# Patient Record
Sex: MALE | Race: WHITE | NOT HISPANIC OR LATINO | Employment: FULL TIME | URBAN - METROPOLITAN AREA
[De-identification: names, ages, dates, MRNs, and addresses within clinical notes are randomized per-mention and may not be internally consistent; named-entity substitution may affect disease eponyms.]

---

## 2017-01-04 ENCOUNTER — CLINICAL SUPPORT (OUTPATIENT)
Dept: CARDIAC REHAB | Facility: CLINIC | Age: 72
End: 2017-01-04
Payer: COMMERCIAL

## 2017-01-04 DIAGNOSIS — Z98.61 S/P PTCA (PERCUTANEOUS TRANSLUMINAL CORONARY ANGIOPLASTY): ICD-10-CM

## 2017-01-04 DIAGNOSIS — I25.10 CORONARY ATHEROSCLEROSIS OF UNSPECIFIED TYPE OF VESSEL, NATIVE OR GRAFT: ICD-10-CM

## 2017-01-04 DIAGNOSIS — Z98.61 S/P PTCA (PERCUTANEOUS TRANSLUMINAL CORONARY ANGIOPLASTY): Primary | ICD-10-CM

## 2017-01-04 DIAGNOSIS — Z98.61 POSTSURGICAL PERCUTANEOUS TRANSLUMINAL CORONARY ANGIOPLASTY STATUS: ICD-10-CM

## 2017-01-04 PROCEDURE — 93798 PHYS/QHP OP CAR RHAB W/ECG: CPT | Mod: S$GLB,,, | Performed by: INTERNAL MEDICINE

## 2017-01-06 ENCOUNTER — CLINICAL SUPPORT (OUTPATIENT)
Dept: CARDIAC REHAB | Facility: CLINIC | Age: 72
End: 2017-01-06
Payer: COMMERCIAL

## 2017-01-06 DIAGNOSIS — Z98.61 POSTSURGICAL PERCUTANEOUS TRANSLUMINAL CORONARY ANGIOPLASTY STATUS: ICD-10-CM

## 2017-01-06 DIAGNOSIS — I25.10 CORONARY ATHEROSCLEROSIS OF UNSPECIFIED TYPE OF VESSEL, NATIVE OR GRAFT: ICD-10-CM

## 2017-01-06 PROCEDURE — 93798 PHYS/QHP OP CAR RHAB W/ECG: CPT | Mod: S$GLB,,, | Performed by: INTERNAL MEDICINE

## 2017-01-09 ENCOUNTER — CLINICAL SUPPORT (OUTPATIENT)
Dept: CARDIAC REHAB | Facility: CLINIC | Age: 72
End: 2017-01-09
Payer: COMMERCIAL

## 2017-01-09 DIAGNOSIS — Z98.61 PERCUTANEOUS TRANSLUMINAL CORONARY ANGIOPLASTY STATUS: ICD-10-CM

## 2017-01-09 DIAGNOSIS — I25.10 DISEASE OF CARDIOVASCULAR SYSTEM: ICD-10-CM

## 2017-01-09 PROCEDURE — 93798 PHYS/QHP OP CAR RHAB W/ECG: CPT | Mod: S$GLB,,, | Performed by: INTERNAL MEDICINE

## 2017-01-11 ENCOUNTER — CLINICAL SUPPORT (OUTPATIENT)
Dept: CARDIAC REHAB | Facility: CLINIC | Age: 72
End: 2017-01-11
Payer: COMMERCIAL

## 2017-01-11 DIAGNOSIS — I25.10 CORONARY ATHEROSCLEROSIS OF UNSPECIFIED TYPE OF VESSEL, NATIVE OR GRAFT: ICD-10-CM

## 2017-01-11 DIAGNOSIS — Z98.61 POSTSURGICAL PERCUTANEOUS TRANSLUMINAL CORONARY ANGIOPLASTY STATUS: ICD-10-CM

## 2017-01-11 PROCEDURE — 93798 PHYS/QHP OP CAR RHAB W/ECG: CPT | Mod: S$GLB,,, | Performed by: INTERNAL MEDICINE

## 2017-01-13 ENCOUNTER — CLINICAL SUPPORT (OUTPATIENT)
Dept: CARDIAC REHAB | Facility: CLINIC | Age: 72
End: 2017-01-13
Payer: COMMERCIAL

## 2017-01-13 DIAGNOSIS — I25.10 CORONARY ARTERY DISEASE INVOLVING NATIVE CORONARY ARTERY OF NATIVE HEART WITHOUT ANGINA PECTORIS: ICD-10-CM

## 2017-01-13 DIAGNOSIS — I25.10 CORONARY ATHEROSCLEROSIS OF UNSPECIFIED TYPE OF VESSEL, NATIVE OR GRAFT: ICD-10-CM

## 2017-01-13 DIAGNOSIS — Z98.61 S/P PTCA (PERCUTANEOUS TRANSLUMINAL CORONARY ANGIOPLASTY): ICD-10-CM

## 2017-01-13 PROCEDURE — 93798 PHYS/QHP OP CAR RHAB W/ECG: CPT | Mod: S$GLB,,, | Performed by: INTERNAL MEDICINE

## 2017-01-16 ENCOUNTER — CLINICAL SUPPORT (OUTPATIENT)
Dept: CARDIAC REHAB | Facility: CLINIC | Age: 72
End: 2017-01-16
Payer: COMMERCIAL

## 2017-01-16 DIAGNOSIS — Z98.61 POSTSURGICAL PERCUTANEOUS TRANSLUMINAL CORONARY ANGIOPLASTY STATUS: ICD-10-CM

## 2017-01-16 DIAGNOSIS — I25.10 CORONARY ATHEROSCLEROSIS OF UNSPECIFIED TYPE OF VESSEL, NATIVE OR GRAFT: ICD-10-CM

## 2017-01-16 PROCEDURE — 93798 PHYS/QHP OP CAR RHAB W/ECG: CPT | Mod: S$GLB,,, | Performed by: INTERNAL MEDICINE

## 2017-01-20 ENCOUNTER — CLINICAL SUPPORT (OUTPATIENT)
Dept: CARDIAC REHAB | Facility: CLINIC | Age: 72
End: 2017-01-20
Payer: COMMERCIAL

## 2017-01-20 DIAGNOSIS — I25.10 CORONARY ATHEROSCLEROSIS OF UNSPECIFIED TYPE OF VESSEL, NATIVE OR GRAFT: ICD-10-CM

## 2017-01-20 DIAGNOSIS — Z98.61 S/P PERCUTANEOUS TRANSLUMINAL CORONARY ANGIOPLASTY: ICD-10-CM

## 2017-01-20 PROCEDURE — 93798 PHYS/QHP OP CAR RHAB W/ECG: CPT | Mod: S$GLB,,, | Performed by: INTERNAL MEDICINE

## 2017-01-23 ENCOUNTER — CLINICAL SUPPORT (OUTPATIENT)
Dept: CARDIAC REHAB | Facility: CLINIC | Age: 72
End: 2017-01-23
Payer: COMMERCIAL

## 2017-01-23 DIAGNOSIS — Z98.61 POSTSURGICAL PERCUTANEOUS TRANSLUMINAL CORONARY ANGIOPLASTY STATUS: ICD-10-CM

## 2017-01-23 DIAGNOSIS — I25.10 CORONARY ATHEROSCLEROSIS OF UNSPECIFIED TYPE OF VESSEL, NATIVE OR GRAFT: ICD-10-CM

## 2017-01-23 PROCEDURE — 93798 PHYS/QHP OP CAR RHAB W/ECG: CPT | Mod: S$GLB,,, | Performed by: INTERNAL MEDICINE

## 2017-01-27 ENCOUNTER — CLINICAL SUPPORT (OUTPATIENT)
Dept: CARDIAC REHAB | Facility: CLINIC | Age: 72
End: 2017-01-27
Payer: COMMERCIAL

## 2017-01-27 DIAGNOSIS — Z98.61 POSTSURGICAL PERCUTANEOUS TRANSLUMINAL CORONARY ANGIOPLASTY STATUS: ICD-10-CM

## 2017-01-27 DIAGNOSIS — I25.10 CORONARY ATHEROSCLEROSIS OF UNSPECIFIED TYPE OF VESSEL, NATIVE OR GRAFT: ICD-10-CM

## 2017-01-27 PROCEDURE — 93798 PHYS/QHP OP CAR RHAB W/ECG: CPT | Mod: S$GLB,,, | Performed by: INTERNAL MEDICINE

## 2017-02-10 ENCOUNTER — CLINICAL SUPPORT (OUTPATIENT)
Dept: CARDIAC REHAB | Facility: CLINIC | Age: 72
End: 2017-02-10
Payer: COMMERCIAL

## 2017-02-10 DIAGNOSIS — I25.10 CORONARY ATHEROSCLEROSIS OF UNSPECIFIED TYPE OF VESSEL, NATIVE OR GRAFT: ICD-10-CM

## 2017-02-10 DIAGNOSIS — Z98.61 POSTSURGICAL PERCUTANEOUS TRANSLUMINAL CORONARY ANGIOPLASTY STATUS: ICD-10-CM

## 2017-02-10 PROCEDURE — 93798 PHYS/QHP OP CAR RHAB W/ECG: CPT | Mod: S$GLB,,, | Performed by: INTERNAL MEDICINE

## 2017-02-13 ENCOUNTER — CLINICAL SUPPORT (OUTPATIENT)
Dept: CARDIAC REHAB | Facility: CLINIC | Age: 72
End: 2017-02-13
Payer: COMMERCIAL

## 2017-02-13 DIAGNOSIS — Z98.61 POSTSURGICAL PERCUTANEOUS TRANSLUMINAL CORONARY ANGIOPLASTY STATUS: ICD-10-CM

## 2017-02-13 DIAGNOSIS — I25.10 CORONARY ATHEROSCLEROSIS OF UNSPECIFIED TYPE OF VESSEL, NATIVE OR GRAFT: ICD-10-CM

## 2017-02-13 PROCEDURE — 93798 PHYS/QHP OP CAR RHAB W/ECG: CPT | Mod: S$GLB,,, | Performed by: DIETITIAN, REGISTERED

## 2017-02-15 ENCOUNTER — CLINICAL SUPPORT (OUTPATIENT)
Dept: CARDIAC REHAB | Facility: CLINIC | Age: 72
End: 2017-02-15
Payer: COMMERCIAL

## 2017-02-15 DIAGNOSIS — Z98.61 S/P PERCUTANEOUS TRANSLUMINAL CORONARY ANGIOPLASTY: ICD-10-CM

## 2017-02-15 DIAGNOSIS — I25.10 CORONARY ATHEROSCLEROSIS OF UNSPECIFIED TYPE OF VESSEL, NATIVE OR GRAFT: ICD-10-CM

## 2017-02-15 PROCEDURE — 93798 PHYS/QHP OP CAR RHAB W/ECG: CPT | Mod: S$GLB,,, | Performed by: INTERNAL MEDICINE

## 2017-02-17 ENCOUNTER — CLINICAL SUPPORT (OUTPATIENT)
Dept: CARDIAC REHAB | Facility: CLINIC | Age: 72
End: 2017-02-17
Payer: COMMERCIAL

## 2017-02-17 DIAGNOSIS — Z98.61 POSTSURGICAL PERCUTANEOUS TRANSLUMINAL CORONARY ANGIOPLASTY STATUS: ICD-10-CM

## 2017-02-17 DIAGNOSIS — I25.10 CORONARY ATHEROSCLEROSIS OF UNSPECIFIED TYPE OF VESSEL, NATIVE OR GRAFT: ICD-10-CM

## 2017-02-17 PROCEDURE — 93798 PHYS/QHP OP CAR RHAB W/ECG: CPT | Mod: S$GLB,,, | Performed by: INTERNAL MEDICINE

## 2017-02-20 ENCOUNTER — CLINICAL SUPPORT (OUTPATIENT)
Dept: CARDIAC REHAB | Facility: CLINIC | Age: 72
End: 2017-02-20
Payer: COMMERCIAL

## 2017-02-20 DIAGNOSIS — I25.10 CORONARY ATHEROSCLEROSIS OF UNSPECIFIED TYPE OF VESSEL, NATIVE OR GRAFT: ICD-10-CM

## 2017-02-20 DIAGNOSIS — Z98.61 POSTSURGICAL PERCUTANEOUS TRANSLUMINAL CORONARY ANGIOPLASTY STATUS: ICD-10-CM

## 2017-02-20 DIAGNOSIS — Z98.61 POSTSURGICAL PERCUTANEOUS TRANSLUMINAL CORONARY ANGIOPLASTY STATUS: Primary | ICD-10-CM

## 2017-02-20 PROCEDURE — 93798 PHYS/QHP OP CAR RHAB W/ECG: CPT | Mod: S$GLB,,, | Performed by: INTERNAL MEDICINE

## 2017-02-22 ENCOUNTER — CLINICAL SUPPORT (OUTPATIENT)
Dept: CARDIAC REHAB | Facility: CLINIC | Age: 72
End: 2017-02-22
Payer: COMMERCIAL

## 2017-02-22 DIAGNOSIS — Z98.61 POSTSURGICAL PERCUTANEOUS TRANSLUMINAL CORONARY ANGIOPLASTY STATUS: ICD-10-CM

## 2017-02-22 DIAGNOSIS — I25.10 CORONARY ATHEROSCLEROSIS OF UNSPECIFIED TYPE OF VESSEL, NATIVE OR GRAFT: ICD-10-CM

## 2017-02-22 PROCEDURE — 93798 PHYS/QHP OP CAR RHAB W/ECG: CPT | Mod: S$GLB,,, | Performed by: INTERNAL MEDICINE

## 2017-02-24 ENCOUNTER — CLINICAL SUPPORT (OUTPATIENT)
Dept: CARDIAC REHAB | Facility: CLINIC | Age: 72
End: 2017-02-24
Payer: COMMERCIAL

## 2017-02-24 DIAGNOSIS — I25.10 CORONARY ATHEROSCLEROSIS OF UNSPECIFIED TYPE OF VESSEL, NATIVE OR GRAFT: ICD-10-CM

## 2017-02-24 DIAGNOSIS — Z98.61 POSTSURGICAL PERCUTANEOUS TRANSLUMINAL CORONARY ANGIOPLASTY STATUS: ICD-10-CM

## 2017-02-24 PROCEDURE — 93798 PHYS/QHP OP CAR RHAB W/ECG: CPT | Mod: S$GLB,,, | Performed by: INTERNAL MEDICINE

## 2017-02-27 ENCOUNTER — CLINICAL SUPPORT (OUTPATIENT)
Dept: CARDIAC REHAB | Facility: CLINIC | Age: 72
End: 2017-02-27
Payer: COMMERCIAL

## 2017-02-27 DIAGNOSIS — I25.10 CORONARY ATHEROSCLEROSIS OF UNSPECIFIED TYPE OF VESSEL, NATIVE OR GRAFT: ICD-10-CM

## 2017-02-27 DIAGNOSIS — Z98.61 POSTSURGICAL PERCUTANEOUS TRANSLUMINAL CORONARY ANGIOPLASTY STATUS: ICD-10-CM

## 2017-02-27 PROCEDURE — 93798 PHYS/QHP OP CAR RHAB W/ECG: CPT | Mod: S$GLB,,, | Performed by: INTERNAL MEDICINE

## 2017-03-03 ENCOUNTER — CLINICAL SUPPORT (OUTPATIENT)
Dept: CARDIAC REHAB | Facility: CLINIC | Age: 72
End: 2017-03-03
Payer: COMMERCIAL

## 2017-03-03 DIAGNOSIS — I25.10 CORONARY ATHEROSCLEROSIS OF UNSPECIFIED TYPE OF VESSEL, NATIVE OR GRAFT: ICD-10-CM

## 2017-03-03 DIAGNOSIS — Z98.61 POSTSURGICAL PERCUTANEOUS TRANSLUMINAL CORONARY ANGIOPLASTY STATUS: ICD-10-CM

## 2017-03-03 PROCEDURE — 93798 PHYS/QHP OP CAR RHAB W/ECG: CPT | Mod: S$GLB,,, | Performed by: INTERNAL MEDICINE

## 2017-03-06 ENCOUNTER — CLINICAL SUPPORT (OUTPATIENT)
Dept: CARDIAC REHAB | Facility: CLINIC | Age: 72
End: 2017-03-06
Payer: COMMERCIAL

## 2017-03-06 DIAGNOSIS — I25.10 CORONARY ATHEROSCLEROSIS OF UNSPECIFIED TYPE OF VESSEL, NATIVE OR GRAFT: ICD-10-CM

## 2017-03-06 DIAGNOSIS — Z98.61 S/P PERCUTANEOUS TRANSLUMINAL CORONARY ANGIOPLASTY: ICD-10-CM

## 2017-03-06 PROCEDURE — 93798 PHYS/QHP OP CAR RHAB W/ECG: CPT | Mod: S$GLB,,, | Performed by: INTERNAL MEDICINE

## 2017-03-08 ENCOUNTER — CLINICAL SUPPORT (OUTPATIENT)
Dept: CARDIAC REHAB | Facility: CLINIC | Age: 72
End: 2017-03-08
Payer: COMMERCIAL

## 2017-03-08 DIAGNOSIS — I25.10 CORONARY ATHEROSCLEROSIS OF UNSPECIFIED TYPE OF VESSEL, NATIVE OR GRAFT: ICD-10-CM

## 2017-03-08 DIAGNOSIS — Z98.61 POSTSURGICAL PERCUTANEOUS TRANSLUMINAL CORONARY ANGIOPLASTY STATUS: ICD-10-CM

## 2017-03-08 PROCEDURE — 93798 PHYS/QHP OP CAR RHAB W/ECG: CPT | Mod: S$GLB,,, | Performed by: INTERNAL MEDICINE

## 2017-03-10 ENCOUNTER — CLINICAL SUPPORT (OUTPATIENT)
Dept: CARDIAC REHAB | Facility: CLINIC | Age: 72
End: 2017-03-10
Payer: COMMERCIAL

## 2017-03-10 DIAGNOSIS — I25.10 CORONARY ATHEROSCLEROSIS OF UNSPECIFIED TYPE OF VESSEL, NATIVE OR GRAFT: ICD-10-CM

## 2017-03-10 DIAGNOSIS — Z98.61 POSTSURGICAL PERCUTANEOUS TRANSLUMINAL CORONARY ANGIOPLASTY STATUS: ICD-10-CM

## 2017-03-10 PROCEDURE — 93798 PHYS/QHP OP CAR RHAB W/ECG: CPT | Mod: S$GLB,,, | Performed by: INTERNAL MEDICINE

## 2017-03-13 ENCOUNTER — CLINICAL SUPPORT (OUTPATIENT)
Dept: CARDIAC REHAB | Facility: CLINIC | Age: 72
End: 2017-03-13
Payer: COMMERCIAL

## 2017-03-13 DIAGNOSIS — Z98.61 S/P PERCUTANEOUS TRANSLUMINAL CORONARY ANGIOPLASTY: ICD-10-CM

## 2017-03-13 DIAGNOSIS — I25.10 CORONARY ATHEROSCLEROSIS OF UNSPECIFIED TYPE OF VESSEL, NATIVE OR GRAFT: ICD-10-CM

## 2017-03-13 PROCEDURE — 93798 PHYS/QHP OP CAR RHAB W/ECG: CPT | Mod: S$GLB,,, | Performed by: INTERNAL MEDICINE

## 2017-03-15 ENCOUNTER — HOSPITAL ENCOUNTER (OUTPATIENT)
Dept: CARDIOLOGY | Facility: CLINIC | Age: 72
Discharge: HOME | End: 2017-03-15
Payer: COMMERCIAL

## 2017-03-15 ENCOUNTER — LAB VISIT (OUTPATIENT)
Dept: LAB | Facility: HOSPITAL | Age: 72
End: 2017-03-15
Attending: INTERNAL MEDICINE
Payer: COMMERCIAL

## 2017-03-15 DIAGNOSIS — I25.10 CORONARY ATHEROSCLEROSIS OF UNSPECIFIED TYPE OF VESSEL, NATIVE OR GRAFT: ICD-10-CM

## 2017-03-15 DIAGNOSIS — Z98.61 POSTSURGICAL PERCUTANEOUS TRANSLUMINAL CORONARY ANGIOPLASTY STATUS: ICD-10-CM

## 2017-03-15 LAB
BASOPHILS # BLD AUTO: 0.02 K/UL
BASOPHILS NFR BLD: 0.4 %
CHOLEST/HDLC SERPL: 2.8 {RATIO}
CRP SERPL-MCNC: 2.51 MG/L
DIASTOLIC DYSFUNCTION: NO
DIFFERENTIAL METHOD: ABNORMAL
EOSINOPHIL # BLD AUTO: 0.2 K/UL
EOSINOPHIL NFR BLD: 4.4 %
ERYTHROCYTE [DISTWIDTH] IN BLOOD BY AUTOMATED COUNT: 13 %
GLUCOSE SERPL-MCNC: 88 MG/DL
HCT VFR BLD AUTO: 44.6 %
HDL/CHOLESTEROL RATIO: 36.2 %
HDLC SERPL-MCNC: 34 MG/DL
HDLC SERPL-MCNC: 94 MG/DL
HGB BLD-MCNC: 15.4 G/DL
LDLC SERPL CALC-MCNC: 41 MG/DL
LYMPHOCYTES # BLD AUTO: 1.5 K/UL
LYMPHOCYTES NFR BLD: 32.9 %
MCH RBC QN AUTO: 30.9 PG
MCHC RBC AUTO-ENTMCNC: 34.5 %
MCV RBC AUTO: 90 FL
MONOCYTES # BLD AUTO: 0.5 K/UL
MONOCYTES NFR BLD: 10 %
NEUTROPHILS # BLD AUTO: 2.4 K/UL
NEUTROPHILS NFR BLD: 52.1 %
NONHDLC SERPL-MCNC: 60 MG/DL
PLATELET # BLD AUTO: 129 K/UL
PMV BLD AUTO: 12.2 FL
RBC # BLD AUTO: 4.98 M/UL
TRIGL SERPL-MCNC: 95 MG/DL
WBC # BLD AUTO: 4.59 K/UL

## 2017-03-15 PROCEDURE — 36415 COLL VENOUS BLD VENIPUNCTURE: CPT

## 2017-03-15 PROCEDURE — 85025 COMPLETE CBC W/AUTO DIFF WBC: CPT

## 2017-03-15 PROCEDURE — 94621 CARDIOPULM EXERCISE TESTING: CPT | Mod: S$GLB,,, | Performed by: INTERNAL MEDICINE

## 2017-03-15 PROCEDURE — 80061 LIPID PANEL: CPT

## 2017-03-15 PROCEDURE — 82947 ASSAY GLUCOSE BLOOD QUANT: CPT

## 2017-03-15 PROCEDURE — 86141 C-REACTIVE PROTEIN HS: CPT

## 2017-03-21 ENCOUNTER — CLINICAL SUPPORT (OUTPATIENT)
Dept: CARDIAC REHAB | Facility: CLINIC | Age: 72
End: 2017-03-21
Payer: COMMERCIAL

## 2017-03-21 DIAGNOSIS — Z98.61 POSTSURGICAL PERCUTANEOUS TRANSLUMINAL CORONARY ANGIOPLASTY STATUS: ICD-10-CM

## 2017-03-21 DIAGNOSIS — I25.10 CORONARY ATHEROSCLEROSIS OF UNSPECIFIED TYPE OF VESSEL, NATIVE OR GRAFT: ICD-10-CM

## 2017-03-21 PROCEDURE — 99999 PR PBB SHADOW E&M-EST. PATIENT-LVL I: CPT | Mod: PBBFAC,,,

## 2017-03-21 PROCEDURE — 93798 PHYS/QHP OP CAR RHAB W/ECG: CPT | Mod: S$GLB,,, | Performed by: INTERNAL MEDICINE

## 2017-03-21 NOTE — PROGRESS NOTES
Exercise:  Met with the patient for exit interview.  Entry and exit CPX test results were discussed as well as current and future exercise routine.      On entry, an estimated MET Level of 12.0 and a Peak VO2 of 30.4ml/kg/min (8.7 actual METS) were measured.  Upon exit, the patient achieved 15.0 estimated METS on the CPX test and a Peak VO2 of 34.1ml/kg/min (9.7 actual METS).      The goal of a 30% improvement to 15.6 was not reached although the patient did have a significant improvement in aerobic capacity.    Based on exit CPX test, the target heart range during aerobic exercise for this patient is 108 to 134 bpm.      Exit CPX test results also included weight (210 lb), abdominal girth (42 in), BMI (29.98), and body composition (21.9%).    Patient stated he has continued attending the gym 5 days/week.  He is using the elliptical and stairclimber interchangeably for a total of 45 minutes.  He has plans to include the treadmill in the rotation.  He is also including stretching after each exercise session and resistance training on non-consecutive days.     Aerobic exercise recommendations of 5 to 6 days/week for 30 to 60 minutes, resistance training 2 to 3 non-consecutive days/week and stretching after each session of exercise was reviewed and patient was asked to call if they have and questions in the future.  Patient stated understanding.    Patient was compliant with attendance to the rehab classes.    Minor Hoyt., CEP

## 2017-03-21 NOTE — PROGRESS NOTES
Patient here for exit interview for Phase II Cardiac rehab.    Discussed with patient pre/post labs, stress tests, QOL, risk factors, goals & home exercise prescription.      QOL:  The following changes were noted on Ward & SF36 Questionnaire:  Ward:                 PRE:       POST:              SF36         PRE:        POST:  Anxiety                    1              0                                       136             139                                  Somatic                  2              2                                        Depression             0              0                                        Hostility                   0              0                                       Patient denies having any overwhelming stress & continues to report excellent family support.  He states that he does have some stress with his job. He has been instructed to seek attention via PCP/Psychiatry in the event that circumstances change.  He verbalizes understanding.        RISK FACTORS:    Hypertension-well controlled  Sedentary lifestyle-pt is exercising 5 days per week.  Hyperlipidemia-LDL well controlled, HDL at 34.  Stress/anxiety-see above  Obesity-BMI at 29.    GOALS:  The following goals have been met:  Decrease cholesterol level  Increase exercise tolerance  Increase knowledge of CAD  Decrease blood pressure  Weight loss  Accurate pulse taking  ID & manage personal areas of stress  Stop smoking  Control diabetes by adjusting diet and exercise  Learn more about healthy eating    Home exercise prescription discussed with patient.  Patient has been instructed to follow up with Cardiologist.   Patient is currently a member at [employer].      Holly Dorado RN  Cardiac Rehab Nurse

## 2017-04-24 ENCOUNTER — OFFICE VISIT (OUTPATIENT)
Dept: PULMONOLOGY | Facility: CLINIC | Age: 72
End: 2017-04-24
Payer: COMMERCIAL

## 2017-04-24 VITALS
WEIGHT: 215.81 LBS | HEIGHT: 71 IN | BODY MASS INDEX: 30.21 KG/M2 | OXYGEN SATURATION: 98 % | HEART RATE: 66 BPM | SYSTOLIC BLOOD PRESSURE: 122 MMHG | DIASTOLIC BLOOD PRESSURE: 66 MMHG

## 2017-04-24 DIAGNOSIS — I25.10 CORONARY ARTERY DISEASE INVOLVING NATIVE CORONARY ARTERY OF NATIVE HEART WITHOUT ANGINA PECTORIS: Primary | ICD-10-CM

## 2017-04-24 DIAGNOSIS — R06.09 DOE (DYSPNEA ON EXERTION): Primary | ICD-10-CM

## 2017-04-24 PROCEDURE — 1157F ADVNC CARE PLAN IN RCRD: CPT | Mod: 8P,S$GLB,, | Performed by: INTERNAL MEDICINE

## 2017-04-24 PROCEDURE — 1126F AMNT PAIN NOTED NONE PRSNT: CPT | Mod: S$GLB,,, | Performed by: INTERNAL MEDICINE

## 2017-04-24 PROCEDURE — 99999 PR PBB SHADOW E&M-EST. PATIENT-LVL III: CPT | Mod: PBBFAC,,, | Performed by: INTERNAL MEDICINE

## 2017-04-24 PROCEDURE — 1159F MED LIST DOCD IN RCRD: CPT | Mod: S$GLB,,, | Performed by: INTERNAL MEDICINE

## 2017-04-24 PROCEDURE — 1160F RVW MEDS BY RX/DR IN RCRD: CPT | Mod: S$GLB,,, | Performed by: INTERNAL MEDICINE

## 2017-04-24 PROCEDURE — 99213 OFFICE O/P EST LOW 20 MIN: CPT | Mod: S$GLB,,, | Performed by: INTERNAL MEDICINE

## 2017-04-24 NOTE — PROGRESS NOTES
Subjective:       Patient ID: Patrick Short is a 71 y.o. male.    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 He is doing well, his brain fog resolved with Dr. Mendoza dropped his dose of lipitor from 80 to 40 mg. Sharp as ever. No hx of chest pain. Starting to train  For the [employer] in the Fall.      No flowsheet data found.]  Review of Systems   Cardiovascular:        S/P CAD with multiiple stents. S/P NSTEMI July, 2016.        Objective:      Physical Exam   Constitutional:   Normal            Assessment:       No diagnosis found.    Outpatient Encounter Prescriptions as of 4/24/2017   Medication Sig Dispense Refill    aspirin 81 MG Chew Take 1 tablet (81 mg total) by mouth once daily.  0    atorvastatin (LIPITOR) 40 MG tablet Take 1 tablet (40 mg total) by mouth once daily. 90 tablet 3    nitroGLYCERIN (NITROSTAT) 0.4 MG SL tablet Place 1 tablet (0.4 mg total) under the tongue every 5 (five) minutes as needed for Chest pain. 30 tablet 1    pantoprazole (PROTONIX) 40 MG tablet Take 1 tablet (40 mg total) by mouth once daily. 30 tablet 11    ticagrelor (BRILINTA) 90 mg tablet Take 1 tablet (90 mg total) by mouth 2 (two) times daily. 60 tablet 11     No facility-administered encounter medications on file as of 4/24/2017.      No orders of the defined types were placed in this encounter.    Plan:         S/P Stents for CAD, Seeing Dr. Mendoza next month for periodic check up

## 2017-05-15 ENCOUNTER — LAB VISIT (OUTPATIENT)
Dept: LAB | Facility: HOSPITAL | Age: 72
End: 2017-05-15
Payer: COMMERCIAL

## 2017-05-15 DIAGNOSIS — E78.5 HYPERLIPIDEMIA, UNSPECIFIED HYPERLIPIDEMIA TYPE: ICD-10-CM

## 2017-05-15 DIAGNOSIS — R41.3 MEMORY LOSS: ICD-10-CM

## 2017-05-15 LAB
CHOLEST/HDLC SERPL: 2.7 {RATIO}
HDL/CHOLESTEROL RATIO: 37.3 %
HDLC SERPL-MCNC: 118 MG/DL
HDLC SERPL-MCNC: 44 MG/DL
LDLC SERPL CALC-MCNC: 50.4 MG/DL
NONHDLC SERPL-MCNC: 74 MG/DL
TRIGL SERPL-MCNC: 118 MG/DL

## 2017-05-15 PROCEDURE — 80061 LIPID PANEL: CPT

## 2017-05-15 PROCEDURE — 36415 COLL VENOUS BLD VENIPUNCTURE: CPT

## 2017-05-23 ENCOUNTER — OFFICE VISIT (OUTPATIENT)
Dept: CARDIOLOGY | Facility: CLINIC | Age: 72
End: 2017-05-23
Payer: COMMERCIAL

## 2017-05-23 VITALS
HEART RATE: 55 BPM | SYSTOLIC BLOOD PRESSURE: 144 MMHG | HEIGHT: 70 IN | DIASTOLIC BLOOD PRESSURE: 89 MMHG | WEIGHT: 218.06 LBS | BODY MASS INDEX: 31.22 KG/M2 | OXYGEN SATURATION: 97 %

## 2017-05-23 DIAGNOSIS — E78.5 DYSLIPIDEMIA: ICD-10-CM

## 2017-05-23 DIAGNOSIS — R79.89 ELEVATED TROPONIN: ICD-10-CM

## 2017-05-23 DIAGNOSIS — I25.10 CORONARY ARTERY DISEASE INVOLVING NATIVE CORONARY ARTERY OF NATIVE HEART WITHOUT ANGINA PECTORIS: ICD-10-CM

## 2017-05-23 DIAGNOSIS — I21.4 NSTEMI (NON-ST ELEVATED MYOCARDIAL INFARCTION): Primary | ICD-10-CM

## 2017-05-23 DIAGNOSIS — R57.0 CARDIOGENIC SHOCK: ICD-10-CM

## 2017-05-23 PROCEDURE — 99499 UNLISTED E&M SERVICE: CPT | Mod: S$GLB,,, | Performed by: INTERNAL MEDICINE

## 2017-05-23 PROCEDURE — 99999 PR PBB SHADOW E&M-EST. PATIENT-LVL IV: CPT | Mod: PBBFAC,,, | Performed by: INTERNAL MEDICINE

## 2017-05-23 NOTE — PROGRESS NOTES
"Subjective:    Patient ID:  Patrick Short is a 71 y.o. male who presents for follow-up of Coronary Artery Disease      Coronary Artery Disease   Pertinent negatives include no dizziness, leg swelling, palpitations, shortness of breath or weight gain.     Patient is a 72 yo man with a recent STEMI (7/18/16) s/p GUS x 2 to RCA, s/p 90% LAD PCI (8/29/2016), anomalous Lcx origin, h/o R groin pseudoaneurysm s/p ultrasound-guided thrombin injection, who presents for routine f/u.Patient states doing great, activity increased, working out eating healthy.  Patient had memory loss on 80 of atorvastatin which has resolved, with a dose reduction to 40mg.  No bleeding issues.      Review of Systems   Constitution: Negative for weight gain and weight loss.   HENT: Negative for headaches.    Eyes: Negative for blurred vision, double vision, vision loss in left eye, vision loss in right eye and visual disturbance.   Cardiovascular: Negative for claudication, dyspnea on exertion, irregular heartbeat, leg swelling, near-syncope, orthopnea and palpitations.   Respiratory: Negative for shortness of breath and snoring.    Hematologic/Lymphatic: Does not bruise/bleed easily.   Skin: Negative for rash.   Musculoskeletal: Negative for myalgias.   Gastrointestinal: Negative for bloating, abdominal pain, constipation, diarrhea, hematemesis, hematochezia, melena, nausea and vomiting.   Neurological: Negative for excessive daytime sleepiness, dizziness, light-headedness, loss of balance, numbness and vertigo.   Psychiatric/Behavioral: Negative for altered mental status.        Objective:    BP (!) 144/89 (BP Location: Right arm, Patient Position: Sitting, BP Method: Automatic)   Pulse (!) 55   Ht 5' 10" (1.778 m)   Wt 98.9 kg (218 lb 0.6 oz)   SpO2 97%   BMI 31.28 kg/m²       Physical Exam   Constitutional: He is oriented to person, place, and time. Vital signs are normal. He appears well-developed and well-nourished.   HENT:   Head: " Normocephalic.   Eyes: Conjunctivae and EOM are normal. Pupils are equal, round, and reactive to light.   Neck: Normal range of motion. Neck supple. No hepatojugular reflux and no JVD present. Carotid bruit is not present. No tracheal deviation present. No thyromegaly present.   Cardiovascular: Normal rate, regular rhythm, normal heart sounds, intact distal pulses and normal pulses.    Pulses:       Carotid pulses are 2+ on the right side, and 2+ on the left side.       Radial pulses are 2+ on the right side, and 2+ on the left side.        Femoral pulses are 2+ on the right side, and 2+ on the left side.       Popliteal pulses are 2+ on the right side, and 2+ on the left side.        Dorsalis pedis pulses are 2+ on the right side, and 2+ on the left side.        Posterior tibial pulses are 2+ on the right side, and 2+ on the left side.   Pulmonary/Chest: Effort normal and breath sounds normal. No respiratory distress.   Abdominal: Soft. Bowel sounds are normal. There is no hepatosplenomegaly.   Musculoskeletal: Normal range of motion.   Neurological: He is alert and oriented to person, place, and time. He has normal strength and normal reflexes.   Skin: Skin is warm and dry.   Psychiatric: He has a normal mood and affect.   Nursing note and vitals reviewed.        Assessment:         Patient Active Problem List   Diagnosis    Coronary artery disease involving native coronary artery without angina pectoris - s/p RCA/LAD stents, on asp, brillinta, doing well, recent CPX 12 mets. F/u 1 year with stress echo    NSTEMI (non-ST elevated myocardial infarction)    Femoral artery pseudo-aneurysm, right - resolved    Dyslipidemia - appropriate, lipitor 40    Memory loss - resolved with decrease in statin dose     Plan:         Follow up in 1 year  Stress echo 1 year  Continue lipitor 40  Lipid panel in 1 year   Coninue DAPT indefinitely      Nikole Reid MD  Interventional Cardiology    I have personally taken the  history and examined this patient. I have discussed and agree with the resident's findings and plan as documented in the resident's note.  Sung Mendoza

## 2017-06-23 ENCOUNTER — HOSPITAL ENCOUNTER (OUTPATIENT)
Dept: CARDIOLOGY | Facility: CLINIC | Age: 72
Discharge: HOME | End: 2017-06-23
Payer: COMMERCIAL

## 2017-06-23 DIAGNOSIS — I25.10 CORONARY ARTERY DISEASE, ANGINA PRESENCE UNSPECIFIED, UNSPECIFIED VESSEL OR LESION TYPE, UNSPECIFIED WHETHER NATIVE OR TRANSPLANTED HEART: Primary | ICD-10-CM

## 2017-06-23 DIAGNOSIS — I25.10 CORONARY ARTERY DISEASE, ANGINA PRESENCE UNSPECIFIED, UNSPECIFIED VESSEL OR LESION TYPE, UNSPECIFIED WHETHER NATIVE OR TRANSPLANTED HEART: ICD-10-CM

## 2017-06-23 LAB
DIASTOLIC DYSFUNCTION: NO
RETIRED EF AND QEF - SEE NOTES: 60 (ref 55–65)

## 2017-06-23 PROCEDURE — 93351 STRESS TTE COMPLETE: CPT | Mod: S$GLB,,, | Performed by: INTERNAL MEDICINE

## 2017-06-23 PROCEDURE — 93321 DOPPLER ECHO F-UP/LMTD STD: CPT | Mod: S$GLB,,, | Performed by: INTERNAL MEDICINE

## 2017-08-17 DIAGNOSIS — I25.10 CORONARY ARTERY DISEASE, ANGINA PRESENCE UNSPECIFIED, UNSPECIFIED VESSEL OR LESION TYPE, UNSPECIFIED WHETHER NATIVE OR TRANSPLANTED HEART: Primary | ICD-10-CM

## 2017-08-18 RX ORDER — PANTOPRAZOLE SODIUM 40 MG/1
40 TABLET, DELAYED RELEASE ORAL DAILY
Qty: 30 TABLET | Refills: 11 | Status: SHIPPED | OUTPATIENT
Start: 2017-08-18 | End: 2018-08-28 | Stop reason: SDUPTHER

## 2017-08-31 RX ORDER — NITROGLYCERIN 0.4 MG/1
0.4 TABLET SUBLINGUAL EVERY 5 MIN PRN
Qty: 30 TABLET | Refills: 1 | Status: SHIPPED | OUTPATIENT
Start: 2017-08-31 | End: 2019-11-19 | Stop reason: SDUPTHER

## 2017-12-17 RX ORDER — ATORVASTATIN CALCIUM 40 MG/1
40 TABLET, FILM COATED ORAL DAILY
Qty: 90 TABLET | Refills: 3 | Status: SHIPPED | OUTPATIENT
Start: 2017-12-17 | End: 2018-12-19 | Stop reason: SDUPTHER

## 2018-05-15 ENCOUNTER — OFFICE VISIT (OUTPATIENT)
Dept: PULMONOLOGY | Facility: CLINIC | Age: 73
End: 2018-05-15
Payer: COMMERCIAL

## 2018-05-15 ENCOUNTER — OFFICE VISIT (OUTPATIENT)
Dept: CARDIOLOGY | Facility: CLINIC | Age: 73
End: 2018-05-15
Payer: COMMERCIAL

## 2018-05-15 ENCOUNTER — HOSPITAL ENCOUNTER (OUTPATIENT)
Dept: CARDIOLOGY | Facility: CLINIC | Age: 73
Discharge: HOME | End: 2018-05-15
Attending: INTERNAL MEDICINE
Payer: COMMERCIAL

## 2018-05-15 ENCOUNTER — CLINICAL SUPPORT (OUTPATIENT)
Dept: INTERNAL MEDICINE | Facility: CLINIC | Age: 73
End: 2018-05-15
Payer: COMMERCIAL

## 2018-05-15 VITALS
SYSTOLIC BLOOD PRESSURE: 154 MMHG | HEART RATE: 58 BPM | WEIGHT: 224 LBS | BODY MASS INDEX: 32.07 KG/M2 | HEIGHT: 70 IN | DIASTOLIC BLOOD PRESSURE: 84 MMHG

## 2018-05-15 VITALS
BODY MASS INDEX: 31.82 KG/M2 | OXYGEN SATURATION: 94 % | SYSTOLIC BLOOD PRESSURE: 152 MMHG | DIASTOLIC BLOOD PRESSURE: 86 MMHG | HEART RATE: 60 BPM | WEIGHT: 227.31 LBS | HEIGHT: 71 IN

## 2018-05-15 DIAGNOSIS — Z00.00 ANNUAL PHYSICAL EXAM: Primary | ICD-10-CM

## 2018-05-15 DIAGNOSIS — Z00.00 ROUTINE GENERAL MEDICAL EXAMINATION AT A HEALTH CARE FACILITY: Primary | ICD-10-CM

## 2018-05-15 DIAGNOSIS — I25.10 CORONARY ARTERY DISEASE, ANGINA PRESENCE UNSPECIFIED, UNSPECIFIED VESSEL OR LESION TYPE, UNSPECIFIED WHETHER NATIVE OR TRANSPLANTED HEART: ICD-10-CM

## 2018-05-15 DIAGNOSIS — R40.0 DAYTIME SLEEPINESS: ICD-10-CM

## 2018-05-15 DIAGNOSIS — E78.5 DYSLIPIDEMIA: ICD-10-CM

## 2018-05-15 DIAGNOSIS — I25.10 CORONARY ARTERY DISEASE INVOLVING NATIVE CORONARY ARTERY OF NATIVE HEART WITHOUT ANGINA PECTORIS: Primary | ICD-10-CM

## 2018-05-15 LAB
ALBUMIN SERPL BCP-MCNC: 3.8 G/DL
ALP SERPL-CCNC: 83 U/L
ALT SERPL W/O P-5'-P-CCNC: 38 U/L
ANION GAP SERPL CALC-SCNC: 8 MMOL/L
AORTIC VALVE REGURGITATION: NORMAL
AST SERPL-CCNC: 33 U/L
BILIRUB SERPL-MCNC: 0.9 MG/DL
BUN SERPL-MCNC: 21 MG/DL
CALCIUM SERPL-MCNC: 9.2 MG/DL
CHLORIDE SERPL-SCNC: 106 MMOL/L
CHOLEST SERPL-MCNC: 106 MG/DL
CHOLEST/HDLC SERPL: 3.1 {RATIO}
CO2 SERPL-SCNC: 27 MMOL/L
COMPLEXED PSA SERPL-MCNC: 0.83 NG/ML
CREAT SERPL-MCNC: 1 MG/DL
DIASTOLIC DYSFUNCTION: NO
ERYTHROCYTE [DISTWIDTH] IN BLOOD BY AUTOMATED COUNT: 12.8 %
EST. GFR  (AFRICAN AMERICAN): >60 ML/MIN/1.73 M^2
EST. GFR  (NON AFRICAN AMERICAN): >60 ML/MIN/1.73 M^2
ESTIMATED AVG GLUCOSE: 103 MG/DL
GLUCOSE SERPL-MCNC: 102 MG/DL
HBA1C MFR BLD HPLC: 5.2 %
HCT VFR BLD AUTO: 44.2 %
HDLC SERPL-MCNC: 34 MG/DL
HDLC SERPL: 32.1 %
HGB BLD-MCNC: 15.4 G/DL
LDLC SERPL CALC-MCNC: 51.6 MG/DL
MCH RBC QN AUTO: 31.4 PG
MCHC RBC AUTO-ENTMCNC: 34.8 G/DL
MCV RBC AUTO: 90 FL
NONHDLC SERPL-MCNC: 72 MG/DL
PLATELET # BLD AUTO: 143 K/UL
PMV BLD AUTO: 11.8 FL
POTASSIUM SERPL-SCNC: 4 MMOL/L
PROT SERPL-MCNC: 6.9 G/DL
RBC # BLD AUTO: 4.91 M/UL
RETIRED EF AND QEF - SEE NOTES: 65 (ref 55–65)
SODIUM SERPL-SCNC: 141 MMOL/L
TRIGL SERPL-MCNC: 102 MG/DL
TSH SERPL DL<=0.005 MIU/L-ACNC: 3.42 UIU/ML
WBC # BLD AUTO: 5.4 K/UL

## 2018-05-15 PROCEDURE — 84443 ASSAY THYROID STIM HORMONE: CPT

## 2018-05-15 PROCEDURE — 85027 COMPLETE CBC AUTOMATED: CPT

## 2018-05-15 PROCEDURE — 84153 ASSAY OF PSA TOTAL: CPT

## 2018-05-15 PROCEDURE — 80061 LIPID PANEL: CPT

## 2018-05-15 PROCEDURE — 99397 PER PM REEVAL EST PAT 65+ YR: CPT | Mod: S$GLB,,, | Performed by: INTERNAL MEDICINE

## 2018-05-15 PROCEDURE — 99215 OFFICE O/P EST HI 40 MIN: CPT | Mod: S$GLB,,, | Performed by: INTERNAL MEDICINE

## 2018-05-15 PROCEDURE — 99999 PR PBB SHADOW E&M-EST. PATIENT-LVL III: CPT | Mod: PBBFAC,,, | Performed by: INTERNAL MEDICINE

## 2018-05-15 PROCEDURE — 93351 STRESS TTE COMPLETE: CPT | Mod: S$GLB,,, | Performed by: INTERNAL MEDICINE

## 2018-05-15 PROCEDURE — 93321 DOPPLER ECHO F-UP/LMTD STD: CPT | Mod: S$GLB,,, | Performed by: INTERNAL MEDICINE

## 2018-05-15 PROCEDURE — 83036 HEMOGLOBIN GLYCOSYLATED A1C: CPT

## 2018-05-15 PROCEDURE — 80053 COMPREHEN METABOLIC PANEL: CPT

## 2018-05-15 RX ORDER — CETIRIZINE HYDROCHLORIDE 10 MG/1
10 TABLET ORAL DAILY
COMMUNITY
End: 2022-12-08

## 2018-05-15 RX ORDER — TRIAMCINOLONE ACETONIDE 1 MG/G
OINTMENT TOPICAL
Refills: 1 | COMMUNITY
Start: 2018-03-29

## 2018-05-15 RX ORDER — CLOBETASOL PROPIONATE 0.5 MG/G
CREAM TOPICAL
Refills: 1 | COMMUNITY
Start: 2018-03-29

## 2018-05-15 RX ORDER — DESONIDE 0.5 MG/G
CREAM TOPICAL
Refills: 1 | COMMUNITY
Start: 2018-03-29

## 2018-05-15 NOTE — ASSESSMENT & PLAN NOTE
- Hx of STEMI (7/18/2016) s/p GUS x 2 to RCA, s/p 90% LAD PCI (8/29/2016), anomalous LCx origin  - No exertional chest pain or shortness of breath  - KRYSTA with non-specific failure to augment of inferior wall, but no clinical symptoms of angina  - Continue ASA, Brilinta, Lipitor  - KRYSTA in 1 year

## 2018-05-15 NOTE — ASSESSMENT & PLAN NOTE
- Has been snoring at night with fatigue and sleepiness in the afternoon  - TSH normal this AM  - Will refer for a sleep study to assess for SHALINI

## 2018-05-15 NOTE — PROGRESS NOTES
Interventional Cardiology Clinic Note  Reason for Visit: CAD, 1 year follow-up    HPI:   Mr. Short is a 73 y/o gentleman with PMHx CAD who presented with a STEMI (7/18/2016) s/p GUS x 2 to RCA, s/p 90% LAD PCI (8/29/2016), anomalous LCx origin, h/o R groin pseudoaneurysm s/p ultrasound-guided thrombin injection, who presents for routine 1 year f/u.    Patient has been doing well since last year, is able to exercise at the gym 1 hour daily either walking on a treadmill or climbing a stairmaster with no chest pain or shortness of breath. He denies any palpitations, claudication, PND, orthopnea, or LE edema. He has gained about 10 pounds since last year, and notes that he feels more tired in the afternoons compared to before. He does say he snores at night, and has never had a previous sleep study.    ROS:    Constitution: Negative for fever, chills, weight loss or gain.   HENT: Negative for sore throat, rhinorrhea, or headache.  Eyes: Negative for blurred or double vision.   Cardiovascular: See above  Pulmonary: Negative for SOB   Gastrointestinal: Negative for abdominal pain, nausea, vomiting, or diarrhea.   : Negative for dysuria.   Neurological: Negative for focal weakness or sensory changes.  PMH:     Past Medical History:   Diagnosis Date    CAD (coronary artery disease) 7/26/2016    Heart block     MI (myocardial infarction)     Reflux      Past Surgical History:   Procedure Laterality Date    CARDIAC CATHETERIZATION      EYE SURGERY Left 02/2017     Allergies:   Review of patient's allergies indicates:  No Known Allergies  Medications:     Current Outpatient Prescriptions on File Prior to Visit   Medication Sig Dispense Refill    aspirin 81 MG Chew Take 1 tablet (81 mg total) by mouth once daily.  0    atorvastatin (LIPITOR) 40 MG tablet Take 1 tablet (40 mg total) by mouth once daily. 90 tablet 3    pantoprazole (PROTONIX) 40 MG tablet Take 1 tablet (40 mg total) by mouth once daily. 30 tablet  "11    ticagrelor (BRILINTA) 90 mg tablet Take 1 tablet (90 mg total) by mouth 2 (two) times daily. 60 tablet 11    nitroGLYCERIN (NITROSTAT) 0.4 MG SL tablet Place 1 tablet (0.4 mg total) under the tongue every 5 (five) minutes as needed for Chest pain. 30 tablet 1     No current facility-administered medications on file prior to visit.      Social History:     Social History   Substance Use Topics    Smoking status: Never Smoker    Smokeless tobacco: Never Used    Alcohol use No      Comment: twice a month/beer wine     Family History:     Family History   Problem Relation Age of Onset    Hypertension Mother     Emphysema Father     Coronary artery disease Father      Physical Exam:   BP (!) 152/86 (BP Location: Right arm, Patient Position: Sitting, BP Method: Large (Automatic))   Pulse 60   Ht 5' 10.5" (1.791 m)   Wt 103.1 kg (227 lb 4.7 oz)   SpO2 (!) 94%   BMI 32.15 kg/m²      Constitutional: NAD, conversant  HEENT: Sclera anicteric, PERRLA, EOMI  Neck: No JVD, no carotid bruits  CV: RRR, no murmur, normal S1/S2  Pulm: CTAB, no wheezes, rales, or ronchi  GI: Abdomen soft, NTND, +BS  Extremities: No LE edema, warm and well perfused  Skin: No ecchymosis, erythema, or ulcers  Psych: AOx3, appropriate affect  Neuro: No gross deficits    Labs:     Lab Results   Component Value Date     05/15/2018    K 4.0 05/15/2018     05/15/2018    CO2 27 05/15/2018    BUN 21 05/15/2018    CREATININE 1.0 05/15/2018    ANIONGAP 8 05/15/2018     Lab Results   Component Value Date    HGBA1C 5.2 05/15/2018     Lab Results   Component Value Date     (H) 07/24/2016    BNP 46 07/18/2016    BNP 64 07/18/2016    Lab Results   Component Value Date    WBC 5.40 05/15/2018    HGB 15.4 05/15/2018    HCT 44.2 05/15/2018     (L) 05/15/2018    GRAN 2.4 03/15/2017    GRAN 52.1 03/15/2017     Lab Results   Component Value Date    CHOL 106 (L) 05/15/2018    HDL 34 (L) 05/15/2018    LDLCALC 51.6 (L) 05/15/2018    " TRIG 102 05/15/2018          Imaging:   KRYSTA (5/15/2018)  CONCLUSIONS     1 - Normal left ventricular systolic function (EF 60-65%).     2 - No wall motion abnormalities.     3 - Normal left ventricular diastolic function.     4 - Normal right ventricular systolic function .     5 - Trivial aortic regurgitation.     6 - Doppler if clinically indicated.     Non-specific finding demonstrating failure to augment involving the basal inferior wall which may be associated with ischemia; clinical correlation required.     EF   Date Value Ref Range Status   05/15/2018 65 55 - 65    06/23/2017 60 55 - 65    07/19/2016 60 55 - 65      Assessment and Plan:   Daytime sleepiness  - Has been snoring at night with fatigue and sleepiness in the afternoon  - TSH normal this AM  - Will refer for a sleep study to assess for SHALINI    CAD (coronary artery disease)  - Hx of STEMI (7/18/2016) s/p GUS x 2 to RCA, s/p 90% LAD PCI (8/29/2016), anomalous LCx origin  - No exertional chest pain or shortness of breath  - KRYSTA with non-specific failure to augment of inferior wall, but no clinical symptoms of angina  - Continue ASA, Brilinta, Lipitor  - KRYSTA in 1 year    Dyslipidemia  - Lipid panel today shows Total 106, LDL 52, HDL 34,   - Continue Lipitor 40 mg Daily    Signed:  Shaheen Saunders MD  Cardiology Fellow, PGY-5  Pager: [phone]  5/15/2018 9:52 AM    I have personally taken the history and examined this patient. I have discussed and agree with the resident's findings and plan as documented in the resident's note.  Have asked Dr. Zelaya to review prior year stress test to compare base of inferior wall findings. Will communicate results to patient.   Follow up in 1 year with stress echo and sleep study now.   Sung Mendoza

## 2018-05-15 NOTE — PROGRESS NOTES
Subjective:       Patient ID: Patrick Short is a 72 y.o. male.    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 He had an emergency cardiac stent July 18, 2016. He has done well, several weeks after the initial event he had catherization  of the left main and remains asymptomatic and functions at a high work level. He participated in the [place] this past fall. His Stress 2-D ECHO today is normal, has been reviewed by Dr. Mendoza. He exercise strenously 3-4 X a week.  Will get a Sleep Study, he is a candidate for obstructive sleep apnea.   Review of Systems   Constitutional: Negative.    HENT: Negative.    Eyes: Negative.    Respiratory: Negative.    Cardiovascular: Negative.         CAD  S/P Two stents  For STEMI with complete heart block and cardiogenic shock.   Gastrointestinal: Negative.    Genitourinary: Negative.    Musculoskeletal: Negative.    Skin: Negative.    Neurological: Negative.    Psychiatric/Behavioral: Negative.    All other systems reviewed and are negative.      Objective:      Physical Exam   Constitutional: He is oriented to person, place, and time. He appears well-developed and well-nourished.   HENT:   Head: Normocephalic and atraumatic.   Right Ear: External ear normal.   Left Ear: External ear normal.   Eyes: Conjunctivae and EOM are normal. Pupils are equal, round, and reactive to light.   Neck: Normal range of motion. Neck supple.   Cardiovascular: Normal rate, regular rhythm and normal heart sounds.    Pulmonary/Chest: Effort normal and breath sounds normal.   Peak flow 650 l/min   Abdominal: Soft. Bowel sounds are normal.   Musculoskeletal: Normal range of motion.   Neurological: He is alert and oriented to person, place, and time. He has normal reflexes.   Skin: Skin is warm and dry.   Psychiatric: He has a normal mood and affect. His behavior is normal. Judgment and thought content  normal.       Assessment:       No diagnosis found.    Plan:           Labs: All parameters are normal, cholesterol:106. Stress 2-D Echo is normal with normal augmentation with exercise. Will arrange  For a sleep study.

## 2018-05-15 NOTE — LETTER
May 15, 2018    Patrick Short  [address] [address]             [facility]  [address]  [facility] [address]  Phone: [phone] Dear Tushar,    Thank you for allowing me to serve you and perform your Executive Health exam on 5/15/2018. This letter will serve as a brief summary of the physical findings and laboratory/studies performed and recommendations at this time. Today's assessment is essentially normal. You have come a long way from July of 2016. I will schedule a sleep study and would encourage you to lose 10 pounds.         If you have any questions or concerns, please don't hesitate to call.    Sincerely,        Darian Lloyd MD

## 2018-08-28 DIAGNOSIS — I25.10 CORONARY ARTERY DISEASE, ANGINA PRESENCE UNSPECIFIED, UNSPECIFIED VESSEL OR LESION TYPE, UNSPECIFIED WHETHER NATIVE OR TRANSPLANTED HEART: ICD-10-CM

## 2018-08-28 RX ORDER — PANTOPRAZOLE SODIUM 40 MG/1
TABLET, DELAYED RELEASE ORAL
Qty: 30 TABLET | Refills: 11 | Status: SHIPPED | OUTPATIENT
Start: 2018-08-28 | End: 2019-07-10 | Stop reason: SDUPTHER

## 2018-08-28 RX ORDER — TICAGRELOR 90 MG/1
TABLET ORAL
Qty: 60 TABLET | Refills: 0 | Status: SHIPPED | OUTPATIENT
Start: 2018-08-28 | End: 2018-10-16 | Stop reason: SDUPTHER

## 2018-10-16 DIAGNOSIS — I25.10 CORONARY ARTERY DISEASE, ANGINA PRESENCE UNSPECIFIED, UNSPECIFIED VESSEL OR LESION TYPE, UNSPECIFIED WHETHER NATIVE OR TRANSPLANTED HEART: ICD-10-CM

## 2018-10-16 RX ORDER — TICAGRELOR 90 MG/1
TABLET ORAL
Qty: 60 TABLET | Refills: 0 | Status: SHIPPED | OUTPATIENT
Start: 2018-10-16 | End: 2018-10-16 | Stop reason: SDUPTHER

## 2018-12-19 RX ORDER — ATORVASTATIN CALCIUM 40 MG/1
40 TABLET, FILM COATED ORAL DAILY
Qty: 90 TABLET | Refills: 3 | Status: SHIPPED | OUTPATIENT
Start: 2018-12-19 | End: 2019-12-05 | Stop reason: SDUPTHER

## 2018-12-28 ENCOUNTER — OFFICE VISIT (OUTPATIENT)
Dept: PULMONOLOGY | Facility: CLINIC | Age: 73
End: 2018-12-28
Payer: COMMERCIAL

## 2018-12-28 ENCOUNTER — CLINICAL SUPPORT (OUTPATIENT)
Dept: INTERNAL MEDICINE | Facility: CLINIC | Age: 73
End: 2018-12-28
Payer: COMMERCIAL

## 2018-12-28 VITALS
HEIGHT: 70 IN | OXYGEN SATURATION: 96 % | WEIGHT: 230.38 LBS | DIASTOLIC BLOOD PRESSURE: 74 MMHG | HEART RATE: 66 BPM | SYSTOLIC BLOOD PRESSURE: 126 MMHG | BODY MASS INDEX: 32.98 KG/M2

## 2018-12-28 DIAGNOSIS — R29.898 WEAKNESS OF BOTH LOWER EXTREMITIES: Primary | ICD-10-CM

## 2018-12-28 DIAGNOSIS — Z00.00 ROUTINE GENERAL MEDICAL EXAMINATION AT A HEALTH CARE FACILITY: Primary | ICD-10-CM

## 2018-12-28 DIAGNOSIS — S09.90XA HEAD TRAUMA, INITIAL ENCOUNTER: ICD-10-CM

## 2018-12-28 LAB
ALBUMIN SERPL BCP-MCNC: 4 G/DL
ALP SERPL-CCNC: 84 U/L
ALT SERPL W/O P-5'-P-CCNC: 36 U/L
ANION GAP SERPL CALC-SCNC: 6 MMOL/L
AST SERPL-CCNC: 32 U/L
BASOPHILS # BLD AUTO: 0.03 K/UL
BASOPHILS NFR BLD: 0.5 %
BILIRUB SERPL-MCNC: 1.1 MG/DL
BUN SERPL-MCNC: 17 MG/DL
CALCIUM SERPL-MCNC: 10 MG/DL
CHLORIDE SERPL-SCNC: 104 MMOL/L
CO2 SERPL-SCNC: 31 MMOL/L
CREAT SERPL-MCNC: 0.9 MG/DL
CRP SERPL-MCNC: 3.76 MG/L
DIFFERENTIAL METHOD: NORMAL
EOSINOPHIL # BLD AUTO: 0.1 K/UL
EOSINOPHIL NFR BLD: 1.4 %
ERYTHROCYTE [DISTWIDTH] IN BLOOD BY AUTOMATED COUNT: 12.6 %
ERYTHROCYTE [SEDIMENTATION RATE] IN BLOOD BY WESTERGREN METHOD: <2 MM/HR
EST. GFR  (AFRICAN AMERICAN): >60 ML/MIN/1.73 M^2
EST. GFR  (NON AFRICAN AMERICAN): >60 ML/MIN/1.73 M^2
FOLATE SERPL-MCNC: 14.4 NG/ML
GLUCOSE SERPL-MCNC: 102 MG/DL
HCT VFR BLD AUTO: 47.9 %
HGB BLD-MCNC: 16 G/DL
IMM GRANULOCYTES # BLD AUTO: 0.02 K/UL
IMM GRANULOCYTES NFR BLD AUTO: 0.3 %
LYMPHOCYTES # BLD AUTO: 1.6 K/UL
LYMPHOCYTES NFR BLD: 25.9 %
MCH RBC QN AUTO: 30.7 PG
MCHC RBC AUTO-ENTMCNC: 33.4 G/DL
MCV RBC AUTO: 92 FL
MONOCYTES # BLD AUTO: 0.5 K/UL
MONOCYTES NFR BLD: 8.3 %
NEUTROPHILS # BLD AUTO: 4 K/UL
NEUTROPHILS NFR BLD: 63.6 %
NRBC BLD-RTO: 0 /100 WBC
PLATELET # BLD AUTO: 177 K/UL
PMV BLD AUTO: 11.6 FL
POTASSIUM SERPL-SCNC: 4.2 MMOL/L
PROT SERPL-MCNC: 7.4 G/DL
RBC # BLD AUTO: 5.21 M/UL
SODIUM SERPL-SCNC: 141 MMOL/L
TSH SERPL DL<=0.005 MIU/L-ACNC: 2.51 UIU/ML
VIT B12 SERPL-MCNC: 449 PG/ML
WBC # BLD AUTO: 6.3 K/UL

## 2018-12-28 PROCEDURE — 82746 ASSAY OF FOLIC ACID SERUM: CPT

## 2018-12-28 PROCEDURE — 85652 RBC SED RATE AUTOMATED: CPT

## 2018-12-28 PROCEDURE — 85025 COMPLETE CBC W/AUTO DIFF WBC: CPT

## 2018-12-28 PROCEDURE — 1101F PT FALLS ASSESS-DOCD LE1/YR: CPT | Mod: CPTII,S$GLB,, | Performed by: INTERNAL MEDICINE

## 2018-12-28 PROCEDURE — 82607 VITAMIN B-12: CPT

## 2018-12-28 PROCEDURE — 86141 C-REACTIVE PROTEIN HS: CPT

## 2018-12-28 PROCEDURE — 84443 ASSAY THYROID STIM HORMONE: CPT

## 2018-12-28 PROCEDURE — 99999 PR PBB SHADOW E&M-EST. PATIENT-LVL III: CPT | Mod: PBBFAC,,, | Performed by: INTERNAL MEDICINE

## 2018-12-28 PROCEDURE — 80053 COMPREHEN METABOLIC PANEL: CPT

## 2018-12-28 PROCEDURE — 99215 OFFICE O/P EST HI 40 MIN: CPT | Mod: S$GLB,,, | Performed by: INTERNAL MEDICINE

## 2018-12-28 NOTE — PROGRESS NOTES
Subjective:       Patient ID: Patrick Short is a 73 y.o. male.    Chief Complaint: Hernia    HPI  Review of Systems    Objective:      Physical Exam    Assessment:       1. Weakness of both lower extremities        Plan:       ***

## 2018-12-28 NOTE — PROGRESS NOTES
Subjective:      Patient ID: Patrick Short is a 73 y.o. male.    Chief Complaint: Hernia    HPI  72 yo male well known to me comes for assessment of a possible hernia. He called me at home over the holidays and wanted me to check him out for an inguinal hernia. When he arrived I noted that he had a shuffle to his gait and began a neurological history, He notes that getting out of his car, he must just his hands to help get his right leg in and out. He is a bit slower in cognitive efforts. Recently took a questionnaire and he had dropped 10 points in performing the assessment that when he did it several years ago. No head aches, slight change in vision. Walks with a slight shuffle and unsteadiness,, does not fall.  Heel to toe test is normal and Rhomberg is negative. He tells me that In July when he was in [place] for the running of the [place] he fell in the shower, a very small contained space and hit the back of his head.  Several months ago he had severe occipital headaches which hadve  subsided and never returned. His gross neurological function is normal but has  muscle weakness in his lower extremities is weaker R>L.  I am concern about PMR, will get labs if normal will get a CT of the head to rule out a chronic subdural from the fall, he has been on blood thinner since his cardiac event: July, 2016. If negative, he needs to see neurology. Something is not right.      No flowsheet data found.  Review of Systems   HENT: Negative.    Eyes: Negative.    Respiratory: Negative.    Cardiovascular: Negative.         CAD with stents since July 2016, on blood thinner   Genitourinary: Negative.    Musculoskeletal: Negative.    Skin: Negative.    Gastrointestinal: Negative.    Neurological: Positive for weakness and headaches.        Shuffling gait   Psychiatric/Behavioral: Negative.      Objective:     Physical Exam   Constitutional: He is oriented to person, place, and time. He appears well-developed and well-nourished.    HENT:   Head: Normocephalic and atraumatic.   Right Ear: External ear normal.   Left Ear: External ear normal.   Eyes: Conjunctivae and EOM are normal. Pupils are equal, round, and reactive to light.   Neck: Normal range of motion. Neck supple.   Cardiovascular: Normal rate, regular rhythm and normal heart sounds.   Pulmonary/Chest: Effort normal and breath sounds normal.   Abdominal: Soft. Bowel sounds are normal.   No inguinal hernia on either side.    Musculoskeletal: Normal range of motion.   Neurological: He is alert and oriented to person, place, and time. He has normal reflexes.   Weakness with muscle testing in lower extremities    Past hx of occipital headaches.    Shuffling gait   Skin: Skin is warm and dry.   Psychiatric: He has a normal mood and affect. His behavior is normal. Judgment and thought content normal.       Assessment:     1. Weakness of both lower extremities      Outpatient Encounter Medications as of 12/28/2018   Medication Sig Dispense Refill    aspirin 81 MG Chew Take 1 tablet (81 mg total) by mouth once daily.  0    atorvastatin (LIPITOR) 40 MG tablet TAKE 1 TABLET (40 MG TOTAL) BY MOUTH ONCE DAILY. 90 tablet 3    cetirizine (ZYRTEC) 10 MG tablet Take 10 mg by mouth once daily.      clobetasol (TEMOVATE) 0.05 % cream APPLY TO AFFECTED AREA ON HAND TWICE A DAY AS NEEDED FOR RASH  1    desonide (DESOWEN) 0.05 % cream APPLY TO AFFECTED AREA ON FACE TWICE A DAY AS NEEDED FOR SCALE  1    nitroGLYCERIN (NITROSTAT) 0.4 MG SL tablet Place 1 tablet (0.4 mg total) under the tongue every 5 (five) minutes as needed for Chest pain. 30 tablet 1    pantoprazole (PROTONIX) 40 MG tablet TAKE 1 TABLET BY MOUTH EVERY DAY 30 tablet 11    ticagrelor (BRILINTA) 90 mg tablet Take 1 tablet (90 mg total) by mouth 2 (two) times daily. 180 tablet 3    triamcinolone acetonide 0.1% (KENALOG) 0.1 % ointment APPLY TO AFFECTED AREA ON AXILLAS TWICE A DAY AS NEEDED FOR ITCH/RASH  1     No  facility-administered encounter medications on file as of 12/28/2018.        Plan:   Labs: All parameters are normal. No evidence  Of vitamin deficiency or PMR Will proceed with CT of the head Encounter exceeded an hour. His physical exam for a hernia is negative.  Problem List Items Addressed This Visit     None      Visit Diagnoses     Weakness of both lower extremities    -  Primary

## 2019-01-03 ENCOUNTER — OFFICE VISIT (OUTPATIENT)
Dept: PULMONOLOGY | Facility: CLINIC | Age: 74
End: 2019-01-03
Payer: COMMERCIAL

## 2019-01-03 ENCOUNTER — HOSPITAL ENCOUNTER (OUTPATIENT)
Dept: RADIOLOGY | Facility: HOSPITAL | Age: 74
Discharge: HOME | End: 2019-01-03
Attending: INTERNAL MEDICINE
Payer: COMMERCIAL

## 2019-01-03 VITALS
OXYGEN SATURATION: 100 % | HEIGHT: 71 IN | HEART RATE: 71 BPM | BODY MASS INDEX: 31.5 KG/M2 | SYSTOLIC BLOOD PRESSURE: 138 MMHG | DIASTOLIC BLOOD PRESSURE: 76 MMHG | WEIGHT: 225 LBS

## 2019-01-03 DIAGNOSIS — R26.9 GAIT ABNORMALITY: Primary | ICD-10-CM

## 2019-01-03 DIAGNOSIS — R29.898 LEG WEAKNESS, BILATERAL: Primary | ICD-10-CM

## 2019-01-03 DIAGNOSIS — S09.90XA HEAD TRAUMA, INITIAL ENCOUNTER: ICD-10-CM

## 2019-01-03 DIAGNOSIS — R29.898 WEAKNESS OF BOTH LEGS: ICD-10-CM

## 2019-01-03 PROCEDURE — 99999 PR PBB SHADOW E&M-EST. PATIENT-LVL III: ICD-10-PCS | Mod: PBBFAC,,, | Performed by: INTERNAL MEDICINE

## 2019-01-03 PROCEDURE — 99214 OFFICE O/P EST MOD 30 MIN: CPT | Mod: S$GLB,,, | Performed by: INTERNAL MEDICINE

## 2019-01-03 PROCEDURE — 70450 CT HEAD WITHOUT CONTRAST: ICD-10-PCS | Mod: 26,,, | Performed by: RADIOLOGY

## 2019-01-03 PROCEDURE — 99999 PR PBB SHADOW E&M-EST. PATIENT-LVL III: CPT | Mod: PBBFAC,,, | Performed by: INTERNAL MEDICINE

## 2019-01-03 PROCEDURE — 99214 PR OFFICE/OUTPT VISIT, EST, LEVL IV, 30-39 MIN: ICD-10-PCS | Mod: S$GLB,,, | Performed by: INTERNAL MEDICINE

## 2019-01-03 PROCEDURE — 70450 CT HEAD/BRAIN W/O DYE: CPT | Mod: 26,,, | Performed by: RADIOLOGY

## 2019-01-03 PROCEDURE — 70450 CT HEAD/BRAIN W/O DYE: CPT | Mod: TC

## 2019-01-03 PROCEDURE — 1101F PR PT FALLS ASSESS DOC 0-1 FALLS W/OUT INJ PAST YR: ICD-10-PCS | Mod: CPTII,S$GLB,, | Performed by: INTERNAL MEDICINE

## 2019-01-03 PROCEDURE — 1101F PT FALLS ASSESS-DOCD LE1/YR: CPT | Mod: CPTII,S$GLB,, | Performed by: INTERNAL MEDICINE

## 2019-01-03 NOTE — PROGRESS NOTES
Subjective:      Patient ID: Patrick Short is a 73 y.o. male.    Chief Complaint: No chief complaint on file.    HPIHead CT is negative for any evidence of masses or subdural hematoma. Will get appt in neurology and see if they can better delineate his issue.      No flowsheet data found.  Review of Systems  Objective:     Physical Exam    Assessment:     No diagnosis found.  Outpatient Encounter Medications as of 1/3/2019   Medication Sig Dispense Refill    aspirin 81 MG Chew Take 1 tablet (81 mg total) by mouth once daily.  0    atorvastatin (LIPITOR) 40 MG tablet TAKE 1 TABLET (40 MG TOTAL) BY MOUTH ONCE DAILY. 90 tablet 3    cetirizine (ZYRTEC) 10 MG tablet Take 10 mg by mouth once daily.      clobetasol (TEMOVATE) 0.05 % cream APPLY TO AFFECTED AREA ON HAND TWICE A DAY AS NEEDED FOR RASH  1    desonide (DESOWEN) 0.05 % cream APPLY TO AFFECTED AREA ON FACE TWICE A DAY AS NEEDED FOR SCALE  1    nitroGLYCERIN (NITROSTAT) 0.4 MG SL tablet Place 1 tablet (0.4 mg total) under the tongue every 5 (five) minutes as needed for Chest pain. 30 tablet 1    pantoprazole (PROTONIX) 40 MG tablet TAKE 1 TABLET BY MOUTH EVERY DAY 30 tablet 11    ticagrelor (BRILINTA) 90 mg tablet Take 1 tablet (90 mg total) by mouth 2 (two) times daily. 180 tablet 3    triamcinolone acetonide 0.1% (KENALOG) 0.1 % ointment APPLY TO AFFECTED AREA ON AXILLAS TWICE A DAY AS NEEDED FOR ITCH/RASH  1     No facility-administered encounter medications on file as of 1/3/2019.        Plan:   No lab evidence of PMR and the CT of the head today is negative for any masses or subdural hematoma. Will get consult with neurololgy  Problem List Items Addressed This Visit     None

## 2019-01-17 ENCOUNTER — TELEPHONE (OUTPATIENT)
Dept: NEUROLOGY | Facility: CLINIC | Age: 74
End: 2019-01-17

## 2019-01-18 NOTE — TELEPHONE ENCOUNTER
----- Message from Nela Leon sent at 1/17/2019  1:55 PM CST -----  Contact: Pt request via [facility]  Message     ----- Message from [facility], System Message sent at 1/15/2019  3:50 PM CST -----    Appointment Request From: Patrick Short    With Provider: Rafael Freedman    Preferred Date Range: 1/21/2019 - 1/25/2019    Preferred Times: Any time    Reason for visit: referred by Dr Neva Lloyd; leg problem    Comments:  Available afternoon any day

## 2019-01-21 ENCOUNTER — TELEPHONE (OUTPATIENT)
Dept: NEUROLOGY | Facility: CLINIC | Age: 74
End: 2019-01-21

## 2019-01-21 NOTE — TELEPHONE ENCOUNTER
----- Message from Barb Newman sent at 1/21/2019 10:05 AM CST -----  Contact: pt  Pt would like to be called back regarding to getting a new patient appt     Pt can be reached at [phone]

## 2019-02-13 ENCOUNTER — OFFICE VISIT (OUTPATIENT)
Dept: NEUROLOGY | Facility: CLINIC | Age: 74
End: 2019-02-13
Payer: COMMERCIAL

## 2019-02-13 VITALS
HEIGHT: 71 IN | WEIGHT: 233.94 LBS | DIASTOLIC BLOOD PRESSURE: 86 MMHG | BODY MASS INDEX: 32.75 KG/M2 | HEART RATE: 80 BPM | SYSTOLIC BLOOD PRESSURE: 140 MMHG

## 2019-02-13 DIAGNOSIS — G95.9 MYELOPATHY: Primary | ICD-10-CM

## 2019-02-13 DIAGNOSIS — R53.1 WEAKNESS: ICD-10-CM

## 2019-02-13 PROCEDURE — 1101F PR PT FALLS ASSESS DOC 0-1 FALLS W/OUT INJ PAST YR: ICD-10-PCS | Mod: CPTII,S$GLB,, | Performed by: PSYCHIATRY & NEUROLOGY

## 2019-02-13 PROCEDURE — 99999 PR PBB SHADOW E&M-EST. PATIENT-LVL IV: ICD-10-PCS | Mod: PBBFAC,,, | Performed by: PSYCHIATRY & NEUROLOGY

## 2019-02-13 PROCEDURE — 1101F PT FALLS ASSESS-DOCD LE1/YR: CPT | Mod: CPTII,S$GLB,, | Performed by: PSYCHIATRY & NEUROLOGY

## 2019-02-13 PROCEDURE — 99205 OFFICE O/P NEW HI 60 MIN: CPT | Mod: S$GLB,,, | Performed by: PSYCHIATRY & NEUROLOGY

## 2019-02-13 PROCEDURE — 99999 PR PBB SHADOW E&M-EST. PATIENT-LVL IV: CPT | Mod: PBBFAC,,, | Performed by: PSYCHIATRY & NEUROLOGY

## 2019-02-13 PROCEDURE — 99205 PR OFFICE/OUTPT VISIT, NEW, LEVL V, 60-74 MIN: ICD-10-PCS | Mod: S$GLB,,, | Performed by: PSYCHIATRY & NEUROLOGY

## 2019-02-13 NOTE — LETTER
February 19, 2019      Darian Lloyd MD  [address]  [facility] [address]           [facility]  [address]  [facility] [address]  Phone: [phone]  Fax: [phone]          Patient: Patrick Short   MR Number: [id number]   YOB: 1945   Date of Visit: 2/13/2019       Dear Dr. Darian Lloyd:    Thank you for referring Patrick Short to me for evaluation. Attached you will find relevant portions of my assessment and plan of care.    If you have questions, please do not hesitate to call me. I look forward to following Patrick Short along with you.    Sincerely,    Rafael Freedman MD    Enclosure  CC:  No Recipients    If you would like to receive this communication electronically, please contact [email or web address] or [phone] to request more information on [employer] access.    For providers and/or their staff who would like to refer a patient to [facility], please contact us through our one-stop-shop provider referral line, [facility], at [phone].    If you feel you have received this communication in error or would no longer like to receive these types of communications, please e-mail [email or web address]

## 2019-02-13 NOTE — PROGRESS NOTES
Name: Patrick Short  MRN: [id number]   CSN: [id number]      Date: 02/13/2019    Referring physician:  Darian Lloyd MD  [address] [place] [address]    Chief Complaint / Interval History: No chief complaint on file.      History of Present Illness (HPI):  74 yo retired  of [employer]  Here for evaluation of gait changes. Referred by Dr. Lloyd.  Usual state of health until July 2016.  Was starting to run and train, had midline chest discomfort, had EKG/stress test, cardiac arrest in ER, treated with stent in July, then another on 8/29/16 radial access and a GUS was placed in mid LAD stent.      He participated in the [place] this past fall. His Stress 2-D ECHO today is normal, has been reviewed by Dr. Mendoza. He exercise strenously 3-4 X a week.    In July, he fell in the shower while in [employer], running with bulls!  Hit the back of his head, immediate stiff neck, and now has loss of mobility in the christina and reduced range of motion.3 months ago, he started to notice stiffness in his R leg predominantly.  Still exercising, but feels like he is walking like a penguin.  Using arms to get up.  Trouble getting off the floor.  Has not fallen to the ground, jason much more staggering.  Not feeling weak in arms.  Some tingling in his hands B after sleeping, but otherwise no description of Lhermitte's.      Vision is OK generally, but he rarely sees straight lines looking like a kink in them. Had torn R retina in the past.    7 years ago, was hang gliding in [place] and had a rough landing, pulled his legs up and hit his butt.  Got better with time and gabapentin.    Nonmotor/Premotor ROS:  Hyposmia (HENT)?Yes - terrible for years, but wife notices more now  RBD/sleep issues (Constitutional)?No  Depression/anxiety (Psychiatric)?No  Fatigue (Constitutional)?Yes - a little more than in the past, daytime naps  Constipation (GI)?No  Urinary issues ()?No  Sexual dysfunction  ()?No  Orthostasis (Cardiovascular)?No  Leg swelling (Cardiovascular)? No  Falls (Musculoskeletal)?Yes  Cognitive impairment (Neurologic)?No  Psychoses (Psychiatric)?No  Pain/Paresthesia (Neurologic)?Yes  Visual changes (Eyes)?Yes  Moles / skin changes (Skin)?No  Stridor / SOB (Pulm)?No  Bruising (Heme)?No    Past Medical History: The patient  has a past medical history of CAD (coronary artery disease) (7/26/2016), Heart block, MI (myocardial infarction), and Reflux.    Social History: The patient  reports that  has never smoked. he has never used smokeless tobacco. He reports that he does not drink alcohol or use drugs.    Family History: Their family history includes Coronary artery disease in his father; Emphysema in his father; Hypertension in his mother.    Allergies: Patient has no known allergies.     Meds:   Current Outpatient Medications on File Prior to Visit   Medication Sig Dispense Refill    aspirin 81 MG Chew Take 1 tablet (81 mg total) by mouth once daily.  0    atorvastatin (LIPITOR) 40 MG tablet TAKE 1 TABLET (40 MG TOTAL) BY MOUTH ONCE DAILY. 90 tablet 3    cetirizine (ZYRTEC) 10 MG tablet Take 10 mg by mouth once daily.      clobetasol (TEMOVATE) 0.05 % cream APPLY TO AFFECTED AREA ON HAND TWICE A DAY AS NEEDED FOR RASH  1    desonide (DESOWEN) 0.05 % cream APPLY TO AFFECTED AREA ON FACE TWICE A DAY AS NEEDED FOR SCALE  1    nitroGLYCERIN (NITROSTAT) 0.4 MG SL tablet Place 1 tablet (0.4 mg total) under the tongue every 5 (five) minutes as needed for Chest pain. 30 tablet 1    pantoprazole (PROTONIX) 40 MG tablet TAKE 1 TABLET BY MOUTH EVERY DAY 30 tablet 11    ticagrelor (BRILINTA) 90 mg tablet Take 1 tablet (90 mg total) by mouth 2 (two) times daily. 180 tablet 3    triamcinolone acetonide 0.1% (KENALOG) 0.1 % ointment APPLY TO AFFECTED AREA ON AXILLAS TWICE A DAY AS NEEDED FOR ITCH/RASH  1     No current facility-administered medications on file prior to visit.        Exam:  BP (!)  "140/86   Pulse 80   Ht 5' 11" (1.803 m)   Wt 106.1 kg (233 lb 14.5 oz)   BMI 32.62 kg/m²       Constitutional  Well-developed, well-nourished, appears stated age   Ophthalmoscopic  No papilledema with no hemorrhages or exudates bilaterally   Cardiovascular  Radial pulses 2+ and symmetric, no LE edema bilaterally   Neurological    * Mental status  MOCA deferred     - Orientation  Oriented to person, place, time, and situation     - Memory   Intact recent and remote     - Attention/concentration  Attentive, vigilant during exam     - Language  Naming & repetition intact, +2-step commands     - Fund of knowledge  Aware of current events     - Executive  Well-organized thoughts     - Other     * Cranial nerves       - CN II  PERRL, visual fields full to confrontation     - CN III, IV, VI  Extraocular movements full, normal pursuits and saccades     - CN V  Sensation V1 - V3 intact     - CN VII  Face strong and symmetric bilaterally     - CN VIII  Hearing intact bilaterally     - CN IX, X  Palate raises midline and symmetric     - CN XI  SCM and trapezius 5/5 bilaterally     - CN XII  Tongue midline   * Motor  Muscle bulk normal, strength 5/5 throughout except R hip flexion 4+/5   * Sensory   MIld dim sensation legs length dependent, no level   * Coordination  No dysmetria with finger-to-nose or heel-to-shin   * Gait  See below.   * Deep tendon reflexes  1+ and symmetric throughout except 2+ left leg   Babinski downgoing bilaterally   * Specialized movement exam  No hypophonic speech.    No facial masking.  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   No cogwheel rigidity.     No bradykinesia.   No tremor with rest, posture, kinesis, or intention.    No other dystonia, chorea, athetosis, myoclonus, or tics.   No motor impersistence.   Normal-based gait.   No shortened stride length.   No abnormal arm swing.     No postural instability.      Medical Record Review:  - Lab Results:  Clinical Support on 12/28/2018 "   Component Date Value Ref Range Status    Sodium 12/28/2018 141  136 - 145 mmol/L Final    Potassium 12/28/2018 4.2  3.5 - 5.1 mmol/L Final    Chloride 12/28/2018 104  95 - 110 mmol/L Final    CO2 12/28/2018 31* 23 - 29 mmol/L Final    Glucose 12/28/2018 102  70 - 110 mg/dL Final    BUN, Bld 12/28/2018 17  8 - 23 mg/dL Final    Creatinine 12/28/2018 0.9  0.5 - 1.4 mg/dL Final    Calcium 12/28/2018 10.0  8.7 - 10.5 mg/dL Final    Total Protein 12/28/2018 7.4  6.0 - 8.4 g/dL Final    Albumin 12/28/2018 4.0  3.5 - 5.2 g/dL Final    Total Bilirubin 12/28/2018 1.1* 0.1 - 1.0 mg/dL Final    Alkaline Phosphatase 12/28/2018 84  55 - 135 U/L Final    AST 12/28/2018 32  10 - 40 U/L Final    ALT 12/28/2018 36  10 - 44 U/L Final    Anion Gap 12/28/2018 6* 8 - 16 mmol/L Final    eGFR if African American 12/28/2018 >60.0  >60 mL/min/1.73 m^2 Final    eGFR if non African American 12/28/2018 >60.0  >60 mL/min/1.73 m^2 Final    TSH 12/28/2018 2.506  0.400 - 4.000 uIU/mL Final    WBC 12/28/2018 6.30  3.90 - 12.70 K/uL Final    RBC 12/28/2018 5.21  4.60 - 6.20 M/uL Final    Hemoglobin 12/28/2018 16.0  14.0 - 18.0 g/dL Final    Hematocrit 12/28/2018 47.9  40.0 - 54.0 % Final    MCV 12/28/2018 92  82 - 98 fL Final    MCH 12/28/2018 30.7  27.0 - 31.0 pg Final    MCHC 12/28/2018 33.4  32.0 - 36.0 g/dL Final    RDW 12/28/2018 12.6  11.5 - 14.5 % Final    Platelets 12/28/2018 177  150 - 350 K/uL Final    MPV 12/28/2018 11.6  9.2 - 12.9 fL Final    Immature Granulocytes 12/28/2018 0.3  0.0 - 0.5 % Final    Gran # (ANC) 12/28/2018 4.0  1.8 - 7.7 K/uL Final    Immature Grans (Abs) 12/28/2018 0.02  0.00 - 0.04 K/uL Final    Lymph # 12/28/2018 1.6  1.0 - 4.8 K/uL Final    Mono # 12/28/2018 0.5  0.3 - 1.0 K/uL Final    Eos # 12/28/2018 0.1  0.0 - 0.5 K/uL Final    Baso # 12/28/2018 0.03  0.00 - 0.20 K/uL Final    nRBC 12/28/2018 0  0 /100 WBC Final    Gran% 12/28/2018 63.6  38.0 - 73.0 % Final    Lymph%  12/28/2018 25.9  18.0 - 48.0 % Final    Mono% 12/28/2018 8.3  4.0 - 15.0 % Final    Eosinophil% 12/28/2018 1.4  0.0 - 8.0 % Final    Basophil% 12/28/2018 0.5  0.0 - 1.9 % Final    Differential Method 12/28/2018 Automated   Final    Sed Rate 12/28/2018 <2  0 - 23 mm/Hr Final    Vitamin B-12 12/28/2018 449  210 - 950 pg/mL Final    Folate 12/28/2018 14.4  4.0 - 24.0 ng/mL Final    CRP, High Sensitivity 12/28/2018 3.76* 0.00 - 3.19 mg/L Final       - Independent visualization and interpretation of radiological images:  * Modest hypoattenuation in supratentorial white matter, likely chronic microvascular ischemic change.  This is not unusually advanced for age.  No parenchymal mass, hemorrhage, edema or major vascular distribution infarct.            - Independent review of consultant's notes: [place]      Diagnoses:    Most consistent with a myeloneuropathy - reversible causes and structural lesions of the neuroaxis to be tested now.      Medical Decision Making:    Problem List Items Addressed This Visit     None      Visit Diagnoses     Myelopathy    -  Primary    Relevant Orders    MRI Cervical Spine Without Contrast (Completed)    MRI Thoracic Spine Without Contrast (Completed)    MRI Lumbar Spine Without Contrast (Completed)    Vitamin B1    Methylmalonic acid, serum    Homocysteine, serum    CK    Weakness        Relevant Orders    Vitamin B1    Methylmalonic acid, serum    Homocysteine, serum    CK              Rafael Freedman MD, MPH  Division of Movement and Memory Disorders  [facility]  [phone]

## 2019-02-18 ENCOUNTER — HOSPITAL ENCOUNTER (OUTPATIENT)
Dept: RADIOLOGY | Facility: HOSPITAL | Age: 74
Discharge: HOME | End: 2019-02-18
Attending: PSYCHIATRY & NEUROLOGY
Payer: COMMERCIAL

## 2019-02-18 ENCOUNTER — CLINICAL SUPPORT (OUTPATIENT)
Dept: INTERNAL MEDICINE | Facility: CLINIC | Age: 74
End: 2019-02-18
Payer: COMMERCIAL

## 2019-02-18 DIAGNOSIS — G95.9 MYELOPATHY: ICD-10-CM

## 2019-02-18 DIAGNOSIS — Z00.00 ROUTINE GENERAL MEDICAL EXAMINATION AT A HEALTH CARE FACILITY: Primary | ICD-10-CM

## 2019-02-18 LAB
CK SERPL-CCNC: 152 U/L
HCYS SERPL-SCNC: 9.9 UMOL/L

## 2019-02-18 PROCEDURE — 72141 MRI NECK SPINE W/O DYE: CPT | Mod: 26,,, | Performed by: RADIOLOGY

## 2019-02-18 PROCEDURE — 83921 ORGANIC ACID SINGLE QUANT: CPT

## 2019-02-18 PROCEDURE — 83090 ASSAY OF HOMOCYSTEINE: CPT

## 2019-02-18 PROCEDURE — 72146 MRI CHEST SPINE W/O DYE: CPT | Mod: 26,,, | Performed by: RADIOLOGY

## 2019-02-18 PROCEDURE — 72148 MRI LUMBAR SPINE W/O DYE: CPT | Mod: 26,,, | Performed by: RADIOLOGY

## 2019-02-18 PROCEDURE — 72141 MRI NECK SPINE W/O DYE: CPT | Mod: TC

## 2019-02-18 PROCEDURE — 72141 MRI CERVICAL SPINE WITHOUT CONTRAST: ICD-10-PCS | Mod: 26,,, | Performed by: RADIOLOGY

## 2019-02-18 PROCEDURE — 84425 ASSAY OF VITAMIN B-1: CPT

## 2019-02-18 PROCEDURE — 72148 MRI LUMBAR SPINE W/O DYE: CPT | Mod: TC

## 2019-02-18 PROCEDURE — 82550 ASSAY OF CK (CPK): CPT

## 2019-02-18 PROCEDURE — 36415 COLL VENOUS BLD VENIPUNCTURE: CPT

## 2019-02-18 PROCEDURE — 72146 MRI THORACIC SPINE WITHOUT CONTRAST: ICD-10-PCS | Mod: 26,,, | Performed by: RADIOLOGY

## 2019-02-18 PROCEDURE — 72146 MRI CHEST SPINE W/O DYE: CPT | Mod: TC

## 2019-02-18 PROCEDURE — 72148 MRI LUMBAR SPINE WITHOUT CONTRAST: ICD-10-PCS | Mod: 26,,, | Performed by: RADIOLOGY

## 2019-02-21 LAB — METHYLMALONATE SERPL-SCNC: 0.17 UMOL/L

## 2019-02-24 LAB — VIT B1 BLD-MCNC: 67 UG/L (ref 38–122)

## 2019-03-11 ENCOUNTER — OFFICE VISIT (OUTPATIENT)
Dept: NEUROLOGY | Facility: CLINIC | Age: 74
End: 2019-03-11
Payer: COMMERCIAL

## 2019-03-11 VITALS
SYSTOLIC BLOOD PRESSURE: 137 MMHG | HEIGHT: 71 IN | HEART RATE: 85 BPM | WEIGHT: 229.06 LBS | BODY MASS INDEX: 32.07 KG/M2 | DIASTOLIC BLOOD PRESSURE: 84 MMHG

## 2019-03-11 DIAGNOSIS — M54.10 RADICULOPATHY, UNSPECIFIED SPINAL REGION: Primary | ICD-10-CM

## 2019-03-11 PROCEDURE — 99215 PR OFFICE/OUTPT VISIT, EST, LEVL V, 40-54 MIN: ICD-10-PCS | Mod: S$GLB,,, | Performed by: PSYCHIATRY & NEUROLOGY

## 2019-03-11 PROCEDURE — 1101F PT FALLS ASSESS-DOCD LE1/YR: CPT | Mod: CPTII,S$GLB,, | Performed by: PSYCHIATRY & NEUROLOGY

## 2019-03-11 PROCEDURE — 99999 PR PBB SHADOW E&M-EST. PATIENT-LVL III: ICD-10-PCS | Mod: PBBFAC,,, | Performed by: PSYCHIATRY & NEUROLOGY

## 2019-03-11 PROCEDURE — 99215 OFFICE O/P EST HI 40 MIN: CPT | Mod: S$GLB,,, | Performed by: PSYCHIATRY & NEUROLOGY

## 2019-03-11 PROCEDURE — 99999 PR PBB SHADOW E&M-EST. PATIENT-LVL III: CPT | Mod: PBBFAC,,, | Performed by: PSYCHIATRY & NEUROLOGY

## 2019-03-11 PROCEDURE — 1101F PR PT FALLS ASSESS DOC 0-1 FALLS W/OUT INJ PAST YR: ICD-10-PCS | Mod: CPTII,S$GLB,, | Performed by: PSYCHIATRY & NEUROLOGY

## 2019-03-11 NOTE — PROGRESS NOTES
Name: Patrick Short  MRN: [id number]   CSN: [id number]      Date: 03/11/2019    Referring physician:  Rafael Freedman MD  [address] [place] [address]    Chief Complaint / Interval History: No chief complaint on file.  - busy month, was [place] in [facility]  - wife not here, made mention of a couple of things  - some forgetfulness, mostly with names and some attention issues  - rarely the left arm may fall asleep, perhaps more at night  - more nighttime pain in the legs and back - more aching - ice helps at night - wakes up and he is feeling better  - has a deposit on the retina, causing a domed appearance (presumed cause of some optical distortion)  -       History of Present Illness (HPI):  74 yo retired  of [employer]  Here for evaluation of gait changes. Referred by Dr. Lloyd.  Usual state of health until July 2016.  Was starting to run and train, had midline chest discomfort, had EKG/stress test, cardiac arrest in ER, treated with stent in July, then another on 8/29/16 radial access and a GUS was placed in mid LAD stent.      He participated in the [place] this past fall. His Stress 2-D ECHO today is normal, has been reviewed by Dr. Mendoza. He exercise strenously 3-4 X a week.    In July, he fell in the shower while in [place], running with bulls!  Hit the back of his head, immediate stiff neck, and now has loss of mobility in the christina and reduced range of motion.3 months ago, he started to notice stiffness in his R leg predominantly.  Still exercising, but feels like he is walking like a penguin.  Using arms to get up.  Trouble getting off the floor.  Has not fallen to the ground, jason much more staggering.  Not feeling weak in arms.  Some tingling in his hands B after sleeping, but otherwise no description of Lhermitte's.      Vision is OK generally, but he rarely sees straight lines looking like a kink in them. Had torn R retina in the past.    7 years ago, was  hang gliding in [place] and had a rough landing, pulled his legs up and hit his butt.  Got better with time and gabapentin.    Nonmotor/Premotor ROS:  Hyposmia (HENT)?Yes - terrible for years, but wife notices more now  RBD/sleep issues (Constitutional)?No  Depression/anxiety (Psychiatric)?No  Fatigue (Constitutional)?Yes - a little more than in the past, daytime naps  Constipation (GI)?No  Urinary issues ()?No  Sexual dysfunction ()?No  Orthostasis (Cardiovascular)?No  Leg swelling (Cardiovascular)? No  Falls (Musculoskeletal)?Yes  Cognitive impairment (Neurologic)?No  Psychoses (Psychiatric)?No  Pain/Paresthesia (Neurologic)?Yes  Visual changes (Eyes)?Yes  Moles / skin changes (Skin)?No  Stridor / SOB (Pulm)?No  Bruising (Heme)?No    Past Medical History: The patient  has a past medical history of CAD (coronary artery disease) (7/26/2016), Heart block, MI (myocardial infarction), and Reflux.    Social History: The patient  reports that  has never smoked. he has never used smokeless tobacco. He reports that he does not drink alcohol or use drugs.    Family History: Their family history includes Coronary artery disease in his father; Emphysema in his father; Hypertension in his mother.    Allergies: Patient has no known allergies.     Meds:   Current Outpatient Medications on File Prior to Visit   Medication Sig Dispense Refill    atorvastatin (LIPITOR) 40 MG tablet TAKE 1 TABLET (40 MG TOTAL) BY MOUTH ONCE DAILY. 90 tablet 3    cetirizine (ZYRTEC) 10 MG tablet Take 10 mg by mouth once daily.      pantoprazole (PROTONIX) 40 MG tablet TAKE 1 TABLET BY MOUTH EVERY DAY 30 tablet 11    ticagrelor (BRILINTA) 90 mg tablet Take 1 tablet (90 mg total) by mouth 2 (two) times daily. 180 tablet 3    aspirin 81 MG Chew Take 1 tablet (81 mg total) by mouth once daily.  0    clobetasol (TEMOVATE) 0.05 % cream APPLY TO AFFECTED AREA ON HAND TWICE A DAY AS NEEDED FOR RASH  1    desonide (DESOWEN) 0.05 % cream APPLY TO  "AFFECTED AREA ON FACE TWICE A DAY AS NEEDED FOR SCALE  1    nitroGLYCERIN (NITROSTAT) 0.4 MG SL tablet Place 1 tablet (0.4 mg total) under the tongue every 5 (five) minutes as needed for Chest pain. 30 tablet 1    triamcinolone acetonide 0.1% (KENALOG) 0.1 % ointment APPLY TO AFFECTED AREA ON AXILLAS TWICE A DAY AS NEEDED FOR ITCH/RASH  1     No current facility-administered medications on file prior to visit.        Exam:  /84   Pulse 85   Ht 5' 11" (1.803 m)   Wt 103.9 kg (229 lb 0.9 oz)   BMI 31.95 kg/m²       Constitutional  Well-developed, well-nourished, appears stated age   Ophthalmoscopic  No papilledema with no hemorrhages or exudates bilaterally   Cardiovascular  Radial pulses 2+ and symmetric, no LE edema bilaterally   Neurological    * Mental status  MOCA deferred     - Orientation  Oriented to person, place, time, and situation     - Memory   Intact recent and remote     - Attention/concentration  Attentive, vigilant during exam     - Language  Naming & repetition intact, +2-step commands     - Fund of knowledge  Aware of current events     - Executive  Well-organized thoughts     - Other     * Cranial nerves       - CN II  PERRL, visual fields full to confrontation     - CN III, IV, VI  Extraocular movements full, normal pursuits and saccades     - CN V  Sensation V1 - V3 intact     - CN VII  Face strong and symmetric bilaterally     - CN VIII  Hearing intact bilaterally     - CN IX, X  Palate raises midline and symmetric     - CN XI  SCM and trapezius 5/5 bilaterally     - CN XII  Tongue midline   * Motor  Muscle bulk normal, strength 5/5 throughout except R hip flexion 4+/5   * Sensory   MIld dim sensation legs length dependent, no level   * Coordination  No dysmetria with finger-to-nose or heel-to-shin   * Gait  See below.   * Deep tendon reflexes  1+ and symmetric throughout except 2+ left leg   Babinski downgoing bilaterally   * Specialized movement exam  No hypophonic speech.    No " facial masking.  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   No cogwheel rigidity.     No bradykinesia.   No tremor with rest, posture, kinesis, or intention.    No other dystonia, chorea, athetosis, myoclonus, or tics.   No motor impersistence.   Normal-based gait.   No shortened stride length.   No abnormal arm swing.     No postural instability.      Medical Record Review:  - Lab Results:  Clinical Support on 02/18/2019   Component Date Value Ref Range Status    Thiamine 02/18/2019 67  38 - 122 ug/L Final    Methlymalonic Acid 02/18/2019 0.17  <0.40 umol/L Final    Homocysteine 02/18/2019 9.9  4.0 - 16.5 umol/L Final    CPK 02/18/2019 152  20 - 200 U/L Final   Clinical Support on 12/28/2018   Component Date Value Ref Range Status    Sodium 12/28/2018 141  136 - 145 mmol/L Final    Potassium 12/28/2018 4.2  3.5 - 5.1 mmol/L Final    Chloride 12/28/2018 104  95 - 110 mmol/L Final    CO2 12/28/2018 31* 23 - 29 mmol/L Final    Glucose 12/28/2018 102  70 - 110 mg/dL Final    BUN, Bld 12/28/2018 17  8 - 23 mg/dL Final    Creatinine 12/28/2018 0.9  0.5 - 1.4 mg/dL Final    Calcium 12/28/2018 10.0  8.7 - 10.5 mg/dL Final    Total Protein 12/28/2018 7.4  6.0 - 8.4 g/dL Final    Albumin 12/28/2018 4.0  3.5 - 5.2 g/dL Final    Total Bilirubin 12/28/2018 1.1* 0.1 - 1.0 mg/dL Final    Alkaline Phosphatase 12/28/2018 84  55 - 135 U/L Final    AST 12/28/2018 32  10 - 40 U/L Final    ALT 12/28/2018 36  10 - 44 U/L Final    Anion Gap 12/28/2018 6* 8 - 16 mmol/L Final    eGFR if African American 12/28/2018 >60.0  >60 mL/min/1.73 m^2 Final    eGFR if non African American 12/28/2018 >60.0  >60 mL/min/1.73 m^2 Final    TSH 12/28/2018 2.506  0.400 - 4.000 uIU/mL Final    WBC 12/28/2018 6.30  3.90 - 12.70 K/uL Final    RBC 12/28/2018 5.21  4.60 - 6.20 M/uL Final    Hemoglobin 12/28/2018 16.0  14.0 - 18.0 g/dL Final    Hematocrit 12/28/2018 47.9  40.0 - 54.0 % Final    MCV 12/28/2018 92  82 - 98 fL Final     MCH 12/28/2018 30.7  27.0 - 31.0 pg Final    MCHC 12/28/2018 33.4  32.0 - 36.0 g/dL Final    RDW 12/28/2018 12.6  11.5 - 14.5 % Final    Platelets 12/28/2018 177  150 - 350 K/uL Final    MPV 12/28/2018 11.6  9.2 - 12.9 fL Final    Immature Granulocytes 12/28/2018 0.3  0.0 - 0.5 % Final    Gran # (ANC) 12/28/2018 4.0  1.8 - 7.7 K/uL Final    Immature Grans (Abs) 12/28/2018 0.02  0.00 - 0.04 K/uL Final    Lymph # 12/28/2018 1.6  1.0 - 4.8 K/uL Final    Mono # 12/28/2018 0.5  0.3 - 1.0 K/uL Final    Eos # 12/28/2018 0.1  0.0 - 0.5 K/uL Final    Baso # 12/28/2018 0.03  0.00 - 0.20 K/uL Final    nRBC 12/28/2018 0  0 /100 WBC Final    Gran% 12/28/2018 63.6  38.0 - 73.0 % Final    Lymph% 12/28/2018 25.9  18.0 - 48.0 % Final    Mono% 12/28/2018 8.3  4.0 - 15.0 % Final    Eosinophil% 12/28/2018 1.4  0.0 - 8.0 % Final    Basophil% 12/28/2018 0.5  0.0 - 1.9 % Final    Differential Method 12/28/2018 Automated   Final    Sed Rate 12/28/2018 <2  0 - 23 mm/Hr Final    Vitamin B-12 12/28/2018 449  210 - 950 pg/mL Final    Folate 12/28/2018 14.4  4.0 - 24.0 ng/mL Final    CRP, High Sensitivity 12/28/2018 3.76* 0.00 - 3.19 mg/L Final       - Independent visualization and interpretation of radiological images:  * Modest hypoattenuation in supratentorial white matter, likely chronic microvascular ischemic change.  This is not unusually advanced for age.  No parenchymal mass, hemorrhage, edema or major vascular distribution infarct.            - Independent review of consultant's notes: [place]      Diagnoses:    Most consistent with a myeloneuropathy - reversible causes and structural lesions of the neuroaxis to be tested now.      Medical Decision Making:    Problem List Items Addressed This Visit     None      Visit Diagnoses     Radiculopathy, unspecified spinal region    -  Primary    Relevant Orders    EMG W/ ULTRASOUND AND NERVE CONDUCTION TEST 2 Extremities                Rafael Freedman MD,  MPH  Division of Movement and Memory Disorders  [facility]  [phone]

## 2019-04-24 ENCOUNTER — PROCEDURE VISIT (OUTPATIENT)
Dept: NEUROLOGY | Facility: CLINIC | Age: 74
End: 2019-04-24
Payer: COMMERCIAL

## 2019-04-24 DIAGNOSIS — M54.16 LUMBAR RADICULOPATHY: ICD-10-CM

## 2019-04-24 DIAGNOSIS — M54.10 RADICULOPATHY, UNSPECIFIED SPINAL REGION: ICD-10-CM

## 2019-04-24 PROCEDURE — 95908 PR NERVE CONDUCTION STUDY; 3-4 STUDIES: ICD-10-PCS | Mod: S$GLB,,, | Performed by: NEUROMUSCULOSKELETAL MEDICINE & OMM

## 2019-04-24 PROCEDURE — 95908 NRV CNDJ TST 3-4 STUDIES: CPT | Mod: S$GLB,,, | Performed by: NEUROMUSCULOSKELETAL MEDICINE & OMM

## 2019-04-24 PROCEDURE — 95886 MUSC TEST DONE W/N TEST COMP: CPT | Mod: S$GLB,,, | Performed by: NEUROMUSCULOSKELETAL MEDICINE & OMM

## 2019-04-24 PROCEDURE — 95886 PR EMG COMPLETE, W/ NERVE CONDUCTION STUDIES, 5+ MUSCLES: ICD-10-PCS | Mod: S$GLB,,, | Performed by: NEUROMUSCULOSKELETAL MEDICINE & OMM

## 2019-04-29 ENCOUNTER — PATIENT MESSAGE (OUTPATIENT)
Dept: NEUROLOGY | Facility: CLINIC | Age: 74
End: 2019-04-29

## 2019-07-10 DIAGNOSIS — I25.10 CORONARY ARTERY DISEASE, ANGINA PRESENCE UNSPECIFIED, UNSPECIFIED VESSEL OR LESION TYPE, UNSPECIFIED WHETHER NATIVE OR TRANSPLANTED HEART: ICD-10-CM

## 2019-07-10 RX ORDER — PANTOPRAZOLE SODIUM 40 MG/1
TABLET, DELAYED RELEASE ORAL
Qty: 90 TABLET | Refills: 3 | Status: SHIPPED | OUTPATIENT
Start: 2019-07-10 | End: 2020-07-06

## 2019-10-14 DIAGNOSIS — E78.5 DYSLIPIDEMIA: Primary | ICD-10-CM

## 2019-10-14 DIAGNOSIS — I25.10 CORONARY ARTERY DISEASE INVOLVING NATIVE CORONARY ARTERY OF NATIVE HEART WITHOUT ANGINA PECTORIS: Primary | ICD-10-CM

## 2019-10-20 ENCOUNTER — OFFICE VISIT (OUTPATIENT)
Dept: URGENT CARE | Facility: CLINIC | Age: 74
End: 2019-10-20
Payer: COMMERCIAL

## 2019-10-20 VITALS
HEART RATE: 68 BPM | TEMPERATURE: 98 F | BODY MASS INDEX: 31.5 KG/M2 | RESPIRATION RATE: 20 BRPM | WEIGHT: 220 LBS | HEIGHT: 70 IN | SYSTOLIC BLOOD PRESSURE: 134 MMHG | OXYGEN SATURATION: 97 % | DIASTOLIC BLOOD PRESSURE: 91 MMHG

## 2019-10-20 DIAGNOSIS — B02.9 HERPES ZOSTER WITHOUT COMPLICATION: Primary | ICD-10-CM

## 2019-10-20 PROCEDURE — 99214 PR OFFICE/OUTPT VISIT, EST, LEVL IV, 30-39 MIN: ICD-10-PCS | Mod: S$GLB,,, | Performed by: FAMILY MEDICINE

## 2019-10-20 PROCEDURE — 1101F PR PT FALLS ASSESS DOC 0-1 FALLS W/OUT INJ PAST YR: ICD-10-PCS | Mod: CPTII,S$GLB,, | Performed by: FAMILY MEDICINE

## 2019-10-20 PROCEDURE — 1101F PT FALLS ASSESS-DOCD LE1/YR: CPT | Mod: CPTII,S$GLB,, | Performed by: FAMILY MEDICINE

## 2019-10-20 PROCEDURE — 99214 OFFICE O/P EST MOD 30 MIN: CPT | Mod: S$GLB,,, | Performed by: FAMILY MEDICINE

## 2019-10-20 RX ORDER — OLMESARTAN MEDOXOMIL 5 MG/1
5 TABLET ORAL DAILY
Refills: 3 | COMMUNITY
Start: 2019-09-24 | End: 2020-11-10 | Stop reason: SDUPTHER

## 2019-10-20 RX ORDER — ROSUVASTATIN CALCIUM 40 MG/1
40 TABLET, COATED ORAL DAILY
Refills: 2 | COMMUNITY
Start: 2019-10-14 | End: 2020-11-10

## 2019-10-20 RX ORDER — DAPAGLIFLOZIN 10 MG/1
1 TABLET, FILM COATED ORAL DAILY
Refills: 3 | COMMUNITY
Start: 2019-09-25 | End: 2020-11-10

## 2019-10-20 RX ORDER — ACETAMINOPHEN AND CODEINE PHOSPHATE 300; 30 MG/1; MG/1
TABLET ORAL
COMMUNITY
End: 2020-11-18

## 2019-10-20 RX ORDER — FLUTICASONE PROPIONATE 50 MCG
SPRAY, SUSPENSION (ML) NASAL
Refills: 3 | COMMUNITY
Start: 2019-08-18 | End: 2020-11-18

## 2019-10-20 RX ORDER — VALACYCLOVIR HYDROCHLORIDE 1 G/1
1000 TABLET, FILM COATED ORAL 3 TIMES DAILY
Qty: 21 TABLET | Refills: 0 | Status: SHIPPED | OUTPATIENT
Start: 2019-10-20 | End: 2021-11-16

## 2019-10-20 RX ORDER — AMOXICILLIN 400 MG/5ML
POWDER, FOR SUSPENSION ORAL
COMMUNITY
End: 2021-11-16

## 2019-10-20 RX ORDER — FLUTICASONE PROPIONATE 50 MCG
SPRAY, SUSPENSION (ML) NASAL
COMMUNITY
End: 2020-11-18

## 2019-10-20 NOTE — PATIENT INSTRUCTIONS

## 2019-10-20 NOTE — PROGRESS NOTES
"Subjective:       Patient ID: Patrick Short is a 74 y.o. male.    Vitals:  height is 5' 10" (1.778 m) and weight is 99.8 kg (220 lb). His oral temperature is 97.8 °F (36.6 °C). His blood pressure is 134/91 (abnormal) and his pulse is 68. His respiration is 20 and oxygen saturation is 97%.     Chief Complaint: Rash    This is a 74 y.o. male   with Past Medical History:  7/26/2016: CAD (coronary artery disease)  No date: Heart block  No date: MI (myocardial infarction)  No date: Reflux   and Past Surgical History:  No date: CARDIAC CATHETERIZATION  02/2017: EYE SURGERY; Left  who presents today with a chief complaint of a rash located on his back that he noticed three days ago. He's complaining of upper back pain and believes the rash is spreading. He used his wife's prescription of valtrex to help relieve his symptoms.    Rash   This is a new problem. The current episode started in the past 7 days. The problem has been gradually worsening since onset. The affected locations include the back. The rash is characterized by pain and redness. He was exposed to nothing. Pertinent negatives include no cough, fever or sore throat. Treatments tried: valtrex. The treatment provided no relief.       Constitution: Negative for chills and fever.   HENT: Negative for facial swelling and sore throat.    Neck: Negative for painful lymph nodes.   Eyes: Negative for eye itching and eyelid swelling.   Respiratory: Negative for cough.    Musculoskeletal: Positive for pain. Negative for joint pain and joint swelling.   Skin: Positive for rash and erythema. Negative for color change, pale, wound, abrasion, laceration, lesion, skin thickening/induration, puncture wound, abscess, avulsion and hives.   Allergic/Immunologic: Negative for environmental allergies, immunocompromised state and hives.   Hematologic/Lymphatic: Negative for swollen lymph nodes.       Objective:      Physical Exam   Constitutional: He appears well-developed and " well-nourished.   HENT:   Head: Normocephalic and atraumatic.   Cardiovascular: Normal rate, regular rhythm and normal heart sounds.   Pulmonary/Chest: Effort normal and breath sounds normal.   Skin: Skin is rash (grouped red vesicular and papular lesion right posterior axillary region). Lesions:  erythema  Nursing note and vitals reviewed.        Assessment:       1. Herpes zoster without complication        Plan:         Herpes zoster without complication  -     valACYclovir (VALTREX) 1000 MG tablet; Take 1 tablet (1,000 mg total) by mouth 3 (three) times daily. for 7 days  Dispense: 21 tablet; Refill: 0

## 2019-10-23 DIAGNOSIS — I25.10 CORONARY ARTERY DISEASE, ANGINA PRESENCE UNSPECIFIED, UNSPECIFIED VESSEL OR LESION TYPE, UNSPECIFIED WHETHER NATIVE OR TRANSPLANTED HEART: ICD-10-CM

## 2019-10-23 RX ORDER — TICAGRELOR 90 MG/1
TABLET ORAL
Qty: 180 TABLET | Refills: 3 | Status: SHIPPED | OUTPATIENT
Start: 2019-10-23 | End: 2019-11-19

## 2019-11-14 ENCOUNTER — HOSPITAL ENCOUNTER (OUTPATIENT)
Dept: CARDIOLOGY | Facility: CLINIC | Age: 74
Discharge: HOME | End: 2019-11-14
Attending: INTERNAL MEDICINE
Payer: COMMERCIAL

## 2019-11-14 ENCOUNTER — CLINICAL SUPPORT (OUTPATIENT)
Dept: INTERNAL MEDICINE | Facility: CLINIC | Age: 74
End: 2019-11-14
Payer: COMMERCIAL

## 2019-11-14 VITALS — BODY MASS INDEX: 30.1 KG/M2 | WEIGHT: 215 LBS | HEIGHT: 71 IN

## 2019-11-14 DIAGNOSIS — E78.5 DYSLIPIDEMIA: ICD-10-CM

## 2019-11-14 DIAGNOSIS — I25.10 CORONARY ARTERY DISEASE INVOLVING NATIVE CORONARY ARTERY OF NATIVE HEART WITHOUT ANGINA PECTORIS: ICD-10-CM

## 2019-11-14 LAB
ALBUMIN SERPL BCP-MCNC: 3.9 G/DL (ref 3.5–5.2)
ALBUMIN SERPL BCP-MCNC: 3.9 G/DL (ref 3.5–5.2)
ALP SERPL-CCNC: 89 U/L (ref 55–135)
ALP SERPL-CCNC: 89 U/L (ref 55–135)
ALT SERPL W/O P-5'-P-CCNC: 38 U/L (ref 10–44)
ALT SERPL W/O P-5'-P-CCNC: 38 U/L (ref 10–44)
ANION GAP SERPL CALC-SCNC: 6 MMOL/L (ref 8–16)
ASCENDING AORTA: 3.91 CM
AST SERPL-CCNC: 31 U/L (ref 10–40)
AST SERPL-CCNC: 31 U/L (ref 10–40)
BASOPHILS # BLD AUTO: 0.04 K/UL (ref 0–0.2)
BASOPHILS NFR BLD: 0.8 % (ref 0–1.9)
BILIRUB DIRECT SERPL-MCNC: 0.4 MG/DL (ref 0.1–0.3)
BILIRUB SERPL-MCNC: 0.8 MG/DL (ref 0.1–1)
BILIRUB SERPL-MCNC: 0.8 MG/DL (ref 0.1–1)
BSA FOR ECHO PROCEDURE: 2.21 M2
BUN SERPL-MCNC: 20 MG/DL (ref 8–23)
CALCIUM SERPL-MCNC: 9.6 MG/DL (ref 8.7–10.5)
CHLORIDE SERPL-SCNC: 107 MMOL/L (ref 95–110)
CHOLEST SERPL-MCNC: 114 MG/DL (ref 120–199)
CHOLEST SERPL-MCNC: 114 MG/DL (ref 120–199)
CHOLEST/HDLC SERPL: 3 {RATIO} (ref 2–5)
CHOLEST/HDLC SERPL: 3 {RATIO} (ref 2–5)
CO2 SERPL-SCNC: 29 MMOL/L (ref 23–29)
CREAT SERPL-MCNC: 1.1 MG/DL (ref 0.5–1.4)
CV ECHO LV RWT: 0.38 CM
CV STRESS BASE HR: 53 BPM
DIASTOLIC BLOOD PRESSURE: 95 MMHG
DIFFERENTIAL METHOD: NORMAL
DOP CALC LVOT AREA: 4.3 CM2
DOP CALC LVOT DIAMETER: 2.35 CM
DOP CALC LVOT PEAK VEL: 1.1 M/S
DOP CALC LVOT STROKE VOLUME: 106.69 CM3
DOP CALCLVOT PEAK VEL VTI: 24.61 CM
E WAVE DECELERATION TIME: 266.8 MSEC
E/A RATIO: 0.74
E/E' RATIO: 9 M/S
ECHO LV POSTERIOR WALL: 0.88 CM (ref 0.6–1.1)
EOSINOPHIL # BLD AUTO: 0.2 K/UL (ref 0–0.5)
EOSINOPHIL NFR BLD: 3.3 % (ref 0–8)
ERYTHROCYTE [DISTWIDTH] IN BLOOD BY AUTOMATED COUNT: 13 % (ref 11.5–14.5)
EST. GFR  (AFRICAN AMERICAN): >60 ML/MIN/1.73 M^2
EST. GFR  (NON AFRICAN AMERICAN): >60 ML/MIN/1.73 M^2
FRACTIONAL SHORTENING: 37 % (ref 28–44)
GLUCOSE SERPL-MCNC: 102 MG/DL (ref 70–110)
HCT VFR BLD AUTO: 46.9 % (ref 40–54)
HDLC SERPL-MCNC: 38 MG/DL (ref 40–75)
HDLC SERPL-MCNC: 38 MG/DL (ref 40–75)
HDLC SERPL: 33.3 % (ref 20–50)
HDLC SERPL: 33.3 % (ref 20–50)
HGB BLD-MCNC: 15.8 G/DL (ref 14–18)
IMM GRANULOCYTES # BLD AUTO: 0.01 K/UL (ref 0–0.04)
IMM GRANULOCYTES NFR BLD AUTO: 0.2 % (ref 0–0.5)
INTERVENTRICULAR SEPTUM: 1 CM (ref 0.6–1.1)
LA MAJOR: 4.17 CM
LA MINOR: 4.21 CM
LA WIDTH: 3.63 CM
LDLC SERPL CALC-MCNC: 56.6 MG/DL (ref 63–159)
LDLC SERPL CALC-MCNC: 56.6 MG/DL (ref 63–159)
LEFT ATRIUM SIZE: 4.11 CM
LEFT ATRIUM VOLUME INDEX: 24.4 ML/M2
LEFT ATRIUM VOLUME: 53.13 CM3
LEFT INTERNAL DIMENSION IN SYSTOLE: 2.95 CM (ref 2.1–4)
LEFT VENTRICLE DIASTOLIC VOLUME INDEX: 46.7 ML/M2
LEFT VENTRICLE DIASTOLIC VOLUME: 101.57 ML
LEFT VENTRICLE MASS INDEX: 69 G/M2
LEFT VENTRICLE SYSTOLIC VOLUME INDEX: 15.4 ML/M2
LEFT VENTRICLE SYSTOLIC VOLUME: 33.5 ML
LEFT VENTRICULAR INTERNAL DIMENSION IN DIASTOLE: 4.68 CM (ref 3.5–6)
LEFT VENTRICULAR MASS: 150.19 G
LV LATERAL E/E' RATIO: 9 M/S
LV SEPTAL E/E' RATIO: 9 M/S
LYMPHOCYTES # BLD AUTO: 1.6 K/UL (ref 1–4.8)
LYMPHOCYTES NFR BLD: 30.6 % (ref 18–48)
MCH RBC QN AUTO: 31 PG (ref 27–31)
MCHC RBC AUTO-ENTMCNC: 33.7 G/DL (ref 32–36)
MCV RBC AUTO: 92 FL (ref 82–98)
MONOCYTES # BLD AUTO: 0.5 K/UL (ref 0.3–1)
MONOCYTES NFR BLD: 8.7 % (ref 4–15)
MV PEAK A VEL: 0.85 M/S
MV PEAK E VEL: 0.63 M/S
NEUTROPHILS # BLD AUTO: 2.9 K/UL (ref 1.8–7.7)
NEUTROPHILS NFR BLD: 56.4 % (ref 38–73)
NONHDLC SERPL-MCNC: 76 MG/DL
NONHDLC SERPL-MCNC: 76 MG/DL
NRBC BLD-RTO: 0 /100 WBC
OHS CV CPX 1 MINUTE RECOVERY HEART RATE: 122 BPM
OHS CV CPX 85 PERCENT MAX PREDICTED HEART RATE MALE: 126
OHS CV CPX ESTIMATED METS: 11
OHS CV CPX MAX PREDICTED HEART RATE: 148
OHS CV CPX PATIENT IS FEMALE: 0
OHS CV CPX PATIENT IS MALE: 1
OHS CV CPX PEAK DIASTOLIC BLOOD PRESSURE: 78 MMHG
OHS CV CPX PEAK HEAR RATE: 150 BPM
OHS CV CPX PEAK RATE PRESSURE PRODUCT: NORMAL
OHS CV CPX PEAK SYSTOLIC BLOOD PRESSURE: 205 MMHG
OHS CV CPX PERCENT MAX PREDICTED HEART RATE ACHIEVED: 101
OHS CV CPX RATE PRESSURE PRODUCT PRESENTING: 8162
PLATELET # BLD AUTO: 165 K/UL (ref 150–350)
PMV BLD AUTO: 11.6 FL (ref 9.2–12.9)
POTASSIUM SERPL-SCNC: 4.1 MMOL/L (ref 3.5–5.1)
PROT SERPL-MCNC: 7.3 G/DL (ref 6–8.4)
PROT SERPL-MCNC: 7.3 G/DL (ref 6–8.4)
PULM VEIN S/D RATIO: 1.53
PV PEAK D VEL: 0.3 M/S
PV PEAK S VEL: 0.46 M/S
RA MAJOR: 4.3 CM
RA WIDTH: 1.94 CM
RBC # BLD AUTO: 5.1 M/UL (ref 4.6–6.2)
RIGHT VENTRICULAR END-DIASTOLIC DIMENSION: 3.19 CM
RV TISSUE DOPPLER FREE WALL SYSTOLIC VELOCITY 1 (APICAL 4 CHAMBER VIEW): 13.98 CM/S
SINUS: 3.51 CM
SODIUM SERPL-SCNC: 142 MMOL/L (ref 136–145)
STJ: 3.54 CM
STRESS ECHO POST EXERCISE DUR MIN: 6 MINUTES
STRESS ECHO POST EXERCISE DUR SEC: 51 SECONDS
SYSTOLIC BLOOD PRESSURE: 154 MMHG
TDI LATERAL: 0.07 M/S
TDI SEPTAL: 0.07 M/S
TDI: 0.07 M/S
TRICUSPID ANNULAR PLANE SYSTOLIC EXCURSION: 1.85 CM
TRIGL SERPL-MCNC: 97 MG/DL (ref 30–150)
TRIGL SERPL-MCNC: 97 MG/DL (ref 30–150)
WBC # BLD AUTO: 5.2 K/UL (ref 3.9–12.7)

## 2019-11-14 PROCEDURE — 93351 STRESS TTE COMPLETE: CPT | Mod: S$GLB,,, | Performed by: INTERNAL MEDICINE

## 2019-11-14 PROCEDURE — 80061 LIPID PANEL: CPT

## 2019-11-14 PROCEDURE — 80053 COMPREHEN METABOLIC PANEL: CPT

## 2019-11-14 PROCEDURE — 85025 COMPLETE CBC W/AUTO DIFF WBC: CPT

## 2019-11-14 PROCEDURE — 93351 STRESS ECHO (CUPID ONLY): ICD-10-PCS | Mod: S$GLB,,, | Performed by: INTERNAL MEDICINE

## 2019-11-14 PROCEDURE — 36415 COLL VENOUS BLD VENIPUNCTURE: CPT

## 2019-11-19 ENCOUNTER — OFFICE VISIT (OUTPATIENT)
Dept: CARDIOLOGY | Facility: CLINIC | Age: 74
End: 2019-11-19
Payer: COMMERCIAL

## 2019-11-19 VITALS
WEIGHT: 232.56 LBS | HEART RATE: 68 BPM | DIASTOLIC BLOOD PRESSURE: 84 MMHG | SYSTOLIC BLOOD PRESSURE: 138 MMHG | HEIGHT: 71 IN | OXYGEN SATURATION: 95 % | BODY MASS INDEX: 32.56 KG/M2

## 2019-11-19 DIAGNOSIS — I25.10 CORONARY ARTERY DISEASE INVOLVING NATIVE CORONARY ARTERY OF NATIVE HEART WITHOUT ANGINA PECTORIS: Primary | ICD-10-CM

## 2019-11-19 DIAGNOSIS — I21.11 ST ELEVATION MYOCARDIAL INFARCTION INVOLVING RIGHT CORONARY ARTERY: ICD-10-CM

## 2019-11-19 DIAGNOSIS — E78.5 DYSLIPIDEMIA: ICD-10-CM

## 2019-11-19 PROCEDURE — 99499 NO LOS: ICD-10-PCS | Mod: S$GLB,,, | Performed by: INTERNAL MEDICINE

## 2019-11-19 PROCEDURE — 99499 UNLISTED E&M SERVICE: CPT | Mod: S$GLB,,, | Performed by: INTERNAL MEDICINE

## 2019-11-19 PROCEDURE — 99999 PR PBB SHADOW E&M-EST. PATIENT-LVL III: CPT | Mod: PBBFAC,,, | Performed by: INTERNAL MEDICINE

## 2019-11-19 PROCEDURE — 99999 PR PBB SHADOW E&M-EST. PATIENT-LVL III: ICD-10-PCS | Mod: PBBFAC,,, | Performed by: INTERNAL MEDICINE

## 2019-11-19 RX ORDER — NITROGLYCERIN 0.4 MG/1
0.4 TABLET SUBLINGUAL EVERY 5 MIN PRN
Qty: 30 TABLET | Refills: 1 | Status: SHIPPED | OUTPATIENT
Start: 2019-11-19 | End: 2023-01-31

## 2019-11-19 NOTE — PROGRESS NOTES
Interventional Cardiology Clinic Note  Reason for Visit: Coronary artery disease, one year follow up    HPI:   Patrick Short is a 74 y.o. male who presents for coronary artery disease follow up.    Mr. Short is a 75 y/o gentleman with PMHx CAD who presented with a STEMI (7/18/2016) s/p GUS x 2 to RCA, s/p 90% LAD PCI (8/29/2016), anomalous LCx origin, h/o R groin pseudoaneurysm s/p ultrasound-guided thrombin injection, who presents for routine 1 year f/u.     Patient has been doing well since last year, is able to exercise at the gym 1 hour 3x/week by climbing a stairmaster with no chest pain or shortness of breath. He denies any palpitations, claudication, PND, orthopnea, or LE edema. Last time he was seen in clinic, he was referred for a sleep study, but his insurance did not cover it, so he did not do it. He did undergo uvulectomy after his last clinic visit which he thinks helped somewhat with his snoring and daytime somnolence.     He saw an endocrinologist since his last visit for weight gain. The endocrinologist wanted to switch him to Rosuvastatin to increase his HDL and start him on Olmesartan for blood pressure. He never started either of these medications. He is compliant with his ASA, Brilinta, and Lipitor.    He underwent an exercise stress echocardiogram on 11/14/19 which was negative for ischemia. The patient exercised for 6 minutes and 51 seconds on a high ramp protocol, corresponding to a functional capacity of 11 METS, achieving a peak heart rate of 150 bpm, which is 101% of the age predicted maximum heart rate.  ROS:    Constitution: Negative for diaphoresis and malaise/fatigue.   HENT: Positive for nosebleeds.  Cardiovascular: Negative for chest pain, claudication, dyspnea on exertion, leg swelling, near-syncope, orthopnea, palpitations, paroxysmal nocturnal dyspnea and syncope.   Respiratory: Negative for shortness of breath, snoring and wheezing.    Hematologic/Lymphatic: Negative for  bleeding problem. Does bruise/bleed easily.   Musculoskeletal: Negative for muscle cramps and myalgias.   Gastrointestinal: Negative for bloating, abdominal pain, nausea and vomiting.   Neurological: Negative for dizziness, headaches and light-headedness.   PMH:     Past Medical History:   Diagnosis Date    CAD (coronary artery disease) 7/26/2016    Heart block     MI (myocardial infarction)     Reflux      Past Surgical History:   Procedure Laterality Date    CARDIAC CATHETERIZATION      EYE SURGERY Left 02/2017     Allergies:   Review of patient's allergies indicates:  No Known Allergies  Medications:     Current Outpatient Medications on File Prior to Visit   Medication Sig Dispense Refill    acetaminophen-codeine 300-30mg (TYLENOL #3) 300-30 mg Tab acetaminophen 300 mg-codeine 30 mg tablet      amoxicillin (AMOXIL) 400 mg/5 mL suspension amoxicillin 400 mg/5 mL oral suspension      aspirin 81 MG Chew Take 1 tablet (81 mg total) by mouth once daily.  0    atorvastatin (LIPITOR) 40 MG tablet TAKE 1 TABLET (40 MG TOTAL) BY MOUTH ONCE DAILY. 90 tablet 3    BRILINTA 90 mg tablet TAKE 1 TABLET (90 MG TOTAL) BY MOUTH 2 (TWO) TIMES DAILY. 180 tablet 3    cetirizine (ZYRTEC) 10 MG tablet Take 10 mg by mouth once daily.      clobetasol (TEMOVATE) 0.05 % cream APPLY TO AFFECTED AREA ON HAND TWICE A DAY AS NEEDED FOR RASH  1    desonide (DESOWEN) 0.05 % cream APPLY TO AFFECTED AREA ON FACE TWICE A DAY AS NEEDED FOR SCALE  1    FARXIGA 10 mg Tab Take 1 tablet by mouth once daily.  3    fluticasone propionate (FLONASE) 50 mcg/actuation nasal spray fluticasone propionate 50 mcg/actuation nasal spray,suspension   Spray 2 sprays every day by intranasal route.      fluticasone propionate (FLONASE) 50 mcg/actuation nasal spray SPRAY 2 SPRAYS INTO EACH NOSTRIL EVERY DAY  3    nitroGLYCERIN (NITROSTAT) 0.4 MG SL tablet Place 1 tablet (0.4 mg total) under the tongue every 5 (five) minutes as needed for Chest pain.  "30 tablet 1    pantoprazole (PROTONIX) 40 MG tablet TAKE 1 TABLET BY MOUTH EVERY DAY 90 tablet 3    rosuvastatin (CRESTOR) 40 MG Tab Take 40 mg by mouth once daily.  2    triamcinolone acetonide 0.1% (KENALOG) 0.1 % ointment APPLY TO AFFECTED AREA ON AXILLAS TWICE A DAY AS NEEDED FOR ITCH/RASH  1    olmesartan (BENICAR) 5 MG Tab Take 5 mg by mouth once daily.  3    valACYclovir (VALTREX) 1000 MG tablet Take 1 tablet (1,000 mg total) by mouth 3 (three) times daily. for 7 days 21 tablet 0     No current facility-administered medications on file prior to visit.      Social History:     Social History     Tobacco Use    Smoking status: Never Smoker    Smokeless tobacco: Never Used   Substance Use Topics    Alcohol use: No     Comment: twice a month/beer wine     Family History:     Family History   Problem Relation Age of Onset    Hypertension Mother     Emphysema Father     Coronary artery disease Father      Physical Exam:   /84 (BP Location: Left arm, Patient Position: Sitting, BP Method: Large (Automatic))   Pulse 68   Ht 5' 11" (1.803 m)   Wt 105.5 kg (232 lb 9.4 oz)   SpO2 95%   BMI 32.44 kg/m²      Constitutional: NAD, conversant  HEENT: Sclera anicteric, PERRLA, EOMI  Neck: No JVD, no carotid bruits  CV: RRR, no murmur, normal S1/S2  Vascular:  L radial  2+,   R radial  2+,     L posterior tibial  2+,  R posterior tibial  2+    L dorsalis pedis  2+, R dorsalis pedis  2+     Pulm: CTAB, no wheezes, rales, or ronchi  GI: Abdomen soft, NTND, +BS  Extremities: No LE edema, warm and well perfused  Skin: No ecchymosis, erythema, or ulcers  Psych: AOx3, appropriate affect  Neuro: No gross deficits    Labs:     Lab Results   Component Value Date     11/14/2019    K 4.1 11/14/2019     11/14/2019    CO2 29 11/14/2019    BUN 20 11/14/2019    CREATININE 1.1 11/14/2019    ANIONGAP 6 (L) 11/14/2019     Lab Results   Component Value Date    HGBA1C 5.2 05/15/2018     Lab Results   Component " Value Date     (H) 2016    BNP 46 2016    BNP 64 2016    Lab Results   Component Value Date    WBC 5.20 2019    HGB 15.8 2019    HCT 46.9 2019     2019    GRAN 2.9 2019    GRAN 56.4 2019     Lab Results   Component Value Date    CHOL 114 (L) 2019    CHOL 114 (L) 2019    HDL 38 (L) 2019    HDL 38 (L) 2019    LDLCALC 56.6 (L) 2019    LDLCALC 56.6 (L) 2019    TRIG 97 2019    TRIG 97 2019          Imagin. Exercise stress echocardiogram (19)  · There were no arrhythmias during stress.  · The test was stopped because the patient experienced shortness of breath.  · The EKG portion of this study is negative for myocardial ischemia.  · Normal left ventricular systolic function. The estimated ejection fraction is 63%  · No wall motion abnormalities.  · Normal LV diastolic function.  · Normal right ventricular systolic function.  · The ascending aorta is mildly dilated.  · The stress echo portion of this study is negative for myocardial ischemia, although images very difficult post stress as per text.    Assessment:     1. Coronary artery disease involving native coronary artery of native heart without angina pectoris    2. Dyslipidemia    3. ST elevation myocardial infarction involving right coronary artery      Plan:     CAD (coronary artery disease)  -- Decrease Ticagrelor to 60 mg bid x 3 months. If no side effects of easy bruising/bleeding, will send more refills. Otherwise he can stop it if still causing aggravating bleeding.   -- Continue Aspirin 81 mg daily for life.   -- Medical therapy includes  1. SBP< 140 mmHg (< 130 mmHg for DM)  2. LDL < 70 mg/dl  3. Non-HDL cholesterol < 100 mg/dl  4. Hemoglobin A1c < 7.0%  5. Smoking abstinence  6. Targeted weight management  7. > 30 min. of moderate exercise 3 xs/week  -- Follow up in one year with exercise stress echo    Dyslipidemia  -- LDL at  goal.  -- Continue high intensity statin with Lipitor 40 mg qhs.     Signed:  Radha Peralta PA-C  Interventional Cardiology   [id number]  11/19/2019 12:11 PM  I have personally taken the history and examined this patient. I have discussed and agree with the resident's findings and plan as documented in the resident's note.    Sung Mendoza

## 2019-12-05 RX ORDER — ATORVASTATIN CALCIUM 40 MG/1
TABLET, FILM COATED ORAL
Qty: 90 TABLET | Refills: 3 | Status: SHIPPED | OUTPATIENT
Start: 2019-12-05 | End: 2020-11-10

## 2020-03-03 ENCOUNTER — OFFICE VISIT (OUTPATIENT)
Dept: URGENT CARE | Facility: CLINIC | Age: 75
End: 2020-03-03
Payer: COMMERCIAL

## 2020-03-03 VITALS
SYSTOLIC BLOOD PRESSURE: 129 MMHG | RESPIRATION RATE: 18 BRPM | TEMPERATURE: 98 F | BODY MASS INDEX: 32.2 KG/M2 | OXYGEN SATURATION: 97 % | HEART RATE: 66 BPM | WEIGHT: 230 LBS | DIASTOLIC BLOOD PRESSURE: 82 MMHG | HEIGHT: 71 IN

## 2020-03-03 DIAGNOSIS — J10.1 INFLUENZA A: Primary | ICD-10-CM

## 2020-03-03 LAB
CTP QC/QA: YES
FLUAV AG NPH QL: POSITIVE
FLUBV AG NPH QL: NEGATIVE

## 2020-03-03 PROCEDURE — 87804 INFLUENZA ASSAY W/OPTIC: CPT | Mod: QW,S$GLB,, | Performed by: FAMILY MEDICINE

## 2020-03-03 PROCEDURE — 99214 PR OFFICE/OUTPT VISIT, EST, LEVL IV, 30-39 MIN: ICD-10-PCS | Mod: 25,S$GLB,, | Performed by: FAMILY MEDICINE

## 2020-03-03 PROCEDURE — 87804 POCT INFLUENZA A/B: ICD-10-PCS | Mod: 59,QW,S$GLB, | Performed by: FAMILY MEDICINE

## 2020-03-03 PROCEDURE — 99214 OFFICE O/P EST MOD 30 MIN: CPT | Mod: 25,S$GLB,, | Performed by: FAMILY MEDICINE

## 2020-03-03 RX ORDER — BENZONATATE 100 MG/1
100 CAPSULE ORAL EVERY 6 HOURS PRN
Qty: 30 CAPSULE | Refills: 1 | Status: SHIPPED | OUTPATIENT
Start: 2020-03-03 | End: 2020-11-18

## 2020-03-03 NOTE — PROGRESS NOTES
"Subjective:       Patient ID: Patrick Short is a 74 y.o. male.    Vitals:  height is 5' 11" (1.803 m) and weight is 104.3 kg (230 lb). His oral temperature is 98.1 °F (36.7 °C). His blood pressure is 129/82 and his pulse is 66. His respiration is 18 and oxygen saturation is 97%.     Chief Complaint: URI    Meli is a 74 y.o. male who presents today with a chief complaint of cough with post nasal drip, scratchy throat for 3 days. Fever started yesterday. This morning was 100.8 and took Tylenol.     URI    This is a new problem. The current episode started in the past 7 days. The problem has been gradually worsening. The maximum temperature recorded prior to his arrival was 100.4 - 100.9 F. The fever has been present for 1 to 2 days. Associated symptoms include congestion, coughing, headaches, rhinorrhea, sinus pain, sneezing and a sore throat. Pertinent negatives include no dysuria, ear pain, nausea, plugged ear sensation, rash, swollen glands, vomiting or wheezing. He has tried acetaminophen and increased fluids for the symptoms. The treatment provided moderate relief.       Constitution: Positive for activity change, appetite change, fatigue and fever. Negative for chills, sweating and international travel in last 60 days.   HENT: Positive for congestion, sinus pain and sore throat. Negative for ear pain, tinnitus, sinus pressure and voice change.    Neck: Negative for painful lymph nodes.   Cardiovascular: Negative for chest trauma and sob on exertion.   Eyes: Negative for eye redness.   Respiratory: Positive for cough. Negative for chest tightness, sputum production, bloody sputum, COPD, shortness of breath, stridor, wheezing and asthma.    Gastrointestinal: Negative for nausea and vomiting.   Genitourinary: Negative for dysuria.   Musculoskeletal: Negative for muscle ache.   Skin: Negative for rash.   Allergic/Immunologic: Positive for seasonal allergies, sneezing and immunizations up-to-date. Negative for " asthma and flu shot.   Neurological: Positive for headaches. Negative for dizziness, light-headedness and altered mental status.   Hematologic/Lymphatic: Negative for swollen lymph nodes.   Psychiatric/Behavioral: Negative for altered mental status and confusion.       Objective:      Physical Exam   Constitutional: He appears well-developed and well-nourished.   HENT:   Head: Normocephalic and atraumatic.   Mouth/Throat: Posterior oropharyngeal erythema present.   Eyes: Pupils are equal, round, and reactive to light. EOM are normal.   Neck: Normal range of motion. Neck supple.   Cardiovascular: Normal rate, regular rhythm and normal heart sounds.   Pulmonary/Chest: Effort normal and breath sounds normal.   Lymphadenopathy:     He has cervical adenopathy (nontender).   Nursing note and vitals reviewed.        Assessment:       1. Influenza A        Plan:         Influenza A  -     POCT Influenza A/B  -     benzonatate (TESSALON PERLES) 100 MG capsule; Take 1 capsule (100 mg total) by mouth every 6 (six) hours as needed for Cough.  Dispense: 30 capsule; Refill: 1    discussed OTC remedies. RTC prn worsening symptoms

## 2020-03-03 NOTE — PATIENT INSTRUCTIONS

## 2020-11-10 ENCOUNTER — CLINICAL SUPPORT (OUTPATIENT)
Dept: INTERNAL MEDICINE | Facility: CLINIC | Age: 75
End: 2020-11-10
Payer: COMMERCIAL

## 2020-11-10 ENCOUNTER — OFFICE VISIT (OUTPATIENT)
Dept: CARDIOLOGY | Facility: CLINIC | Age: 75
End: 2020-11-10
Payer: COMMERCIAL

## 2020-11-10 ENCOUNTER — HOSPITAL ENCOUNTER (OUTPATIENT)
Dept: CARDIOLOGY | Facility: HOSPITAL | Age: 75
Discharge: HOME | End: 2020-11-10
Attending: PHYSICIAN ASSISTANT
Payer: COMMERCIAL

## 2020-11-10 VITALS
WEIGHT: 233.94 LBS | SYSTOLIC BLOOD PRESSURE: 143 MMHG | HEIGHT: 70 IN | DIASTOLIC BLOOD PRESSURE: 94 MMHG | BODY MASS INDEX: 33.49 KG/M2 | BODY MASS INDEX: 31.5 KG/M2 | OXYGEN SATURATION: 95 % | HEART RATE: 80 BPM | HEIGHT: 70 IN | WEIGHT: 220 LBS

## 2020-11-10 DIAGNOSIS — E78.00 PURE HYPERCHOLESTEROLEMIA: ICD-10-CM

## 2020-11-10 DIAGNOSIS — I25.10 CORONARY ARTERY DISEASE INVOLVING NATIVE CORONARY ARTERY OF NATIVE HEART WITHOUT ANGINA PECTORIS: ICD-10-CM

## 2020-11-10 DIAGNOSIS — Z00.00 ANNUAL PHYSICAL EXAM: Primary | ICD-10-CM

## 2020-11-10 DIAGNOSIS — I21.4 NSTEMI (NON-ST ELEVATED MYOCARDIAL INFARCTION): Primary | ICD-10-CM

## 2020-11-10 DIAGNOSIS — I21.11 ST ELEVATION MYOCARDIAL INFARCTION INVOLVING RIGHT CORONARY ARTERY: ICD-10-CM

## 2020-11-10 DIAGNOSIS — I25.10 CORONARY ARTERY DISEASE, ANGINA PRESENCE UNSPECIFIED, UNSPECIFIED VESSEL OR LESION TYPE, UNSPECIFIED WHETHER NATIVE OR TRANSPLANTED HEART: ICD-10-CM

## 2020-11-10 DIAGNOSIS — E78.5 DYSLIPIDEMIA: ICD-10-CM

## 2020-11-10 DIAGNOSIS — I72.4 FEMORAL ARTERY PSEUDO-ANEURYSM, RIGHT: ICD-10-CM

## 2020-11-10 LAB
ALBUMIN SERPL BCP-MCNC: 3.9 G/DL (ref 3.5–5.2)
ALBUMIN SERPL BCP-MCNC: 3.9 G/DL (ref 3.5–5.2)
ALP SERPL-CCNC: 83 U/L (ref 55–135)
ALP SERPL-CCNC: 85 U/L (ref 55–135)
ALT SERPL W/O P-5'-P-CCNC: 58 U/L (ref 10–44)
ALT SERPL W/O P-5'-P-CCNC: 58 U/L (ref 10–44)
ANION GAP SERPL CALC-SCNC: 7 MMOL/L (ref 8–16)
ASCENDING AORTA: 3.63 CM
AST SERPL-CCNC: 38 U/L (ref 10–40)
AST SERPL-CCNC: 39 U/L (ref 10–40)
BILIRUB DIRECT SERPL-MCNC: 0.3 MG/DL (ref 0.1–0.3)
BILIRUB SERPL-MCNC: 0.8 MG/DL (ref 0.1–1)
BILIRUB SERPL-MCNC: 0.8 MG/DL (ref 0.1–1)
BSA FOR ECHO PROCEDURE: 2.22 M2
BUN SERPL-MCNC: 13 MG/DL (ref 8–23)
CALCIUM SERPL-MCNC: 9.2 MG/DL (ref 8.7–10.5)
CHLORIDE SERPL-SCNC: 106 MMOL/L (ref 95–110)
CHOLEST SERPL-MCNC: 130 MG/DL (ref 120–199)
CHOLEST/HDLC SERPL: 3.3 {RATIO} (ref 2–5)
CO2 SERPL-SCNC: 32 MMOL/L (ref 23–29)
COMPLEXED PSA SERPL-MCNC: 0.56 NG/ML (ref 0–4)
CREAT SERPL-MCNC: 1.1 MG/DL (ref 0.5–1.4)
CV ECHO LV RWT: 0.42 CM
CV STRESS BASE HR: 70 BPM
DIASTOLIC BLOOD PRESSURE: 97 MMHG
DOP CALC LVOT AREA: 5.3 CM2
DOP CALC LVOT DIAMETER: 2.59 CM
DOP CALC LVOT PEAK VEL: 1.12 M/S
DOP CALC LVOT STROKE VOLUME: 108.95 CM3
DOP CALCLVOT PEAK VEL VTI: 20.69 CM
E WAVE DECELERATION TIME: 249.61 MSEC
E/A RATIO: 0.71
E/E' RATIO: 8.31 M/S
ECHO LV POSTERIOR WALL: 0.99 CM (ref 0.6–1.1)
ERYTHROCYTE [DISTWIDTH] IN BLOOD BY AUTOMATED COUNT: 13.1 % (ref 11.5–14.5)
EST. GFR  (AFRICAN AMERICAN): >60 ML/MIN/1.73 M^2
EST. GFR  (NON AFRICAN AMERICAN): >60 ML/MIN/1.73 M^2
ESTIMATED AVG GLUCOSE: 105 MG/DL (ref 68–131)
FRACTIONAL SHORTENING: 30 % (ref 28–44)
GLUCOSE SERPL-MCNC: 99 MG/DL (ref 70–110)
HBA1C MFR BLD HPLC: 5.3 % (ref 4–5.6)
HCT VFR BLD AUTO: 48.1 % (ref 40–54)
HDLC SERPL-MCNC: 39 MG/DL (ref 40–75)
HDLC SERPL: 30 % (ref 20–50)
HGB BLD-MCNC: 15.8 G/DL (ref 14–18)
INTERVENTRICULAR SEPTUM: 0.99 CM (ref 0.6–1.1)
IVRT: 82.78 MSEC
LA MAJOR: 4.88 CM
LA MINOR: 5.5 CM
LA WIDTH: 3.96 CM
LDLC SERPL CALC-MCNC: 61.2 MG/DL (ref 63–159)
LEFT ATRIUM SIZE: 4.13 CM
LEFT ATRIUM VOLUME INDEX: 32.2 ML/M2
LEFT ATRIUM VOLUME: 71.89 CM3
LEFT INTERNAL DIMENSION IN SYSTOLE: 3.28 CM (ref 2.1–4)
LEFT VENTRICLE DIASTOLIC VOLUME INDEX: 45.89 ML/M2
LEFT VENTRICLE DIASTOLIC VOLUME: 102.39 ML
LEFT VENTRICLE MASS INDEX: 73 G/M2
LEFT VENTRICLE SYSTOLIC VOLUME INDEX: 19.5 ML/M2
LEFT VENTRICLE SYSTOLIC VOLUME: 43.52 ML
LEFT VENTRICULAR INTERNAL DIMENSION IN DIASTOLE: 4.7 CM (ref 3.5–6)
LEFT VENTRICULAR MASS: 162.22 G
LV LATERAL E/E' RATIO: 7.71 M/S
LV SEPTAL E/E' RATIO: 9 M/S
MCH RBC QN AUTO: 30.4 PG (ref 27–31)
MCHC RBC AUTO-ENTMCNC: 32.8 G/DL (ref 32–36)
MCV RBC AUTO: 93 FL (ref 82–98)
MV PEAK A VEL: 0.76 M/S
MV PEAK E VEL: 0.54 M/S
MV STENOSIS PRESSURE HALF TIME: 72.39 MS
MV VALVE AREA P 1/2 METHOD: 3.04 CM2
NONHDLC SERPL-MCNC: 91 MG/DL
OHS CV CPX 1 MINUTE RECOVERY HEART RATE: 127 BPM
OHS CV CPX 85 PERCENT MAX PREDICTED HEART RATE MALE: 125
OHS CV CPX ESTIMATED METS: 9
OHS CV CPX MAX PREDICTED HEART RATE: 147
OHS CV CPX PATIENT IS FEMALE: 0
OHS CV CPX PATIENT IS MALE: 1
OHS CV CPX PEAK DIASTOLIC BLOOD PRESSURE: 119 MMHG
OHS CV CPX PEAK HEAR RATE: 155 BPM
OHS CV CPX PEAK RATE PRESSURE PRODUCT: NORMAL
OHS CV CPX PEAK SYSTOLIC BLOOD PRESSURE: 216 MMHG
OHS CV CPX PERCENT MAX PREDICTED HEART RATE ACHIEVED: 105
OHS CV CPX RATE PRESSURE PRODUCT PRESENTING: NORMAL
PISA TR MAX VEL: 2.18 M/S
PLATELET # BLD AUTO: 157 K/UL (ref 150–350)
PMV BLD AUTO: 11.9 FL (ref 9.2–12.9)
POTASSIUM SERPL-SCNC: 4.7 MMOL/L (ref 3.5–5.1)
PROT SERPL-MCNC: 7.2 G/DL (ref 6–8.4)
PROT SERPL-MCNC: 7.2 G/DL (ref 6–8.4)
PULM VEIN S/D RATIO: 2.5
PV PEAK D VEL: 0.3 M/S
PV PEAK S VEL: 0.75 M/S
RA MAJOR: 4.59 CM
RA PRESSURE: 3 MMHG
RA WIDTH: 2.74 CM
RBC # BLD AUTO: 5.2 M/UL (ref 4.6–6.2)
RIGHT VENTRICULAR END-DIASTOLIC DIMENSION: 3.73 CM
RV TISSUE DOPPLER FREE WALL SYSTOLIC VELOCITY 1 (APICAL 4 CHAMBER VIEW): 13.72 CM/S
SINUS: 3.77 CM
SODIUM SERPL-SCNC: 145 MMOL/L (ref 136–145)
STJ: 3.42 CM
STRESS ECHO POST EXERCISE DUR MIN: 5 MINUTES
STRESS ECHO POST EXERCISE DUR SEC: 42 SECONDS
SYSTOLIC BLOOD PRESSURE: 153 MMHG
TDI LATERAL: 0.07 M/S
TDI SEPTAL: 0.06 M/S
TDI: 0.07 M/S
TR MAX PG: 19 MMHG
TRICUSPID ANNULAR PLANE SYSTOLIC EXCURSION: 3.24 CM
TRIGL SERPL-MCNC: 149 MG/DL (ref 30–150)
TSH SERPL DL<=0.005 MIU/L-ACNC: 2.44 UIU/ML (ref 0.4–4)
TV REST PULMONARY ARTERY PRESSURE: 22 MMHG
WBC # BLD AUTO: 5.67 K/UL (ref 3.9–12.7)

## 2020-11-10 PROCEDURE — 85027 COMPLETE CBC AUTOMATED: CPT

## 2020-11-10 PROCEDURE — 99999 PR PBB SHADOW E&M-EST. PATIENT-LVL IV: CPT | Mod: PBBFAC,,, | Performed by: INTERNAL MEDICINE

## 2020-11-10 PROCEDURE — 1126F PR PAIN SEVERITY QUANTIFIED, NO PAIN PRESENT: ICD-10-PCS | Mod: S$GLB,,, | Performed by: INTERNAL MEDICINE

## 2020-11-10 PROCEDURE — 80053 COMPREHEN METABOLIC PANEL: CPT

## 2020-11-10 PROCEDURE — 1159F MED LIST DOCD IN RCRD: CPT | Mod: S$GLB,,, | Performed by: INTERNAL MEDICINE

## 2020-11-10 PROCEDURE — 83036 HEMOGLOBIN GLYCOSYLATED A1C: CPT

## 2020-11-10 PROCEDURE — 1159F PR MEDICATION LIST DOCUMENTED IN MEDICAL RECORD: ICD-10-PCS | Mod: S$GLB,,, | Performed by: INTERNAL MEDICINE

## 2020-11-10 PROCEDURE — 99215 OFFICE O/P EST HI 40 MIN: CPT | Mod: S$GLB,,, | Performed by: INTERNAL MEDICINE

## 2020-11-10 PROCEDURE — 99215 PR OFFICE/OUTPT VISIT, EST, LEVL V, 40-54 MIN: ICD-10-PCS | Mod: S$GLB,,, | Performed by: INTERNAL MEDICINE

## 2020-11-10 PROCEDURE — 84443 ASSAY THYROID STIM HORMONE: CPT

## 2020-11-10 PROCEDURE — 84153 ASSAY OF PSA TOTAL: CPT

## 2020-11-10 PROCEDURE — 80076 HEPATIC FUNCTION PANEL: CPT

## 2020-11-10 PROCEDURE — 3008F BODY MASS INDEX DOCD: CPT | Mod: CPTII,S$GLB,, | Performed by: INTERNAL MEDICINE

## 2020-11-10 PROCEDURE — 93351 STRESS ECHO (CUPID ONLY): ICD-10-PCS | Mod: 26,,, | Performed by: INTERNAL MEDICINE

## 2020-11-10 PROCEDURE — 93351 STRESS TTE COMPLETE: CPT

## 2020-11-10 PROCEDURE — 3008F PR BODY MASS INDEX (BMI) DOCUMENTED: ICD-10-PCS | Mod: CPTII,S$GLB,, | Performed by: INTERNAL MEDICINE

## 2020-11-10 PROCEDURE — 1126F AMNT PAIN NOTED NONE PRSNT: CPT | Mod: S$GLB,,, | Performed by: INTERNAL MEDICINE

## 2020-11-10 PROCEDURE — 99999 PR PBB SHADOW E&M-EST. PATIENT-LVL IV: ICD-10-PCS | Mod: PBBFAC,,, | Performed by: INTERNAL MEDICINE

## 2020-11-10 PROCEDURE — 93351 STRESS TTE COMPLETE: CPT | Mod: 26,,, | Performed by: INTERNAL MEDICINE

## 2020-11-10 PROCEDURE — 80061 LIPID PANEL: CPT

## 2020-11-10 RX ORDER — NAPROXEN SODIUM 220 MG/1
81 TABLET, FILM COATED ORAL DAILY
Refills: 0 | COMMUNITY
Start: 2020-11-10 | End: 2024-01-26

## 2020-11-10 RX ORDER — OLMESARTAN MEDOXOMIL 5 MG/1
10 TABLET ORAL DAILY
Qty: 30 TABLET | Refills: 11 | Status: SHIPPED | OUTPATIENT
Start: 2020-11-10 | End: 2020-11-18

## 2020-11-10 RX ORDER — PANTOPRAZOLE SODIUM 40 MG/1
40 TABLET, DELAYED RELEASE ORAL DAILY
Qty: 90 TABLET | Refills: 3 | Status: SHIPPED | OUTPATIENT
Start: 2020-11-10 | End: 2022-04-21 | Stop reason: SDUPTHER

## 2020-11-10 NOTE — PROGRESS NOTES
Interventional Cardiology Consult   PATIENT: Patrick Short  MRN: [id number]   Date of Service: 11/10/2020  Reason for Visit: Coronary artery disease, one year follow up     History of Present Illness:   Mr. Patrick Short is a 75 y.o. male, retired  of [employer], with PMHx CAD / STEMI (7/18/2016) s/p GUS x 2 to RCA, s/p 90% LAD PCI (8/29/2016), anomalous LCx origin, h/o R groin pseudoaneurysm s/p ultrasound-guided thrombin injection, HLD who presents for routine 1 year f/u.    Patient has been doing well since last year, due to COVID hasn't been able to work out. He gained significant weight per patient, he reports very active, walks 3x weekly ~ 1 mile. Denies any chest pain, palpitations, dizziness, syncope, DEAN or shortness of breath, claudication, PND, orthopnea, or LE edema. In the past completed sleep study, reports he received full face however did not tolerate and underwent uvulectomy after his last clinic visit which he thinks helped somewhat with his snoring and daytime somnolence.     Additional history and information were obtained from chart , and referring physician  I reviewed last clinic, H&P, progress note and discharge summary  note for additional information, including medical history, progression of symptoms, treatment plan.      Past Medical History:   Diagnosis Date    CAD (coronary artery disease) 7/26/2016    Heart block     MI (myocardial infarction)     Reflux        Review of patient's allergies indicates:  No Known Allergies    Family History   Problem Relation Age of Onset    Hypertension Mother     Emphysema Father     Coronary artery disease Father        Social History:  Social History     Tobacco Use    Smoking status: Never Smoker    Smokeless tobacco: Never Used   Substance Use Topics    Alcohol use: Yes     Comment: twice a month/beer wine       Medications have been reviewed and reconciled.  Outpatient Medications Marked as Taking for the 11/10/20  "encounter (Office Visit) with Sung Mendoza MD   Medication Sig Dispense Refill    aspirin 81 MG Chew Take 1 tablet (81 mg total) by mouth once daily.  0    cetirizine (ZYRTEC) 10 MG tablet Take 10 mg by mouth once daily.      olmesartan (BENICAR) 5 MG Tab Take 5 mg by mouth once daily.  3    pantoprazole (PROTONIX) 40 MG tablet TAKE 1 TABLET BY MOUTH EVERY DAY 90 tablet 3    [DISCONTINUED] atorvastatin (LIPITOR) 40 MG tablet TAKE 1 TABLET BY MOUTH EVERY DAY 90 tablet 3         Objective:   BP (!) 143/94 (BP Location: Right arm, Patient Position: Sitting, BP Method: Large (Automatic))   Pulse 80   Ht 5' 10" (1.778 m)   Wt 106.1 kg (233 lb 14.5 oz)   SpO2 95%   BMI 33.56 kg/m²   Physical Exam   Constitutional: He is oriented to person, place, and time. No distress.   HENT:   Head: Normocephalic and atraumatic.   Neck: Normal range of motion. Neck supple. No JVD present.   Cardiovascular: Normal rate, regular rhythm, normal heart sounds and intact distal pulses. Exam reveals no gallop and no friction rub.   No murmur heard.  Pulmonary/Chest: Effort normal and breath sounds normal. No respiratory distress. He has no wheezes. He has no rales.   Abdominal: Soft. Bowel sounds are normal. He exhibits no distension. There is no abdominal tenderness.   Musculoskeletal: Normal range of motion.   Neurological: He is alert and oriented to person, place, and time.   Skin: Skin is warm and dry. He is not diaphoretic.   Nursing note and vitals reviewed.      Data/results:  Basic Metabolic Panel, reviewed today   No results found for this or any previous visit (from the past 336 hour(s)).    Lipid panel,  reviewed    Lab Results   Component Value Date    CHOL 114 (L) 11/14/2019    CHOL 114 (L) 11/14/2019    CHOL 106 (L) 05/15/2018     Lab Results   Component Value Date    HDL 38 (L) 11/14/2019    HDL 38 (L) 11/14/2019    HDL 34 (L) 05/15/2018     Lab Results   Component Value Date    LDLCALC 56.6 (L) 11/14/2019    " LDLCALC 56.6 (L) 11/14/2019    LDLCALC 51.6 (L) 05/15/2018     Lab Results   Component Value Date    TRIG 97 11/14/2019    TRIG 97 11/14/2019    TRIG 102 05/15/2018     Lab Results   Component Value Date    CHOLHDL 33.3 11/14/2019    CHOLHDL 33.3 11/14/2019    CHOLHDL 32.1 05/15/2018       Laboratory Tests, reviewed today   Lab Results   Component Value Date    WBC 5.67 11/10/2020    HGB 15.8 11/10/2020    HCT 48.1 11/10/2020     11/10/2020    GRAN 2.9 11/14/2019    GRAN 56.4 11/14/2019     Lab Results   Component Value Date    HGBA1C 5.2 05/15/2018     Lab Results   Component Value Date     (H) 07/24/2016    BNP 46 07/18/2016    BNP 64 07/18/2016     TSH   Date Value Ref Range Status   12/28/2018 2.506 0.400 - 4.000 uIU/mL Final     Lab Results   Component Value Date    INR 1.0 07/18/2016       Medical decision making      Labs reviewed personally, reported, normal cratinine, normal platelet    Images   EKG: I  personally reviewed and interpreted the EKG, which shows, Sinus bradycardia    CXR: I independently visualized the images and personally reviewed, demonstrated, NACP    Echo, independently visualized the images and personally interpreted, showed,   1. Exercise stress echocardiogram (11/14/19)  · There were no arrhythmias during stress.  · The test was stopped because the patient experienced shortness of breath.  · The EKG portion of this study is negative for myocardial ischemia.  · Normal left ventricular systolic function. The estimated ejection fraction is 63%  · No wall motion abnormalities.  · Normal LV diastolic function.  · Normal right ventricular systolic function.  · The ascending aorta is mildly dilated.  · The stress echo portion of this study is negative for myocardial ischemia, although images very difficult post stress as per text.    · The stress echo portion of this study is negative for myocardial ischemia.  · The ECG portion of this study is negative for myocardial  ischemia.  · The test was stopped because the patient experienced shortness of breath.  · The patient's exercise capacity was normal.  · There were no arrhythmias during stress.  · The left ventricle is normal in size with normal systolic function. The estimated ejection fraction is 65%.  · Normal left ventricular diastolic function.  · Normal right ventricular size with normal right ventricular systolic function.  · Mild tricuspid regurgitation.  · The estimated PA systolic pressure is 22 mmHg.  · Normal central venous pressure (3 mmHg).  The patient exercised for 5 minutes and 42 seconds on a high ramp protocol, corresponding to a functional capacity of 9 METS, achieving a peak heart rate of 155 bpm, which is 105% of the age predicted maximum heart rate. The patient reported no symptoms during stress.   The blood pressure response to stress was normal.  The patient's exercise capacity was normal.   The test was stopped because the patient experienced shortness of breath.   The peak rate pressure product was 55111.    Coronary angiography findings/intervention (images independently visualized the images and personally interpreted):   Tortuous right EIA and right LEXIE prohibiting placement of an IABP.     Successful PCI of an occluded mid RCA with 4X15 GUS.     Successul PCI of 90% distal RCA stenosis with 4X15 GUS.     Resolution of complete heart block and return to normal sinus rhythm after RCA revasculatization.     90% mid LAD stenosis.     Old records from the chart reviewed,       Assessment & Plan:     1. NSTEMI (non-ST elevated myocardial infarction)    2. ST elevation myocardial infarction involving right coronary artery    3. Dyslipidemia    4. Coronary artery disease involving native coronary artery of native heart without angina pectoris    5. Femoral artery pseudo-aneurysm, right        Patrick Short is a 75 y.o. y.o. male , Interventional cardiology was consulted for evaluation.    Preop risk  "evaluation for left epiretinal membrane needing viterctomy with membrane peel.  - patient stable and optimized from cardiovascular standpoint, can proceed with procedure as needed no further cardiovascular evaluation needed at this time  -continue aspirin 81 mg if able to  -please keep patient normotensive, euvolemic, hemoglobin above 8  -do not initiate new blood pressure medications prior to procedure    CAD (coronary artery disease)  -- Decrease Ticagrelor to 60 mg bid x 3 months. If no side effects of easy bruising/bleeding, will send more refills. Otherwise he can stop it if still causing aggravating bleeding.   -- Continue Aspirin 81 mg daily for life.   -- Medical therapy includes  1. SBP< 140 mmHg (< 130 mmHg for DM)  2. LDL < 70 mg/dl  3. Non-HDL cholesterol < 100 mg/dl  4. Hemoglobin A1c < 7.0%  5. Smoking abstinence  6. Targeted weight management  7. > 30 min. of moderate exercise 3 xs/week  -- Follow up in one year with exercise stress echo    Dyslipidemia  Lab Results   Component Value Date    LDLCALC 56.6 (L) 11/14/2019    LDLCALC 56.6 (L) 11/14/2019   -- LDL at goal, lipitor 40mg qday  -- Continue high intensity statin with Lipitor 40 mg qhs.     Hypertension:  -BP today: BP (!) 143/94 (BP Location: Right arm, Patient Position: Sitting, BP Method: Large (Automatic))   Pulse 80   Ht 5' 10" (1.778 m)   Wt 106.1 kg (233 lb 14.5 oz)   SpO2 95%   BMI 33.56 kg/m²   -HTN is not well controlled, management plan will be altered to Olmesartan 10mg qday  -Needs Sleep clinic evaluation r/o SHALINI  -Pt advised to be compliant with their treatment regimen daily to avoid BP fluctuation and any complications.   -Pt advised to monitor their blood pressure regularly and bring their recorded results to next office visit.  -Pt educated that it is important to eat right. Following a reasonable-calorie low-salt diet (Such as the DASH diet) has been shown to control blood pressure better with less medications. " Recommended to exercise at least 30 mins a day for 5 days a week and also told to avoid smoking all together.      Associated significant medical problems   # obesity,worsening:  Significant weight gain since last clinical visit, patient reports this is secondary to covid and lack of activity.    #SHALINI: follow up with sleep clinic  Associated problems management plan as per primary team.      Prognosis, risks and benefits of management options was discussed with the patient in details.   Treatment plan and recommendations were directly comminuted and discussed with the primary team.   Thank you for allowing us to consult and assist in care of the patient.       Signed:  Anant Lynch M.D.  Page # [phone]  Cardiovascular Fellow PGY-V  [facility]     Interventional Cardiology Staff  I have personally taken the history and examined this patient. I have discussed and agree with the resident's findings and plan as documented in the resident's note.   Stress echo negative.  Will call patient with results of blood work.  Sung Mendoza

## 2020-11-18 ENCOUNTER — OFFICE VISIT (OUTPATIENT)
Dept: PULMONOLOGY | Facility: CLINIC | Age: 75
End: 2020-11-18
Payer: COMMERCIAL

## 2020-11-18 DIAGNOSIS — Z00.00 ANNUAL PHYSICAL EXAM: Primary | ICD-10-CM

## 2020-11-18 PROCEDURE — 99999 PR PBB SHADOW E&M-EST. PATIENT-LVL II: ICD-10-PCS | Mod: PBBFAC,,, | Performed by: INTERNAL MEDICINE

## 2020-11-18 PROCEDURE — 99999 PR PBB SHADOW E&M-EST. PATIENT-LVL II: CPT | Mod: PBBFAC,,, | Performed by: INTERNAL MEDICINE

## 2020-11-18 PROCEDURE — 99397 PER PM REEVAL EST PAT 65+ YR: CPT | Mod: S$GLB,,, | Performed by: INTERNAL MEDICINE

## 2020-11-18 PROCEDURE — 3288F PR FALLS RISK ASSESSMENT DOCUMENTED: ICD-10-PCS | Mod: CPTII,S$GLB,, | Performed by: INTERNAL MEDICINE

## 2020-11-18 PROCEDURE — 99397 PR PREVENTIVE VISIT,EST,65 & OVER: ICD-10-PCS | Mod: S$GLB,,, | Performed by: INTERNAL MEDICINE

## 2020-11-18 PROCEDURE — 1101F PR PT FALLS ASSESS DOC 0-1 FALLS W/OUT INJ PAST YR: ICD-10-PCS | Mod: CPTII,S$GLB,, | Performed by: INTERNAL MEDICINE

## 2020-11-18 PROCEDURE — 1159F MED LIST DOCD IN RCRD: CPT | Mod: S$GLB,,, | Performed by: INTERNAL MEDICINE

## 2020-11-18 PROCEDURE — 1126F AMNT PAIN NOTED NONE PRSNT: CPT | Mod: S$GLB,,, | Performed by: INTERNAL MEDICINE

## 2020-11-18 PROCEDURE — 3288F FALL RISK ASSESSMENT DOCD: CPT | Mod: CPTII,S$GLB,, | Performed by: INTERNAL MEDICINE

## 2020-11-18 PROCEDURE — 1101F PT FALLS ASSESS-DOCD LE1/YR: CPT | Mod: CPTII,S$GLB,, | Performed by: INTERNAL MEDICINE

## 2020-11-18 PROCEDURE — 1159F PR MEDICATION LIST DOCUMENTED IN MEDICAL RECORD: ICD-10-PCS | Mod: S$GLB,,, | Performed by: INTERNAL MEDICINE

## 2020-11-18 PROCEDURE — 1126F PR PAIN SEVERITY QUANTIFIED, NO PAIN PRESENT: ICD-10-PCS | Mod: S$GLB,,, | Performed by: INTERNAL MEDICINE

## 2020-11-18 RX ORDER — KETOROLAC TROMETHAMINE 5 MG/ML
1 SOLUTION OPHTHALMIC 2 TIMES DAILY
COMMUNITY
Start: 2020-10-23 | End: 2022-08-09

## 2020-11-18 RX ORDER — FINASTERIDE 5 MG/1
1 TABLET, FILM COATED ORAL DAILY
COMMUNITY
Start: 2020-10-01

## 2020-11-18 NOTE — LETTER
November 19, 2020    Patrick hSort  [address] [place] [address] [place] [address]             [facility] [address] [address]  [address]  [place] [address]  Phone: [phone] Dear Tushar,      Thank you for allowing me to serve you and perform your [facility] exam on 11/18/2020. This letter will serve as a brief summary of the physical findings and laboratory/studies performed and recommendations at this time. Today's assessment is essentially normal in all respects except for your weight. Your recent cardiac assessment is normal by Dr. Mendoza. Lab values are normal.   Obviously you feel well and are enjoying life. You earned it so keep it up.          If you have any questions or concerns, please don't hesitate to call.    Sincerely,        Darian Lloyd MD

## 2020-11-18 NOTE — PROGRESS NOTES
"Subjective:       Patient ID: Patrick Short is a 75 y.o. male.    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 His former company [employer] is being run by his faily. He had a STEMI and developed severe bradycardia in the ER was taken to the cath lab in 10 minutes. Had a heart cath and with perfusion of the right coronary artery his pulse returned to normal sinus and he has stents placed and had stents in LCA and have a subsequent  Cath by Dr. Mendoza several days later. He has done very well, and had a comprehensive heart assessment by Serjio Koroma. Has a normal EF and his 2-D portion of the ECHO is normal with no wall motion abnormalities.  He says that he feels great and that "life is great".  Review of Systems   Constitutional: Negative.    HENT: Negative.    Eyes: Negative.    Respiratory: Negative.    Cardiovascular: Negative.         STEMI: July 2016  Several resolution with craterization.   Gastrointestinal: Positive for anal bleeding.   Genitourinary: Negative.    Musculoskeletal: Negative.    Integumentary:  Negative.   Neurological: Negative.    Psychiatric/Behavioral: Negative.    All other systems reviewed and are negative.        Objective:      Physical Exam  Constitutional:       Appearance: He is well-developed.      Comments: Obese:   BMI:33.5  Former marathon runner.   HENT:      Head: Normocephalic and atraumatic.      Right Ear: External ear normal.      Left Ear: External ear normal.   Eyes:      Conjunctiva/sclera: Conjunctivae normal.      Pupils: Pupils are equal, round, and reactive to light.   Neck:      Musculoskeletal: Normal range of motion and neck supple.   Cardiovascular:      Rate and Rhythm: Normal rate and regular rhythm.      Heart sounds: Normal heart sounds.   Pulmonary:      Effort: Pulmonary effort is normal.      Breath sounds: Normal breath sounds.   Abdominal:      General: Bowel sounds are normal.     "  Palpations: Abdomen is soft.   Musculoskeletal: Normal range of motion.   Skin:     General: Skin is warm and dry.   Neurological:      Mental Status: He is alert and oriented to person, place, and time.      Deep Tendon Reflexes: Reflexes are normal and symmetric.   Psychiatric:         Behavior: Behavior normal.         Thought Content: Thought content normal.         Judgment: Judgment normal.         Assessment:       1. Annual physical exam        Plan:       Labs: All parameters are normal and Stress 2-D Echo negative for ischemia with a normal EF

## 2021-01-05 ENCOUNTER — IMMUNIZATION (OUTPATIENT)
Dept: PRIMARY CARE CLINIC | Facility: CLINIC | Age: 76
End: 2021-01-05
Payer: COMMERCIAL

## 2021-01-05 DIAGNOSIS — Z23 NEED FOR VACCINATION: ICD-10-CM

## 2021-01-05 PROCEDURE — 91300 COVID-19, MRNA, LNP-S, PF, 30 MCG/0.3 ML DOSE VACCINE: ICD-10-PCS | Mod: S$GLB,,, | Performed by: FAMILY MEDICINE

## 2021-01-05 PROCEDURE — 0001A COVID-19, MRNA, LNP-S, PF, 30 MCG/0.3 ML DOSE VACCINE: CPT | Mod: S$GLB,,, | Performed by: FAMILY MEDICINE

## 2021-01-05 PROCEDURE — 91300 COVID-19, MRNA, LNP-S, PF, 30 MCG/0.3 ML DOSE VACCINE: CPT | Mod: S$GLB,,, | Performed by: FAMILY MEDICINE

## 2021-01-05 PROCEDURE — 0001A COVID-19, MRNA, LNP-S, PF, 30 MCG/0.3 ML DOSE VACCINE: ICD-10-PCS | Mod: S$GLB,,, | Performed by: FAMILY MEDICINE

## 2021-01-26 ENCOUNTER — IMMUNIZATION (OUTPATIENT)
Dept: INTERNAL MEDICINE | Facility: CLINIC | Age: 76
End: 2021-01-26
Payer: COMMERCIAL

## 2021-01-26 DIAGNOSIS — Z23 NEED FOR VACCINATION: Primary | ICD-10-CM

## 2021-01-26 PROCEDURE — 0002A COVID-19, MRNA, LNP-S, PF, 30 MCG/0.3 ML DOSE VACCINE: CPT | Mod: PBBFAC | Performed by: FAMILY MEDICINE

## 2021-01-26 PROCEDURE — 91300 COVID-19, MRNA, LNP-S, PF, 30 MCG/0.3 ML DOSE VACCINE: CPT | Mod: PBBFAC | Performed by: FAMILY MEDICINE

## 2021-03-18 ENCOUNTER — PATIENT MESSAGE (OUTPATIENT)
Dept: RESEARCH | Facility: HOSPITAL | Age: 76
End: 2021-03-18

## 2021-03-26 ENCOUNTER — PATIENT MESSAGE (OUTPATIENT)
Dept: RESEARCH | Facility: HOSPITAL | Age: 76
End: 2021-03-26

## 2021-10-20 DIAGNOSIS — Z00.00 ROUTINE GENERAL MEDICAL EXAMINATION AT A HEALTH CARE FACILITY: Primary | ICD-10-CM

## 2021-10-21 ENCOUNTER — PATIENT MESSAGE (OUTPATIENT)
Dept: PULMONOLOGY | Facility: CLINIC | Age: 76
End: 2021-10-21
Payer: COMMERCIAL

## 2021-11-16 ENCOUNTER — CLINICAL SUPPORT (OUTPATIENT)
Dept: INTERNAL MEDICINE | Facility: CLINIC | Age: 76
End: 2021-11-16
Payer: COMMERCIAL

## 2021-11-16 ENCOUNTER — HOSPITAL ENCOUNTER (OUTPATIENT)
Dept: CARDIOLOGY | Facility: HOSPITAL | Age: 76
Discharge: HOME | End: 2021-11-16
Attending: STUDENT IN AN ORGANIZED HEALTH CARE EDUCATION/TRAINING PROGRAM
Payer: COMMERCIAL

## 2021-11-16 ENCOUNTER — HOSPITAL ENCOUNTER (OUTPATIENT)
Dept: RADIOLOGY | Facility: HOSPITAL | Age: 76
Discharge: HOME | End: 2021-11-16
Attending: INTERNAL MEDICINE
Payer: COMMERCIAL

## 2021-11-16 ENCOUNTER — OFFICE VISIT (OUTPATIENT)
Dept: PULMONOLOGY | Facility: CLINIC | Age: 76
End: 2021-11-16
Payer: COMMERCIAL

## 2021-11-16 ENCOUNTER — OFFICE VISIT (OUTPATIENT)
Dept: CARDIOLOGY | Facility: CLINIC | Age: 76
End: 2021-11-16
Payer: COMMERCIAL

## 2021-11-16 VITALS
OXYGEN SATURATION: 96 % | DIASTOLIC BLOOD PRESSURE: 84 MMHG | BODY MASS INDEX: 30.49 KG/M2 | SYSTOLIC BLOOD PRESSURE: 137 MMHG | WEIGHT: 212.94 LBS | HEIGHT: 70 IN | HEART RATE: 64 BPM

## 2021-11-16 VITALS
BODY MASS INDEX: 29.92 KG/M2 | WEIGHT: 209 LBS | WEIGHT: 209 LBS | SYSTOLIC BLOOD PRESSURE: 151 MMHG | DIASTOLIC BLOOD PRESSURE: 79 MMHG | HEIGHT: 70 IN | RESPIRATION RATE: 12 BRPM | HEART RATE: 62 BPM | BODY MASS INDEX: 29.92 KG/M2 | HEIGHT: 70 IN

## 2021-11-16 DIAGNOSIS — I21.4 NSTEMI (NON-ST ELEVATED MYOCARDIAL INFARCTION): ICD-10-CM

## 2021-11-16 DIAGNOSIS — Z00.00 ROUTINE GENERAL MEDICAL EXAMINATION AT A HEALTH CARE FACILITY: ICD-10-CM

## 2021-11-16 DIAGNOSIS — Z00.00 ANNUAL PHYSICAL EXAM: Primary | ICD-10-CM

## 2021-11-16 DIAGNOSIS — I25.10 CORONARY ARTERY DISEASE INVOLVING NATIVE CORONARY ARTERY OF NATIVE HEART WITHOUT ANGINA PECTORIS: ICD-10-CM

## 2021-11-16 DIAGNOSIS — E78.5 DYSLIPIDEMIA: Primary | ICD-10-CM

## 2021-11-16 LAB
ALBUMIN SERPL BCP-MCNC: 4.1 G/DL (ref 3.5–5.2)
ALP SERPL-CCNC: 77 U/L (ref 55–135)
ALT SERPL W/O P-5'-P-CCNC: 49 U/L (ref 10–44)
ANION GAP SERPL CALC-SCNC: 5 MMOL/L (ref 8–16)
ASCENDING AORTA: 3.67 CM
AST SERPL-CCNC: 26 U/L (ref 10–40)
BILIRUB SERPL-MCNC: 1 MG/DL (ref 0.1–1)
BSA FOR ECHO PROCEDURE: 2.16 M2
BUN SERPL-MCNC: 20 MG/DL (ref 8–23)
CALCIUM SERPL-MCNC: 9.7 MG/DL (ref 8.7–10.5)
CHLORIDE SERPL-SCNC: 102 MMOL/L (ref 95–110)
CHOLEST SERPL-MCNC: 132 MG/DL (ref 120–199)
CHOLEST/HDLC SERPL: 2.9 {RATIO} (ref 2–5)
CO2 SERPL-SCNC: 33 MMOL/L (ref 23–29)
COMPLEXED PSA SERPL-MCNC: 0.38 NG/ML (ref 0–4)
CREAT SERPL-MCNC: 0.9 MG/DL (ref 0.5–1.4)
CV ECHO LV RWT: 0.32 CM
CV STRESS BASE HR: 54 BPM
DIASTOLIC BLOOD PRESSURE: 83 MMHG
DOP CALC LVOT AREA: 4.1 CM2
DOP CALC LVOT DIAMETER: 2.28 CM
DOP CALC LVOT PEAK VEL: 1.02 M/S
DOP CALC LVOT STROKE VOLUME: 96.84 CM3
DOP CALCLVOT PEAK VEL VTI: 23.73 CM
E WAVE DECELERATION TIME: 238.85 MSEC
E/A RATIO: 1.06
E/E' RATIO: 9.6 M/S
ECHO LV POSTERIOR WALL: 0.77 CM (ref 0.6–1.1)
EJECTION FRACTION: 65 %
ERYTHROCYTE [DISTWIDTH] IN BLOOD BY AUTOMATED COUNT: 13.2 % (ref 11.5–14.5)
EST. GFR  (AFRICAN AMERICAN): >60 ML/MIN/1.73 M^2
EST. GFR  (NON AFRICAN AMERICAN): >60 ML/MIN/1.73 M^2
ESTIMATED AVG GLUCOSE: 105 MG/DL (ref 68–131)
FRACTIONAL SHORTENING: 28 % (ref 28–44)
GLUCOSE SERPL-MCNC: 92 MG/DL (ref 70–110)
HBA1C MFR BLD: 5.3 % (ref 4–5.6)
HCT VFR BLD AUTO: 46.1 % (ref 40–54)
HDLC SERPL-MCNC: 46 MG/DL (ref 40–75)
HDLC SERPL: 34.8 % (ref 20–50)
HGB BLD-MCNC: 15.5 G/DL (ref 14–18)
INTERVENTRICULAR SEPTUM: 0.87 CM (ref 0.6–1.1)
IVRT: 108.47 MSEC
LA MAJOR: 6.01 CM
LA MINOR: 6.03 CM
LA WIDTH: 4.27 CM
LDLC SERPL CALC-MCNC: 71 MG/DL (ref 63–159)
LEFT ATRIUM SIZE: 4.11 CM
LEFT ATRIUM VOLUME INDEX MOD: 37.6 ML/M2
LEFT ATRIUM VOLUME INDEX: 42.2 ML/M2
LEFT ATRIUM VOLUME MOD: 80 CM3
LEFT ATRIUM VOLUME: 89.8 CM3
LEFT INTERNAL DIMENSION IN SYSTOLE: 3.45 CM (ref 2.1–4)
LEFT VENTRICLE DIASTOLIC VOLUME INDEX: 50.41 ML/M2
LEFT VENTRICLE DIASTOLIC VOLUME: 107.38 ML
LEFT VENTRICLE MASS INDEX: 61 G/M2
LEFT VENTRICLE SYSTOLIC VOLUME INDEX: 23.1 ML/M2
LEFT VENTRICLE SYSTOLIC VOLUME: 49.18 ML
LEFT VENTRICULAR INTERNAL DIMENSION IN DIASTOLE: 4.8 CM (ref 3.5–6)
LEFT VENTRICULAR MASS: 130.81 G
LV LATERAL E/E' RATIO: 8 M/S
LV SEPTAL E/E' RATIO: 12 M/S
MCH RBC QN AUTO: 31 PG (ref 27–31)
MCHC RBC AUTO-ENTMCNC: 33.6 G/DL (ref 32–36)
MCV RBC AUTO: 92 FL (ref 82–98)
MV A" WAVE DURATION": 15.98 MSEC
MV PEAK A VEL: 0.68 M/S
MV PEAK E VEL: 0.72 M/S
MV STENOSIS PRESSURE HALF TIME: 69.27 MS
MV VALVE AREA P 1/2 METHOD: 3.18 CM2
NONHDLC SERPL-MCNC: 86 MG/DL
OHS CV CPX 1 MINUTE RECOVERY HEART RATE: 115 BPM
OHS CV CPX 85 PERCENT MAX PREDICTED HEART RATE MALE: 124
OHS CV CPX ESTIMATED METS: 9
OHS CV CPX MAX PREDICTED HEART RATE: 146
OHS CV CPX PATIENT IS FEMALE: 0
OHS CV CPX PATIENT IS MALE: 1
OHS CV CPX PEAK DIASTOLIC BLOOD PRESSURE: 79 MMHG
OHS CV CPX PEAK HEAR RATE: 148 BPM
OHS CV CPX PEAK RATE PRESSURE PRODUCT: NORMAL
OHS CV CPX PEAK SYSTOLIC BLOOD PRESSURE: 184 MMHG
OHS CV CPX PERCENT MAX PREDICTED HEART RATE ACHIEVED: 101
OHS CV CPX RATE PRESSURE PRODUCT PRESENTING: 7344
PISA TR MAX VEL: 2.32 M/S
PLATELET # BLD AUTO: 160 K/UL (ref 150–450)
PMV BLD AUTO: 11 FL (ref 9.2–12.9)
POTASSIUM SERPL-SCNC: 4.8 MMOL/L (ref 3.5–5.1)
PROT SERPL-MCNC: 6.9 G/DL (ref 6–8.4)
PULM VEIN S/D RATIO: 2.03
PV PEAK D VEL: 0.33 M/S
PV PEAK S VEL: 0.67 M/S
RA MAJOR: 5.93 CM
RA PRESSURE: 3 MMHG
RA WIDTH: 3.59 CM
RBC # BLD AUTO: 5 M/UL (ref 4.6–6.2)
RIGHT VENTRICULAR END-DIASTOLIC DIMENSION: 3.91 CM
RV TISSUE DOPPLER FREE WALL SYSTOLIC VELOCITY 1 (APICAL 4 CHAMBER VIEW): 15.83 CM/S
SINUS: 3.28 CM
SODIUM SERPL-SCNC: 140 MMOL/L (ref 136–145)
STJ: 3.07 CM
STRESS ECHO POST EXERCISE DUR MIN: 6 MINUTES
STRESS ECHO POST EXERCISE DUR SEC: 0 SECONDS
SYSTOLIC BLOOD PRESSURE: 136 MMHG
TDI LATERAL: 0.09 M/S
TDI SEPTAL: 0.06 M/S
TDI: 0.08 M/S
TR MAX PG: 22 MMHG
TRICUSPID ANNULAR PLANE SYSTOLIC EXCURSION: 2.76 CM
TRIGL SERPL-MCNC: 75 MG/DL (ref 30–150)
TSH SERPL DL<=0.005 MIU/L-ACNC: 2.36 UIU/ML (ref 0.4–4)
TV REST PULMONARY ARTERY PRESSURE: 25 MMHG
WBC # BLD AUTO: 6.91 K/UL (ref 3.9–12.7)

## 2021-11-16 PROCEDURE — 1160F PR REVIEW ALL MEDS BY PRESCRIBER/CLIN PHARMACIST DOCUMENTED: ICD-10-PCS | Mod: CPTII,S$GLB,, | Performed by: INTERNAL MEDICINE

## 2021-11-16 PROCEDURE — 83036 HEMOGLOBIN GLYCOSYLATED A1C: CPT | Performed by: INTERNAL MEDICINE

## 2021-11-16 PROCEDURE — 3288F FALL RISK ASSESSMENT DOCD: CPT | Mod: CPTII,S$GLB,, | Performed by: INTERNAL MEDICINE

## 2021-11-16 PROCEDURE — 3008F BODY MASS INDEX DOCD: CPT | Mod: CPTII,S$GLB,, | Performed by: INTERNAL MEDICINE

## 2021-11-16 PROCEDURE — 3075F SYST BP GE 130 - 139MM HG: CPT | Mod: CPTII,S$GLB,, | Performed by: INTERNAL MEDICINE

## 2021-11-16 PROCEDURE — 93351 STRESS TTE COMPLETE: CPT

## 2021-11-16 PROCEDURE — 1126F AMNT PAIN NOTED NONE PRSNT: CPT | Mod: CPTII,S$GLB,, | Performed by: INTERNAL MEDICINE

## 2021-11-16 PROCEDURE — 3288F PR FALLS RISK ASSESSMENT DOCUMENTED: ICD-10-PCS | Mod: CPTII,S$GLB,, | Performed by: INTERNAL MEDICINE

## 2021-11-16 PROCEDURE — 3078F PR MOST RECENT DIASTOLIC BLOOD PRESSURE < 80 MM HG: ICD-10-PCS | Mod: CPTII,S$GLB,, | Performed by: INTERNAL MEDICINE

## 2021-11-16 PROCEDURE — 3008F PR BODY MASS INDEX (BMI) DOCUMENTED: ICD-10-PCS | Mod: CPTII,S$GLB,, | Performed by: INTERNAL MEDICINE

## 2021-11-16 PROCEDURE — 99999 PR PBB SHADOW E&M-EST. PATIENT-LVL IV: ICD-10-PCS | Mod: PBBFAC,,, | Performed by: INTERNAL MEDICINE

## 2021-11-16 PROCEDURE — 99999 PR PBB SHADOW E&M-EST. PATIENT-LVL III: CPT | Mod: PBBFAC,,, | Performed by: INTERNAL MEDICINE

## 2021-11-16 PROCEDURE — 3044F PR MOST RECENT HEMOGLOBIN A1C LEVEL <7.0%: ICD-10-PCS | Mod: CPTII,S$GLB,, | Performed by: INTERNAL MEDICINE

## 2021-11-16 PROCEDURE — 1126F PR PAIN SEVERITY QUANTIFIED, NO PAIN PRESENT: ICD-10-PCS | Mod: CPTII,S$GLB,, | Performed by: INTERNAL MEDICINE

## 2021-11-16 PROCEDURE — 99499 UNLISTED E&M SERVICE: CPT | Mod: S$GLB,,, | Performed by: INTERNAL MEDICINE

## 2021-11-16 PROCEDURE — 3075F PR MOST RECENT SYSTOLIC BLOOD PRESS GE 130-139MM HG: ICD-10-PCS | Mod: CPTII,S$GLB,, | Performed by: INTERNAL MEDICINE

## 2021-11-16 PROCEDURE — 3044F HG A1C LEVEL LT 7.0%: CPT | Mod: CPTII,S$GLB,, | Performed by: INTERNAL MEDICINE

## 2021-11-16 PROCEDURE — 99999 PR PBB SHADOW E&M-EST. PATIENT-LVL III: ICD-10-PCS | Mod: PBBFAC,,, | Performed by: INTERNAL MEDICINE

## 2021-11-16 PROCEDURE — 99999 PR PBB SHADOW E&M-EST. PATIENT-LVL IV: CPT | Mod: PBBFAC,,, | Performed by: INTERNAL MEDICINE

## 2021-11-16 PROCEDURE — 71046 X-RAY EXAM CHEST 2 VIEWS: CPT | Mod: TC,FY

## 2021-11-16 PROCEDURE — 3079F PR MOST RECENT DIASTOLIC BLOOD PRESSURE 80-89 MM HG: ICD-10-PCS | Mod: CPTII,S$GLB,, | Performed by: INTERNAL MEDICINE

## 2021-11-16 PROCEDURE — 71046 X-RAY EXAM CHEST 2 VIEWS: CPT | Mod: 26,,, | Performed by: RADIOLOGY

## 2021-11-16 PROCEDURE — 80061 LIPID PANEL: CPT | Performed by: INTERNAL MEDICINE

## 2021-11-16 PROCEDURE — 71046 XR CHEST PA AND LATERAL: ICD-10-PCS | Mod: 26,,, | Performed by: RADIOLOGY

## 2021-11-16 PROCEDURE — 93351 STRESS TTE COMPLETE: CPT | Mod: 26,,, | Performed by: INTERNAL MEDICINE

## 2021-11-16 PROCEDURE — 1160F RVW MEDS BY RX/DR IN RCRD: CPT | Mod: CPTII,S$GLB,, | Performed by: INTERNAL MEDICINE

## 2021-11-16 PROCEDURE — 99499 NO LOS: ICD-10-PCS | Mod: S$GLB,,, | Performed by: INTERNAL MEDICINE

## 2021-11-16 PROCEDURE — 99397 PR PREVENTIVE VISIT,EST,65 & OVER: ICD-10-PCS | Mod: S$GLB,,, | Performed by: INTERNAL MEDICINE

## 2021-11-16 PROCEDURE — 84153 ASSAY OF PSA TOTAL: CPT | Performed by: INTERNAL MEDICINE

## 2021-11-16 PROCEDURE — 3077F SYST BP >= 140 MM HG: CPT | Mod: CPTII,S$GLB,, | Performed by: INTERNAL MEDICINE

## 2021-11-16 PROCEDURE — 93351 STRESS ECHO (CUPID ONLY): ICD-10-PCS | Mod: 26,,, | Performed by: INTERNAL MEDICINE

## 2021-11-16 PROCEDURE — 3077F PR MOST RECENT SYSTOLIC BLOOD PRESSURE >= 140 MM HG: ICD-10-PCS | Mod: CPTII,S$GLB,, | Performed by: INTERNAL MEDICINE

## 2021-11-16 PROCEDURE — 1159F MED LIST DOCD IN RCRD: CPT | Mod: CPTII,S$GLB,, | Performed by: INTERNAL MEDICINE

## 2021-11-16 PROCEDURE — 3079F DIAST BP 80-89 MM HG: CPT | Mod: CPTII,S$GLB,, | Performed by: INTERNAL MEDICINE

## 2021-11-16 PROCEDURE — 1159F PR MEDICATION LIST DOCUMENTED IN MEDICAL RECORD: ICD-10-PCS | Mod: CPTII,S$GLB,, | Performed by: INTERNAL MEDICINE

## 2021-11-16 PROCEDURE — 1101F PR PT FALLS ASSESS DOC 0-1 FALLS W/OUT INJ PAST YR: ICD-10-PCS | Mod: CPTII,S$GLB,, | Performed by: INTERNAL MEDICINE

## 2021-11-16 PROCEDURE — 3078F DIAST BP <80 MM HG: CPT | Mod: CPTII,S$GLB,, | Performed by: INTERNAL MEDICINE

## 2021-11-16 PROCEDURE — 80053 COMPREHEN METABOLIC PANEL: CPT | Performed by: INTERNAL MEDICINE

## 2021-11-16 PROCEDURE — 1101F PT FALLS ASSESS-DOCD LE1/YR: CPT | Mod: CPTII,S$GLB,, | Performed by: INTERNAL MEDICINE

## 2021-11-16 PROCEDURE — 84443 ASSAY THYROID STIM HORMONE: CPT | Performed by: INTERNAL MEDICINE

## 2021-11-16 PROCEDURE — 99397 PER PM REEVAL EST PAT 65+ YR: CPT | Mod: S$GLB,,, | Performed by: INTERNAL MEDICINE

## 2021-11-16 PROCEDURE — 85027 COMPLETE CBC AUTOMATED: CPT | Performed by: INTERNAL MEDICINE

## 2021-11-22 ENCOUNTER — TELEPHONE (OUTPATIENT)
Dept: NEUROLOGY | Facility: CLINIC | Age: 76
End: 2021-11-22
Payer: COMMERCIAL

## 2021-11-23 ENCOUNTER — IMMUNIZATION (OUTPATIENT)
Dept: INTERNAL MEDICINE | Facility: CLINIC | Age: 76
End: 2021-11-23
Payer: COMMERCIAL

## 2021-11-23 DIAGNOSIS — Z23 NEED FOR VACCINATION: Primary | ICD-10-CM

## 2021-11-23 PROCEDURE — 0004A COVID-19, MRNA, LNP-S, PF, 30 MCG/0.3 ML DOSE VACCINE: CPT | Mod: CV19,PBBFAC | Performed by: INTERNAL MEDICINE

## 2021-12-02 ENCOUNTER — OFFICE VISIT (OUTPATIENT)
Dept: NEUROLOGY | Facility: CLINIC | Age: 76
End: 2021-12-02
Payer: COMMERCIAL

## 2021-12-02 VITALS
HEART RATE: 65 BPM | HEIGHT: 70 IN | BODY MASS INDEX: 30.49 KG/M2 | SYSTOLIC BLOOD PRESSURE: 149 MMHG | DIASTOLIC BLOOD PRESSURE: 88 MMHG | WEIGHT: 212.94 LBS

## 2021-12-02 DIAGNOSIS — G20.A1 PARKINSON DISEASE: Primary | ICD-10-CM

## 2021-12-02 PROCEDURE — 99215 PR OFFICE/OUTPT VISIT, EST, LEVL V, 40-54 MIN: ICD-10-PCS | Mod: S$GLB,,, | Performed by: PSYCHIATRY & NEUROLOGY

## 2021-12-02 PROCEDURE — 3008F BODY MASS INDEX DOCD: CPT | Mod: CPTII,S$GLB,, | Performed by: PSYCHIATRY & NEUROLOGY

## 2021-12-02 PROCEDURE — 1159F MED LIST DOCD IN RCRD: CPT | Mod: CPTII,S$GLB,, | Performed by: PSYCHIATRY & NEUROLOGY

## 2021-12-02 PROCEDURE — 99215 OFFICE O/P EST HI 40 MIN: CPT | Mod: S$GLB,,, | Performed by: PSYCHIATRY & NEUROLOGY

## 2021-12-02 PROCEDURE — 3288F PR FALLS RISK ASSESSMENT DOCUMENTED: ICD-10-PCS | Mod: CPTII,S$GLB,, | Performed by: PSYCHIATRY & NEUROLOGY

## 2021-12-02 PROCEDURE — 1160F PR REVIEW ALL MEDS BY PRESCRIBER/CLIN PHARMACIST DOCUMENTED: ICD-10-PCS | Mod: CPTII,S$GLB,, | Performed by: PSYCHIATRY & NEUROLOGY

## 2021-12-02 PROCEDURE — 3079F PR MOST RECENT DIASTOLIC BLOOD PRESSURE 80-89 MM HG: ICD-10-PCS | Mod: CPTII,S$GLB,, | Performed by: PSYCHIATRY & NEUROLOGY

## 2021-12-02 PROCEDURE — 99999 PR PBB SHADOW E&M-EST. PATIENT-LVL III: ICD-10-PCS | Mod: PBBFAC,,, | Performed by: PSYCHIATRY & NEUROLOGY

## 2021-12-02 PROCEDURE — 1160F RVW MEDS BY RX/DR IN RCRD: CPT | Mod: CPTII,S$GLB,, | Performed by: PSYCHIATRY & NEUROLOGY

## 2021-12-02 PROCEDURE — 3008F PR BODY MASS INDEX (BMI) DOCUMENTED: ICD-10-PCS | Mod: CPTII,S$GLB,, | Performed by: PSYCHIATRY & NEUROLOGY

## 2021-12-02 PROCEDURE — 99999 PR PBB SHADOW E&M-EST. PATIENT-LVL III: CPT | Mod: PBBFAC,,, | Performed by: PSYCHIATRY & NEUROLOGY

## 2021-12-02 PROCEDURE — 1126F AMNT PAIN NOTED NONE PRSNT: CPT | Mod: CPTII,S$GLB,, | Performed by: PSYCHIATRY & NEUROLOGY

## 2021-12-02 PROCEDURE — 3288F FALL RISK ASSESSMENT DOCD: CPT | Mod: CPTII,S$GLB,, | Performed by: PSYCHIATRY & NEUROLOGY

## 2021-12-02 PROCEDURE — 3077F SYST BP >= 140 MM HG: CPT | Mod: CPTII,S$GLB,, | Performed by: PSYCHIATRY & NEUROLOGY

## 2021-12-02 PROCEDURE — 1100F PR PT FALLS ASSESS DOC 2+ FALLS/FALL W/INJURY/YR: ICD-10-PCS | Mod: CPTII,S$GLB,, | Performed by: PSYCHIATRY & NEUROLOGY

## 2021-12-02 PROCEDURE — 3079F DIAST BP 80-89 MM HG: CPT | Mod: CPTII,S$GLB,, | Performed by: PSYCHIATRY & NEUROLOGY

## 2021-12-02 PROCEDURE — 1126F PR PAIN SEVERITY QUANTIFIED, NO PAIN PRESENT: ICD-10-PCS | Mod: CPTII,S$GLB,, | Performed by: PSYCHIATRY & NEUROLOGY

## 2021-12-02 PROCEDURE — 1159F PR MEDICATION LIST DOCUMENTED IN MEDICAL RECORD: ICD-10-PCS | Mod: CPTII,S$GLB,, | Performed by: PSYCHIATRY & NEUROLOGY

## 2021-12-02 PROCEDURE — 3077F PR MOST RECENT SYSTOLIC BLOOD PRESSURE >= 140 MM HG: ICD-10-PCS | Mod: CPTII,S$GLB,, | Performed by: PSYCHIATRY & NEUROLOGY

## 2021-12-02 PROCEDURE — 1100F PTFALLS ASSESS-DOCD GE2>/YR: CPT | Mod: CPTII,S$GLB,, | Performed by: PSYCHIATRY & NEUROLOGY

## 2021-12-02 NOTE — PROGRESS NOTES
Name: Patrick Short  MRN: [id number]   CSN: [id number]      Date: 12/15/2021    Referring physician:  No referring provider defined for this encounter.    Chief Complaint / Interval History: PD eval  - he had episodes of listing to the side while walking, thinks it is usually to the left  - also reports some tripping and his wife says he doesn't lift his feet up, and will actually fall down, no really injuries  - not sure if he trips more on the R or L foot  - planning on getting R hip replaced on Dec 15  - thinks he might shuffle  - some history of R leg sciatica, gone noew (MRI with Grade 1 at L5/S1 on R)    From March 2019  - busy month, was [place] in [facility]  - wife not here, made mention of a couple of things  - some forgetfulness, mostly with names and some attention issues  - rarely the left arm may fall asleep, perhaps more at night  - more nighttime pain in the legs and back - more aching - ice helps at night - wakes up and he is feeling better  - has a deposit on the retina, causing a domed appearance (presumed cause of some optical distortion)  -       History of Present Illness (HPI):  74 yo retired  of [employer]  Here for evaluation of gait changes. Referred by Dr. Lloyd.  Usual state of health until July 2016.  Was starting to run and train, had midline chest discomfort, had EKG/stress test, cardiac arrest in ER, treated with stent in July, then another on 8/29/16 radial access and a GUS was placed in mid LAD stent.      He participated in the [place] [employer] this past fall. His Stress 2-D ECHO today is normal, has been reviewed by Dr. Mendoza. He exercise strenously 3-4 X a week.    In July, he fell in the shower while in [place], running with bulls!  Hit the back of his head, immediate stiff neck, and now has loss of mobility in the christina and reduced range of motion.3 months ago, he started to notice stiffness in his R leg predominantly.  Still exercising, but feels like he  is walking like a penguin.  Using arms to get up.  Trouble getting off the floor.  Has not fallen to the ground, jason much more staggering.  Not feeling weak in arms.  Some tingling in his hands B after sleeping, but otherwise no description of Lhermitte's.      Vision is OK generally, but he rarely sees straight lines looking like a kink in them. Had torn R retina in the past.    7 years ago, was hang gliding in [place] and had a rough landing, pulled his legs up and hit his butt.  Got better with time and gabapentin.    Nonmotor/Premotor ROS:  Hyposmia (HENT)?Yes - terrible for years, but wife notices more now  RBD/sleep issues (Constitutional)?No  Depression/anxiety (Psychiatric)?No  Fatigue (Constitutional)?Yes - a little more than in the past, daytime naps  Constipation (GI)?No  Urinary issues ()?No  Sexual dysfunction ()?No  Orthostasis (Cardiovascular)?No  Leg swelling (Cardiovascular)? No  Falls (Musculoskeletal)?Yes  Cognitive impairment (Neurologic)?No  Psychoses (Psychiatric)?No  Pain/Paresthesia (Neurologic)?Yes  Visual changes (Eyes)?Yes  Moles / skin changes (Skin)?No  Stridor / SOB (Pulm)?No  Bruising (Heme)?No    Past Medical History: The patient  has a past medical history of CAD (coronary artery disease) (7/26/2016), Heart block, MI (myocardial infarction), and Reflux.    Social History: The patient  reports that he has never smoked. He has never used smokeless tobacco. He reports current alcohol use. He reports that he does not use drugs.    Family History: Their family history includes Coronary artery disease in his father; Emphysema in his father; Hypertension in his mother.    Allergies: Patient has no known allergies.     Meds:   Current Outpatient Medications on File Prior to Visit   Medication Sig Dispense Refill    atorvastatin (LIPITOR) 40 MG tablet TAKE 1 TABLET BY MOUTH EVERY DAY 90 tablet 3    cetirizine (ZYRTEC) 10 MG tablet Take 10 mg by mouth once daily.      clobetasol  "(TEMOVATE) 0.05 % cream APPLY TO AFFECTED AREA ON HAND TWICE A DAY AS NEEDED FOR RASH  1    desonide (DESOWEN) 0.05 % cream APPLY TO AFFECTED AREA ON FACE TWICE A DAY AS NEEDED FOR SCALE  1    finasteride (PROSCAR) 5 mg tablet 1 tablet once daily.      ketorolac 0.5% (ACULAR) 0.5 % Drop 1 drop 2 (two) times daily. left      pantoprazole (PROTONIX) 40 MG tablet Take 1 tablet (40 mg total) by mouth once daily. 90 tablet 3    triamcinolone acetonide 0.1% (KENALOG) 0.1 % ointment APPLY TO AFFECTED AREA ON AXILLAS TWICE A DAY AS NEEDED FOR ITCH/RASH  1    aspirin 81 MG Chew Take 1 tablet (81 mg total) by mouth once daily.  0    nitroGLYCERIN (NITROSTAT) 0.4 MG SL tablet Place 1 tablet (0.4 mg total) under the tongue every 5 (five) minutes as needed for Chest pain. 30 tablet 1     No current facility-administered medications on file prior to visit.       Exam:  BP (!) 149/88   Pulse 65   Ht 5' 10" (1.778 m)   Wt 96.6 kg (212 lb 15.4 oz)   BMI 30.56 kg/m²       Constitutional  Well-developed, well-nourished, appears stated age   Ophthalmoscopic  No papilledema with no hemorrhages or exudates bilaterally   Cardiovascular  Radial pulses 2+ and symmetric, no LE edema bilaterally   Neurological    * Mental status  MOCA deferred     - Orientation  Oriented to person, place, time, and situation     - Memory   Intact recent and remote     - Attention/concentration  Attentive, vigilant during exam     - Language  Naming & repetition intact, +2-step commands     - Fund of knowledge  Aware of current events     - Executive  Well-organized thoughts     - Other     * Cranial nerves       - CN II  PERRL, visual fields full to confrontation     - CN III, IV, VI  Extraocular movements full, normal pursuits and saccades     - CN V  Sensation V1 - V3 intact     - CN VII  Face strong and symmetric bilaterally     - CN VIII  Hearing intact bilaterally     - CN IX, X  Palate raises midline and symmetric     - CN XI  SCM and " trapezius 5/5 bilaterally     - CN XII  Tongue midline   * Motor  Muscle bulk normal, strength 5/5 throughout except R hip flexion 4+/5   * Sensory   MIld dim sensation legs length dependent, no level   * Coordination  No dysmetria with finger-to-nose or heel-to-shin   * Gait  See below.   * Deep tendon reflexes  1+ and symmetric throughout except 2+ left leg   Babinski downgoing bilaterally   * Specialized movement exam  No hypophonic speech.    No facial masking.  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   Mld B cogwheel rigidity.     Mild L bradykinesia of hand. Quite animated    No tremor with rest, posture, kinesis, or intention.    No other dystonia, chorea, athetosis, myoclonus, or tics.   No motor impersistence.   Normal-based gait.   No shortened stride length.   decreased arm swing bilaterally    mild anterior stoop    normal stride length      Medical Record Review:  - Lab Results:  Hospital Outpatient Visit on 11/16/2021   Component Date Value Ref Range Status    BSA 11/16/2021 2.16  m2 Final    TDI SEPTAL 11/16/2021 0.06  m/s Final    LV LATERAL E/E' RATIO 11/16/2021 8.00  m/s Final    LV SEPTAL E/E' RATIO 11/16/2021 12.00  m/s Final    LA WIDTH 11/16/2021 4.27  cm Final    Systolic blood pressure 11/16/2021 136  mmHg Final    Diastolic blood pressure 11/16/2021 83  mmHg Final    HR at rest 11/16/2021 54  bpm Final    RPP 11/16/2021 7,344   Final    Peak HR 11/16/2021 148  bpm Final    Peak Systolic BP 11/16/2021 184  mmHg Final    Peak Diatolic BP 11/16/2021 79  mmHg Final    Peak RPP 11/16/2021 27,232   Final    Estimated METs 11/16/2021 9   Final    Max Predicted HR 11/16/2021 146   Final    85% Max Predicted HR 11/16/2021 124   Final    % Max HR Achieved 11/16/2021 101   Final    1 Minute Recovery HR 11/16/2021 115  bpm Final    OHS CV CPX PATIENT IS MALE 11/16/2021 1   Final    OHS CV CPX PATIENT IS FEMALE 11/16/2021 0   Final    TDI LATERAL 11/16/2021 0.09  m/s Final  "   Exercise duration (sec) 11/16/2021 0  seconds Final    LVIDd 11/16/2021 4.80  3.5 - 6.0 cm Final    IVS 11/16/2021 0.87  0.6 - 1.1 cm Final    Posterior Wall 11/16/2021 0.77  0.6 - 1.1 cm Final    LVIDs 11/16/2021 3.45  2.1 - 4.0 cm Final    FS 11/16/2021 28  28 - 44 % Final    LA volume 11/16/2021 89.80  cm3 Final    Sinus 11/16/2021 3.28  cm Final    STJ 11/16/2021 3.07  cm Final    Ascending aorta 11/16/2021 3.67  cm Final    LV mass 11/16/2021 130.81  g Final    LA size 11/16/2021 4.11  cm Final    Exercise duration (min) 11/16/2021 6  minutes Final    RVDD 11/16/2021 3.91  cm Final    TAPSE 11/16/2021 2.76  cm Final    RV S' 11/16/2021 15.83  cm/s Final    Left Ventricle Relative Wall Thick* 11/16/2021 0.32  cm Final    MV valve area p 1/2 method 11/16/2021 3.18  cm2 Final    E/A ratio 11/16/2021 1.06   Final    Mean e' 11/16/2021 0.08  m/s Final    E wave deceleration time 11/16/2021 238.85  msec Final    IVRT 11/16/2021 108.47  msec Final    MV "A" wave duration 11/16/2021 15.98  msec Final    Pulm vein S/D ratio 11/16/2021 2.03   Final    LVOT diameter 11/16/2021 2.28  cm Final    LVOT area 11/16/2021 4.1  cm2 Final    LVOT peak harrison 11/16/2021 1.02  m/s Final    LVOT peak VTI 11/16/2021 23.73  cm Final    LVOT stroke volume 11/16/2021 96.84  cm3 Final    E/E' ratio 11/16/2021 9.60  m/s Final    MV Peak E Harrison 11/16/2021 0.72  m/s Final    TR Max Harrison 11/16/2021 2.32  m/s Final    MV stenosis pressure 1/2 time 11/16/2021 69.27  ms Final    MV Peak A Harrison 11/16/2021 0.68  m/s Final    PV Peak S Harrison 11/16/2021 0.67  m/s Final    PV Peak D Harrison 11/16/2021 0.33  m/s Final    LV Systolic Volume 11/16/2021 49.18  mL Final    LV Systolic Volume Index 11/16/2021 23.1  mL/m2 Final    LV Diastolic Volume 11/16/2021 107.38  mL Final    LV Diastolic Volume Index 11/16/2021 50.41  mL/m2 Final    LA Volume Index 11/16/2021 42.2  mL/m2 Final    LV Mass Index 11/16/2021 61  g/m2 Final "    RA Major Axis 11/16/2021 5.93  cm Final    Left Atrium Minor Axis 11/16/2021 6.03  cm Final    Left Atrium Major Axis 11/16/2021 6.01  cm Final    Triscuspid Valve Regurgitation Pea* 11/16/2021 22  mmHg Final    LA Volume Index (Mod) 11/16/2021 37.6  mL/m2 Final    LA volume (mod) 11/16/2021 80.00  cm3 Final    RA Width 11/16/2021 3.59  cm Final    Right Atrial Pressure (from IVC) 11/16/2021 3  mmHg Final    EF 11/16/2021 65  % Final    TV rest pulmonary artery pressure 11/16/2021 25  mmHg Final   Clinical Support on 11/16/2021   Component Date Value Ref Range Status    WBC 11/16/2021 6.91  3.90 - 12.70 K/uL Final    RBC 11/16/2021 5.00  4.60 - 6.20 M/uL Final    Hemoglobin 11/16/2021 15.5  14.0 - 18.0 g/dL Final    Hematocrit 11/16/2021 46.1  40.0 - 54.0 % Final    MCV 11/16/2021 92  82 - 98 fL Final    MCH 11/16/2021 31.0  27.0 - 31.0 pg Final    MCHC 11/16/2021 33.6  32.0 - 36.0 g/dL Final    RDW 11/16/2021 13.2  11.5 - 14.5 % Final    Platelets 11/16/2021 160  150 - 450 K/uL Final    MPV 11/16/2021 11.0  9.2 - 12.9 fL Final    Cholesterol 11/16/2021 132  120 - 199 mg/dL Final    Triglycerides 11/16/2021 75  30 - 150 mg/dL Final    HDL 11/16/2021 46  40 - 75 mg/dL Final    LDL Cholesterol 11/16/2021 71.0  63.0 - 159.0 mg/dL Final    HDL/Cholesterol Ratio 11/16/2021 34.8  20.0 - 50.0 % Final    Total Cholesterol/HDL Ratio 11/16/2021 2.9  2.0 - 5.0 Final    Non-HDL Cholesterol 11/16/2021 86  mg/dL Final    TSH 11/16/2021 2.361  0.400 - 4.000 uIU/mL Final    PSA, Screen 11/16/2021 0.38  0.00 - 4.00 ng/mL Final    Hemoglobin A1C 11/16/2021 5.3  4.0 - 5.6 % Final    Estimated Avg Glucose 11/16/2021 105  68 - 131 mg/dL Final    Sodium 11/16/2021 140  136 - 145 mmol/L Final    Potassium 11/16/2021 4.8  3.5 - 5.1 mmol/L Final    Chloride 11/16/2021 102  95 - 110 mmol/L Final    CO2 11/16/2021 33* 23 - 29 mmol/L Final    Glucose 11/16/2021 92  70 - 110 mg/dL Final    BUN 11/16/2021  20  8 - 23 mg/dL Final    Creatinine 11/16/2021 0.9  0.5 - 1.4 mg/dL Final    Calcium 11/16/2021 9.7  8.7 - 10.5 mg/dL Final    Total Protein 11/16/2021 6.9  6.0 - 8.4 g/dL Final    Albumin 11/16/2021 4.1  3.5 - 5.2 g/dL Final    Total Bilirubin 11/16/2021 1.0  0.1 - 1.0 mg/dL Final    Alkaline Phosphatase 11/16/2021 77  55 - 135 U/L Final    AST 11/16/2021 26  10 - 40 U/L Final    ALT 11/16/2021 49* 10 - 44 U/L Final    Anion Gap 11/16/2021 5* 8 - 16 mmol/L Final    eGFR if African American 11/16/2021 >60.0  >60 mL/min/1.73 m^2 Final    eGFR if non African American 11/16/2021 >60.0  >60 mL/min/1.73 m^2 Final       - Independent visualization and interpretation of radiological images:  * Modest hypoattenuation in supratentorial white matter, likely chronic microvascular ischemic change.  This is not unusually advanced for age.  No parenchymal mass, hemorrhage, edema or major vascular distribution infarct.            - Independent review of consultant's notes: [place]      Diagnoses:    PDism -- mild facial masking, mild b/l cogwheel rigidity, mild bradykinesia      Medical Decision Making:  - DatScan now to confirm PDism   - hold off on medication trial for now, patient functioning well   - follow up in 3 months         Rafael Freedman MD, MPH  Division of Movement and Memory Disorders  [facility]  [phone]

## 2021-12-07 ENCOUNTER — DOCUMENTATION ONLY (OUTPATIENT)
Dept: CARDIOLOGY | Facility: CLINIC | Age: 76
End: 2021-12-07
Payer: COMMERCIAL

## 2021-12-14 ENCOUNTER — TELEPHONE (OUTPATIENT)
Dept: PULMONOLOGY | Facility: CLINIC | Age: 76
End: 2021-12-14
Payer: COMMERCIAL

## 2021-12-15 PROBLEM — G20.A1 PARKINSON DISEASE: Status: ACTIVE | Noted: 2021-12-15

## 2022-01-18 ENCOUNTER — TELEPHONE (OUTPATIENT)
Dept: NEUROLOGY | Facility: CLINIC | Age: 77
End: 2022-01-18
Payer: COMMERCIAL

## 2022-01-19 ENCOUNTER — TELEPHONE (OUTPATIENT)
Dept: NEUROLOGY | Facility: CLINIC | Age: 77
End: 2022-01-19
Payer: COMMERCIAL

## 2022-01-19 NOTE — TELEPHONE ENCOUNTER
----- Message from Fanny Winston LPN sent at 1/19/2022  9:27 AM CST -----  Regarding: FW: Orders Inquiry    ----- Message -----  From: Kori Harrell  Sent: 1/19/2022   9:23 AM CST  To: [facility] Staff  Subject: Orders Inquiry                                   Regarding: [facility] they need clinical information faxed over so they can obtain authorization for ELLEN scan doctor ordered.         Call back number:[phone] fax

## 2022-01-19 NOTE — TELEPHONE ENCOUNTER
----- Message from Kori Harrell sent at 1/19/2022  7:58 AM CST -----  Regarding: Orders Inquiry  Regarding: [facility] they need clinical information faxed over so they can obtain authorization for ELLEN scan doctor ordered.         Call back number:[phone] fax

## 2022-03-09 ENCOUNTER — DOCUMENTATION ONLY (OUTPATIENT)
Dept: NEUROLOGY | Facility: CLINIC | Age: 77
End: 2022-03-09
Payer: COMMERCIAL

## 2022-03-09 NOTE — PROGRESS NOTES
DaTscan reviewed. Reported normal. There is asymmetric uptake in the R > L caudate and putamen.       RBR

## 2022-04-21 DIAGNOSIS — K21.00 GASTROESOPHAGEAL REFLUX DISEASE WITH ESOPHAGITIS, UNSPECIFIED WHETHER HEMORRHAGE: Primary | ICD-10-CM

## 2022-04-21 DIAGNOSIS — I25.10 CORONARY ARTERY DISEASE: ICD-10-CM

## 2022-04-21 RX ORDER — PANTOPRAZOLE SODIUM 40 MG/1
40 TABLET, DELAYED RELEASE ORAL DAILY
Qty: 90 TABLET | Refills: 3 | Status: SHIPPED | OUTPATIENT
Start: 2022-04-21 | End: 2023-02-10

## 2022-05-05 ENCOUNTER — PATIENT MESSAGE (OUTPATIENT)
Dept: RESEARCH | Facility: HOSPITAL | Age: 77
End: 2022-05-05
Payer: COMMERCIAL

## 2022-06-29 DIAGNOSIS — E78.5 DYSLIPIDEMIA: Primary | ICD-10-CM

## 2022-07-26 ENCOUNTER — HOSPITAL ENCOUNTER (OUTPATIENT)
Dept: CARDIOLOGY | Facility: HOSPITAL | Age: 77
Discharge: HOME | End: 2022-07-26
Attending: INTERNAL MEDICINE
Payer: COMMERCIAL

## 2022-07-26 ENCOUNTER — OFFICE VISIT (OUTPATIENT)
Dept: CARDIOLOGY | Facility: CLINIC | Age: 77
End: 2022-07-26
Payer: COMMERCIAL

## 2022-07-26 VITALS
OXYGEN SATURATION: 96 % | HEIGHT: 70 IN | BODY MASS INDEX: 32.28 KG/M2 | HEART RATE: 61 BPM | SYSTOLIC BLOOD PRESSURE: 159 MMHG | DIASTOLIC BLOOD PRESSURE: 96 MMHG | WEIGHT: 225.5 LBS

## 2022-07-26 VITALS
SYSTOLIC BLOOD PRESSURE: 160 MMHG | HEART RATE: 63 BPM | BODY MASS INDEX: 30.35 KG/M2 | WEIGHT: 212 LBS | DIASTOLIC BLOOD PRESSURE: 90 MMHG | HEIGHT: 70 IN

## 2022-07-26 DIAGNOSIS — Z01.810 PRE-OPERATIVE CARDIOVASCULAR EXAMINATION: ICD-10-CM

## 2022-07-26 DIAGNOSIS — E78.5 DYSLIPIDEMIA: ICD-10-CM

## 2022-07-26 DIAGNOSIS — I25.10 CORONARY ARTERY DISEASE INVOLVING NATIVE CORONARY ARTERY OF NATIVE HEART WITHOUT ANGINA PECTORIS: Primary | ICD-10-CM

## 2022-07-26 LAB
ASCENDING AORTA: 3.77 CM
AV INDEX (PROSTH): 0.89
AV MEAN GRADIENT: 4 MMHG
AV PEAK GRADIENT: 8 MMHG
AV VALVE AREA: 3.52 CM2
AV VELOCITY RATIO: 0.99
BSA FOR ECHO PROCEDURE: 2.18 M2
CV ECHO LV RWT: 0.34 CM
DOP CALC AO PEAK VEL: 1.38 M/S
DOP CALC AO VTI: 28.19 CM
DOP CALC LVOT AREA: 3.9 CM2
DOP CALC LVOT DIAMETER: 2.24 CM
DOP CALC LVOT PEAK VEL: 1.37 M/S
DOP CALC LVOT STROKE VOLUME: 99.26 CM3
DOP CALCLVOT PEAK VEL VTI: 25.2 CM
E WAVE DECELERATION TIME: 217.57 MSEC
E/A RATIO: 0.77
E/E' RATIO: 12 M/S
ECHO LV POSTERIOR WALL: 0.82 CM (ref 0.6–1.1)
EJECTION FRACTION: 65 %
FRACTIONAL SHORTENING: 27 % (ref 28–44)
INTERVENTRICULAR SEPTUM: 1.2 CM (ref 0.6–1.1)
LA MAJOR: 4.38 CM
LA MINOR: 4.5 CM
LA WIDTH: 4.07 CM
LEFT ATRIUM SIZE: 3.48 CM
LEFT ATRIUM VOLUME INDEX MOD: 30.3 ML/M2
LEFT ATRIUM VOLUME INDEX: 25 ML/M2
LEFT ATRIUM VOLUME MOD: 64.83 CM3
LEFT ATRIUM VOLUME: 53.44 CM3
LEFT INTERNAL DIMENSION IN SYSTOLE: 3.46 CM (ref 2.1–4)
LEFT VENTRICLE DIASTOLIC VOLUME INDEX: 49.57 ML/M2
LEFT VENTRICLE DIASTOLIC VOLUME: 106.08 ML
LEFT VENTRICLE MASS INDEX: 80 G/M2
LEFT VENTRICLE SYSTOLIC VOLUME INDEX: 23.1 ML/M2
LEFT VENTRICLE SYSTOLIC VOLUME: 49.51 ML
LEFT VENTRICULAR INTERNAL DIMENSION IN DIASTOLE: 4.77 CM (ref 3.5–6)
LEFT VENTRICULAR MASS: 170.76 G
LV LATERAL E/E' RATIO: 11 M/S
LV SEPTAL E/E' RATIO: 13.2 M/S
MV A" WAVE DURATION": 13.99 MSEC
MV PEAK A VEL: 0.86 M/S
MV PEAK E VEL: 0.66 M/S
MV STENOSIS PRESSURE HALF TIME: 63.09 MS
MV VALVE AREA P 1/2 METHOD: 3.49 CM2
PISA TR MAX VEL: 2.19 M/S
PULM VEIN S/D RATIO: 2.11
PV PEAK D VEL: 0.37 M/S
PV PEAK S VEL: 0.78 M/S
RA MAJOR: 4.47 CM
RA PRESSURE: 3 MMHG
RA WIDTH: 3.83 CM
RIGHT VENTRICULAR END-DIASTOLIC DIMENSION: 3.87 CM
RV TISSUE DOPPLER FREE WALL SYSTOLIC VELOCITY 1 (APICAL 4 CHAMBER VIEW): 19.3 CM/S
SINUS: 3.48 CM
STJ: 3.3 CM
TDI LATERAL: 0.06 M/S
TDI SEPTAL: 0.05 M/S
TDI: 0.06 M/S
TR MAX PG: 19 MMHG
TRICUSPID ANNULAR PLANE SYSTOLIC EXCURSION: 2.56 CM
TV REST PULMONARY ARTERY PRESSURE: 22 MMHG

## 2022-07-26 PROCEDURE — 1126F AMNT PAIN NOTED NONE PRSNT: CPT | Mod: CPTII,S$GLB,, | Performed by: INTERNAL MEDICINE

## 2022-07-26 PROCEDURE — 1159F PR MEDICATION LIST DOCUMENTED IN MEDICAL RECORD: ICD-10-PCS | Mod: CPTII,S$GLB,, | Performed by: INTERNAL MEDICINE

## 2022-07-26 PROCEDURE — 1160F RVW MEDS BY RX/DR IN RCRD: CPT | Mod: CPTII,S$GLB,, | Performed by: INTERNAL MEDICINE

## 2022-07-26 PROCEDURE — 3080F DIAST BP >= 90 MM HG: CPT | Mod: CPTII,S$GLB,, | Performed by: INTERNAL MEDICINE

## 2022-07-26 PROCEDURE — 99213 OFFICE O/P EST LOW 20 MIN: CPT | Mod: S$GLB,,, | Performed by: INTERNAL MEDICINE

## 2022-07-26 PROCEDURE — 93306 TTE W/DOPPLER COMPLETE: CPT | Mod: 26,,, | Performed by: INTERNAL MEDICINE

## 2022-07-26 PROCEDURE — 3077F PR MOST RECENT SYSTOLIC BLOOD PRESSURE >= 140 MM HG: ICD-10-PCS | Mod: CPTII,S$GLB,, | Performed by: INTERNAL MEDICINE

## 2022-07-26 PROCEDURE — 1159F MED LIST DOCD IN RCRD: CPT | Mod: CPTII,S$GLB,, | Performed by: INTERNAL MEDICINE

## 2022-07-26 PROCEDURE — 3077F SYST BP >= 140 MM HG: CPT | Mod: CPTII,S$GLB,, | Performed by: INTERNAL MEDICINE

## 2022-07-26 PROCEDURE — 1126F PR PAIN SEVERITY QUANTIFIED, NO PAIN PRESENT: ICD-10-PCS | Mod: CPTII,S$GLB,, | Performed by: INTERNAL MEDICINE

## 2022-07-26 PROCEDURE — 99999 PR PBB SHADOW E&M-EST. PATIENT-LVL IV: ICD-10-PCS | Mod: PBBFAC,,, | Performed by: INTERNAL MEDICINE

## 2022-07-26 PROCEDURE — 3080F PR MOST RECENT DIASTOLIC BLOOD PRESSURE >= 90 MM HG: ICD-10-PCS | Mod: CPTII,S$GLB,, | Performed by: INTERNAL MEDICINE

## 2022-07-26 PROCEDURE — 1160F PR REVIEW ALL MEDS BY PRESCRIBER/CLIN PHARMACIST DOCUMENTED: ICD-10-PCS | Mod: CPTII,S$GLB,, | Performed by: INTERNAL MEDICINE

## 2022-07-26 PROCEDURE — 93306 TTE W/DOPPLER COMPLETE: CPT

## 2022-07-26 PROCEDURE — 99999 PR PBB SHADOW E&M-EST. PATIENT-LVL IV: CPT | Mod: PBBFAC,,, | Performed by: INTERNAL MEDICINE

## 2022-07-26 PROCEDURE — 93306 ECHO (CUPID ONLY): ICD-10-PCS | Mod: 26,,, | Performed by: INTERNAL MEDICINE

## 2022-07-26 PROCEDURE — 99213 PR OFFICE/OUTPT VISIT, EST, LEVL III, 20-29 MIN: ICD-10-PCS | Mod: S$GLB,,, | Performed by: INTERNAL MEDICINE

## 2022-07-26 NOTE — PROGRESS NOTES
Cardiology Clinic Note  Reason for Visit: Coronary Artery Disease       HPI:   77-year-old male with CAD, hyperlipidemia presents for the preop examination for the right shoulder rotator cuff repair.  .  Patient has hip replacement last December and had a stress test prior to that which was normal.  Echo today also showed normal ejection fraction with no wall motion abnormalities.  Patient is able to walk 1-2 blocks without having any shortness of breath, chest pain.  Denies having any orthopnea PND presyncope syncope.   ROS:      Review of Systems   Constitutional: Negative.    HENT: Negative.    Eyes: Negative.    Respiratory: Negative.    Cardiovascular: Negative.    Gastrointestinal: Negative.    Genitourinary: Negative.    Musculoskeletal: Negative.    Skin: Negative.    Neurological: Negative.        PMH:     Past Medical History:   Diagnosis Date    CAD (coronary artery disease) 7/26/2016    Heart block     MI (myocardial infarction)     Reflux        PSH:     Past Surgical History:   Procedure Laterality Date    CARDIAC CATHETERIZATION      EYE SURGERY Left 02/2017    HIP SURGERY Right 12/2021     Allergies:   Review of patient's allergies indicates:  No Known Allergies  Medications:     Current Outpatient Medications on File Prior to Visit   Medication Sig Dispense Refill    aspirin 81 MG Chew Take 1 tablet (81 mg total) by mouth once daily.  0    atorvastatin (LIPITOR) 40 MG tablet TAKE 1 TABLET BY MOUTH EVERY DAY 90 tablet 3    cetirizine (ZYRTEC) 10 MG tablet Take 10 mg by mouth once daily.      finasteride (PROSCAR) 5 mg tablet 1 tablet once daily.      pantoprazole (PROTONIX) 40 MG tablet Take 1 tablet (40 mg total) by mouth once daily. 90 tablet 3    clobetasol (TEMOVATE) 0.05 % cream APPLY TO AFFECTED AREA ON HAND TWICE A DAY AS NEEDED FOR RASH  1    desonide (DESOWEN) 0.05 % cream APPLY TO AFFECTED AREA ON FACE TWICE A DAY AS NEEDED FOR SCALE  1    ketorolac 0.5% (ACULAR) 0.5 % Drop  "1 drop 2 (two) times daily. left      nitroGLYCERIN (NITROSTAT) 0.4 MG SL tablet Place 1 tablet (0.4 mg total) under the tongue every 5 (five) minutes as needed for Chest pain. (Patient not taking: Reported on 7/26/2022) 30 tablet 1    triamcinolone acetonide 0.1% (KENALOG) 0.1 % ointment APPLY TO AFFECTED AREA ON AXILLAS TWICE A DAY AS NEEDED FOR ITCH/RASH  1     No current facility-administered medications on file prior to visit.     Social History:     Social History     Tobacco Use    Smoking status: Never Smoker    Smokeless tobacco: Never Used   Substance Use Topics    Alcohol use: Yes     Comment: twice a month/beer wine     Family History:     Family History   Problem Relation Age of Onset    Hypertension Mother     Emphysema Father     Coronary artery disease Father      Physical Exam:   BP (!) 159/96 (BP Location: Right arm, Patient Position: Sitting, BP Method: Large (Automatic))   Pulse 61   Ht 5' 10" (1.778 m)   Wt 102.3 kg (225 lb 8.5 oz)   SpO2 96%   BMI 32.36 kg/m²      Physical Exam  Constitutional:       Appearance: Normal appearance.   HENT:      Head: Normocephalic.      Nose: Nose normal.      Mouth/Throat:      Mouth: Mucous membranes are moist.   Eyes:      Pupils: Pupils are equal, round, and reactive to light.   Cardiovascular:      Rate and Rhythm: Normal rate and regular rhythm.   Pulmonary:      Effort: Pulmonary effort is normal.      Breath sounds: Normal breath sounds.   Abdominal:      General: Abdomen is flat. Bowel sounds are normal.      Palpations: Abdomen is soft.   Musculoskeletal:         General: Normal range of motion.      Cervical back: Normal range of motion.   Skin:     General: Skin is warm.      Capillary Refill: Capillary refill takes less than 2 seconds.   Neurological:      General: No focal deficit present.      Mental Status: He is alert and oriented to person, place, and time.         Notable Labs:     Lab Results   Component Value Date     " 11/16/2021    K 4.8 11/16/2021     11/16/2021    CO2 33 (H) 11/16/2021    BUN 20 11/16/2021    CREATININE 0.9 11/16/2021    ANIONGAP 5 (L) 11/16/2021     Lab Results   Component Value Date    HGBA1C 5.3 11/16/2021     Lab Results   Component Value Date     (H) 07/24/2016    BNP 46 07/18/2016    BNP 64 07/18/2016    Lab Results   Component Value Date    WBC 8.56 07/26/2022    HGB 14.8 07/26/2022    HCT 43.2 07/26/2022     07/26/2022    GRAN 6.2 07/26/2022    GRAN 72.3 07/26/2022     Lab Results   Component Value Date    CHOL 132 11/16/2021    HDL 46 11/16/2021    LDLCALC 71.0 11/16/2021    TRIG 75 11/16/2021          Imaging:     EF   Date Value Ref Range Status   07/26/2022 65 % Final   11/16/2021 65 % Final          Assessment:     1. Coronary artery disease involving native coronary artery of native heart without angina pectoris    2. Dyslipidemia    3. Pre-operative cardiovascular examination        Plan:     Patrick was seen today for coronary artery disease.    Diagnoses and all orders for this visit:    Pre-operative cardiovascular examination  Patient able to walk 2 blocks without having any limitations.  Denies having any chest pain, shortness of breath on exertion or at rest.    Coronary artery disease involving native coronary artery of native heart without angina pectoris  History of PCI to his RCA and LAD.  Continue aspirin, Lipitor    Dyslipidemia  Continue Lipitor          The patient Patrick Short was seen, assessed, evaluated and examined with the presence of the attending provider Dr Mendoza   Return to clinic  as needed.     Jeromy Gaona MD  Cardiology Fellow  Interventional Cardiology Staff  I have personally taken the history and examined this patient. I have discussed and agree with the resident's findings and plan as documented in the resident's note. Pt has no active cardiac condition (ACS/USA, decompenstated CHF, significant arrhythmias or severe valvular disease)  and can easily achieve > 4 METS.  Pt does not require further cardiac evaluation prior to undergoing surgical procedure. These recommendations follow the 2014 ACC/AHA Guideline on Perioperative Cardiovascular Evaluation and Management of Patients Undergoing Noncardiac Surgery. (JACC Vol. 64 No. 22, Dec 9, 2014, pp r01-b877).      Sung Mendoza

## 2022-07-27 ENCOUNTER — HOSPITAL ENCOUNTER (EMERGENCY)
Facility: HOSPITAL | Age: 77
Discharge: LEFT BEFORE DISCHARGE | End: 2022-07-27
Attending: EMERGENCY MEDICINE
Payer: COMMERCIAL

## 2022-07-27 VITALS
BODY MASS INDEX: 32.28 KG/M2 | WEIGHT: 225 LBS | DIASTOLIC BLOOD PRESSURE: 73 MMHG | TEMPERATURE: 99 F | SYSTOLIC BLOOD PRESSURE: 157 MMHG | RESPIRATION RATE: 16 BRPM | HEART RATE: 70 BPM | OXYGEN SATURATION: 95 %

## 2022-07-27 DIAGNOSIS — R07.9 CHEST PAIN: ICD-10-CM

## 2022-07-27 LAB
ALBUMIN SERPL BCP-MCNC: 3.7 G/DL (ref 3.5–5.2)
ALP SERPL-CCNC: 85 U/L (ref 55–135)
ALT SERPL W/O P-5'-P-CCNC: 36 U/L (ref 10–44)
ANION GAP SERPL CALC-SCNC: 5 MMOL/L (ref 8–16)
AST SERPL-CCNC: 24 U/L (ref 10–40)
BASOPHILS # BLD AUTO: 0.02 K/UL (ref 0–0.2)
BASOPHILS NFR BLD: 0.2 % (ref 0–1.9)
BILIRUB SERPL-MCNC: 1.4 MG/DL (ref 0.1–1)
BNP SERPL-MCNC: 61 PG/ML (ref 0–99)
BUN SERPL-MCNC: 12 MG/DL (ref 8–23)
CALCIUM SERPL-MCNC: 9.6 MG/DL (ref 8.7–10.5)
CHLORIDE SERPL-SCNC: 102 MMOL/L (ref 95–110)
CO2 SERPL-SCNC: 28 MMOL/L (ref 23–29)
CREAT SERPL-MCNC: 0.9 MG/DL (ref 0.5–1.4)
D DIMER PPP IA.FEU-MCNC: 0.8 MG/L FEU
DIFFERENTIAL METHOD: ABNORMAL
EOSINOPHIL # BLD AUTO: 0.1 K/UL (ref 0–0.5)
EOSINOPHIL NFR BLD: 1 % (ref 0–8)
ERYTHROCYTE [DISTWIDTH] IN BLOOD BY AUTOMATED COUNT: 13.4 % (ref 11.5–14.5)
EST. GFR  (AFRICAN AMERICAN): >60 ML/MIN/1.73 M^2
EST. GFR  (NON AFRICAN AMERICAN): >60 ML/MIN/1.73 M^2
GLUCOSE SERPL-MCNC: 119 MG/DL (ref 70–110)
HCT VFR BLD AUTO: 44.2 % (ref 40–54)
HGB BLD-MCNC: 15 G/DL (ref 14–18)
IMM GRANULOCYTES # BLD AUTO: 0.04 K/UL (ref 0–0.04)
IMM GRANULOCYTES NFR BLD AUTO: 0.4 % (ref 0–0.5)
LYMPHOCYTES # BLD AUTO: 1.2 K/UL (ref 1–4.8)
LYMPHOCYTES NFR BLD: 13.2 % (ref 18–48)
MCH RBC QN AUTO: 30.4 PG (ref 27–31)
MCHC RBC AUTO-ENTMCNC: 33.9 G/DL (ref 32–36)
MCV RBC AUTO: 90 FL (ref 82–98)
MONOCYTES # BLD AUTO: 0.9 K/UL (ref 0.3–1)
MONOCYTES NFR BLD: 9.9 % (ref 4–15)
NEUTROPHILS # BLD AUTO: 7 K/UL (ref 1.8–7.7)
NEUTROPHILS NFR BLD: 75.3 % (ref 38–73)
NRBC BLD-RTO: 0 /100 WBC
PLATELET # BLD AUTO: 145 K/UL (ref 150–450)
PMV BLD AUTO: 11.2 FL (ref 9.2–12.9)
POTASSIUM SERPL-SCNC: 3.9 MMOL/L (ref 3.5–5.1)
PROT SERPL-MCNC: 7.2 G/DL (ref 6–8.4)
RBC # BLD AUTO: 4.93 M/UL (ref 4.6–6.2)
SODIUM SERPL-SCNC: 135 MMOL/L (ref 136–145)
TROPONIN I SERPL DL<=0.01 NG/ML-MCNC: 0.03 NG/ML (ref 0–0.03)
WBC # BLD AUTO: 9.26 K/UL (ref 3.9–12.7)

## 2022-07-27 PROCEDURE — 84484 ASSAY OF TROPONIN QUANT: CPT | Mod: 91 | Performed by: EMERGENCY MEDICINE

## 2022-07-27 PROCEDURE — 93010 ELECTROCARDIOGRAM REPORT: CPT | Mod: ,,, | Performed by: INTERNAL MEDICINE

## 2022-07-27 PROCEDURE — 99284 PR EMERGENCY DEPT VISIT,LEVEL IV: ICD-10-PCS | Mod: ,,, | Performed by: EMERGENCY MEDICINE

## 2022-07-27 PROCEDURE — 83880 ASSAY OF NATRIURETIC PEPTIDE: CPT | Performed by: EMERGENCY MEDICINE

## 2022-07-27 PROCEDURE — 99285 EMERGENCY DEPT VISIT HI MDM: CPT | Mod: 25

## 2022-07-27 PROCEDURE — 80053 COMPREHEN METABOLIC PANEL: CPT | Performed by: EMERGENCY MEDICINE

## 2022-07-27 PROCEDURE — 93005 ELECTROCARDIOGRAM TRACING: CPT

## 2022-07-27 PROCEDURE — 85379 FIBRIN DEGRADATION QUANT: CPT | Performed by: EMERGENCY MEDICINE

## 2022-07-27 PROCEDURE — 25500020 PHARM REV CODE 255: Performed by: EMERGENCY MEDICINE

## 2022-07-27 PROCEDURE — 93010 EKG 12-LEAD: ICD-10-PCS | Mod: ,,, | Performed by: INTERNAL MEDICINE

## 2022-07-27 PROCEDURE — 63600175 PHARM REV CODE 636 W HCPCS: Performed by: EMERGENCY MEDICINE

## 2022-07-27 PROCEDURE — 84484 ASSAY OF TROPONIN QUANT: CPT | Performed by: EMERGENCY MEDICINE

## 2022-07-27 PROCEDURE — 99284 EMERGENCY DEPT VISIT MOD MDM: CPT | Mod: ,,, | Performed by: EMERGENCY MEDICINE

## 2022-07-27 PROCEDURE — 85025 COMPLETE CBC W/AUTO DIFF WBC: CPT | Performed by: EMERGENCY MEDICINE

## 2022-07-27 PROCEDURE — 96374 THER/PROPH/DIAG INJ IV PUSH: CPT

## 2022-07-27 PROCEDURE — 25000003 PHARM REV CODE 250: Performed by: EMERGENCY MEDICINE

## 2022-07-27 RX ORDER — KETOROLAC TROMETHAMINE 30 MG/ML
10 INJECTION, SOLUTION INTRAMUSCULAR; INTRAVENOUS
Status: COMPLETED | OUTPATIENT
Start: 2022-07-27 | End: 2022-07-27

## 2022-07-27 RX ORDER — HYDROCODONE BITARTRATE AND ACETAMINOPHEN 5; 325 MG/1; MG/1
1 TABLET ORAL
Status: COMPLETED | OUTPATIENT
Start: 2022-07-27 | End: 2022-07-27

## 2022-07-27 RX ADMIN — HYDROCODONE BITARTRATE AND ACETAMINOPHEN 1 TABLET: 5; 325 TABLET ORAL at 12:07

## 2022-07-27 RX ADMIN — KETOROLAC TROMETHAMINE 10 MG: 30 INJECTION, SOLUTION INTRAMUSCULAR; INTRAVENOUS at 02:07

## 2022-07-27 RX ADMIN — IOHEXOL 100 ML: 350 INJECTION, SOLUTION INTRAVENOUS at 08:07

## 2022-07-27 NOTE — ED NOTES
Pt care assumed. Pt AAOx4 resting comfortably in bed. Bed low and locked, side rails up x2, call light within reach. Wife at bedside. No needs expressed at this time, pt stating that he is ready to go.

## 2022-07-27 NOTE — ED PROVIDER NOTES
Encounter Date: 7/27/2022       History     Chief Complaint   Patient presents with    Chest Pain     Pt c/o pain on the R side of his ribs and a cough. Pt states his side hurts when he coughs and breathes in. Also states the pain is worse when he is lying. Also slightly SOB. Hx of a MI.      HPI  Review of patient's allergies indicates:  No Known Allergies  Past Medical History:   Diagnosis Date    CAD (coronary artery disease) 7/26/2016    Heart block     MI (myocardial infarction)     Reflux      Past Surgical History:   Procedure Laterality Date    CARDIAC CATHETERIZATION      EYE SURGERY Left 02/2017    HIP SURGERY Right 12/2021     Family History   Problem Relation Age of Onset    Hypertension Mother     Emphysema Father     Coronary artery disease Father      Social History     Tobacco Use    Smoking status: Never Smoker    Smokeless tobacco: Never Used   Substance Use Topics    Alcohol use: Yes     Comment: twice a month/beer wine    Drug use: Never     Review of Systems    Physical Exam     Initial Vitals [07/27/22 0350]   BP Pulse Resp Temp SpO2   (!) 197/91 70 20 98.4 °F (36.9 °C) 95 %      MAP       --         Physical Exam    ED Course   Procedures  Labs Reviewed   CBC W/ AUTO DIFFERENTIAL - Abnormal; Notable for the following components:       Result Value    Platelets 145 (*)     Gran % 75.3 (*)     Lymph % 13.2 (*)     All other components within normal limits   COMPREHENSIVE METABOLIC PANEL - Abnormal; Notable for the following components:    Sodium 135 (*)     Glucose 119 (*)     Total Bilirubin 1.4 (*)     Anion Gap 5 (*)     All other components within normal limits   TROPONIN I - Abnormal; Notable for the following components:    Troponin I 0.033 (*)     All other components within normal limits   TROPONIN I - Abnormal; Notable for the following components:    Troponin I 0.028 (*)     All other components within normal limits   D DIMER, QUANTITATIVE - Abnormal; Notable for the  following components:    D-Dimer 0.80 (*)     All other components within normal limits   TROPONIN I - Abnormal; Notable for the following components:    Troponin I 0.028 (*)     All other components within normal limits   B-TYPE NATRIURETIC PEPTIDE        ECG Results          EKG 12-lead (Final result)  Result time 07/27/22 12:56:21    Final result by Interface, Lab In [facility] (07/27/22 12:56:21)                 Narrative:    Test Reason : R07.9,    Vent. Rate : 065 BPM     Atrial Rate : 065 BPM     P-R Int : 166 ms          QRS Dur : 084 ms      QT Int : 378 ms       P-R-T Axes : 049 -30 015 degrees     QTc Int : 393 ms    Normal sinus rhythm  Left axis deviation  Possible Inferior infarct (cited on or before 27-JUL-2022)  Abnormal R wave progression in the precordial leads  Abnormal ECG  When compared with ECG of 16-NOV-2021 08:51,  Axis now left  Confirmed by Nicko Mukherjee MD ([id number]) on 7/27/2022 12:56:05 PM    Referred By: [id number]   SELF           Confirmed By:Nicko Mukherjee MD                            Imaging Results          CTA Chest Non-Coronary - PE Study (Final result)  Result time 07/27/22 09:02:27    Final result by Shlomo Packer MD (07/27/22 09:02:27)                 Impression:      1. Examination limited due to suboptimal contrast bolus timing and respiratory motion.  Examination considered diagnostic to the lobar pulmonary arterial level without convincing evidence of acute pulmonary embolism.  2. Motion compromised assessment of the lungs demonstrates dependent atelectasis and small pleural effusions.  Superimposed infectious or inflammatory airspace consolidation dependently in the lower lobes difficult to exclude.  3. Additional details as provided in the body of report.      Electronically signed by: Shlomo Packer  Date:    07/27/2022  Time:    09:02             Narrative:    EXAMINATION:  CTA CHEST NON CORONARY    CLINICAL HISTORY:  Pulmonary embolism (PE) suspected, positive  D-dimer;R side chest pain;    TECHNIQUE:  Low dose axial images, sagittal and coronal reformations were obtained from the thoracic inlet to the lung bases following the IV administration of 100 mL of Omnipaque 350.  Contrast timing was optimized to evaluate the pulmonary arteries.  MIP images were performed.    COMPARISON:  Chest radiograph 07/27/2022. CTA chest 07/24/2016.    FINDINGS:  Exam quality: Suboptimal contrast bolus timing and respiratory motion examination limit evaluation.  Considered diagnostic to the lobar pulmonary arterial level.    Pulmonary embolism: No acute or chronic pulmonary embolism.    Central pulmonary arteries: Normal caliber.    Aorta: Normal caliber.    Heart: No right heart strain. Normal size.    Coronary arteries: Calcifications present    Pericardium: Normal. No effusion, thickening, or calcification.    Support tubes and lines: None.    Base of neck/thyroid: Normal.    Lymph nodes: No supraclavicular, axillary, internal mammary, mediastinal, or hilar adenopathy.    Esophagus: Patulous esophagus, nonspecific.    Pleura: Small pleural effusions.    Upper abdomen: Unremarkable    Body wall: Unremarkable    Airways: Central airways appear patent    Lungs: Assessment lungs limited due to respiratory motion.  Dependent atelectasis identified.  Superimposed infectious or inflammatory airspace consolidation dependently in the lower lobes difficult to exclude.    Bones: No focal lesion.  Degenerative findings in the spine.                               X-Ray Chest AP Portable (Final result)  Result time 07/27/22 06:00:02    Final result by Lexis Salcido MD (07/27/22 06:00:02)                 Impression:      No acute intrathoracic abnormality identified on this single hypoventilatory radiographic view of the chest.      Electronically signed by: Lexis Salcido MD  Date:    07/27/2022  Time:    06:00             Narrative:    EXAMINATION:  XR CHEST AP PORTABLE    CLINICAL HISTORY:  Chest  Pain;    TECHNIQUE:  Single frontal view of the chest was performed.    COMPARISON:  11/16/2021    FINDINGS:  Cardiac monitoring leads overlie the chest.  Mediastinal structures are midline.  There is atherosclerosis of the thoracic aorta.  The cardiomediastinal silhouette appears to be within normal limits of size and configuration.  There are low lung volumes bilaterally with subsequent accentuation of pulmonary bronchovascular markings.  No confluent airspace consolidation, large volume of pleural through it or pneumothorax identified.  Osseous structures are intact.                                 Medications   iohexoL (OMNIPAQUE 350) injection 100 mL (100 mLs Intravenous Given 7/27/22 0835)   HYDROcodone-acetaminophen 5-325 mg per tablet 1 tablet (1 tablet Oral Given 7/27/22 1225)   ketorolac injection 9.999 mg (9.999 mg Intravenous Given 7/27/22 1438)                          Clinical Impression:   Final diagnoses:  [R07.9] Chest pain          ED Disposition Condition    HUSSAIN Denson MD  Resident  07/27/22 3732

## 2022-07-27 NOTE — ED NOTES
Assumed care of pt at this time. VSS, RR even and unlabored. Resting in bed comfortably. Pt complains of RUQ pain,described as pressure that comes from movement and coughing. Safety protocols remain, will continue to monitor.       Patient identifiers for Patrick Short 77 y.o. male checked and correct.  Chief Complaint   Patient presents with    Chest Pain     Pt c/o pain on the R side of his ribs and a cough. Pt states his side hurts when he coughs and breathes in. Also states the pain is worse when he is lying. Also slightly SOB. Hx of a MI.      Past Medical History:   Diagnosis Date    CAD (coronary artery disease) 7/26/2016    Heart block     MI (myocardial infarction)     Reflux      Allergies reported: Review of patient's allergies indicates:  No Known Allergies      LOC: Patient is awake, alert, and aware of environment with an appropriate affect. Patient is oriented x 4 and speaking appropriately.  APPEARANCE: Patient resting comfortably and in no acute distress. Patient is clean and well groomed, patient's clothing is properly fastened.  HEENT: - JVD, + midline trach, - bruising or lacerations to face or neck.  SKIN: The skin is warm and dry. Patient has normal skin turgor and moist mucus membranes.   MUSKULOSKELETAL: Patient is moving all extremities well, no obvious deformities noted. Pulses intact. PMS x 4  RESPIRATORY: Airway is open and patent. Respirations are spontaneous and non-labored with normal effort and rate.  CARDIAC: Patient has a normal rate and rhythm. 73 on cardiac monitor. No peripheral edema noted. + 2 bilateral pedal and radial pulses, < 3 s cap refill.  ABDOMEN: No distention noted. Soft and tender upon palpation in the RUQ.  NEUROLOGICAL: pupils 3 mm, PERRL. Facial expression is symmetrical. Hand grasps are equal bilaterally. Normal sensation in all extremities when touched with finger.

## 2022-07-27 NOTE — ED NOTES
Received a phone call from [facility] pertaining transfer request. Transfer placed on hold for now, per request of Jarett Chaudhry RN.

## 2022-07-27 NOTE — PROVIDER PROGRESS NOTES - EMERGENCY DEPT.
Encounter Date: 7/27/2022    ED Physician Progress Notes        Physician Note:   I received sign out on this patient from Dr. Villavicencio. Patient is pending CTA and 2nd troponin. Seeing patient with Dr. Kelly to continue care.

## 2022-07-27 NOTE — ED TRIAGE NOTES
Pt arrives to Ed with c/o right sided flank pain. Denies changes in urine, denies blood in urine or stool, denies kidney stones. Pt reports recent trip to [place]. Reports cough with onset yesterday. denies being around others that are sick. Pt lying in bed, respirations even, unlabored, NAD noted, answering questions appropriately. Pt placed on BP cycling, pulse ox, and cardiac monitoring.

## 2022-07-27 NOTE — ED PROVIDER NOTES
Encounter Date: 7/27/2022       History     Chief Complaint   Patient presents with    Chest Pain     Pt c/o pain on the R side of his ribs and a cough. Pt states his side hurts when he coughs and breathes in. Also states the pain is worse when he is lying. Also slightly SOB. Hx of a MI.      Pt is a 77 year old male with PMHx significant for CAD and MI who presents for evaluation of persistent right lateral chest wall pain, which began last night. Notes an associated cough and states chest pain worsens with coughing and deep inspiration. Pain woke the pt from sleep and this prompted him to present to the ED. He denies any hx of similar pain and states this does not feel similar to prior MI. Denies any fever, chills, shortness of breath, nausea, or vomiting. Of note, pt reports recent long flight from [place] to the [place] on [place] 19th.     The history is provided by the patient and the spouse.     Review of patient's allergies indicates:  No Known Allergies  Past Medical History:   Diagnosis Date    CAD (coronary artery disease) 7/26/2016    Heart block     MI (myocardial infarction)     Reflux      Past Surgical History:   Procedure Laterality Date    CARDIAC CATHETERIZATION      EYE SURGERY Left 02/2017    HIP SURGERY Right 12/2021     Family History   Problem Relation Age of Onset    Hypertension Mother     Emphysema Father     Coronary artery disease Father      Social History     Tobacco Use    Smoking status: Never Smoker    Smokeless tobacco: Never Used   Substance Use Topics    Alcohol use: Yes     Comment: twice a month/beer wine    Drug use: Never     Review of Systems   Constitutional: Negative for chills and fever.   HENT: Negative for rhinorrhea and sore throat.    Eyes: Negative for photophobia and pain.   Respiratory: Positive for cough. Negative for shortness of breath and wheezing.    Cardiovascular: Positive for chest pain.   Gastrointestinal: Negative for abdominal pain, diarrhea,  nausea and vomiting.   Genitourinary: Negative for dysuria and frequency.   Musculoskeletal: Negative for back pain and neck pain.   Skin: Negative for pallor and wound.   Neurological: Negative for weakness, numbness and headaches.       Physical Exam     Initial Vitals [07/27/22 0350]   BP Pulse Resp Temp SpO2   (!) 197/91 70 20 98.4 °F (36.9 °C) 95 %      MAP       --         Physical Exam    Nursing note and vitals reviewed.  Constitutional: He appears well-developed and well-nourished. He is not diaphoretic. No distress.   HENT:   Head: Normocephalic and atraumatic.   Eyes: Conjunctivae are normal. No scleral icterus.   Neck: Neck supple.   Normal range of motion.  Cardiovascular: Normal rate and regular rhythm.   Pulmonary/Chest: Breath sounds normal. No stridor. No respiratory distress.   Abdominal: Abdomen is soft. There is no abdominal tenderness.   Musculoskeletal:         General: No tenderness or edema. Normal range of motion.      Cervical back: Normal range of motion and neck supple.     Neurological: He is alert and oriented to person, place, and time. He has normal strength. No sensory deficit.   Skin: Skin is warm and dry.         ED Course   Procedures  Labs Reviewed   CBC W/ AUTO DIFFERENTIAL - Abnormal; Notable for the following components:       Result Value    Platelets 145 (*)     Gran % 75.3 (*)     Lymph % 13.2 (*)     All other components within normal limits   COMPREHENSIVE METABOLIC PANEL - Abnormal; Notable for the following components:    Sodium 135 (*)     Glucose 119 (*)     Total Bilirubin 1.4 (*)     Anion Gap 5 (*)     All other components within normal limits   TROPONIN I - Abnormal; Notable for the following components:    Troponin I 0.033 (*)     All other components within normal limits   D DIMER, QUANTITATIVE - Abnormal; Notable for the following components:    D-Dimer 0.80 (*)     All other components within normal limits   B-TYPE NATRIURETIC PEPTIDE   TROPONIN I           Imaging Results          X-Ray Chest AP Portable (Final result)  Result time 07/27/22 06:00:02    Final result by Lexis Salcido MD (07/27/22 06:00:02)                 Impression:      No acute intrathoracic abnormality identified on this single hypoventilatory radiographic view of the chest.      Electronically signed by: Lexis Salcido MD  Date:    07/27/2022  Time:    06:00             Narrative:    EXAMINATION:  XR CHEST AP PORTABLE    CLINICAL HISTORY:  Chest Pain;    TECHNIQUE:  Single frontal view of the chest was performed.    COMPARISON:  11/16/2021    FINDINGS:  Cardiac monitoring leads overlie the chest.  Mediastinal structures are midline.  There is atherosclerosis of the thoracic aorta.  The cardiomediastinal silhouette appears to be within normal limits of size and configuration.  There are low lung volumes bilaterally with subsequent accentuation of pulmonary bronchovascular markings.  No confluent airspace consolidation, large volume of pleural through it or pneumothorax identified.  Osseous structures are intact.                              X-Rays:   Independently Interpreted Readings:   Chest X-Ray: No infiltrates.  No acute abnormalities.     Medications - No data to display  Medical Decision Making:   History:   Old Medical Records: I decided to obtain old medical records.  Initial Assessment:   Pt is a 77 year old male with PMHx significant for CAD and MI who presents for evaluation of right lateral chest wall pain, which began last night. Notes an associated cough and states chest pain worsens with coughing and deep inspiration  Differential Diagnosis:   ACS, PE, PNA, CHF  Independently Interpreted Test(s):   I have ordered and independently interpreted X-rays - see prior notes.  Clinical Tests:   Lab Tests: Ordered and Reviewed  Radiological Study: Ordered and Reviewed  Medical Tests: Ordered and Reviewed  ED Management:  Labs significant for elevated troponin and d-dimer. Pt pending  second troponin and CT PE upon sign out.                       Clinical Impression:   Final diagnoses:  [R07.9] Chest pain                 Jr. Jose De Jesus Villavicencio MD  Resident  07/27/22 5411

## 2022-07-28 ENCOUNTER — HOSPITAL ENCOUNTER (OUTPATIENT)
Facility: HOSPITAL | Age: 77
Discharge: HOME | End: 2022-07-29
Attending: EMERGENCY MEDICINE | Admitting: INTERNAL MEDICINE
Payer: COMMERCIAL

## 2022-07-28 ENCOUNTER — OFFICE VISIT (OUTPATIENT)
Dept: PULMONOLOGY | Facility: CLINIC | Age: 77
End: 2022-07-28
Payer: COMMERCIAL

## 2022-07-28 ENCOUNTER — TELEPHONE (OUTPATIENT)
Dept: PULMONOLOGY | Facility: CLINIC | Age: 77
End: 2022-07-28
Payer: COMMERCIAL

## 2022-07-28 ENCOUNTER — OFFICE VISIT (OUTPATIENT)
Dept: URGENT CARE | Facility: CLINIC | Age: 77
End: 2022-07-28
Payer: COMMERCIAL

## 2022-07-28 VITALS
SYSTOLIC BLOOD PRESSURE: 158 MMHG | WEIGHT: 226 LBS | RESPIRATION RATE: 24 BRPM | TEMPERATURE: 100 F | HEIGHT: 70 IN | HEART RATE: 70 BPM | OXYGEN SATURATION: 93 % | BODY MASS INDEX: 32.35 KG/M2 | DIASTOLIC BLOOD PRESSURE: 92 MMHG

## 2022-07-28 VITALS
HEART RATE: 80 BPM | OXYGEN SATURATION: 100 % | HEIGHT: 70 IN | BODY MASS INDEX: 32.38 KG/M2 | SYSTOLIC BLOOD PRESSURE: 138 MMHG | DIASTOLIC BLOOD PRESSURE: 76 MMHG | WEIGHT: 226.19 LBS

## 2022-07-28 DIAGNOSIS — R07.81 PLEURITIC CHEST PAIN: ICD-10-CM

## 2022-07-28 DIAGNOSIS — R07.9 CHEST PAIN: ICD-10-CM

## 2022-07-28 DIAGNOSIS — I25.10 CORONARY ARTERY DISEASE INVOLVING NATIVE CORONARY ARTERY OF NATIVE HEART WITHOUT ANGINA PECTORIS: ICD-10-CM

## 2022-07-28 DIAGNOSIS — R10.9 RIGHT FLANK PAIN: ICD-10-CM

## 2022-07-28 DIAGNOSIS — R05.9 COUGH: ICD-10-CM

## 2022-07-28 DIAGNOSIS — R10.9 ABDOMINAL PAIN: ICD-10-CM

## 2022-07-28 DIAGNOSIS — R07.81 PLEURITIC CHEST PAIN: Primary | ICD-10-CM

## 2022-07-28 DIAGNOSIS — R07.9 CHEST PAIN, UNSPECIFIED TYPE: Primary | ICD-10-CM

## 2022-07-28 DIAGNOSIS — R79.89 LFT ELEVATION: ICD-10-CM

## 2022-07-28 DIAGNOSIS — R79.89 TROPONIN LEVEL ELEVATED: ICD-10-CM

## 2022-07-28 DIAGNOSIS — R05.9 COUGH: Primary | ICD-10-CM

## 2022-07-28 LAB
ALBUMIN SERPL BCP-MCNC: 3.3 G/DL (ref 3.5–5.2)
ALP SERPL-CCNC: 103 U/L (ref 55–135)
ALT SERPL W/O P-5'-P-CCNC: 58 U/L (ref 10–44)
ANION GAP SERPL CALC-SCNC: 10 MMOL/L (ref 8–16)
AST SERPL-CCNC: 49 U/L (ref 10–40)
BASOPHILS # BLD AUTO: 0.01 K/UL (ref 0–0.2)
BASOPHILS NFR BLD: 0.1 % (ref 0–1.9)
BILIRUB SERPL-MCNC: 1.4 MG/DL (ref 0.1–1)
BILIRUB UR QL STRIP: NEGATIVE
BILIRUB UR QL STRIP: NEGATIVE
BUN SERPL-MCNC: 11 MG/DL (ref 6–30)
BUN SERPL-MCNC: 11 MG/DL (ref 8–23)
CALCIUM SERPL-MCNC: 9.1 MG/DL (ref 8.7–10.5)
CHLORIDE SERPL-SCNC: 100 MMOL/L (ref 95–110)
CHLORIDE SERPL-SCNC: 102 MMOL/L (ref 95–110)
CLARITY UR REFRACT.AUTO: CLEAR
CO2 SERPL-SCNC: 25 MMOL/L (ref 23–29)
COLOR UR AUTO: YELLOW
CREAT SERPL-MCNC: 0.8 MG/DL (ref 0.5–1.4)
CREAT SERPL-MCNC: 0.8 MG/DL (ref 0.5–1.4)
CTP QC/QA: YES
DIFFERENTIAL METHOD: ABNORMAL
EOSINOPHIL # BLD AUTO: 0 K/UL (ref 0–0.5)
EOSINOPHIL NFR BLD: 0.1 % (ref 0–8)
ERYTHROCYTE [DISTWIDTH] IN BLOOD BY AUTOMATED COUNT: 13.2 % (ref 11.5–14.5)
EST. GFR  (AFRICAN AMERICAN): >60 ML/MIN/1.73 M^2
EST. GFR  (NON AFRICAN AMERICAN): >60 ML/MIN/1.73 M^2
GLUCOSE SERPL-MCNC: 190 MG/DL (ref 70–110)
GLUCOSE SERPL-MCNC: 198 MG/DL (ref 70–110)
GLUCOSE UR QL STRIP: NEGATIVE
GLUCOSE UR QL STRIP: NEGATIVE
HCT VFR BLD AUTO: 40.5 % (ref 40–54)
HCT VFR BLD CALC: 41 %PCV (ref 36–54)
HGB BLD-MCNC: 13.8 G/DL (ref 14–18)
HGB UR QL STRIP: ABNORMAL
IMM GRANULOCYTES # BLD AUTO: 0.04 K/UL (ref 0–0.04)
IMM GRANULOCYTES NFR BLD AUTO: 0.4 % (ref 0–0.5)
KETONES UR QL STRIP: NEGATIVE
KETONES UR QL STRIP: NEGATIVE
LEUKOCYTE ESTERASE UR QL STRIP: NEGATIVE
LEUKOCYTE ESTERASE UR QL STRIP: NEGATIVE
LIPASE SERPL-CCNC: 15 U/L (ref 4–60)
LYMPHOCYTES # BLD AUTO: 0.5 K/UL (ref 1–4.8)
LYMPHOCYTES NFR BLD: 4.8 % (ref 18–48)
MCH RBC QN AUTO: 30.3 PG (ref 27–31)
MCHC RBC AUTO-ENTMCNC: 34.1 G/DL (ref 32–36)
MCV RBC AUTO: 89 FL (ref 82–98)
MICROSCOPIC COMMENT: NORMAL
MONOCYTES # BLD AUTO: 0.3 K/UL (ref 0.3–1)
MONOCYTES NFR BLD: 2.8 % (ref 4–15)
NEUTROPHILS # BLD AUTO: 9 K/UL (ref 1.8–7.7)
NEUTROPHILS NFR BLD: 91.8 % (ref 38–73)
NITRITE UR QL STRIP: NEGATIVE
NRBC BLD-RTO: 0 /100 WBC
PATH REV BLD -IMP: NORMAL
PH UR STRIP: 7 [PH] (ref 5–8)
PH, POC UA: 7.5 (ref 5–8)
PLATELET # BLD AUTO: 144 K/UL (ref 150–450)
PMV BLD AUTO: 11.8 FL (ref 9.2–12.9)
POC BLOOD, URINE: NEGATIVE
POC IONIZED CALCIUM: 1.12 MMOL/L (ref 1.06–1.42)
POC NITRATES, URINE: NEGATIVE
POC TCO2 (MEASURED): 25 MMOL/L (ref 23–29)
POTASSIUM BLD-SCNC: 3.6 MMOL/L (ref 3.5–5.1)
POTASSIUM SERPL-SCNC: 3.6 MMOL/L (ref 3.5–5.1)
PROT SERPL-MCNC: 7 G/DL (ref 6–8.4)
PROT UR QL STRIP: NEGATIVE
PROT UR QL STRIP: NEGATIVE
RBC # BLD AUTO: 4.56 M/UL (ref 4.6–6.2)
RBC #/AREA URNS AUTO: 0 /HPF (ref 0–4)
SAMPLE: ABNORMAL
SARS-COV-2 RDRP RESP QL NAA+PROBE: NEGATIVE
SODIUM BLD-SCNC: 137 MMOL/L (ref 136–145)
SODIUM SERPL-SCNC: 137 MMOL/L (ref 136–145)
SP GR UR STRIP: 1 (ref 1–1.03)
SP GR UR STRIP: 1.02 (ref 1–1.03)
TROPONIN I SERPL DL<=0.01 NG/ML-MCNC: 0.03 NG/ML (ref 0–0.03)
URN SPEC COLLECT METH UR: ABNORMAL
UROBILINOGEN UR STRIP-ACNC: NORMAL (ref 0.3–2.2)
WBC # BLD AUTO: 9.79 K/UL (ref 3.9–12.7)
WBC #/AREA URNS AUTO: 1 /HPF (ref 0–5)

## 2022-07-28 PROCEDURE — 3078F DIAST BP <80 MM HG: CPT | Mod: CPTII,S$GLB,, | Performed by: INTERNAL MEDICINE

## 2022-07-28 PROCEDURE — 80047 BASIC METABLC PNL IONIZED CA: CPT

## 2022-07-28 PROCEDURE — 81003 POCT URINALYSIS, DIPSTICK, AUTOMATED, W/O SCOPE: ICD-10-PCS | Mod: QW,S$GLB,, | Performed by: FAMILY MEDICINE

## 2022-07-28 PROCEDURE — U0002 COVID-19 LAB TEST NON-CDC: HCPCS | Mod: QW,S$GLB,, | Performed by: FAMILY MEDICINE

## 2022-07-28 PROCEDURE — 99285 EMERGENCY DEPT VISIT HI MDM: CPT | Mod: CS,,, | Performed by: PHYSICIAN ASSISTANT

## 2022-07-28 PROCEDURE — 3288F PR FALLS RISK ASSESSMENT DOCUMENTED: ICD-10-PCS | Mod: CPTII,S$GLB,, | Performed by: INTERNAL MEDICINE

## 2022-07-28 PROCEDURE — 99215 PR OFFICE/OUTPT VISIT, EST, LEVL V, 40-54 MIN: ICD-10-PCS | Mod: S$GLB,,, | Performed by: INTERNAL MEDICINE

## 2022-07-28 PROCEDURE — 3080F DIAST BP >= 90 MM HG: CPT | Mod: CPTII,S$GLB,, | Performed by: FAMILY MEDICINE

## 2022-07-28 PROCEDURE — 96375 TX/PRO/DX INJ NEW DRUG ADDON: CPT

## 2022-07-28 PROCEDURE — 1101F PT FALLS ASSESS-DOCD LE1/YR: CPT | Mod: CPTII,S$GLB,, | Performed by: INTERNAL MEDICINE

## 2022-07-28 PROCEDURE — 85060 BLOOD SMEAR INTERPRETATION: CPT | Mod: ,,, | Performed by: PATHOLOGY

## 2022-07-28 PROCEDURE — 1126F PR PAIN SEVERITY QUANTIFIED, NO PAIN PRESENT: ICD-10-PCS | Mod: CPTII,S$GLB,, | Performed by: INTERNAL MEDICINE

## 2022-07-28 PROCEDURE — 1125F PR PAIN SEVERITY QUANTIFIED, PAIN PRESENT: ICD-10-PCS | Mod: CPTII,S$GLB,, | Performed by: FAMILY MEDICINE

## 2022-07-28 PROCEDURE — 99285 PR EMERGENCY DEPT VISIT,LEVEL V: ICD-10-PCS | Mod: CS,,, | Performed by: PHYSICIAN ASSISTANT

## 2022-07-28 PROCEDURE — 1159F MED LIST DOCD IN RCRD: CPT | Mod: CPTII,S$GLB,, | Performed by: FAMILY MEDICINE

## 2022-07-28 PROCEDURE — 93010 EKG 12-LEAD: ICD-10-PCS | Mod: ,,, | Performed by: INTERNAL MEDICINE

## 2022-07-28 PROCEDURE — 25500020 PHARM REV CODE 255: Performed by: STUDENT IN AN ORGANIZED HEALTH CARE EDUCATION/TRAINING PROGRAM

## 2022-07-28 PROCEDURE — 1126F AMNT PAIN NOTED NONE PRSNT: CPT | Mod: CPTII,S$GLB,, | Performed by: INTERNAL MEDICINE

## 2022-07-28 PROCEDURE — 63600175 PHARM REV CODE 636 W HCPCS: Performed by: STUDENT IN AN ORGANIZED HEALTH CARE EDUCATION/TRAINING PROGRAM

## 2022-07-28 PROCEDURE — 3077F SYST BP >= 140 MM HG: CPT | Mod: CPTII,S$GLB,, | Performed by: FAMILY MEDICINE

## 2022-07-28 PROCEDURE — 85060 PATHOLOGIST REVIEW: ICD-10-PCS | Mod: ,,, | Performed by: PATHOLOGY

## 2022-07-28 PROCEDURE — 3078F PR MOST RECENT DIASTOLIC BLOOD PRESSURE < 80 MM HG: ICD-10-PCS | Mod: CPTII,S$GLB,, | Performed by: INTERNAL MEDICINE

## 2022-07-28 PROCEDURE — 3080F PR MOST RECENT DIASTOLIC BLOOD PRESSURE >= 90 MM HG: ICD-10-PCS | Mod: CPTII,S$GLB,, | Performed by: FAMILY MEDICINE

## 2022-07-28 PROCEDURE — 1101F PR PT FALLS ASSESS DOC 0-1 FALLS W/OUT INJ PAST YR: ICD-10-PCS | Mod: CPTII,S$GLB,, | Performed by: INTERNAL MEDICINE

## 2022-07-28 PROCEDURE — 99214 PR OFFICE/OUTPT VISIT, EST, LEVL IV, 30-39 MIN: ICD-10-PCS | Mod: S$GLB,,, | Performed by: FAMILY MEDICINE

## 2022-07-28 PROCEDURE — 93005 ELECTROCARDIOGRAM TRACING: CPT

## 2022-07-28 PROCEDURE — 3077F PR MOST RECENT SYSTOLIC BLOOD PRESSURE >= 140 MM HG: ICD-10-PCS | Mod: CPTII,S$GLB,, | Performed by: FAMILY MEDICINE

## 2022-07-28 PROCEDURE — 99999 PR PBB SHADOW E&M-EST. PATIENT-LVL III: ICD-10-PCS | Mod: PBBFAC,,, | Performed by: INTERNAL MEDICINE

## 2022-07-28 PROCEDURE — 1160F PR REVIEW ALL MEDS BY PRESCRIBER/CLIN PHARMACIST DOCUMENTED: ICD-10-PCS | Mod: CPTII,S$GLB,, | Performed by: FAMILY MEDICINE

## 2022-07-28 PROCEDURE — 96374 THER/PROPH/DIAG INJ IV PUSH: CPT

## 2022-07-28 PROCEDURE — 83690 ASSAY OF LIPASE: CPT | Performed by: STUDENT IN AN ORGANIZED HEALTH CARE EDUCATION/TRAINING PROGRAM

## 2022-07-28 PROCEDURE — 3075F PR MOST RECENT SYSTOLIC BLOOD PRESS GE 130-139MM HG: ICD-10-PCS | Mod: CPTII,S$GLB,, | Performed by: INTERNAL MEDICINE

## 2022-07-28 PROCEDURE — 99214 OFFICE O/P EST MOD 30 MIN: CPT | Mod: S$GLB,,, | Performed by: FAMILY MEDICINE

## 2022-07-28 PROCEDURE — 80053 COMPREHEN METABOLIC PANEL: CPT | Performed by: STUDENT IN AN ORGANIZED HEALTH CARE EDUCATION/TRAINING PROGRAM

## 2022-07-28 PROCEDURE — 1160F RVW MEDS BY RX/DR IN RCRD: CPT | Mod: CPTII,S$GLB,, | Performed by: FAMILY MEDICINE

## 2022-07-28 PROCEDURE — 87207 SMEAR SPECIAL STAIN: CPT | Performed by: EMERGENCY MEDICINE

## 2022-07-28 PROCEDURE — U0002: ICD-10-PCS | Mod: QW,S$GLB,, | Performed by: FAMILY MEDICINE

## 2022-07-28 PROCEDURE — 81001 URINALYSIS AUTO W/SCOPE: CPT | Performed by: STUDENT IN AN ORGANIZED HEALTH CARE EDUCATION/TRAINING PROGRAM

## 2022-07-28 PROCEDURE — 3075F SYST BP GE 130 - 139MM HG: CPT | Mod: CPTII,S$GLB,, | Performed by: INTERNAL MEDICINE

## 2022-07-28 PROCEDURE — 99999 PR PBB SHADOW E&M-EST. PATIENT-LVL III: CPT | Mod: PBBFAC,,, | Performed by: INTERNAL MEDICINE

## 2022-07-28 PROCEDURE — 3288F FALL RISK ASSESSMENT DOCD: CPT | Mod: CPTII,S$GLB,, | Performed by: INTERNAL MEDICINE

## 2022-07-28 PROCEDURE — 1159F PR MEDICATION LIST DOCUMENTED IN MEDICAL RECORD: ICD-10-PCS | Mod: CPTII,S$GLB,, | Performed by: FAMILY MEDICINE

## 2022-07-28 PROCEDURE — 99285 EMERGENCY DEPT VISIT HI MDM: CPT | Mod: 25

## 2022-07-28 PROCEDURE — 99215 OFFICE O/P EST HI 40 MIN: CPT | Mod: S$GLB,,, | Performed by: INTERNAL MEDICINE

## 2022-07-28 PROCEDURE — 82330 ASSAY OF CALCIUM: CPT

## 2022-07-28 PROCEDURE — 85025 COMPLETE CBC W/AUTO DIFF WBC: CPT | Performed by: STUDENT IN AN ORGANIZED HEALTH CARE EDUCATION/TRAINING PROGRAM

## 2022-07-28 PROCEDURE — 81003 URINALYSIS AUTO W/O SCOPE: CPT | Mod: QW,S$GLB,, | Performed by: FAMILY MEDICINE

## 2022-07-28 PROCEDURE — U0002 COVID-19 LAB TEST NON-CDC: HCPCS | Performed by: EMERGENCY MEDICINE

## 2022-07-28 PROCEDURE — 84484 ASSAY OF TROPONIN QUANT: CPT | Performed by: STUDENT IN AN ORGANIZED HEALTH CARE EDUCATION/TRAINING PROGRAM

## 2022-07-28 PROCEDURE — 1125F AMNT PAIN NOTED PAIN PRSNT: CPT | Mod: CPTII,S$GLB,, | Performed by: FAMILY MEDICINE

## 2022-07-28 PROCEDURE — 93010 ELECTROCARDIOGRAM REPORT: CPT | Mod: ,,, | Performed by: INTERNAL MEDICINE

## 2022-07-28 RX ORDER — PREDNISONE 10 MG/1
TABLET ORAL
Qty: 36 TABLET | Refills: 0 | Status: SHIPPED | OUTPATIENT
Start: 2022-07-28 | End: 2022-12-08

## 2022-07-28 RX ORDER — HYDROMORPHONE HYDROCHLORIDE 1 MG/ML
0.5 INJECTION, SOLUTION INTRAMUSCULAR; INTRAVENOUS; SUBCUTANEOUS
Status: COMPLETED | OUTPATIENT
Start: 2022-07-28 | End: 2022-07-28

## 2022-07-28 RX ADMIN — IOHEXOL 100 ML: 350 INJECTION, SOLUTION INTRAVENOUS at 10:07

## 2022-07-28 RX ADMIN — HYDROMORPHONE HYDROCHLORIDE 0.5 MG: 1 INJECTION, SOLUTION INTRAMUSCULAR; INTRAVENOUS; SUBCUTANEOUS at 08:07

## 2022-07-28 NOTE — PROGRESS NOTES
Subjective:      Patient ID: Patrick Short is a 77 y.o. male.    Chief Complaint: Cough and Shortness of Breath    HPI  78 yo long term friend and patient called earlier today after  spenidng 13 hours  In the ER for cough and chest pain. He came with a cough and right sided chest pain  Because the last time he was in the ER he had a myocardial infarct in evolution, developed third degree heart block and was taken emergently to the cath lab where he had multiple stents.  He then became a cardiac patient of Dr. Michael Mendoza.    He had an extensive (& expensive) cardiac evaluation because of a slightly elevated troponin level 0.028 followed by 0.032 and 0.027  He had mulitple studies including a negative chest x-day and CTA  Negative for emboli..Other labs were:   Non fasting glucose:135  WBC Normal  ESR:41. Elevated transaminases     Portable Chest x-ray: Poor inspiratory effort otherwise normal    CTA: No emboli  ?? Of small right pleural effusion  A week ago flew back from [place]. No clinical evidence of DVT!    CT of abdomen and pelvis   Unremarkable    History of a three week [employer] trip to [place] and on about the 5 day felt poorly, had a cough and temp to 100.45  About 10 other trip members had similar symptoms!!!!!! He  Has not been tested fo Covid   Will wait another two weeks and do a Covid antibody test.ON MY EXAM I HEAR FAINT CRACKLES AT THE RIGHT LUNG BASE NO RUB    Signed out AMA      Saw Dr. Mendoza his  Cardiologist 7/26thwho has done a cardiac ECHO: Negative   Has normal EF of 65%  PAP:22 mm Hg and CVP: 3       Certainly does not seem to be a cardiac issue    He has a cough and on peak flow slightly low at 240 l/min and improved minimally with nebulizer treatement    IMP Probable Covid in [place]     Current problem seems to be pulmonary. Will give a course of prednisone and treat for asthmatic bronchitis. Need to monitor the findings at the right base.  WHY ARE THE LIVER TRANSAMINASE VALUES ELEVATED.        No flowsheet data found.  Review of Systems  Objective:     Physical Exam  Cardiovascular:      Comments: CAD with hx of stents.     Seen in ER for chest pain See HPI    Slightly elevated troponin    Negative cardiac Echo on 7/26  Pulmonary:      Comments: Cough  Non productive   No hemoptysis      Peak flow 450 l/min    Sa02:98%    Slight crackles at right lung base  Abdominal:      Comments: Elevated transaminases     Had a negative abdominal CT in the ER  No evidence  Of gallstones   Musculoskeletal:      Comments: S/P hip replacement    Scheduled for elective shoulder surgery but postponded because of son's wedding in [place] in early September.         Assessment:     1. Cough    2. Coronary artery disease involving native coronary artery of native heart without angina pectoris    3. Pleuritic chest pain      No facility-administered encounter medications on file as of 7/28/2022.     Outpatient Encounter Medications as of 7/28/2022   Medication Sig Dispense Refill    aspirin 81 MG Chew Take 1 tablet (81 mg total) by mouth once daily.  0    atorvastatin (LIPITOR) 40 MG tablet TAKE 1 TABLET BY MOUTH EVERY DAY 90 tablet 3    cetirizine (ZYRTEC) 10 MG tablet Take 10 mg by mouth once daily.      clobetasol (TEMOVATE) 0.05 % cream APPLY TO AFFECTED AREA ON HAND TWICE A DAY AS NEEDED FOR RASH  1    desonide (DESOWEN) 0.05 % cream APPLY TO AFFECTED AREA ON FACE TWICE A DAY AS NEEDED FOR SCALE  1    finasteride (PROSCAR) 5 mg tablet 1 tablet once daily.      ketorolac 0.5% (ACULAR) 0.5 % Drop 1 drop 2 (two) times daily. left      nitroGLYCERIN (NITROSTAT) 0.4 MG SL tablet Place 1 tablet (0.4 mg total) under the tongue every 5 (five) minutes as needed for Chest pain. (Patient not taking: Reported on 7/26/2022) 30 tablet 1    pantoprazole (PROTONIX) 40 MG tablet Take 1 tablet (40 mg total) by mouth once daily. 90 tablet 3    predniSONE (DELTASONE) 10 MG tablet Take 3 tablets x 7 days then 2 tablets x 7days.  36 tablet 0    triamcinolone acetonide 0.1% (KENALOG) 0.1 % ointment APPLY TO AFFECTED AREA ON AXILLAS TWICE A DAY AS NEEDED FOR ITCH/RASH  1       Plan:     Problem List Items Addressed This Visit     CAD (coronary artery disease)    Pleuritic chest pain      Other Visit Diagnoses     Cough    -  Primary        Told him that he had  Everything but an autopsy and  Only minor changes in transaminases and chest CT and my physical exam. Encounter complicated  Took >1 hour to review     Will treat with a pulse of prednisone and check back tomorrow.

## 2022-07-28 NOTE — PROVIDER PROGRESS NOTES - EMERGENCY DEPT.
Encounter Date: 7/27/2022    ED Physician Progress Notes        Physician Note:   Mr. Short was signed out to me by Dr. Lino at approximately 7 AM.  My sign-out history was that the patient had had chest discomfort the prompted his arrival to the emergency department and given a positive D-dimer was awaiting a CTA of the chest.  He also had troponins in the 0.028 range and thus our plan was going to be, given his known coronary artery disease, that we would admit the patient for further serial troponins following obtaining the CTA results.  We did want to go ahead and exclude PE prior to ascertaining his disposition.  The patient's CTA of the chest, independently reviewed and interpreted by me and interpreted by Radiology, reveal no acute embolus and also no acute subtle infiltrate or other intrathoracic findings that could be contributing to the patient's pain.  I went in to speak with the patient about these findings and discuss admission after the resident had already done so.  Initially the patient was amenable to admission with transfer due to capacity over to [facility] we already gone ahead and initiated that process.  However subsequently multiple conversations ensued in which the patient wanted to leave against medical advice.  The patient's wife initially was able to discuss this with him and convince him that further cardiac workup would be prudent.  Throughout this time he complained of pain and was frustrated that he had not had resolution of his pain with treatment here in the ED.  As I try to control his pain with Lortab.  Since I knew that he had had a cough as well I thought that this may be a good adjunct to use as it may also serve as a cough suppressant.  At that point we had discussed everything and we were awaiting transfer to [facility].  Subsequently I was asked to come back in and talk with the patient as he was wanting to leave against medical advice again.  At this point he was extremely  frustrated.  He was frustrated that his pain had not been controlled.  He at that point informed me that his abdomen was hurting and that his chest was not hurting at all.  We gave him some Toradol and I was trying to work through this with him and examine his abdomen and continued to proceed further in his workup, however the patient wanted to leave at that point.  He stated that he was going to go to [facility]  to continue his workup. He has decisional capacity. He understands the risks of leaving which we discussed including injury to the heart and even death. He has been informed that he is welcome to return to continue his workup at any time. On his paperwork when he discharged against medical advice, I cut and pasted his CTA of his chest so that he would have these results as he continued in this workup. Discharged AMA.

## 2022-07-28 NOTE — TELEPHONE ENCOUNTER
----- Message from Lexi Juarez sent at 7/28/2022 11:01 AM CDT -----  Regarding: Difficulty Breathing  Contact: @[phone]  Pt requesting a call back to see if can be seen today.  I schedule the appt for 08/02/22 per patient but he still wanted me to send a message to see about today..

## 2022-07-28 NOTE — PROGRESS NOTES
"Subjective:       Patient ID: Patrick Short is a 77 y.o. male.    Vitals:  height is 5' 10" (1.778 m) and weight is 102.5 kg (226 lb). His temperature is 99.7 °F (37.6 °C). His blood pressure is 158/92 (abnormal) and his pulse is 70. His respiration is 24 (abnormal) and oxygen saturation is 93% (abnormal).     Chief Complaint: Abdominal Pain    This is a 77 y.o. male who presents today with a chief complaint of   Abdominal pain and right sided back pain near kidney area. Symptoms for 2 weeks ago off and on. Pt also has a dry cough. Took home covid test today and was Negative     Abdominal Pain  This is a new problem. The current episode started in the past 7 days. The pain is located in the right flank. The pain is at a severity of 8/10. The pain is severe. The quality of the pain is aching (pressure). The abdominal pain radiates to the RLQ, RUQ and right flank. Associated symptoms comments: Loss of appetite. The pain is aggravated by movement, coughing, deep breathing and certain positions. The pain is relieved by nothing. Prior diagnostic workup includes CT scan.       Gastrointestinal: Positive for abdominal pain.       Objective:      Physical Exam   Cardiovascular: Normal rate, regular rhythm, normal heart sounds and normal pulses.   Pulmonary/Chest: Effort normal and breath sounds normal.   Abdominal: Normal appearance. Soft.   Neurological: He is alert.   Nursing note and vitals reviewed.    Results for orders placed or performed in visit on 07/28/22   POCT Urinalysis, Dipstick, Automated, W/O Scope   Result Value Ref Range    POC Blood, Urine Negative Negative    POC Bilirubin, Urine Negative Negative    POC Urobilinogen, Urine normal 0.3 - 2.2    POC Ketones, Urine Negative Negative    POC Protein, Urine Negative Negative    POC Nitrates, Urine Negative Negative    POC Glucose, Urine Negative Negative    pH, UA 7.5 5 - 8    POC Specific Gravity, Urine 1.005 1.003 - 1.029    POC Leukocytes, Urine Negative " Negative   POCT COVID-19 Rapid Screening   Result Value Ref Range    POC Rapid COVID Negative Negative     Acceptable Yes          Assessment:       1. Chest pain, unspecified type    2. Troponin level elevated        Chart reviewed and discussed in detail with patient the findings and testing done in the ER. Discussed with patient need for further evaluation and management of possible cardiac injury and possible pulmonary embolism. Patient verbalized understanding and agreement and contracted to return tot he ER. Discussed case with Filipe charge nurse in ER patient's concerns that led to his leaving AMA. Spent approximately 30 mins in discussion with patient and wife.   Plan:         Chest pain, unspecified type  -     POCT COVID-19 Rapid Screening  -     Refer to Emergency Dept.    Troponin level elevated  -     POCT Urinalysis, Dipstick, Automated, W/O Scope  -     Refer to Emergency Dept.

## 2022-07-28 NOTE — TELEPHONE ENCOUNTER
Mr Deja called coughing and short of breath. He said he was in [facility] ER yesterday for 12 hours and left AMA. Dr Lloyd said he can come today and he will. Bernard YUEN

## 2022-07-29 ENCOUNTER — TELEPHONE (OUTPATIENT)
Dept: NEUROLOGY | Facility: CLINIC | Age: 77
End: 2022-07-29
Payer: COMMERCIAL

## 2022-07-29 VITALS
DIASTOLIC BLOOD PRESSURE: 86 MMHG | TEMPERATURE: 97 F | OXYGEN SATURATION: 97 % | HEART RATE: 65 BPM | SYSTOLIC BLOOD PRESSURE: 158 MMHG | RESPIRATION RATE: 20 BRPM | WEIGHT: 210 LBS | HEIGHT: 71 IN | BODY MASS INDEX: 29.4 KG/M2

## 2022-07-29 DIAGNOSIS — R41.3 MEMORY LOSS: Primary | ICD-10-CM

## 2022-07-29 DIAGNOSIS — G20.A1 PARKINSON DISEASE: ICD-10-CM

## 2022-07-29 PROBLEM — R07.9 CHEST PAIN: Status: ACTIVE | Noted: 2022-07-29

## 2022-07-29 PROBLEM — R07.81 PLEURITIC CHEST PAIN: Status: ACTIVE | Noted: 2022-07-29

## 2022-07-29 LAB
ALBUMIN SERPL BCP-MCNC: 3.4 G/DL (ref 3.5–5.2)
ALP SERPL-CCNC: 105 U/L (ref 55–135)
ALT SERPL W/O P-5'-P-CCNC: 60 U/L (ref 10–44)
ANION GAP SERPL CALC-SCNC: 8 MMOL/L (ref 8–16)
AST SERPL-CCNC: 46 U/L (ref 10–40)
BASOPHILS # BLD AUTO: 0.01 K/UL (ref 0–0.2)
BASOPHILS NFR BLD: 0.2 % (ref 0–1.9)
BILIRUB DIRECT SERPL-MCNC: 0.6 MG/DL (ref 0.1–0.3)
BILIRUB SERPL-MCNC: 1.4 MG/DL (ref 0.1–1)
BUN SERPL-MCNC: 10 MG/DL (ref 8–23)
CALCIUM SERPL-MCNC: 9.1 MG/DL (ref 8.7–10.5)
CHLORIDE SERPL-SCNC: 104 MMOL/L (ref 95–110)
CO2 SERPL-SCNC: 26 MMOL/L (ref 23–29)
CREAT SERPL-MCNC: 0.7 MG/DL (ref 0.5–1.4)
CRP SERPL-MCNC: 145.1 MG/L (ref 0–8.2)
DIFFERENTIAL METHOD: ABNORMAL
EOSINOPHIL # BLD AUTO: 0 K/UL (ref 0–0.5)
EOSINOPHIL NFR BLD: 0 % (ref 0–8)
ERYTHROCYTE [DISTWIDTH] IN BLOOD BY AUTOMATED COUNT: 13.2 % (ref 11.5–14.5)
ERYTHROCYTE [SEDIMENTATION RATE] IN BLOOD BY PHOTOMETRIC METHOD: 41 MM/HR (ref 0–23)
EST. GFR  (AFRICAN AMERICAN): >60 ML/MIN/1.73 M^2
EST. GFR  (NON AFRICAN AMERICAN): >60 ML/MIN/1.73 M^2
GLUCOSE SERPL-MCNC: 135 MG/DL (ref 70–110)
HCT VFR BLD AUTO: 39.9 % (ref 40–54)
HGB BLD-MCNC: 13.4 G/DL (ref 14–18)
IMM GRANULOCYTES # BLD AUTO: 0.03 K/UL (ref 0–0.04)
IMM GRANULOCYTES NFR BLD AUTO: 0.5 % (ref 0–0.5)
LYMPHOCYTES # BLD AUTO: 0.7 K/UL (ref 1–4.8)
LYMPHOCYTES NFR BLD: 10.4 % (ref 18–48)
MAGNESIUM SERPL-MCNC: 1.5 MG/DL (ref 1.6–2.6)
MCH RBC QN AUTO: 30 PG (ref 27–31)
MCHC RBC AUTO-ENTMCNC: 33.6 G/DL (ref 32–36)
MCV RBC AUTO: 89 FL (ref 82–98)
MONOCYTES # BLD AUTO: 0.4 K/UL (ref 0.3–1)
MONOCYTES NFR BLD: 6.6 % (ref 4–15)
NEUTROPHILS # BLD AUTO: 5.5 K/UL (ref 1.8–7.7)
NEUTROPHILS NFR BLD: 82.3 % (ref 38–73)
NRBC BLD-RTO: 0 /100 WBC
PARASITE BLD: NORMAL
PATH REV BLD -IMP: NORMAL
PHOSPHATE SERPL-MCNC: 3 MG/DL (ref 2.7–4.5)
PLATELET # BLD AUTO: 142 K/UL (ref 150–450)
PMV BLD AUTO: 11.3 FL (ref 9.2–12.9)
POTASSIUM SERPL-SCNC: 3.9 MMOL/L (ref 3.5–5.1)
PROCALCITONIN SERPL IA-MCNC: 0.06 NG/ML
PROT SERPL-MCNC: 7.3 G/DL (ref 6–8.4)
RBC # BLD AUTO: 4.47 M/UL (ref 4.6–6.2)
SARS-COV-2 RDRP RESP QL NAA+PROBE: NEGATIVE
SODIUM SERPL-SCNC: 138 MMOL/L (ref 136–145)
TROPONIN I SERPL DL<=0.01 NG/ML-MCNC: 0.03 NG/ML (ref 0–0.03)
WBC # BLD AUTO: 6.64 K/UL (ref 3.9–12.7)

## 2022-07-29 PROCEDURE — 25000242 PHARM REV CODE 250 ALT 637 W/ HCPCS: Performed by: PHYSICIAN ASSISTANT

## 2022-07-29 PROCEDURE — 86140 C-REACTIVE PROTEIN: CPT | Performed by: PHYSICIAN ASSISTANT

## 2022-07-29 PROCEDURE — 96365 THER/PROPH/DIAG IV INF INIT: CPT | Performed by: EMERGENCY MEDICINE

## 2022-07-29 PROCEDURE — 83735 ASSAY OF MAGNESIUM: CPT | Performed by: PHYSICIAN ASSISTANT

## 2022-07-29 PROCEDURE — 96375 TX/PRO/DX INJ NEW DRUG ADDON: CPT | Performed by: EMERGENCY MEDICINE

## 2022-07-29 PROCEDURE — 36415 COLL VENOUS BLD VENIPUNCTURE: CPT | Performed by: PHYSICIAN ASSISTANT

## 2022-07-29 PROCEDURE — 84484 ASSAY OF TROPONIN QUANT: CPT | Performed by: PHYSICIAN ASSISTANT

## 2022-07-29 PROCEDURE — 63600175 PHARM REV CODE 636 W HCPCS: Performed by: PHYSICIAN ASSISTANT

## 2022-07-29 PROCEDURE — 96372 THER/PROPH/DIAG INJ SC/IM: CPT | Performed by: PHYSICIAN ASSISTANT

## 2022-07-29 PROCEDURE — 80048 BASIC METABOLIC PNL TOTAL CA: CPT | Performed by: PHYSICIAN ASSISTANT

## 2022-07-29 PROCEDURE — 85652 RBC SED RATE AUTOMATED: CPT | Performed by: PHYSICIAN ASSISTANT

## 2022-07-29 PROCEDURE — 99236 PR OBSERV/HOSP SAME DATE,LEVL V: ICD-10-PCS | Mod: ,,, | Performed by: PHYSICIAN ASSISTANT

## 2022-07-29 PROCEDURE — 84145 PROCALCITONIN (PCT): CPT | Performed by: PHYSICIAN ASSISTANT

## 2022-07-29 PROCEDURE — 99214 OFFICE O/P EST MOD 30 MIN: CPT | Mod: ,,, | Performed by: INTERNAL MEDICINE

## 2022-07-29 PROCEDURE — 99499 NO LOS: ICD-10-PCS | Mod: ,,, | Performed by: PHYSICIAN ASSISTANT

## 2022-07-29 PROCEDURE — G0378 HOSPITAL OBSERVATION PER HR: HCPCS

## 2022-07-29 PROCEDURE — 94761 N-INVAS EAR/PLS OXIMETRY MLT: CPT

## 2022-07-29 PROCEDURE — 25000003 PHARM REV CODE 250: Performed by: PHYSICIAN ASSISTANT

## 2022-07-29 PROCEDURE — 85025 COMPLETE CBC W/AUTO DIFF WBC: CPT | Performed by: PHYSICIAN ASSISTANT

## 2022-07-29 PROCEDURE — 99214 PR OFFICE/OUTPT VISIT, EST, LEVL IV, 30-39 MIN: ICD-10-PCS | Mod: ,,, | Performed by: INTERNAL MEDICINE

## 2022-07-29 PROCEDURE — 99214 PR OFFICE/OUTPT VISIT, EST, LEVL IV, 30-39 MIN: ICD-10-PCS | Mod: ,,, | Performed by: STUDENT IN AN ORGANIZED HEALTH CARE EDUCATION/TRAINING PROGRAM

## 2022-07-29 PROCEDURE — 94640 AIRWAY INHALATION TREATMENT: CPT

## 2022-07-29 PROCEDURE — 96361 HYDRATE IV INFUSION ADD-ON: CPT | Performed by: EMERGENCY MEDICINE

## 2022-07-29 PROCEDURE — 99214 OFFICE O/P EST MOD 30 MIN: CPT | Mod: ,,, | Performed by: STUDENT IN AN ORGANIZED HEALTH CARE EDUCATION/TRAINING PROGRAM

## 2022-07-29 PROCEDURE — 99499 UNLISTED E&M SERVICE: CPT | Mod: ,,, | Performed by: PHYSICIAN ASSISTANT

## 2022-07-29 PROCEDURE — 96366 THER/PROPH/DIAG IV INF ADDON: CPT | Performed by: EMERGENCY MEDICINE

## 2022-07-29 PROCEDURE — 84100 ASSAY OF PHOSPHORUS: CPT | Performed by: PHYSICIAN ASSISTANT

## 2022-07-29 PROCEDURE — 80076 HEPATIC FUNCTION PANEL: CPT | Performed by: PHYSICIAN ASSISTANT

## 2022-07-29 PROCEDURE — 99236 HOSP IP/OBS SAME DATE HI 85: CPT | Mod: ,,, | Performed by: PHYSICIAN ASSISTANT

## 2022-07-29 RX ORDER — NAPROXEN SODIUM 220 MG/1
81 TABLET, FILM COATED ORAL DAILY
Status: DISCONTINUED | OUTPATIENT
Start: 2022-07-29 | End: 2022-07-29 | Stop reason: HOSPADM

## 2022-07-29 RX ORDER — HEPARIN SODIUM 5000 [USP'U]/ML
5000 INJECTION, SOLUTION INTRAVENOUS; SUBCUTANEOUS EVERY 8 HOURS
Status: DISCONTINUED | OUTPATIENT
Start: 2022-07-29 | End: 2022-07-29 | Stop reason: HOSPADM

## 2022-07-29 RX ORDER — PANTOPRAZOLE SODIUM 40 MG/1
40 TABLET, DELAYED RELEASE ORAL DAILY
Status: DISCONTINUED | OUTPATIENT
Start: 2022-07-29 | End: 2022-07-29 | Stop reason: HOSPADM

## 2022-07-29 RX ORDER — GLUCAGON 1 MG
1 KIT INJECTION
Status: DISCONTINUED | OUTPATIENT
Start: 2022-07-29 | End: 2022-07-29 | Stop reason: HOSPADM

## 2022-07-29 RX ORDER — SIMETHICONE 80 MG
1 TABLET,CHEWABLE ORAL 4 TIMES DAILY PRN
Status: DISCONTINUED | OUTPATIENT
Start: 2022-07-29 | End: 2022-07-29 | Stop reason: HOSPADM

## 2022-07-29 RX ORDER — BENZONATATE 200 MG/1
200 CAPSULE ORAL 3 TIMES DAILY PRN
Qty: 30 CAPSULE | Refills: 0 | Status: SHIPPED | OUTPATIENT
Start: 2022-07-29 | End: 2022-08-08

## 2022-07-29 RX ORDER — IPRATROPIUM BROMIDE AND ALBUTEROL SULFATE 2.5; .5 MG/3ML; MG/3ML
3 SOLUTION RESPIRATORY (INHALATION) ONCE
Status: COMPLETED | OUTPATIENT
Start: 2022-07-29 | End: 2022-07-29

## 2022-07-29 RX ORDER — KETOROLAC TROMETHAMINE 15 MG/ML
15 INJECTION, SOLUTION INTRAMUSCULAR; INTRAVENOUS EVERY 6 HOURS PRN
Status: DISCONTINUED | OUTPATIENT
Start: 2022-07-29 | End: 2022-07-29 | Stop reason: HOSPADM

## 2022-07-29 RX ORDER — IBUPROFEN 200 MG
16 TABLET ORAL
Status: DISCONTINUED | OUTPATIENT
Start: 2022-07-29 | End: 2022-07-29 | Stop reason: HOSPADM

## 2022-07-29 RX ORDER — SODIUM CHLORIDE 0.9 % (FLUSH) 0.9 %
10 SYRINGE (ML) INJECTION EVERY 8 HOURS PRN
Status: DISCONTINUED | OUTPATIENT
Start: 2022-07-29 | End: 2022-07-29 | Stop reason: HOSPADM

## 2022-07-29 RX ORDER — NALOXONE HCL 0.4 MG/ML
0.02 VIAL (ML) INJECTION
Status: DISCONTINUED | OUTPATIENT
Start: 2022-07-29 | End: 2022-07-29 | Stop reason: HOSPADM

## 2022-07-29 RX ORDER — KETOROLAC TROMETHAMINE 15 MG/ML
15 INJECTION, SOLUTION INTRAMUSCULAR; INTRAVENOUS ONCE
Status: COMPLETED | OUTPATIENT
Start: 2022-07-29 | End: 2022-07-29

## 2022-07-29 RX ORDER — ONDANSETRON 4 MG/1
8 TABLET, ORALLY DISINTEGRATING ORAL EVERY 8 HOURS PRN
Status: DISCONTINUED | OUTPATIENT
Start: 2022-07-29 | End: 2022-07-29 | Stop reason: HOSPADM

## 2022-07-29 RX ORDER — IBUPROFEN 200 MG
24 TABLET ORAL
Status: DISCONTINUED | OUTPATIENT
Start: 2022-07-29 | End: 2022-07-29 | Stop reason: HOSPADM

## 2022-07-29 RX ORDER — MAGNESIUM SULFATE HEPTAHYDRATE 40 MG/ML
2 INJECTION, SOLUTION INTRAVENOUS ONCE
Status: COMPLETED | OUTPATIENT
Start: 2022-07-29 | End: 2022-07-29

## 2022-07-29 RX ORDER — POLYETHYLENE GLYCOL 3350 17 G/17G
17 POWDER, FOR SOLUTION ORAL DAILY PRN
Status: DISCONTINUED | OUTPATIENT
Start: 2022-07-29 | End: 2022-07-29 | Stop reason: HOSPADM

## 2022-07-29 RX ORDER — IPRATROPIUM BROMIDE AND ALBUTEROL SULFATE 2.5; .5 MG/3ML; MG/3ML
3 SOLUTION RESPIRATORY (INHALATION) EVERY 4 HOURS PRN
Status: DISCONTINUED | OUTPATIENT
Start: 2022-07-29 | End: 2022-07-29 | Stop reason: HOSPADM

## 2022-07-29 RX ORDER — ACETAMINOPHEN 325 MG/1
650 TABLET ORAL EVERY 4 HOURS PRN
Status: DISCONTINUED | OUTPATIENT
Start: 2022-07-29 | End: 2022-07-29 | Stop reason: HOSPADM

## 2022-07-29 RX ORDER — LIDOCAINE HYDROCHLORIDE 20 MG/ML
15 SOLUTION OROPHARYNGEAL ONCE
Status: COMPLETED | OUTPATIENT
Start: 2022-07-29 | End: 2022-07-29

## 2022-07-29 RX ORDER — IBUPROFEN 800 MG/1
800 TABLET ORAL EVERY 6 HOURS PRN
Qty: 40 TABLET | Refills: 0 | Status: SHIPPED | OUTPATIENT
Start: 2022-07-29 | End: 2022-08-08

## 2022-07-29 RX ORDER — METHYLPREDNISOLONE SOD SUCC 125 MG
125 VIAL (EA) INJECTION ONCE
Status: DISCONTINUED | OUTPATIENT
Start: 2022-07-29 | End: 2022-07-29

## 2022-07-29 RX ORDER — MAG HYDROX/ALUMINUM HYD/SIMETH 200-200-20
30 SUSPENSION, ORAL (FINAL DOSE FORM) ORAL 4 TIMES DAILY PRN
Status: DISCONTINUED | OUTPATIENT
Start: 2022-07-29 | End: 2022-07-29 | Stop reason: HOSPADM

## 2022-07-29 RX ORDER — MAG HYDROX/ALUMINUM HYD/SIMETH 200-200-20
30 SUSPENSION, ORAL (FINAL DOSE FORM) ORAL ONCE
Status: COMPLETED | OUTPATIENT
Start: 2022-07-29 | End: 2022-07-29

## 2022-07-29 RX ORDER — ATORVASTATIN CALCIUM 20 MG/1
40 TABLET, FILM COATED ORAL DAILY
Status: DISCONTINUED | OUTPATIENT
Start: 2022-07-29 | End: 2022-07-29 | Stop reason: HOSPADM

## 2022-07-29 RX ORDER — TALC
6 POWDER (GRAM) TOPICAL NIGHTLY PRN
Status: DISCONTINUED | OUTPATIENT
Start: 2022-07-29 | End: 2022-07-29 | Stop reason: HOSPADM

## 2022-07-29 RX ADMIN — IPRATROPIUM BROMIDE AND ALBUTEROL SULFATE 3 ML: .5; 3 SOLUTION RESPIRATORY (INHALATION) at 12:07

## 2022-07-29 RX ADMIN — HEPARIN SODIUM 5000 UNITS: 5000 INJECTION INTRAVENOUS; SUBCUTANEOUS at 05:07

## 2022-07-29 RX ADMIN — MAGNESIUM SULFATE HEPTAHYDRATE 2 G: 2 INJECTION, SOLUTION INTRAVENOUS at 08:07

## 2022-07-29 RX ADMIN — HEPARIN SODIUM 5000 UNITS: 5000 INJECTION INTRAVENOUS; SUBCUTANEOUS at 03:07

## 2022-07-29 RX ADMIN — METHYLPREDNISOLONE SODIUM SUCCINATE 125 MG: 125 INJECTION, POWDER, FOR SOLUTION INTRAMUSCULAR; INTRAVENOUS at 03:07

## 2022-07-29 RX ADMIN — KETOROLAC TROMETHAMINE 15 MG: 15 INJECTION, SOLUTION INTRAMUSCULAR; INTRAVENOUS at 03:07

## 2022-07-29 RX ADMIN — ALUMINUM HYDROXIDE, MAGNESIUM HYDROXIDE, AND SIMETHICONE 30 ML: 200; 200; 20 SUSPENSION ORAL at 11:07

## 2022-07-29 RX ADMIN — ASPIRIN 81 MG CHEWABLE TABLET 81 MG: 81 TABLET CHEWABLE at 08:07

## 2022-07-29 RX ADMIN — LIDOCAINE HYDROCHLORIDE 15 ML: 20 SOLUTION ORAL; TOPICAL at 11:07

## 2022-07-29 NOTE — TELEPHONE ENCOUNTER
----- Message from Darian Lloyd MD sent at 7/29/2022  1:24 PM CDT -----  Regarding: Loss of memory becoming more obvious to patient and wife      Rafael, I saw Tushar yesterday for an ER follow up. Both he  and his  wife commented that he is having progressive memory loss. States that is he is speaking and gets interrupt he can find his place or remember who he is  addressing!  Should you see him or should get plugged into the memory guru.? Nilton

## 2022-07-29 NOTE — DISCHARGE SUMMARY
[facility] - Cardiology [facility] Medicine  Discharge Summary      Patient Name: Patrick Short  MRN: [id number]  Patient Class: OP- Observation  Admission Date: 7/28/2022  Hospital Length of Stay: 0 days  Discharge Date and Time: No discharge date for patient encounter.  Attending Physician: Alethea Slaughter MD   Discharging Provider: Stephan Spencer PA-C  Primary Care Provider: Nilton Lloyd MD  Hospital Medicine Team: [facility] MED F Stephan Spencer PA-C    HPI:   Patrick Short is a 77 y.o. male with a PMHx of CAD, MI, HLD who presented to the ED complaining of chest pain and a cough. He reports that for the last 3 days he has had a persistent cough as well as a chest pain that is worse with coughing and deep inspiration. He was seen in the ED yesterday where a CTA was done due to history of long flight from  on July 19th and was negative for a PE. Patient saw his cardiologist today where COVID test was negative, but he was told to come to the ED since his pain had not improved.     ED: O2 94% on RA, otherwise hemodynamically stable and afebrile. CBC without leukocytosis. EKG without acute ischemic changes. trop is 0.031 was 0.028 yesterday. CT today with bibasilar atelectasis. CTA yesterday didn't show a PE, he left AMA yesterday but returned due to continued pain.      * No surgery found *      Hospital Course:   77 y.o. who was admitted to hospital medicine for chest pain worse with cough and deep inspiration. CTA PE and CTA C/A/P negative for PE with no acute findings. EKG was without acute ischemic changes. ESR 41 and . Troponin elevated but flat. Cardiology evaluated and did not believe presentation were consistent with a cardiac etiology. Pulmonology evaluated and suspected a post viral pleuritic chest pain as patient reports improvement with outpatient steroids and NSAIDS. Patient reported significant improvement with supportive care, discharged home in stable condition.         Goals of Care Treatment Preferences:  Code Status: Full Code      Consults:   Consults (From admission, onward)        Status Ordering Provider     Inpatient consult to Cardiology  Once        Provider:  (Not yet assigned)    GRIFFIN Juarez new Assessment & Plan notes have been filed under this hospital service since the last note was generated.  Service: Hospital Medicine    Final Active Diagnoses:    Diagnosis Date Noted POA    PRINCIPAL PROBLEM:  Pleuritic chest pain [R07.81] 07/29/2022 Yes    Parkinson disease [G20] 12/15/2021 Yes    Coronary artery disease involving native coronary artery without angina pectoris [I25.10] 08/29/2016 Yes    Dyslipidemia [E78.5] 07/19/2016 Yes      Problems Resolved During this Admission:       Discharged Condition: stable    Disposition: Home or Self Care    Follow Up:    Patient Instructions:      C-REACTIVE PROTEIN   Standing Status: Future Standing Exp. Date: 09/27/23     Ambulatory referral/consult to Pulmonology   Standing Status: Future   Referral Priority: Urgent Referral Type: Consultation   Referral Reason: Specialty Services Required   Referred to Provider: LAURA COOK Requested Specialty: Pulmonary Disease   Number of Visits Requested: 1     Diet Cardiac     Notify your health care provider if you experience any of the following:  severe uncontrolled pain     Notify your health care provider if you experience any of the following:  difficulty breathing or increased cough     Notify your health care provider if you experience any of the following:  temperature >100.4     Activity as tolerated       Significant Diagnostic Studies: Labs: All labs within the past 24 hours have been reviewed    Pending Diagnostic Studies:     None         Medications:  Reconciled Home Medications:      Medication List      START taking these medications    benzonatate 200 MG capsule  Commonly known as: TESSALON  Take 1 capsule (200 mg total) by  mouth 3 (three) times daily as needed for Cough.     ibuprofen 800 MG tablet  Commonly known as: ADVIL,MOTRIN  Take 1 tablet (800 mg total) by mouth every 6 (six) hours as needed for Pain.        CONTINUE taking these medications    aspirin 81 MG Chew  Take 1 tablet (81 mg total) by mouth once daily.     atorvastatin 40 MG tablet  Commonly known as: LIPITOR  TAKE 1 TABLET BY MOUTH EVERY DAY     cetirizine 10 MG tablet  Commonly known as: ZYRTEC  Take 10 mg by mouth once daily.     clobetasoL 0.05 % cream  Commonly known as: TEMOVATE  APPLY TO AFFECTED AREA ON HAND TWICE A DAY AS NEEDED FOR RASH     desonide 0.05 % cream  Commonly known as: DESOWEN  APPLY TO AFFECTED AREA ON FACE TWICE A DAY AS NEEDED FOR SCALE     finasteride 5 mg tablet  Commonly known as: PROSCAR  1 tablet once daily.     ketorolac 0.5% 0.5 % Drop  Commonly known as: ACULAR  1 drop 2 (two) times daily. left     nitroGLYCERIN 0.4 MG SL tablet  Commonly known as: NITROSTAT  Place 1 tablet (0.4 mg total) under the tongue every 5 (five) minutes as needed for Chest pain.     pantoprazole 40 MG tablet  Commonly known as: PROTONIX  Take 1 tablet (40 mg total) by mouth once daily.     predniSONE 10 MG tablet  Commonly known as: DELTASONE  Take 3 tablets x 7 days then 2 tablets x 7days.     triamcinolone acetonide 0.1% 0.1 % ointment  Commonly known as: KENALOG  APPLY TO AFFECTED AREA ON AXILLAS TWICE A DAY AS NEEDED FOR ITCH/RASH            Indwelling Lines/Drains at time of discharge:   Lines/Drains/Airways     None                 Time spent on the discharge of patient: 30 minutes         Stephan Spencer PA-C  Department of Hospital Medicine  [facility] - Cardiology Stepdown

## 2022-07-29 NOTE — PLAN OF CARE
Problem: Adult Inpatient Plan of Care  Goal: Plan of Care Review  Outcome: Ongoing, Progressing  Goal: Patient-Specific Goal (Individualized)  Outcome: Ongoing, Progressing  Goal: Absence of Hospital-Acquired Illness or Injury  Outcome: Ongoing, Progressing  Goal: Optimal Comfort and Wellbeing  Outcome: Ongoing, Progressing  Goal: Readiness for Transition of Care  Outcome: Ongoing, Progressing     Problem: Infection  Goal: Absence of Infection Signs and Symptoms  Outcome: Ongoing, Progressing     Problem: Impaired Wound Healing  Goal: Optimal Wound Healing  Outcome: Ongoing, Progressing

## 2022-07-29 NOTE — NURSING
Received patient from ED. Patient is alert and orientedx4, and ambulatory. No complaints of pain, discomfort, or respiratory distress. VSS. Telemetry box on patient. Cardiac consult this AM. Patient staying in street clothes because he stated he is leaving as soon as he is seen by cardiologist. Bed locked in lowest position. Call bell and personal items within reach. Will continue POC

## 2022-07-29 NOTE — TELEPHONE ENCOUNTER
I;ve ordered a full neuropscyh battery to start, and then we'll get him in for follow-up based on the findings.      Thanks for reaching out!

## 2022-07-29 NOTE — ASSESSMENT & PLAN NOTE
- patient presented to ED for the 2nd day in a row complaining of chest pain worse with coughing and inspiration. Also reports cough ongoing for several days  - EKG without acute ischemic changes  - troponin 0.031, will trend  - CXR without consolidation, bibasilar atelectasis  - CTA chest and CTA chest/abd/pelvis without evidence of PE  - differential includes costochondritis, pneumonia, ACS, pericarditis  - f/u ESR, CRP, procal  - pain relieved with dilaudid in ED, will order prn toradol   - monitor telemetry

## 2022-07-29 NOTE — HOSPITAL COURSE
77 y.o. who was admitted to hospital medicine for chest pain worse with cough and deep inspiration. CTA PE and CTA C/A/P negative for PE with no acute findings. EKG was without acute ischemic changes. ESR 41 and . Troponin elevated but flat. Cardiology evaluated and did not believe presentation were consistent with a cardiac etiology. Pulmonology evaluated and suspected a post viral pleuritic chest pain as patient reports improvement with outpatient steroids and NSAIDS. Patient reported significant improvement with supportive care, discharged home in stable condition.

## 2022-07-29 NOTE — PLAN OF CARE
Patient is ready for discharge. Patient stable alert and oriented. Tele, and IVs removed. No complaints of pain. Discussed discharge plan. Reviewed medications and side effects, appointments, and answered questions with patient and spouse.

## 2022-07-29 NOTE — ASSESSMENT & PLAN NOTE
Primarily abdominal pain not anginal in quality.   - Despite hx of recent URI, the lack of friction rub or relief with upright postioning, effusion on echo, or EKG changes result in a low index of suspicion for pericarditis.  - Possibly pleurisy with diaphragmatic irritation, improved with prednisone and albuterol. Some GERD involvement with hiatal hernia on CT.  - May benefit from NSAID but caution with GERD  - Continue ASA, Statin.

## 2022-07-29 NOTE — ED PROVIDER NOTES
Encounter Date: 7/28/2022       History     Chief Complaint   Patient presents with    Abdominal Pain     C/o right sided rib pain, worsens with inspiration. Denies chest pain. Here yesterday, left HUSSAIN. MD convinced him to come back, due to continued pain      Mr. Short is a 77 year old male with PMHx significant for CAD and MI who presents to the emergency department due to multiple concerns.  Patient states that for the past 3 days he has had a persistent cough and a pain that radiates to the right side of his back worse with coughing and deep inspiration.    He states that he was in emergency department yesterday and a CTA was done he was told that he did not have a pulmonary embolism but he states that he was later told that the CTA was not completed all the way.  Patient states that he had seen his cardiologist today and a COVID test was done which was negative, he was advised by his doctor to return because none of his symptoms had improved.    He denies fever, chills, nausea, or vomiting.     Of note, pt reports recent long flight from [place] to the [place] on [place]th.           Review of patient's allergies indicates:  No Known Allergies  Past Medical History:   Diagnosis Date    CAD (coronary artery disease) 7/26/2016    Heart block     MI (myocardial infarction)     Reflux      Past Surgical History:   Procedure Laterality Date    CARDIAC CATHETERIZATION      EYE SURGERY Left 02/2017    HIP SURGERY Right 12/2021     Family History   Problem Relation Age of Onset    Hypertension Mother     Emphysema Father     Coronary artery disease Father      Social History     Tobacco Use    Smoking status: Never Smoker    Smokeless tobacco: Never Used   Substance Use Topics    Alcohol use: Yes     Comment: twice a month/beer wine    Drug use: Never     Review of Systems   Constitutional: Negative for activity change, appetite change, chills, fatigue and fever.   HENT: Negative for congestion, drooling,  mouth sores, sinus pressure, sinus pain and trouble swallowing.    Eyes: Negative for photophobia, pain and visual disturbance.   Respiratory: Positive for cough and shortness of breath. Negative for chest tightness and wheezing.    Cardiovascular: Negative for chest pain, palpitations and leg swelling.   Gastrointestinal: Positive for abdominal pain. Negative for blood in stool, constipation, diarrhea, nausea and vomiting.   Genitourinary: Negative for difficulty urinating, dysuria, flank pain, hematuria, scrotal swelling and testicular pain.   Musculoskeletal: Negative for arthralgias, back pain, joint swelling and myalgias.   Skin: Negative for color change, rash and wound.   Neurological: Negative for dizziness, seizures, weakness, light-headedness, numbness and headaches.   Psychiatric/Behavioral: Negative for agitation and confusion.       Physical Exam     Initial Vitals [07/28/22 1938]   BP Pulse Resp Temp SpO2   (!) 157/84 84 15 99 °F (37.2 °C) 95 %      MAP       --         Physical Exam    Nursing note and vitals reviewed.  Constitutional: He appears well-developed and well-nourished. He is not diaphoretic. No distress.   HENT:   Head: Normocephalic and atraumatic.   Mouth/Throat: Oropharynx is clear and moist. No oropharyngeal exudate.   Eyes: Conjunctivae and EOM are normal. Pupils are equal, round, and reactive to light. Right eye exhibits no discharge. Left eye exhibits no discharge. No scleral icterus.   Neck: No tracheal deviation present. No JVD present.   Normal range of motion.  Cardiovascular: Normal rate, normal heart sounds and intact distal pulses. Exam reveals no gallop.    No murmur heard.  Pulmonary/Chest: Breath sounds normal. No respiratory distress. He has no wheezes. He has no rales. He exhibits no tenderness.   Abdominal: Abdomen is soft. Bowel sounds are normal. He exhibits no distension. There is no abdominal tenderness. There is no guarding.   Musculoskeletal:         General: No  tenderness or edema. Normal range of motion.      Cervical back: Normal range of motion.     Neurological: He is alert and oriented to person, place, and time. He has normal strength.   Skin: Skin is warm. Capillary refill takes less than 2 seconds. No erythema.   Psychiatric: He has a normal mood and affect.         ED Course   Procedures  Labs Reviewed   CBC W/ AUTO DIFFERENTIAL - Abnormal; Notable for the following components:       Result Value    RBC 4.56 (*)     Hemoglobin 13.8 (*)     Platelets 144 (*)     Gran # (ANC) 9.0 (*)     Lymph # 0.5 (*)     Gran % 91.8 (*)     Lymph % 4.8 (*)     Mono % 2.8 (*)     All other components within normal limits   COMPREHENSIVE METABOLIC PANEL - Abnormal; Notable for the following components:    Glucose 190 (*)     Albumin 3.3 (*)     Total Bilirubin 1.4 (*)     AST 49 (*)     ALT 58 (*)     All other components within normal limits   URINALYSIS, REFLEX TO URINE CULTURE - Abnormal; Notable for the following components:    Occult Blood UA 1+ (*)     All other components within normal limits    Narrative:     Specimen Source->Urine   TROPONIN I - Abnormal; Notable for the following components:    Troponin I 0.031 (*)     All other components within normal limits    Narrative:     ADD ON TROPONIN PER DR KENN MONTIEL/ORDER# [id number] @ 22:41    ISTAT PROCEDURE - Abnormal; Notable for the following components:    POC Glucose 198 (*)     All other components within normal limits   PATHOLOGIST INTERPRETATION DIFFERENTIAL   LIPASE   URINALYSIS MICROSCOPIC    Narrative:     Specimen Source->Urine   TROPONIN I   BLOOD PARASITE   ISTAT CHEM8     EKG Readings: (Independently Interpreted)   Initial Reading: No STEMI. Rhythm: Normal Sinus Rhythm. Heart Rate: 79. Ectopy: No Ectopy. Conduction: Normal.       Imaging Results          CTA Chest Abdomen Pelvis (Final result)  Result time 07/28/22 23:01:51    Final result by Pelon Acosta MD (07/28/22 23:01:51)                  Impression:      1. No acute abnormality.  2. Mild distal fusiform aneurysmal dilation of the abdominal aorta with maximum diameter 3 cm.  3. Bilateral common iliac artery aneurysms measuring 2.2 cm on the left and 2 cm on the right also.  4. Bibasilar atelectasis.  5. Small hiatal hernia.  6. Splenomegaly.  7. Right renal cyst.      Electronically signed by: Pelon Layne  Date:    07/28/2022  Time:    23:01             Narrative:    EXAMINATION:  CTA CHEST ABDOMEN PELVIS    CLINICAL HISTORY:  Aortic aneurysm, known or suspected;    TECHNIQUE:  Low dose axial, sagittal and coronal reformations were performed from the thoracic inlet to the pubic symphysis following the IV administration of 100 mL of Omnipaque 350.   No oral contrast was given.    COMPARISON:  None    FINDINGS:  Chest:    Heart and great vessels: Within normal limits.    Adenopathy: None demonstrated.    Lungs: Bibasilar atelectasis.    Aorta is normal in caliber with no evidence of aneurysm or dissection.    Pulmonary arteries are well enhanced with no evidence of pulmonary embolism.    Abdomen:    Small hiatal hernia.    Liver: Within normal limits.    Gallbladder and biliary: Within normal limits.    Spleen: Spleen is minimally enlarged.  No focal abnormality.    Pancreas: Within normal limits.    Adrenals: Within normal limits.    Kidneys: 5.6 cm cyst at the lower pole of the right kidney.  No stone, soft tissue mass or hydronephrosis bilaterally.    Bowel: Within normal limits.  No evidence of obstruction.    Peritoneum: No ascites or pneumoperitoneum.    Abdominal Adenopathy: None.    Vasculature: Mild fusiform aneurysmal dilation of the distal aorta measuring approximately 3 cm in maximum diameter.    Tortuosity and mild aneurysm dilation of the common iliac arteries measuring 2.2 cm on the left and 2 cm on the right.    Pelvis:    Urinary bladder: Unremarkable.    Male: Within normal limits.    Pelvis adenopathy: None.    Bones: No acute  fractures.  Degenerative endplate changes are prominent at the anterior superior endplate of L4.  Moderate disc space narrowing and vacuum disc phenomena at L5-S1.  Mild-to-moderate degenerative disc and endplate changes in lower thoracic spine.    Right hip arthroplasty with metallic artifact in the pelvis.    Miscellaneous: None.                                 Medications   HYDROmorphone injection 0.5 mg (0.5 mg Intravenous Given 7/28/22 2015)   iohexoL (OMNIPAQUE 350) injection 100 mL (100 mLs Intravenous Given 7/28/22 2205)     Medical Decision Making:   History:   Old Medical Records: I decided to obtain old medical records.  Old Records Summarized: records from previous admission(s).  Initial Assessment:   Mr. Short is a 77 year old male with PMHx significant for CAD and MI who presents to the emergency department due to multiple concerns.  Differential Diagnosis:   Malaria  Viral URI  Pulmonary embolism  Pneumonia  Clinical Tests:   Lab Tests: Ordered and Reviewed  Radiological Study: Ordered  ED Management:  Patient was examined,  Repeat labs were obtained which were significant for and a slight increase in patient's LFTs.    Given patient's recent history of travel I was concerned for malaria and so tests were ordered.   Patient denied having chest pain and so I did not feel the need to do a thorough cardiac workup.  I was also reassured by patient's negative workup the day before.   Given that patient did not have symptomatic improvement from his prior visit, a CTA of the chest abdomen and pelvis was obtained which was pending.  Patient was given some pain medication and he was signed out to the incoming team.             ED Course as of 07/28/22 2336   Thu Jul 28, 2022   2146 Mild transaminitis [DS]      ED Course User Index  [DS] Antoni Melendez MD             Clinical Impression:   Final diagnoses:  [R10.9] Abdominal pain  [R05.9] Cough (Primary)  [R79.89] LFT elevation                 Sangita MATIAS  MD Leo  Resident  07/28/22 0759

## 2022-07-29 NOTE — PLAN OF CARE
Plan of care discussed with patient. Patient is free of fall/trauma/injury. Denies SOB. GI cocktail administered for chest discomfort. Mg=1.5, replaced with 2g IVPB. All questions addressed. Will continue to monitor

## 2022-07-29 NOTE — SUBJECTIVE & OBJECTIVE
Past Medical History:   Diagnosis Date    CAD (coronary artery disease) 7/26/2016    Heart block     MI (myocardial infarction)     Reflux        Past Surgical History:   Procedure Laterality Date    CARDIAC CATHETERIZATION      EYE SURGERY Left 02/2017    HIP SURGERY Right 12/2021       Review of patient's allergies indicates:  No Known Allergies    Family History       Problem Relation (Age of Onset)    Coronary artery disease Father    Emphysema Father    Hypertension Mother          Tobacco Use    Smoking status: Never Smoker    Smokeless tobacco: Never Used   Substance and Sexual Activity    Alcohol use: Yes     Comment: twice a month/beer wine    Drug use: Never    Sexual activity: Yes     Partners: Female     Comment: Wife         Review of Systems   Constitutional:  Positive for chills and fever. Negative for activity change and appetite change.   HENT:  Negative for congestion and sore throat.    Eyes:  Negative for visual disturbance.   Respiratory:  Positive for cough and shortness of breath. Negative for wheezing.    Cardiovascular:  Positive for chest pain. Negative for palpitations and leg swelling.   Gastrointestinal:  Negative for abdominal pain, blood in stool, diarrhea, nausea and vomiting.   Endocrine: Negative for polyuria.   Genitourinary:  Negative for dysuria.   Musculoskeletal:  Negative for arthralgias and back pain.   Skin:  Negative for rash.   Neurological:  Negative for headaches.   Psychiatric/Behavioral:  Negative for confusion.    Objective:     Vital Signs (Most Recent):  Temp: 97.1 °F (36.2 °C) (07/29/22 1122)  Pulse: 64 (07/29/22 1122)  Resp: 20 (07/29/22 1122)  BP: (!) 152/87 (07/29/22 1122)  SpO2: 96 % (07/29/22 1122)   Vital Signs (24h Range):  Temp:  [96.2 °F (35.7 °C)-99.7 °F (37.6 °C)] 97.1 °F (36.2 °C)  Pulse:  [58-84] 64  Resp:  [15-24] 20  SpO2:  [91 %-100 %] 96 %  BP: (131-158)/(66-92) 152/87     Weight: 95.3 kg (210 lb)  Body mass index is 29.29 kg/m².    No intake or  output data in the 24 hours ending 07/29/22 1152    Physical Exam  Vitals and nursing note reviewed.   Constitutional:       General: He is not in acute distress.     Appearance: Normal appearance.   HENT:      Head: Normocephalic and atraumatic.      Nose: Nose normal.      Mouth/Throat:      Mouth: Mucous membranes are moist.   Eyes:      Extraocular Movements: Extraocular movements intact.      Conjunctiva/sclera: Conjunctivae normal.   Cardiovascular:      Rate and Rhythm: Normal rate.   Pulmonary:      Effort: Pulmonary effort is normal. No respiratory distress.      Breath sounds: Normal breath sounds. No stridor. No wheezing.   Abdominal:      General: Abdomen is flat.   Musculoskeletal:      Cervical back: Normal range of motion.      Right lower leg: No edema.      Left lower leg: No edema.   Skin:     General: Skin is warm and dry.   Neurological:      General: No focal deficit present.      Mental Status: He is alert.   Psychiatric:         Mood and Affect: Mood normal.       Vents:       Lines/Drains/Airways       Peripheral Intravenous Line  Duration                  Peripheral IV - Single Lumen 07/28/22 2015 20 G Right Wrist <1 day                    Significant Labs:    CBC/Anemia Profile:  Recent Labs   Lab 07/28/22 2016 07/28/22 2023 07/29/22 0249   WBC 9.79  --  6.64   HGB 13.8*  --  13.4*   HCT 40.5 41 39.9*   *  --  142*   MCV 89  --  89   RDW 13.2  --  13.2        Chemistries:  Recent Labs   Lab 07/28/22 2016 07/29/22 0249 07/29/22  0754    138  --    K 3.6 3.9  --     104  --    CO2 25 26  --    BUN 11 10  --    CREATININE 0.8 0.7  --    CALCIUM 9.1 9.1  --    ALBUMIN 3.3*  --  3.4*   PROT 7.0  --  7.3   BILITOT 1.4*  --  1.4*   ALKPHOS 103  --  105   ALT 58*  --  60*   AST 49*  --  46*   MG  --  1.5*  --    PHOS  --  3.0  --        All pertinent labs have been reviewed.    Significant Imaging:   I have reviewed all pertinent imaging results/findings.

## 2022-07-29 NOTE — PLAN OF CARE
[facility] Step[facility]  Initial Discharge Assessment       Primary Care Provider: Nilton Lloyd MD    Admission Diagnosis: Cough [R05.9]  Abdominal pain [R10.9]  Chest pain [R07.9]  LFT elevation [R79.89]    Admission Date: 7/28/2022  Expected Discharge Date:     Discharge Barriers Identified: None    Payor: BLUE CROSS BLUE SHIELD / Plan: [employer] HMO / Product Type: HMO /     Extended Emergency Contact Information  Primary Emergency Contact: Valeria Short   [facility]  Home Phone: [phone]  Work Phone: [phone]  Mobile Phone: [phone]  Relation: Spouse    Discharge Plan A: Home with family  Discharge Plan B: Home      [id number] - [place], [place] - [address] [facility]  [address]  [facility] [address]  Phone: [phone] Fax: [phone]      Initial Assessment (most recent)     Adult Discharge Assessment - 07/29/22 1046        Discharge Assessment    Assessment Type Discharge Planning Assessment     Confirmed/corrected address, phone number and insurance Yes     Confirmed Demographics Correct on Facesheet     Source of Information patient     Does patient/caregiver understand observation status Yes     Lives With alone     Do you expect to return to your current living situation? Yes     Do you have help at home or someone to help you manage your care at home? Yes     Who are your caregiver(s) and their phone number(s)? Spouse Valeria Short [phone]     Prior to hospitilization cognitive status: Alert/Oriented     Current cognitive status: Alert/Oriented     Walking or Climbing Stairs Difficulty none     Dressing/Bathing Difficulty none     Equipment Currently Used at Home none     Readmission within 30 days? No     Do you currently have service(s) that help you manage your care at home? No     Do you take prescription medications? Yes     Do you have prescription coverage? Yes     Coverage BCBS     Do you have any problems affording any  of your prescribed medications? No     Is the patient taking medications as prescribed? yes     How do you get to doctors appointments? car, drives self;family or friend will provide     Are you on dialysis? No     Do you take coumadin? No     Discharge Plan A Home with family     Discharge Plan B Home     DME Needed Upon Discharge  none     Discharge Plan discussed with: Patient;Spouse/sig other     Name(s) and Number(s) Spouse Valeria Short [phone]     Discharge Barriers Identified None                    Pt admitted 7/28/2022  7:43 PM    For Cough [R05.9]  Abdominal pain [R10.9]  Chest pain [R07.9]  LFT elevation [R79.89]       DPEA completed with pt who lives at home with spouse Valeria Short [phone]. Pt and spouse both are active in their health and care. Plan is home no needs.    Discharge packet provided to pt, all questions answered prior to leaving room and contact number provided to reach CM.      Pt is followed cardiologist Dr Sung Mendoza.     PCP is Nilton Lloyd MD  [phone] (p)  [phone] (f)    Future Appointments   Date Time Provider Department Center   8/2/2022  9:00 AM D[facility] Lloyd MD [facility]   Case Management [phone]

## 2022-07-29 NOTE — HPI
Patrick Short is a 77 y.o. male with a PMHx of CAD, MI, HLD who presented to the ED complaining of chest pain and a cough. He reports that for the last 3 days he has had a persistent cough as well as a chest pain that is worse with coughing and deep inspiration. He was seen in the ED yesterday where a CTA was done due to history of long flight from  on July 19th and was negative for a PE. Patient saw his cardiologist today where COVID test was negative, but he was told to come to the ED since his pain had not improved.     ED: O2 94% on RA, otherwise hemodynamically stable and afebrile. CBC without leukocytosis. EKG without acute ischemic changes. trop is 0.031 was 0.028 yesterday. CT today with bibasilar atelectasis. CTA yesterday didn't show a PE, he left AMA yesterday but returned due to continued pain.

## 2022-07-29 NOTE — ASSESSMENT & PLAN NOTE
Presents with a 3 day history of right sided chest pain worse with cough and deep inspiration with recent history of possible recent viral URI with chills, fever and persistent mild cough earlier this month, coughing persists. Suspect post viral pleuritic chest pain as patient reports improvement with outpatient steroids and OTC NSAIDS while at his pulmonologist (Dr. Callaway) yesterday.  EKG in the ED without acute ischemic changes.   CTA PE and CTA C/A/P negative for PE, no acute findings  Pain relieved with steroids and NSAIDS (toradol in ED and OTC NSAIDS at home)  Elevated ESR, CRP - likely post viral  Recommend course of NSAIDs   Follow up with Dr. Callaway

## 2022-07-29 NOTE — CONSULTS
[facility] Step[facility]  Cardiology  Consult Note    Patient Name: Patrick Short  MRN: [id number]  Admission Date: 7/28/2022  Hospital Length of Stay: 0 days  Code Status: Full Code   Attending Provider: Alethea Slaughter MD   Consulting Provider: Nathalia Joseph MD  Primary Care Physician: Nilton Lloyd MD  Principal Problem:Pleuritic chest pain    Patient information was obtained from patient, past medical records and ER records.     Inpatient consult to Cardiology  Consult performed by: Nathalia Joseph MD  Consult ordered by: Roseline Linraes PA-C        Subjective:     Chief Complaint: Pleuritic pain with cough     HPI:   77yoM w PMHx of MI (2016 s/p GUS 3x), HLD who returned to the ED last night with 3d hx of constant nonproductive cough and progressive pleuritic abdominal pain (R flank, RUQ, RLQ, epigastric, mid-back). Pt returned from a trip to [place] ~1.5 weeks ago, where he had an URI, no COVID test performed at the time. Presented to ED yesterday, CT negative for PE but only visualized to lobar segment of pulmonary arteries, COVID negative. Patient left AMA and saw his pulmonologist, where he received breathing treatment and started on prednisone taper with some improvement. Pain recurred last night with 8/10 intensity, patient returned to ED. Hypertensive to 160/80, troponin peaked at 0.031 and downtrending, BNP negative, lipase normal. CT Abdomen without acute pathology (AAA 3cm in diameter), BP in both arms equivocal. Echo 7/26 with 65% EF, no LVDD or WMA. Some elevation in inflammatory markers. Cardiology consulted for cardiac r/o.      Interval Hx: Pt HDS this AM. Afebrile Received dilaudid overnight with substantial pain relief. Still experiencing some pain with deep inspiration or cough.     Discussed prior MI, which presented with typical angina, with patient in relation to current symptomatology. Pain is primarily abdominal, not related to exertion. Abdomen and chest grossly nontender to  palpation. No friction rub on auscultation, not relieved with upright positioning, no effusion on echo. Pt states he had pleurisy as a child, current episode feels similar in quality but more intense in severity.     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    Past Medical History:   Diagnosis Date    CAD (coronary artery disease) 7/26/2016    Heart block     MI (myocardial infarction)     Reflux        Past Surgical History:   Procedure Laterality Date    CARDIAC CATHETERIZATION      EYE SURGERY Left 02/2017    HIP SURGERY Right 12/2021       Review of patient's allergies indicates:  No Known Allergies    No current facility-administered medications on file prior to encounter.     Current Outpatient Medications on File Prior to Encounter   Medication Sig    aspirin 81 MG Chew Take 1 tablet (81 mg total) by mouth once daily.    atorvastatin (LIPITOR) 40 MG tablet TAKE 1 TABLET BY MOUTH EVERY DAY    cetirizine (ZYRTEC) 10 MG tablet Take 10 mg by mouth once daily.    clobetasol (TEMOVATE) 0.05 % cream APPLY TO AFFECTED AREA ON HAND TWICE A DAY AS NEEDED FOR RASH    desonide (DESOWEN) 0.05 % cream APPLY TO AFFECTED AREA ON FACE TWICE A DAY AS NEEDED FOR SCALE    finasteride (PROSCAR) 5 mg tablet 1 tablet once daily.    ketorolac 0.5% (ACULAR) 0.5 % Drop 1 drop 2 (two) times daily. left    nitroGLYCERIN (NITROSTAT) 0.4 MG SL tablet Place 1 tablet (0.4 mg total) under the tongue every 5 (five) minutes as needed for Chest pain. (Patient not taking: Reported on 7/26/2022)    pantoprazole (PROTONIX) 40 MG tablet Take 1 tablet (40 mg total) by mouth once daily.    predniSONE (DELTASONE) 10 MG tablet Take 3 tablets x 7 days then 2 tablets x 7days.    triamcinolone acetonide 0.1% (KENALOG) 0.1 % ointment APPLY TO AFFECTED AREA ON AXILLAS TWICE A DAY AS NEEDED FOR ITCH/RASH     Family History       Problem Relation (Age of Onset)    Coronary artery disease Father    Emphysema Father    Hypertension Mother           Tobacco Use    Smoking status: Never Smoker    Smokeless tobacco: Never Used   Substance and Sexual Activity    Alcohol use: Yes     Comment: twice a month/beer wine    Drug use: Never    Sexual activity: Yes     Partners: Female     Comment: Wife     Review of Systems   Constitutional: Negative for chills, decreased appetite, diaphoresis and fever.   HENT:  Negative for odynophagia and sore throat.    Cardiovascular:  Negative for chest pain, dyspnea on exertion, irregular heartbeat, orthopnea, palpitations, paroxysmal nocturnal dyspnea and syncope.   Respiratory:  Positive for cough. Negative for hemoptysis, shortness of breath, sputum production and wheezing.    Endocrine: Negative for cold intolerance and heat intolerance.   Musculoskeletal:  Positive for back pain. Negative for muscle cramps and muscle weakness.   Gastrointestinal:  Positive for bloating, abdominal pain and heartburn. Negative for change in bowel habit, nausea and vomiting.   Genitourinary:  Positive for flank pain. Negative for dysuria.   Neurological:  Negative for focal weakness, light-headedness, loss of balance and weakness.   Psychiatric/Behavioral:  Negative for altered mental status.    Objective:     Vital Signs (Most Recent):  Temp: 97.1 °F (36.2 °C) (07/29/22 1122)  Pulse: 64 (07/29/22 1122)  Resp: 20 (07/29/22 1122)  BP: (!) 152/87 (07/29/22 1122)  SpO2: 96 % (07/29/22 1122)   Vital Signs (24h Range):  Temp:  [96.2 °F (35.7 °C)-99.7 °F (37.6 °C)] 97.1 °F (36.2 °C)  Pulse:  [58-84] 64  Resp:  [15-24] 20  SpO2:  [91 %-100 %] 96 %  BP: (131-158)/(66-92) 152/87     Weight: 95.3 kg (210 lb)  Body mass index is 29.29 kg/m².    SpO2: 96 %  O2 Device (Oxygen Therapy): room air    No intake or output data in the 24 hours ending 07/29/22 1201    Lines/Drains/Airways       Peripheral Intravenous Line  Duration                  Peripheral IV - Single Lumen 07/28/22 2015 20 G Right Wrist <1 day                    Physical  Exam  Constitutional:       General: He is not in acute distress.     Appearance: Normal appearance. He is not ill-appearing or diaphoretic.   HENT:      Head: Normocephalic and atraumatic.      Mouth/Throat:      Mouth: Mucous membranes are moist.   Eyes:      General: No scleral icterus.     Extraocular Movements: Extraocular movements intact.   Cardiovascular:      Rate and Rhythm: Normal rate and regular rhythm.      Pulses: Normal pulses.      Heart sounds: Normal heart sounds. No murmur heard.    No friction rub. No gallop.   Pulmonary:      Effort: Pulmonary effort is normal.      Breath sounds: Normal breath sounds.   Abdominal:      General: Bowel sounds are normal.      Tenderness: There is no right CVA tenderness.   Musculoskeletal:         General: No swelling or tenderness. Normal range of motion.      Cervical back: Normal range of motion and neck supple. No rigidity.      Right lower leg: No edema.      Left lower leg: No edema.   Skin:     General: Skin is warm.   Neurological:      General: No focal deficit present.      Mental Status: He is alert.   Psychiatric:         Mood and Affect: Mood normal.         Behavior: Behavior normal.         Thought Content: Thought content normal.       Significant Labs: CMP   Recent Labs   Lab 07/28/22 2016 07/29/22 0249 07/29/22 0754    138  --    K 3.6 3.9  --     104  --    CO2 25 26  --    * 135*  --    BUN 11 10  --    CREATININE 0.8 0.7  --    CALCIUM 9.1 9.1  --    PROT 7.0  --  7.3   ALBUMIN 3.3*  --  3.4*   BILITOT 1.4*  --  1.4*   ALKPHOS 103  --  105   AST 49*  --  46*   ALT 58*  --  60*   ANIONGAP 10 8  --    ESTGFRAFRICA >60.0 >60.0  --    EGFRNONAA >60.0 >60.0  --    , CBC   Recent Labs   Lab 07/28/22 2016 07/28/22 2023 07/29/22 0249   WBC 9.79  --  6.64   HGB 13.8*  --  13.4*   HCT 40.5   < > 39.9*   *  --  142*    < > = values in this interval not displayed.   , Lipid Panel No results for input(s): CHOL, HDL,  "LDLCALC, TRIG, CHOLHDL in the last 48 hours., and Troponin   Recent Labs   Lab 07/27/22  1321 07/28/22  2016 07/29/22  0429   TROPONINI 0.028* 0.031* 0.027*       Significant Imaging: Echocardiogram: Transthoracic echo (TTE) complete (Cupid Only):   Results for orders placed or performed during the hospital encounter of 07/26/22   Echo Saline Bubble? No   Result Value Ref Range    Ascending aorta 3.77 cm    STJ 3.30 cm    AV mean gradient 4 mmHg    Ao peak harrison 1.38 m/s    Ao VTI 28.19 cm    IVS 1.20 0.6 - 1.1 cm    LA size 3.48 cm    Left Atrium Major Axis 4.38 cm    Left Atrium Minor Axis 4.50 cm    LVIDd 4.77 3.5 - 6.0 cm    LVIDs 3.46 2.1 - 4.0 cm    LVOT diameter 2.24 cm    LVOT peak VTI 25.20 cm    Posterior Wall 0.82 0.6 - 1.1 cm    MV Peak A Harrison 0.86 m/s    E wave deceleration time 217.57 msec    MV Peak E Harrison 0.66 m/s    PV Peak D Harrison 0.37 m/s    PV Peak S Harrison 0.78 m/s    RA Major Axis 4.47 cm    RA Width 3.83 cm    RVDD 3.87 cm    Sinus 3.48 cm    TAPSE 2.56 cm    TR Max Harrison 2.19 m/s    TDI LATERAL 0.06 m/s    TDI SEPTAL 0.05 m/s    LA WIDTH 4.07 cm    MV stenosis pressure 1/2 time 63.09 ms    LV Diastolic Volume 106.08 mL    LV Systolic Volume 49.51 mL    RV S' 19.30 cm/s    LVOT peak harrison 1.37 m/s    LA volume (mod) 64.83 cm3    MV "A" wave duration 13.99 msec    LV LATERAL E/E' RATIO 11.00 m/s    LV SEPTAL E/E' RATIO 13.20 m/s    FS 27 %    LA volume 53.44 cm3    LV mass 170.76 g    Left Ventricle Relative Wall Thickness 0.34 cm    AV valve area 3.52 cm2    AV Velocity Ratio 0.99     AV index (prosthetic) 0.89     MV valve area p 1/2 method 3.49 cm2    E/A ratio 0.77     Mean e' 0.06 m/s    Pulm vein S/D ratio 2.11     LVOT area 3.9 cm2    LVOT stroke volume 99.26 cm3    AV peak gradient 8 mmHg    E/E' ratio 12.00 m/s    Triscuspid Valve Regurgitation Peak Gradient 19 mmHg    BSA 2.18 m2    LV Systolic Volume Index 23.1 mL/m2    LV Diastolic Volume Index 49.57 mL/m2    LA Volume Index 25.0 mL/m2    LV " Mass Index 80 g/m2    LA Volume Index (Mod) 30.3 mL/m2    Right Atrial Pressure (from IVC) 3 mmHg    EF 65 %    TV rest pulmonary artery pressure 22 mmHg    Narrative    · The left ventricle is normal in size with normal systolic function. The   estimated ejection fraction is 65%.  · Normal right ventricular size with normal right ventricular systolic   function.  · Normal left ventricular diastolic function.  · The estimated PA systolic pressure is 22 mmHg.  · Normal central venous pressure (3 mmHg).  · The ascending aorta is mildly dilated.        Assessment and Plan:     * Pleuritic chest pain  Primarily abdominal pain not anginal in quality.   - Despite hx of recent URI, the lack of friction rub or relief with upright postioning, effusion on echo, or EKG changes result in a low index of suspicion for pericarditis.  - Possibly pleurisy with diaphragmatic irritation, improved with prednisone and albuterol. Some GERD involvement with hiatal hernia on CT.  - May benefit from NSAID but caution with GERD  - Continue ASA, Statin.        VTE Risk Mitigation (From admission, onward)         Ordered     heparin (porcine) injection 5,000 Units  Every 8 hours         07/29/22 0144     IP VTE HIGH RISK PATIENT  Once         07/29/22 0144     Place sequential compression device  Until discontinued         07/29/22 0144                Thank you for your consult. I will sign off. Please contact us if you have any additional questions.    Nathalia Joseph MD  Cardiology   [facility] - [facility] Stepdown

## 2022-07-29 NOTE — H&P
[facility] - Cardiology [facility] Medicine  History & Physical    Patient Name: Patrick Short  MRN: [id number]  Patient Class: OP- Observation  Admission Date: 7/28/2022  Attending Physician: Alethea Slaughter MD   Primary Care Provider: Nilton Lloyd MD         Patient information was obtained from patient, past medical records and ER records.     Subjective:     Principal Problem:Chest pain    Chief Complaint:   Chief Complaint   Patient presents with    Abdominal Pain     C/o right sided rib pain, worsens with inspiration. Denies chest pain. Here yesterday, left AMA. MD convinced him to come back, due to continued pain         HPI: Patrick Short is a 77 y.o. male with a PMHx of CAD, MI, HLD who presented to the ED complaining of chest pain and a cough. He reports that for the last 3 days he has had a persistent cough as well as a chest pain that is worse with coughing and deep inspiration. He was seen in the ED yesterday where a CTA was done due to history of long flight from  on July 19th and was negative for a PE. Patient saw his cardiologist today where COVID test was negative, but he was told to come to the ED since his pain had not improved.     ED: O2 94% on RA, otherwise hemodynamically stable and afebrile. CBC without leukocytosis. EKG without acute ischemic changes. trop is 0.031 was 0.028 yesterday. CT today with bibasilar atelectasis. CTA yesterday didn't show a PE, he left AMA yesterday but returned due to continued pain.      Past Medical History:   Diagnosis Date    CAD (coronary artery disease) 7/26/2016    Heart block     MI (myocardial infarction)     Reflux        Past Surgical History:   Procedure Laterality Date    CARDIAC CATHETERIZATION      EYE SURGERY Left 02/2017    HIP SURGERY Right 12/2021       Review of patient's allergies indicates:  No Known Allergies    No current facility-administered medications on file prior to encounter.     Current Outpatient Medications  on File Prior to Encounter   Medication Sig    aspirin 81 MG Chew Take 1 tablet (81 mg total) by mouth once daily.    atorvastatin (LIPITOR) 40 MG tablet TAKE 1 TABLET BY MOUTH EVERY DAY    cetirizine (ZYRTEC) 10 MG tablet Take 10 mg by mouth once daily.    clobetasol (TEMOVATE) 0.05 % cream APPLY TO AFFECTED AREA ON HAND TWICE A DAY AS NEEDED FOR RASH    desonide (DESOWEN) 0.05 % cream APPLY TO AFFECTED AREA ON FACE TWICE A DAY AS NEEDED FOR SCALE    finasteride (PROSCAR) 5 mg tablet 1 tablet once daily.    ketorolac 0.5% (ACULAR) 0.5 % Drop 1 drop 2 (two) times daily. left    nitroGLYCERIN (NITROSTAT) 0.4 MG SL tablet Place 1 tablet (0.4 mg total) under the tongue every 5 (five) minutes as needed for Chest pain. (Patient not taking: Reported on 7/26/2022)    pantoprazole (PROTONIX) 40 MG tablet Take 1 tablet (40 mg total) by mouth once daily.    predniSONE (DELTASONE) 10 MG tablet Take 3 tablets x 7 days then 2 tablets x 7days.    triamcinolone acetonide 0.1% (KENALOG) 0.1 % ointment APPLY TO AFFECTED AREA ON AXILLAS TWICE A DAY AS NEEDED FOR ITCH/RASH     Family History       Problem Relation (Age of Onset)    Coronary artery disease Father    Emphysema Father    Hypertension Mother          Tobacco Use    Smoking status: Never Smoker    Smokeless tobacco: Never Used   Substance and Sexual Activity    Alcohol use: Yes     Comment: twice a month/beer wine    Drug use: Never    Sexual activity: Yes     Partners: Female     Comment: Wife     Review of Systems   Constitutional:  Negative for activity change, chills and fever.   HENT:  Negative for congestion and trouble swallowing.    Eyes:  Negative for photophobia and visual disturbance.   Respiratory:  Positive for cough. Negative for shortness of breath and wheezing.    Cardiovascular:  Positive for chest pain. Negative for palpitations and leg swelling.   Gastrointestinal:  Negative for abdominal pain, constipation, diarrhea, nausea and vomiting.    Genitourinary:  Negative for dysuria, frequency, hematuria and urgency.   Musculoskeletal:  Negative for arthralgias, back pain and gait problem.   Skin:  Negative for color change and rash.   Neurological:  Negative for dizziness, syncope, weakness, light-headedness, numbness and headaches.   Psychiatric/Behavioral:  Negative for agitation and confusion. The patient is not nervous/anxious.    Objective:     Vital Signs (Most Recent):  Temp: 99 °F (37.2 °C) (07/28/22 1938)  Pulse: (!) 58 (07/29/22 0005)  Resp: 18 (07/29/22 0005)  BP: (!) 141/82 (07/29/22 0005)  SpO2: (!) 94 % (07/29/22 0005)   Vital Signs (24h Range):  Temp:  [99 °F (37.2 °C)-99.7 °F (37.6 °C)] 99 °F (37.2 °C)  Pulse:  [58-84] 58  Resp:  [15-24] 18  SpO2:  [92 %-100 %] 94 %  BP: (138-158)/(76-92) 141/82     Weight: 95.3 kg (210 lb)  Body mass index is 29.29 kg/m².    Physical Exam  Vitals and nursing note reviewed.   Constitutional:       General: He is not in acute distress.     Appearance: He is well-developed.   HENT:      Head: Normocephalic and atraumatic.      Mouth/Throat:      Pharynx: No oropharyngeal exudate.   Eyes:      Conjunctiva/sclera: Conjunctivae normal.      Pupils: Pupils are equal, round, and reactive to light.   Cardiovascular:      Rate and Rhythm: Normal rate and regular rhythm.      Heart sounds: Normal heart sounds.   Pulmonary:      Effort: Pulmonary effort is normal. No respiratory distress.      Breath sounds: Normal breath sounds. No wheezing.   Chest:      Chest wall: No tenderness.   Abdominal:      General: Bowel sounds are normal. There is no distension.      Palpations: Abdomen is soft.      Tenderness: There is no abdominal tenderness.   Musculoskeletal:         General: No tenderness. Normal range of motion.      Cervical back: Normal range of motion and neck supple.   Lymphadenopathy:      Cervical: No cervical adenopathy.   Skin:     General: Skin is warm and dry.      Capillary Refill: Capillary refill takes  less than 2 seconds.      Findings: No rash.   Neurological:      Mental Status: He is alert and oriented to person, place, and time.      Cranial Nerves: No cranial nerve deficit.      Sensory: No sensory deficit.      Coordination: Coordination normal.   Psychiatric:         Behavior: Behavior normal.         Thought Content: Thought content normal.         Judgment: Judgment normal.         CRANIAL NERVES     CN III, IV, VI   Pupils are equal, round, and reactive to light.     Significant Labs: All pertinent labs within the past 24 hours have been reviewed.  CBC:   Recent Labs   Lab 07/27/22 0456 07/28/22 2016 07/28/22 2023   WBC 9.26 9.79  --    HGB 15.0 13.8*  --    HCT 44.2 40.5 41   * 144*  --      CMP:   Recent Labs   Lab 07/27/22 0456 07/28/22 2016   * 137   K 3.9 3.6    102   CO2 28 25   * 190*   BUN 12 11   CREATININE 0.9 0.8   CALCIUM 9.6 9.1   PROT 7.2 7.0   ALBUMIN 3.7 3.3*   BILITOT 1.4* 1.4*   ALKPHOS 85 103   AST 24 49*   ALT 36 58*   ANIONGAP 5* 10   EGFRNONAA >60.0 >60.0     Cardiac Markers:   Recent Labs   Lab 07/27/22 0456   BNP 61     Troponin:   Recent Labs   Lab 07/27/22  0850 07/27/22  1321 07/28/22 2016   TROPONINI 0.028* 0.028* 0.031*       Significant Imaging: I have reviewed all pertinent imaging results/findings within the past 24 hours.  CTA Chest Abdomen Pelvis  Narrative: EXAMINATION:  CTA CHEST ABDOMEN PELVIS    CLINICAL HISTORY:  Aortic aneurysm, known or suspected;    TECHNIQUE:  Low dose axial, sagittal and coronal reformations were performed from the thoracic inlet to the pubic symphysis following the IV administration of 100 mL of Omnipaque 350.   No oral contrast was given.    COMPARISON:  None    FINDINGS:  Chest:    Heart and great vessels: Within normal limits.    Adenopathy: None demonstrated.    Lungs: Bibasilar atelectasis.    Aorta is normal in caliber with no evidence of aneurysm or dissection.    Pulmonary arteries are well enhanced  with no evidence of pulmonary embolism.    Abdomen:    Small hiatal hernia.    Liver: Within normal limits.    Gallbladder and biliary: Within normal limits.    Spleen: Spleen is minimally enlarged.  No focal abnormality.    Pancreas: Within normal limits.    Adrenals: Within normal limits.    Kidneys: 5.6 cm cyst at the lower pole of the right kidney.  No stone, soft tissue mass or hydronephrosis bilaterally.    Bowel: Within normal limits.  No evidence of obstruction.    Peritoneum: No ascites or pneumoperitoneum.    Abdominal Adenopathy: None.    Vasculature: Mild fusiform aneurysmal dilation of the distal aorta measuring approximately 3 cm in maximum diameter.    Tortuosity and mild aneurysm dilation of the common iliac arteries measuring 2.2 cm on the left and 2 cm on the right.    Pelvis:    Urinary bladder: Unremarkable.    Male: Within normal limits.    Pelvis adenopathy: None.    Bones: No acute fractures.  Degenerative endplate changes are prominent at the anterior superior endplate of L4.  Moderate disc space narrowing and vacuum disc phenomena at L5-S1.  Mild-to-moderate degenerative disc and endplate changes in lower thoracic spine.    Right hip arthroplasty with metallic artifact in the pelvis.    Miscellaneous: None.  Impression: 1. No acute abnormality.  2. Mild distal fusiform aneurysmal dilation of the abdominal aorta with maximum diameter 3 cm.  3. Bilateral common iliac artery aneurysms measuring 2.2 cm on the left and 2 cm on the right also.  4. Bibasilar atelectasis.  5. Small hiatal hernia.  6. Splenomegaly.  7. Right renal cyst.    Electronically signed by: Pelon Layne  Date:    07/28/2022  Time:    23:01      Assessment/Plan:     * Chest pain  - patient presented to ED for the 2nd day in a row complaining of chest pain worse with coughing and inspiration. Also reports cough ongoing for several days  - EKG without acute ischemic changes  - troponin 0.031, will trend  - CXR without  consolidation, bibasilar atelectasis  - CTA chest and CTA chest/abd/pelvis without evidence of PE  - differential includes costochondritis, pneumonia, ACS, pericarditis  - f/u ESR, CRP, procal  - pain relieved with dilaudid in ED, will order prn toradol   - monitor telemetry    Coronary artery disease involving native coronary artery without angina pectoris  - continue ASA, statin     Dyslipidemia  - continue statin      VTE Risk Mitigation (From admission, onward)         Ordered     heparin (porcine) injection 5,000 Units  Every 8 hours         07/29/22 0144     IP VTE HIGH RISK PATIENT  Once         07/29/22 0144     Place sequential compression device  Until discontinued         07/29/22 0144                   Roseline Linares PA-C  Department of Hospital Medicine   [facility] - [facility] Stepdown

## 2022-07-29 NOTE — HPI
76 yo male w/ hx long standing GERD, CAD, MI s/p GUS RCA&LAD, HLD who presented to the ED 7/27 with right sided chest pain that radiates anteriorly, worse with movement, cough and deep inspiration; and with associated dyspnea. Patient recently returned from two week (7/3-7/19) trip in [place] and [facility], during this time he developed one time fever (100.5) and mild persistent cough - initially thought to be Covid, patient tested himself and was covid negative. He is vaccinated x3. Upon return from [facility] patient reports one subsequent episode of fever that resolved, intermittent chills, and persistent non productive cough.     Patient presented to his cardiologist Dr. Mendoza on Tuesday 7/26, given pre op clearance for right shoulder rotator cuff repair, early Wednesday morning patient developed right sided chest pain that radiated anteriorly worse with deep inspiration and movement and dyspnea worse when flat- in the ED 7/27, CTA PE was negative for PE, patient frustrated and left AMA, presented to his pulmonologist (Dr. Callaway) who performed breathing treatment, administered steroids and repeated Covid testing which was negative- patient reports he improved with this and was advised to present to the ED to have complete workup performed. On exam patient repeatedly denies cardiac related chest pain and points to right chest.     In the ED patient sating well on RA, HDS, AF, no leukocytosis, procal wnl, EKG NSR without acute ischemic changes, troponin 0.028>0.031>0.027, elevated ESR (41) and CRP (145.1) . CTA C/A/P without acute findings.

## 2022-07-29 NOTE — PROVIDER PROGRESS NOTES - EMERGENCY DEPT.
"Encounter Date: 7/28/2022    ED Physician Progress Notes        Physician Note:   ED Resident HAND-OFF NOTE:  10:39 PM 7/28/2022  Patrick Short is a 77 y.o. male with PMH of CAD and an MI who presented to the ED on 7/28/2022 and has been managed by , who reports patient C/O chest pain and flank pain when he coughs. I assumed care of patient from off-going ED physician team at 10:39 PM pending CT chest, abd, pelvis and troponin. Patient had recent travel from [place] and was seen in the ED yesterday, had a CTA but left AMA before the results returned. He saw his cardiologist today, had a negative covid test, and his doctor encouraged him to return to the ED due to continuing pain.     On my evaluation, Patrick Short appears well, hemodynamically stable and in NAD. States his pain has improved with dilaudid.  Thus far, Patrick Short has received:  Medications  HYDROmorphone injection 0.5 mg (0.5 mg Intravenous Given 7/28/22 2015)  iohexoL (OMNIPAQUE 350) injection 100 mL (100 mLs Intravenous Given 7/28/22 2205)    On my exam, I appreciate:  BP (!) 155/80 (BP Location: Right arm, Patient Position: Lying)   Pulse 64   Temp 99 °F (37.2 °C) (Oral)   Resp 18   Ht 5' 11" (1.803 m)   Wt 95.3 kg (210 lb)   SpO2 (!) 92%   BMI 29.29 kg/m²         Disposition: I anticipate patient will be admitted for ACS rule out.   I have discussed and counseled Patrick Short regarding exam, results, diagnosis, treatment, and plan.  ______________________  Brooke Najera DO   Emergency Medicine Resident  10:39 PM 7/28/2022      UPDATE: Patient's troponin trending up from yesterday. Troponin is 0.031 today, up from 0.028 yesterday. Admit for ACS rule out.     Discussed all results of CT and labs with patient and his wife at bedside. They agree with plan for admission.          "

## 2022-07-29 NOTE — SUBJECTIVE & OBJECTIVE
Interval Hx: Pt HDS this AM. Afebrile Received dilaudid overnight with substantial pain relief. Still experiencing some pain with deep inspiration or cough.     Discussed prior MI, which presented with typical angina, with patient in relation to current symptomatology. Pain is primarily abdominal, not related to exertion. Abdomen and chest grossly nontender to palpation. No friction rub on auscultation, not relieved with upright positioning, no effusion on echo. Pt states he had pleurisy as a child, current episode feels similar in quality but more intense in severity.     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    Past Medical History:   Diagnosis Date    CAD (coronary artery disease) 7/26/2016    Heart block     MI (myocardial infarction)     Reflux        Past Surgical History:   Procedure Laterality Date    CARDIAC CATHETERIZATION      EYE SURGERY Left 02/2017    HIP SURGERY Right 12/2021       Review of patient's allergies indicates:  No Known Allergies    No current facility-administered medications on file prior to encounter.     Current Outpatient Medications on File Prior to Encounter   Medication Sig    aspirin 81 MG Chew Take 1 tablet (81 mg total) by mouth once daily.    atorvastatin (LIPITOR) 40 MG tablet TAKE 1 TABLET BY MOUTH EVERY DAY    cetirizine (ZYRTEC) 10 MG tablet Take 10 mg by mouth once daily.    clobetasol (TEMOVATE) 0.05 % cream APPLY TO AFFECTED AREA ON HAND TWICE A DAY AS NEEDED FOR RASH    desonide (DESOWEN) 0.05 % cream APPLY TO AFFECTED AREA ON FACE TWICE A DAY AS NEEDED FOR SCALE    finasteride (PROSCAR) 5 mg tablet 1 tablet once daily.    ketorolac 0.5% (ACULAR) 0.5 % Drop 1 drop 2 (two) times daily. left    nitroGLYCERIN (NITROSTAT) 0.4 MG SL tablet Place 1 tablet (0.4 mg total) under the tongue every 5 (five) minutes as needed for Chest pain. (Patient not taking: Reported on 7/26/2022)    pantoprazole (PROTONIX) 40 MG tablet Take 1 tablet (40 mg total) by mouth once daily.     predniSONE (DELTASONE) 10 MG tablet Take 3 tablets x 7 days then 2 tablets x 7days.    triamcinolone acetonide 0.1% (KENALOG) 0.1 % ointment APPLY TO AFFECTED AREA ON AXILLAS TWICE A DAY AS NEEDED FOR ITCH/RASH     Family History       Problem Relation (Age of Onset)    Coronary artery disease Father    Emphysema Father    Hypertension Mother          Tobacco Use    Smoking status: Never Smoker    Smokeless tobacco: Never Used   Substance and Sexual Activity    Alcohol use: Yes     Comment: twice a month/beer wine    Drug use: Never    Sexual activity: Yes     Partners: Female     Comment: Wife     Review of Systems   Constitutional: Negative for chills, decreased appetite, diaphoresis and fever.   HENT:  Negative for odynophagia and sore throat.    Cardiovascular:  Negative for chest pain, dyspnea on exertion, irregular heartbeat, orthopnea, palpitations, paroxysmal nocturnal dyspnea and syncope.   Respiratory:  Positive for cough. Negative for hemoptysis, shortness of breath, sputum production and wheezing.    Endocrine: Negative for cold intolerance and heat intolerance.   Musculoskeletal:  Positive for back pain. Negative for muscle cramps and muscle weakness.   Gastrointestinal:  Positive for bloating, abdominal pain and heartburn. Negative for change in bowel habit, nausea and vomiting.   Genitourinary:  Positive for flank pain. Negative for dysuria.   Neurological:  Negative for focal weakness, light-headedness, loss of balance and weakness.   Psychiatric/Behavioral:  Negative for altered mental status.    Objective:     Vital Signs (Most Recent):  Temp: 97.1 °F (36.2 °C) (07/29/22 1122)  Pulse: 64 (07/29/22 1122)  Resp: 20 (07/29/22 1122)  BP: (!) 152/87 (07/29/22 1122)  SpO2: 96 % (07/29/22 1122)   Vital Signs (24h Range):  Temp:  [96.2 °F (35.7 °C)-99.7 °F (37.6 °C)] 97.1 °F (36.2 °C)  Pulse:  [58-84] 64  Resp:  [15-24] 20  SpO2:  [91 %-100 %] 96 %  BP: (131-158)/(66-92) 152/87     Weight: 95.3 kg (210  lb)  Body mass index is 29.29 kg/m².    SpO2: 96 %  O2 Device (Oxygen Therapy): room air    No intake or output data in the 24 hours ending 07/29/22 1201    Lines/Drains/Airways       Peripheral Intravenous Line  Duration                  Peripheral IV - Single Lumen 07/28/22 2015 20 G Right Wrist <1 day                    Physical Exam  Constitutional:       General: He is not in acute distress.     Appearance: Normal appearance. He is not ill-appearing or diaphoretic.   HENT:      Head: Normocephalic and atraumatic.      Mouth/Throat:      Mouth: Mucous membranes are moist.   Eyes:      General: No scleral icterus.     Extraocular Movements: Extraocular movements intact.   Cardiovascular:      Rate and Rhythm: Normal rate and regular rhythm.      Pulses: Normal pulses.      Heart sounds: Normal heart sounds. No murmur heard.    No friction rub. No gallop.   Pulmonary:      Effort: Pulmonary effort is normal.      Breath sounds: Normal breath sounds.   Abdominal:      General: Bowel sounds are normal.      Tenderness: There is no right CVA tenderness.   Musculoskeletal:         General: No swelling or tenderness. Normal range of motion.      Cervical back: Normal range of motion and neck supple. No rigidity.      Right lower leg: No edema.      Left lower leg: No edema.   Skin:     General: Skin is warm.   Neurological:      General: No focal deficit present.      Mental Status: He is alert.   Psychiatric:         Mood and Affect: Mood normal.         Behavior: Behavior normal.         Thought Content: Thought content normal.       Significant Labs: CMP   Recent Labs   Lab 07/28/22 2016 07/29/22  0249 07/29/22  0754    138  --    K 3.6 3.9  --     104  --    CO2 25 26  --    * 135*  --    BUN 11 10  --    CREATININE 0.8 0.7  --    CALCIUM 9.1 9.1  --    PROT 7.0  --  7.3   ALBUMIN 3.3*  --  3.4*   BILITOT 1.4*  --  1.4*   ALKPHOS 103  --  105   AST 49*  --  46*   ALT 58*  --  60*   ANIONGAP 10 8  " --    ESTGFRAFRICA >60.0 >60.0  --    EGFRNONAA >60.0 >60.0  --    , CBC   Recent Labs   Lab 07/28/22 2016 07/28/22 2023 07/29/22  0249   WBC 9.79  --  6.64   HGB 13.8*  --  13.4*   HCT 40.5   < > 39.9*   *  --  142*    < > = values in this interval not displayed.   , Lipid Panel No results for input(s): CHOL, HDL, LDLCALC, TRIG, CHOLHDL in the last 48 hours., and Troponin   Recent Labs   Lab 07/27/22  1321 07/28/22 2016 07/29/22  0429   TROPONINI 0.028* 0.031* 0.027*       Significant Imaging: Echocardiogram: Transthoracic echo (TTE) complete (Cupid Only):   Results for orders placed or performed during the hospital encounter of 07/26/22   Echo Saline Bubble? No   Result Value Ref Range    Ascending aorta 3.77 cm    STJ 3.30 cm    AV mean gradient 4 mmHg    Ao peak harrison 1.38 m/s    Ao VTI 28.19 cm    IVS 1.20 0.6 - 1.1 cm    LA size 3.48 cm    Left Atrium Major Axis 4.38 cm    Left Atrium Minor Axis 4.50 cm    LVIDd 4.77 3.5 - 6.0 cm    LVIDs 3.46 2.1 - 4.0 cm    LVOT diameter 2.24 cm    LVOT peak VTI 25.20 cm    Posterior Wall 0.82 0.6 - 1.1 cm    MV Peak A Harrison 0.86 m/s    E wave deceleration time 217.57 msec    MV Peak E Harrison 0.66 m/s    PV Peak D Harrison 0.37 m/s    PV Peak S Harirson 0.78 m/s    RA Major Axis 4.47 cm    RA Width 3.83 cm    RVDD 3.87 cm    Sinus 3.48 cm    TAPSE 2.56 cm    TR Max Harrison 2.19 m/s    TDI LATERAL 0.06 m/s    TDI SEPTAL 0.05 m/s    LA WIDTH 4.07 cm    MV stenosis pressure 1/2 time 63.09 ms    LV Diastolic Volume 106.08 mL    LV Systolic Volume 49.51 mL    RV S' 19.30 cm/s    LVOT peak harrison 1.37 m/s    LA volume (mod) 64.83 cm3    MV "A" wave duration 13.99 msec    LV LATERAL E/E' RATIO 11.00 m/s    LV SEPTAL E/E' RATIO 13.20 m/s    FS 27 %    LA volume 53.44 cm3    LV mass 170.76 g    Left Ventricle Relative Wall Thickness 0.34 cm    AV valve area 3.52 cm2    AV Velocity Ratio 0.99     AV index (prosthetic) 0.89     MV valve area p 1/2 method 3.49 cm2    E/A ratio 0.77     Mean e' 0.06 " m/s    Pulm vein S/D ratio 2.11     LVOT area 3.9 cm2    LVOT stroke volume 99.26 cm3    AV peak gradient 8 mmHg    E/E' ratio 12.00 m/s    Triscuspid Valve Regurgitation Peak Gradient 19 mmHg    BSA 2.18 m2    LV Systolic Volume Index 23.1 mL/m2    LV Diastolic Volume Index 49.57 mL/m2    LA Volume Index 25.0 mL/m2    LV Mass Index 80 g/m2    LA Volume Index (Mod) 30.3 mL/m2    Right Atrial Pressure (from IVC) 3 mmHg    EF 65 %    TV rest pulmonary artery pressure 22 mmHg    Narrative    · The left ventricle is normal in size with normal systolic function. The   estimated ejection fraction is 65%.  · Normal right ventricular size with normal right ventricular systolic   function.  · Normal left ventricular diastolic function.  · The estimated PA systolic pressure is 22 mmHg.  · Normal central venous pressure (3 mmHg).  · The ascending aorta is mildly dilated.

## 2022-07-29 NOTE — PLAN OF CARE
[facility] - Cardiology Stepdown  Discharge Final Note    Primary Care Provider: Nilton Lloyd MD    Expected Discharge Date: 7/29/2022    Final Discharge Note (most recent)     Final Note - 07/29/22 1651        Final Note    Assessment Type Final Discharge Note     Anticipated Discharge Disposition Home or Self Care               Lara Vázquez LMSW  [facility]  [id number]      Future Appointments   Date Time Provider Department Center   8/2/2022  9:00 AM Darian Lloyd MD [facility]

## 2022-07-29 NOTE — ED PROVIDER NOTES
Encounter Date: 7/28/2022       History     Chief Complaint   Patient presents with    Abdominal Pain     C/o right sided rib pain, worsens with inspiration. Denies chest pain. Here yesterday, left AMA. MD convinced him to come back, due to continued pain      The history is provided by the patient and medical records. No  was used.      Patrick Short is a 77 y.o. male with medical history of CAD with stent presenting to the ED with the chief complaint of chest pain and abdominal pain.               Review of patient's allergies indicates:  No Known Allergies  Past Medical History:   Diagnosis Date    CAD (coronary artery disease) 7/26/2016    Heart block     MI (myocardial infarction)     Reflux      Past Surgical History:   Procedure Laterality Date    CARDIAC CATHETERIZATION      EYE SURGERY Left 02/2017    HIP SURGERY Right 12/2021     Family History   Problem Relation Age of Onset    Hypertension Mother     Emphysema Father     Coronary artery disease Father      Social History     Tobacco Use    Smoking status: Never Smoker    Smokeless tobacco: Never Used   Substance Use Topics    Alcohol use: Yes     Comment: twice a month/beer wine    Drug use: Never     Review of Systems    Physical Exam     Initial Vitals [07/28/22 1938]   BP Pulse Resp Temp SpO2   (!) 157/84 84 15 99 °F (37.2 °C) 95 %      MAP       --         Physical Exam    ED Course   Procedures  Labs Reviewed - No data to display       Imaging Results    None          Medications - No data to display                       Clinical Impression:   Final diagnoses:  [R10.9] Abdominal pain

## 2022-07-29 NOTE — HPI
77yoM w PMHx of MI (2016 s/p GUS 3x), HLD who returned to the ED last night with 3d hx of constant nonproductive cough and progressive pleuritic abdominal pain (R flank, RUQ, RLQ, epigastric, mid-back). Pt returned from a trip to [place] ~1.5 weeks ago, where he had an URI, no COVID test performed at the time. Presented to ED yesterday, CT negative for PE but only visualized to lobar segment of pulmonary arteries, COVID negative. Patient left AMA and saw his pulmonologist, where he received breathing treatment and started on prednisone taper with some improvement. Pain recurred last night with 8/10 intensity, patient returned to ED. Hypertensive to 160/80, troponin peaked at 0.031 and downtrending, BNP negative, lipase normal. CT Abdomen without acute pathology (AAA 3cm in diameter), BP in both arms equivocal. Echo 7/26 with 65% EF, no LVDD or WMA. Some elevation in inflammatory markers. Cardiology consulted for cardiac r/o.

## 2022-07-29 NOTE — CONSULTS
[facility] - [facility] Step[facility]  Pulmonology  Consult Note    Patient Name: Patrick Short  MRN: [id number]  Admission Date: 7/28/2022  Hospital Length of Stay: 0 days  Code Status: Full Code  Attending Physician: Alethea Slaughter MD  Primary Care Provider: Nilton Lloyd MD   Principal Problem: Pleuritic chest pain    Consults  Subjective:     HPI:  78 yo male w/ hx long standing GERD, CAD, MI s/p GUS RCA&LAD, HLD who presented to the ED 7/27 with right sided chest pain that radiates anteriorly, worse with movement, cough and deep inspiration; and with associated dyspnea. Patient recently returned from two week (7/3-7/19) trip in [place] and [facility], during this time he developed one time fever (100.5) and mild persistent cough - initially thought to be Covid, patient tested himself and was covid negative. He is vaccinated x3. Upon return from [facility] patient reports one subsequent episode of fever that resolved, intermittent chills, and persistent non productive cough.     Patient presented to his cardiologist Dr. Mendoza on Tuesday 7/26, given pre op clearance for right shoulder rotator cuff repair, early Wednesday morning patient developed right sided chest pain that radiated anteriorly worse with deep inspiration and movement and dyspnea worse when flat- in the ED 7/27, CTA PE was negative for PE, patient frustrated and left AMA, presented to his pulmonologist (Dr. Callaway) who performed breathing treatment, administered steroids and repeated Covid testing which was negative- patient reports he improved with this and was advised to present to the ED to have complete workup performed. On exam patient repeatedly denies cardiac related chest pain and points to right chest.     In the ED patient sating well on RA, HDS, AF, no leukocytosis, procal wnl, EKG NSR without acute ischemic changes, troponin 0.028>0.031>0.027, elevated ESR (41) and CRP (145.1) . CTA C/A/P without acute findings.          Past Medical  History:   Diagnosis Date    CAD (coronary artery disease) 7/26/2016    Heart block     MI (myocardial infarction)     Reflux        Past Surgical History:   Procedure Laterality Date    CARDIAC CATHETERIZATION      EYE SURGERY Left 02/2017    HIP SURGERY Right 12/2021       Review of patient's allergies indicates:  No Known Allergies    Family History       Problem Relation (Age of Onset)    Coronary artery disease Father    Emphysema Father    Hypertension Mother          Tobacco Use    Smoking status: Never Smoker    Smokeless tobacco: Never Used   Substance and Sexual Activity    Alcohol use: Yes     Comment: twice a month/beer wine    Drug use: Never    Sexual activity: Yes     Partners: Female     Comment: Wife         Review of Systems   Constitutional:  Positive for chills and fever. Negative for activity change and appetite change.   HENT:  Negative for congestion and sore throat.    Eyes:  Negative for visual disturbance.   Respiratory:  Positive for cough and shortness of breath. Negative for wheezing.    Cardiovascular:  Positive for chest pain. Negative for palpitations and leg swelling.   Gastrointestinal:  Negative for abdominal pain, blood in stool, diarrhea, nausea and vomiting.   Endocrine: Negative for polyuria.   Genitourinary:  Negative for dysuria.   Musculoskeletal:  Negative for arthralgias and back pain.   Skin:  Negative for rash.   Neurological:  Negative for headaches.   Psychiatric/Behavioral:  Negative for confusion.    Objective:     Vital Signs (Most Recent):  Temp: 97.1 °F (36.2 °C) (07/29/22 1122)  Pulse: 64 (07/29/22 1122)  Resp: 20 (07/29/22 1122)  BP: (!) 152/87 (07/29/22 1122)  SpO2: 96 % (07/29/22 1122)   Vital Signs (24h Range):  Temp:  [96.2 °F (35.7 °C)-99.7 °F (37.6 °C)] 97.1 °F (36.2 °C)  Pulse:  [58-84] 64  Resp:  [15-24] 20  SpO2:  [91 %-100 %] 96 %  BP: (131-158)/(66-92) 152/87     Weight: 95.3 kg (210 lb)  Body mass index is 29.29 kg/m².    No intake or  output data in the 24 hours ending 07/29/22 1152    Physical Exam  Vitals and nursing note reviewed.   Constitutional:       General: He is not in acute distress.     Appearance: Normal appearance.   HENT:      Head: Normocephalic and atraumatic.      Nose: Nose normal.      Mouth/Throat:      Mouth: Mucous membranes are moist.   Eyes:      Extraocular Movements: Extraocular movements intact.      Conjunctiva/sclera: Conjunctivae normal.   Cardiovascular:      Rate and Rhythm: Normal rate.   Pulmonary:      Effort: Pulmonary effort is normal. No respiratory distress.      Breath sounds: Normal breath sounds. No stridor. No wheezing.   Abdominal:      General: Abdomen is flat.   Musculoskeletal:      Cervical back: Normal range of motion.      Right lower leg: No edema.      Left lower leg: No edema.   Skin:     General: Skin is warm and dry.   Neurological:      General: No focal deficit present.      Mental Status: He is alert.   Psychiatric:         Mood and Affect: Mood normal.       Vents:       Lines/Drains/Airways       Peripheral Intravenous Line  Duration                  Peripheral IV - Single Lumen 07/28/22 2015 20 G Right Wrist <1 day                    Significant Labs:    CBC/Anemia Profile:  Recent Labs   Lab 07/28/22 2016 07/28/22 2023 07/29/22 0249   WBC 9.79  --  6.64   HGB 13.8*  --  13.4*   HCT 40.5 41 39.9*   *  --  142*   MCV 89  --  89   RDW 13.2  --  13.2        Chemistries:  Recent Labs   Lab 07/28/22 2016 07/29/22 0249 07/29/22  0754    138  --    K 3.6 3.9  --     104  --    CO2 25 26  --    BUN 11 10  --    CREATININE 0.8 0.7  --    CALCIUM 9.1 9.1  --    ALBUMIN 3.3*  --  3.4*   PROT 7.0  --  7.3   BILITOT 1.4*  --  1.4*   ALKPHOS 103  --  105   ALT 58*  --  60*   AST 49*  --  46*   MG  --  1.5*  --    PHOS  --  3.0  --        All pertinent labs have been reviewed.    Significant Imaging:   I have reviewed all pertinent imaging results/findings.    Assessment/Plan:      * Pleuritic chest pain  Presents with a 3 day history of right sided chest pain worse with cough and deep inspiration with recent history of possible recent viral URI with chills, fever and persistent mild cough earlier this month, coughing persists. Suspect post viral pleuritic chest pain as patient reports improvement with outpatient steroids and OTC NSAIDS while at his pulmonologist (Dr. Callaway) yesterday.  EKG in the ED without acute ischemic changes.   CTA PE and CTA C/A/P negative for PE, no acute findings  Pain relieved with steroids and NSAIDS (toradol in ED and OTC NSAIDS at home)  Elevated ESR, CRP - likely post viral  Recommend course of NSAIDs   Follow up with Dr. Callaway               Thank you for your consult. I will sign off. Please contact us if you have any additional questions.     Ashtyn Smith MD  Pulmonology  [facility]

## 2022-07-29 NOTE — SUBJECTIVE & OBJECTIVE
Past Medical History:   Diagnosis Date    CAD (coronary artery disease) 7/26/2016    Heart block     MI (myocardial infarction)     Reflux        Past Surgical History:   Procedure Laterality Date    CARDIAC CATHETERIZATION      EYE SURGERY Left 02/2017    HIP SURGERY Right 12/2021       Review of patient's allergies indicates:  No Known Allergies    No current facility-administered medications on file prior to encounter.     Current Outpatient Medications on File Prior to Encounter   Medication Sig    aspirin 81 MG Chew Take 1 tablet (81 mg total) by mouth once daily.    atorvastatin (LIPITOR) 40 MG tablet TAKE 1 TABLET BY MOUTH EVERY DAY    cetirizine (ZYRTEC) 10 MG tablet Take 10 mg by mouth once daily.    clobetasol (TEMOVATE) 0.05 % cream APPLY TO AFFECTED AREA ON HAND TWICE A DAY AS NEEDED FOR RASH    desonide (DESOWEN) 0.05 % cream APPLY TO AFFECTED AREA ON FACE TWICE A DAY AS NEEDED FOR SCALE    finasteride (PROSCAR) 5 mg tablet 1 tablet once daily.    ketorolac 0.5% (ACULAR) 0.5 % Drop 1 drop 2 (two) times daily. left    nitroGLYCERIN (NITROSTAT) 0.4 MG SL tablet Place 1 tablet (0.4 mg total) under the tongue every 5 (five) minutes as needed for Chest pain. (Patient not taking: Reported on 7/26/2022)    pantoprazole (PROTONIX) 40 MG tablet Take 1 tablet (40 mg total) by mouth once daily.    predniSONE (DELTASONE) 10 MG tablet Take 3 tablets x 7 days then 2 tablets x 7days.    triamcinolone acetonide 0.1% (KENALOG) 0.1 % ointment APPLY TO AFFECTED AREA ON AXILLAS TWICE A DAY AS NEEDED FOR ITCH/RASH     Family History       Problem Relation (Age of Onset)    Coronary artery disease Father    Emphysema Father    Hypertension Mother          Tobacco Use    Smoking status: Never Smoker    Smokeless tobacco: Never Used   Substance and Sexual Activity    Alcohol use: Yes     Comment: twice a month/beer wine    Drug use: Never    Sexual activity: Yes     Partners: Female     Comment: Wife     Review of Systems    Constitutional:  Negative for activity change, chills and fever.   HENT:  Negative for congestion and trouble swallowing.    Eyes:  Negative for photophobia and visual disturbance.   Respiratory:  Positive for cough. Negative for shortness of breath and wheezing.    Cardiovascular:  Positive for chest pain. Negative for palpitations and leg swelling.   Gastrointestinal:  Negative for abdominal pain, constipation, diarrhea, nausea and vomiting.   Genitourinary:  Negative for dysuria, frequency, hematuria and urgency.   Musculoskeletal:  Negative for arthralgias, back pain and gait problem.   Skin:  Negative for color change and rash.   Neurological:  Negative for dizziness, syncope, weakness, light-headedness, numbness and headaches.   Psychiatric/Behavioral:  Negative for agitation and confusion. The patient is not nervous/anxious.    Objective:     Vital Signs (Most Recent):  Temp: 99 °F (37.2 °C) (07/28/22 1938)  Pulse: (!) 58 (07/29/22 0005)  Resp: 18 (07/29/22 0005)  BP: (!) 141/82 (07/29/22 0005)  SpO2: (!) 94 % (07/29/22 0005)   Vital Signs (24h Range):  Temp:  [99 °F (37.2 °C)-99.7 °F (37.6 °C)] 99 °F (37.2 °C)  Pulse:  [58-84] 58  Resp:  [15-24] 18  SpO2:  [92 %-100 %] 94 %  BP: (138-158)/(76-92) 141/82     Weight: 95.3 kg (210 lb)  Body mass index is 29.29 kg/m².    Physical Exam  Vitals and nursing note reviewed.   Constitutional:       General: He is not in acute distress.     Appearance: He is well-developed.   HENT:      Head: Normocephalic and atraumatic.      Mouth/Throat:      Pharynx: No oropharyngeal exudate.   Eyes:      Conjunctiva/sclera: Conjunctivae normal.      Pupils: Pupils are equal, round, and reactive to light.   Cardiovascular:      Rate and Rhythm: Normal rate and regular rhythm.      Heart sounds: Normal heart sounds.   Pulmonary:      Effort: Pulmonary effort is normal. No respiratory distress.      Breath sounds: Normal breath sounds. No wheezing.   Chest:      Chest wall: No  tenderness.   Abdominal:      General: Bowel sounds are normal. There is no distension.      Palpations: Abdomen is soft.      Tenderness: There is no abdominal tenderness.   Musculoskeletal:         General: No tenderness. Normal range of motion.      Cervical back: Normal range of motion and neck supple.   Lymphadenopathy:      Cervical: No cervical adenopathy.   Skin:     General: Skin is warm and dry.      Capillary Refill: Capillary refill takes less than 2 seconds.      Findings: No rash.   Neurological:      Mental Status: He is alert and oriented to person, place, and time.      Cranial Nerves: No cranial nerve deficit.      Sensory: No sensory deficit.      Coordination: Coordination normal.   Psychiatric:         Behavior: Behavior normal.         Thought Content: Thought content normal.         Judgment: Judgment normal.         CRANIAL NERVES     CN III, IV, VI   Pupils are equal, round, and reactive to light.     Significant Labs: All pertinent labs within the past 24 hours have been reviewed.  CBC:   Recent Labs   Lab 07/27/22 0456 07/28/22 2016 07/28/22 2023   WBC 9.26 9.79  --    HGB 15.0 13.8*  --    HCT 44.2 40.5 41   * 144*  --      CMP:   Recent Labs   Lab 07/27/22 0456 07/28/22 2016   * 137   K 3.9 3.6    102   CO2 28 25   * 190*   BUN 12 11   CREATININE 0.9 0.8   CALCIUM 9.6 9.1   PROT 7.2 7.0   ALBUMIN 3.7 3.3*   BILITOT 1.4* 1.4*   ALKPHOS 85 103   AST 24 49*   ALT 36 58*   ANIONGAP 5* 10   EGFRNONAA >60.0 >60.0     Cardiac Markers:   Recent Labs   Lab 07/27/22 0456   BNP 61     Troponin:   Recent Labs   Lab 07/27/22  0850 07/27/22  1321 07/28/22 2016   TROPONINI 0.028* 0.028* 0.031*       Significant Imaging: I have reviewed all pertinent imaging results/findings within the past 24 hours.  CTA Chest Abdomen Pelvis  Narrative: EXAMINATION:  CTA CHEST ABDOMEN PELVIS    CLINICAL HISTORY:  Aortic aneurysm, known or suspected;    TECHNIQUE:  Low dose axial,  sagittal and coronal reformations were performed from the thoracic inlet to the pubic symphysis following the IV administration of 100 mL of Omnipaque 350.   No oral contrast was given.    COMPARISON:  None    FINDINGS:  Chest:    Heart and great vessels: Within normal limits.    Adenopathy: None demonstrated.    Lungs: Bibasilar atelectasis.    Aorta is normal in caliber with no evidence of aneurysm or dissection.    Pulmonary arteries are well enhanced with no evidence of pulmonary embolism.    Abdomen:    Small hiatal hernia.    Liver: Within normal limits.    Gallbladder and biliary: Within normal limits.    Spleen: Spleen is minimally enlarged.  No focal abnormality.    Pancreas: Within normal limits.    Adrenals: Within normal limits.    Kidneys: 5.6 cm cyst at the lower pole of the right kidney.  No stone, soft tissue mass or hydronephrosis bilaterally.    Bowel: Within normal limits.  No evidence of obstruction.    Peritoneum: No ascites or pneumoperitoneum.    Abdominal Adenopathy: None.    Vasculature: Mild fusiform aneurysmal dilation of the distal aorta measuring approximately 3 cm in maximum diameter.    Tortuosity and mild aneurysm dilation of the common iliac arteries measuring 2.2 cm on the left and 2 cm on the right.    Pelvis:    Urinary bladder: Unremarkable.    Male: Within normal limits.    Pelvis adenopathy: None.    Bones: No acute fractures.  Degenerative endplate changes are prominent at the anterior superior endplate of L4.  Moderate disc space narrowing and vacuum disc phenomena at L5-S1.  Mild-to-moderate degenerative disc and endplate changes in lower thoracic spine.    Right hip arthroplasty with metallic artifact in the pelvis.    Miscellaneous: None.  Impression: 1. No acute abnormality.  2. Mild distal fusiform aneurysmal dilation of the abdominal aorta with maximum diameter 3 cm.  3. Bilateral common iliac artery aneurysms measuring 2.2 cm on the left and 2 cm on the right also.  4.  Bibasilar atelectasis.  5. Small hiatal hernia.  6. Splenomegaly.  7. Right renal cyst.    Electronically signed by: Pelon Layne  Date:    07/28/2022  Time:    23:01

## 2022-07-29 NOTE — NURSING
Home Oxygen Evaluation    Date Performed: 2022    1) Patient's Home O2 Sat on room air, while at rest: 97%        If O2 sats on room air at rest are 88% or below, patient qualifies. No additional testing needed. Document N/A in steps 2 and 3. If 89% or above, complete steps 2.      2) Patient's O2 Sat on room air while exercisin%

## 2022-08-02 DIAGNOSIS — R07.81 PLEURITIC CHEST PAIN: Primary | ICD-10-CM

## 2022-08-08 ENCOUNTER — OFFICE VISIT (OUTPATIENT)
Dept: NEUROLOGY | Facility: CLINIC | Age: 77
End: 2022-08-08
Payer: COMMERCIAL

## 2022-08-08 DIAGNOSIS — R41.9 COGNITIVE COMPLAINTS: Primary | ICD-10-CM

## 2022-08-08 DIAGNOSIS — F95.2 TOURETTES SYNDROME: ICD-10-CM

## 2022-08-08 PROCEDURE — 99499 NO LOS: ICD-10-PCS | Mod: S$PBB,,, | Performed by: PSYCHIATRY & NEUROLOGY

## 2022-08-08 PROCEDURE — 96116 NUBHVL XM PHYS/QHP 1ST HR: CPT | Mod: S$PBB,,, | Performed by: PSYCHIATRY & NEUROLOGY

## 2022-08-08 PROCEDURE — 96116 NUBHVL XM PHYS/QHP 1ST HR: CPT | Mod: PBBFAC | Performed by: PSYCHIATRY & NEUROLOGY

## 2022-08-08 PROCEDURE — 99499 UNLISTED E&M SERVICE: CPT | Mod: S$PBB,,, | Performed by: PSYCHIATRY & NEUROLOGY

## 2022-08-08 PROCEDURE — 96116 PR NEUROBEHAVIORAL STATUS EXAM BY PSYCH/PHYS: ICD-10-PCS | Mod: S$PBB,,, | Performed by: PSYCHIATRY & NEUROLOGY

## 2022-08-08 NOTE — PROGRESS NOTES
"NEUROPSYCHOLOGY CONSULT  Referral Information  Name: Patrick Short  MRN: [id number]  : 1945  Age: 77 y.o.  Race: White  Gender: male  REFERRAL SOURCE: Rafael Freedman MD  DATE CONDUCTED: 2022  SOURCES OF INFORMATION:  The following was gathered from a clinical interview with Mr. Patrick Short, a separate interview with his wife, and review of the available medical records. Mr. Short expressed an understanding of the purpose of the evaluation and consented to all procedures. Total licensed billing psychologists professional time including clinical interview, test administration and interpretation of tests administered by the billing psychologist, integration of test results and other clinical data, preparing the final report, and personally reporting results to the patient   [place]illin - 60 minutes    NEUROPSYCHOLOGICAL EVALUATION - CONFIDENTIAL    SUMMARY/TREATMENT PLAN   Mr. Short is a 77 year old male with history of Tourette's syndrome and more recent mild parkinsonism (2021) with cognitive complaints. Recent DaTSCAN was normal. Mr. Short described exacerbation of baseline cognitive weaknesses, noting that he is increasingly more likely to lose his train of thought, misplace an item, and procrastinate. His wife largely concurred with his description of cognitive errors, but does not believe they are a significant change from baseline. 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 He is scheduled for neuropsychological testing on 8/15 for further clarification of cognitive strengths/weaknesses. Behaviorally, his wife described possible mild irritability and verbal disinhibition, noting several instances of him "sharply" talking to restaurant and hospital staff. Full diagnostic impressions to follow testing.     Diagnoses  Problem List Items Addressed This Visit        Neuro    Cognitive " "complaints - Primary    Current Assessment & Plan     Compensatory Mechanisms: Mr. Short would likely benefit from increasing organizational systems, including placing items in a consistent location.     Driving: He may benefit from a formal, on-the-road driving evaluation given his wife's concerns, although this is mainly due to vision.     Neuropsychiatric Symptoms: Monitor possible irritability/verbal disinhibition as this may be a point of intervention.     Optimize Brain Health: Prioritize physical/social/cognitive activity/stimulation. Maintain a heart healthy diet.     Follow-up: Testing on 8/15.            Tourettes syndrome    Current Assessment & Plan     Treatment: Mr. Short may benefit from therapy specifically designed for individuals with Tourette's syndrome. Counseling may also be helpful for managing several of the life transitions that have occurred over the past several years.                 Mr. Short will be provided the results of the evaluation.     Thank you for allowing me to participate in Mr. Fink care.  If you have any questions, please contact me at [phone].    Jose Hill Psy.D., ABPP  Board Certified in Clinical Neuropsychology  Department of Neurology    HISTORY OF PRESENT ILLNESS: Mr. Patrick Short is a 77 y.o., right-handed, male with 16 years of education who was referred for a neuropsychological evaluation in the setting of cognitive concerns.     Mr. Short was initially referred to neurology in 12/2018 by his PCP. Per Dr. Lloyd's note: "He called me at home over the holidays and wanted me to check him out for an inguinal hernia. When he arrived I noted that he had a shuffle to his gait and began a neurological history, He notes that getting out of his car, he must just his hands to help get his right leg in and out. He is a bit slower in cognitive efforts. Recently took a questionnaire and he had dropped 10 points in performing the assessment that when he did it several " "years ago. No head aches, slight change in vision. Walks with a slight shuffle and unsteadiness,, does not fall.  Heel to toe test is normal and Rhomberg is negative. He tells me that In July when he was in [place] for the running of the bulls he fell in the shower, a very small contained space and hit the back of his head.  Several months ago he had severe occipital headaches which hadve  subsided and never returned. His gross neurological function is normal but has  muscle weakness in his lower extremities is weaker R>L." Head CT was unremarkable. He was seen by Dr. Freedman in 2/2019 who noted that his symptoms were most consistent with myeloneuropathy. No parkinsonism was noted on exam. 4/2019 EMG was consistent with "RIGHT L5-S1 RADICULOPATHY."    Mr. Short re-established care with Dr. Freedman in 12/2021 who noted mild parkinsonism on exam, including mild facial masking, mild b/l cogwheel rigidity, and mild bradykinesia. He completed a DaTSCAN in 2/2022 that was read as normal with "no significant asymmetry noted." He was also referred for neuropsychological evaluation due to primarily self-reported cognitive concerns.     Mr. Short reported gradual decline of what he believes to be baseline cognitive weaknesses. For instance, he is very prone to misplacing/losing items, indicating that he has lost multiple pairs of glasses and frequently misplaces his keys. He also purchased an Apple Watch that syncs with his phone and sounds an alarm when he can't find his phone. His wife agreed that this issue is longstanding and has been persistent throughout their marriage, stating that he has always been the type of person to set something down anywhere rather than in a consistent location. She indicated that he may misplace items more consistently than in the past, but only mildly so. Mr. Short also finds that he can lose his train of thought in conversation, especially when interrupted/distracted. He has occasional " "retrieval issues, such as not being able to recall someone's name or job title that he had worked with for years. He otherwise denied word finding issues. He feels that his sustained attention/focus isn't as sharp as in the past. He has been bothered by a few instances of telling someone that he can't find his phone and not immediately realize that he was actually talking on his phone. His wife has not appreciated any clear cognitive decline beyond what would be expected for age.     Mr. Short indicated that he does not feel that he is the best version of himself at the present time, specifically related to his drive. He acknowledged that this may be circumstantial, noting that he does not have the same pressures, especially financially, that were great motivating/driving factors when he was younger. 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 His wife indicated that the transition was made even more challenging/upsetting as his son effectively stated that he no longer wanted his father involved with the business following the transition. Mr. Short subsequently successfully started a new, smaller company that has been gratifying. He feels that he is doing well with the company all things considered, but suspects that he could be doing better. He feels that he has always been a procrastinator, but now even more so than in the past. His wife still feels that he is "at the top of his game."    Neuropsychiatric Symptoms:  Hallucinations: Denied  Delusional/Paranoid Thinking: Denied  Apathy: Denied  Irritability/Agitation: Endorsed, mildly so. His wife indicated that he has never been a "yeller," but there have been several recent instances of him talking very "sharply" towards people, including waitresses and ER staff at [facility].   Disinhibition: Denied, but noted that he has "always been a wordy prachi." For instance, he previously wrote a book about his life philosophies " "that was upwards of 800 pages. He is aware that he typically doesn't answer questions succinctly, especially yes/no questions.   Depression/Labile Mood: Denied. Feels is life is exceptional, he is very grateful. He denied active SI, plan, or intent. His wife described him as the "most optimistic person I know." He has always been quick to bounce back from adversity, but she does wonder if recent medical events have him facing his mortality more than in the past.   Anxiety: Denied    DAILY FUNCTIONING:  BASIC ADLS:  Feeding: independent  Dressing: independent  Bathing: independent    IADLS:  Support System: Resides with wife.   Appointment Management: independent, no issues managing his personal schedule.   Medication Compliance: Wife fills a pillbox. He never used to take medications, so he initially required a lot of prompting/reminders to take his medications. He now has a routine and does well, but his wife still provides oversight.   Financial Management: Wife manages the household finances. He has a  that manages the company finances.   Cooking: He doesn't cook.   Driving: His wife worries about his driving due to vision issues to the extent that she won't get into the car with him. He essentially can't see out of his left eye and drifts to the left. He doesn't drive at night.     BRAIN HEALTH RISK FACTORS:  Hearing Loss: Endorsed, bilateral hearing aides.   Sleep: No problems with sleep initiation/maitneance. He very infrequently talks in his sleep, but otherwise no dream enactment behavior per wife.     MEDICAL HISTORY: Mr. Short  has a past medical history of CAD (coronary artery disease) (2016), Heart block, MI (myocardial infarction, 71 years), and Reflux.    NEUROIMAGIN2019 Head CT: "No evidence of recent hemorrhage or other acute intracranial pathology."    3/2022: "DaTscan reviewed. Reported normal. There is asymmetric uptake in the R > L caudate and putamen. "    SUBSTANCE USE: Mr." Deja  reports that he has never smoked. He has never used smokeless tobacco. Intermittent alcohol use. He reports that he does not use drugs.    CURRENT MEDICATIONS:    Current Outpatient Medications:     aspirin 81 MG Chew, Take 1 tablet (81 mg total) by mouth once daily., Disp: , Rfl: 0    atorvastatin (LIPITOR) 40 MG tablet, TAKE 1 TABLET BY MOUTH EVERY DAY, Disp: 90 tablet, Rfl: 3    cetirizine (ZYRTEC) 10 MG tablet, Take 10 mg by mouth once daily., Disp: , Rfl:     clobetasol (TEMOVATE) 0.05 % cream, APPLY TO AFFECTED AREA ON HAND TWICE A DAY AS NEEDED FOR RASH, Disp: , Rfl: 1    desonide (DESOWEN) 0.05 % cream, APPLY TO AFFECTED AREA ON FACE TWICE A DAY AS NEEDED FOR SCALE, Disp: , Rfl: 1    finasteride (PROSCAR) 5 mg tablet, 1 tablet once daily., Disp: , Rfl:     nitroGLYCERIN (NITROSTAT) 0.4 MG SL tablet, Place 1 tablet (0.4 mg total) under the tongue every 5 (five) minutes as needed for Chest pain. (Patient not taking: Reported on 7/26/2022), Disp: 30 tablet, Rfl: 1    pantoprazole (PROTONIX) 40 MG tablet, Take 1 tablet (40 mg total) by mouth once daily., Disp: 90 tablet, Rfl: 3    predniSONE (DELTASONE) 10 MG tablet, Take 3 tablets x 7 days then 2 tablets x 7days., Disp: 36 tablet, Rfl: 0    triamcinolone acetonide 0.1% (KENALOG) 0.1 % ointment, APPLY TO AFFECTED AREA ON AXILLAS TWICE A DAY AS NEEDED FOR ITCH/RASH, Disp: , Rfl: 1     PSYCHIATRIC HISTORY: Tourette's syndrome since adolescence, almost exclusively a head tic. He continues to have daily tics and compulsions/urges. His wife also notices persistent sniffling, coughing, and belching when he seems especially stressed. He does not believe these symptoms have any functional impact at this time, but were certainly frustrating/embarrassing in adolescence. He recalls being a prescribed a medication by a psychiatriast at one point, but he does not recall the medication. He has never been in therapy/counseling. Mr. Short reported a  longstanding fear of medical professionals, noting that he essentially avoided all medical care until around 62 years when his insurance company forced him into a physical. A heart attack in his late 60's has also necessitated closer medical follow-up. Blood draws have been especially challenging as he will become lightheaded/presyncopal. He has a tendency to worry about his daughter with a history of bipolar disorder.     FAMILY HISTORY: family history includes Coronary artery disease in his father; Emphysema in his father; Hypertension in his mother. Son with Tourette's syndrome.     PSYCHOSOCIAL HISTORY:   Education:   Level Attained: Civil engineering from [employer]   Learning Difficulties: Denied, but doesn't believe he was the strongest student.      Vocation:  [employer], retired in his early 70's. He started a new, smaller company over the past several years, [employer], that is currently compromised of himself and 3 employees.     Relationship Status:   : yes, almost 40 years.    Children: 8    MENTAL STATUS AND OBSERVATIONS:  APPEARANCE: Appropriately dressed/groomed.  ALERTNESS/ORIENTATION: Attentive and alert. Demonstrated intact episodic memory for recent events.    GAIT/MOTOR: Ambulated independently.   SENSORY: Unremarkable.   SPEECH/LANGUAGE: Normal in rate, rhythm, tone, and volume. Expressive and receptive language were grossly intact.  STATED MOOD/AFFECT: Mood was euthymic. He cursed several times during the intake. He also rob a line graph to explain one of his philosophies/values and rob part of it on the examiner's desk, with no acknowledgment of it.   INTERPERSONAL BEHAVIOR: Rapport was quickly and easily established   THOUGHT PROCESSES: Thoughts seemed logical and goal-directed.

## 2022-08-09 ENCOUNTER — OFFICE VISIT (OUTPATIENT)
Dept: PULMONOLOGY | Facility: CLINIC | Age: 77
End: 2022-08-09
Payer: COMMERCIAL

## 2022-08-09 ENCOUNTER — HOSPITAL ENCOUNTER (OUTPATIENT)
Dept: RADIOLOGY | Facility: HOSPITAL | Age: 77
Discharge: HOME | End: 2022-08-09
Attending: INTERNAL MEDICINE
Payer: COMMERCIAL

## 2022-08-09 DIAGNOSIS — R07.81 PLEURITIC CHEST PAIN: Primary | ICD-10-CM

## 2022-08-09 DIAGNOSIS — R07.81 PLEURITIC CHEST PAIN: ICD-10-CM

## 2022-08-09 PROCEDURE — 71046 XR CHEST PA AND LATERAL: ICD-10-PCS | Mod: 26,,, | Performed by: RADIOLOGY

## 2022-08-09 PROCEDURE — 71046 X-RAY EXAM CHEST 2 VIEWS: CPT | Mod: TC,FY

## 2022-08-09 PROCEDURE — 99999 PR PBB SHADOW E&M-EST. PATIENT-LVL I: CPT | Mod: PBBFAC,,, | Performed by: INTERNAL MEDICINE

## 2022-08-09 PROCEDURE — 71046 X-RAY EXAM CHEST 2 VIEWS: CPT | Mod: 26,,, | Performed by: RADIOLOGY

## 2022-08-09 PROCEDURE — 99999 PR PBB SHADOW E&M-EST. PATIENT-LVL I: ICD-10-PCS | Mod: PBBFAC,,, | Performed by: INTERNAL MEDICINE

## 2022-08-09 PROCEDURE — 99213 PR OFFICE/OUTPT VISIT, EST, LEVL III, 20-29 MIN: ICD-10-PCS | Mod: S$GLB,,, | Performed by: INTERNAL MEDICINE

## 2022-08-09 PROCEDURE — 99213 OFFICE O/P EST LOW 20 MIN: CPT | Mod: S$GLB,,, | Performed by: INTERNAL MEDICINE

## 2022-08-09 NOTE — PROGRESS NOTES
Subjective:      Patient ID: Patrick Short is a 77 y.o. male.    Chief Complaint: No chief complaint on file.    HPI  Patient comes for follow up on a bout of pleurisy in the right lower lobe that prompted a visit with me  On 7/28. I heard a few crackles at the right base and there was a slight reaction I appreciated in the right costo phrenic angle.  No evidence of pulmonary emboli. He returns today feeling great and Sa02: 55%      No flowsheet data found.  Review of Systems   Respiratory:        Seen 7/28 for right sided pleuritic chest pain posteriorly  And a cough.     Objective:     Physical Exam  Constitutional:       Appearance: He is well-developed.   HENT:      Head: Normocephalic and atraumatic.      Right Ear: External ear normal.      Left Ear: External ear normal.   Eyes:      Conjunctiva/sclera: Conjunctivae normal.      Pupils: Pupils are equal, round, and reactive to light.   Cardiovascular:      Rate and Rhythm: Normal rate and regular rhythm.      Heart sounds: Normal heart sounds.   Pulmonary:      Effort: Pulmonary effort is normal.      Breath sounds: Normal breath sounds.      Comments: Clear to A& P  No crackles or wheezes    Sa02: 95%    Peak flow 550 l/min    Chest x-ray is clear with a slight blunting of the right costo phrenic  Angle.  Abdominal:      General: Bowel sounds are normal.      Palpations: Abdomen is soft.   Musculoskeletal:         General: Normal range of motion.      Cervical back: Normal range of motion and neck supple.   Skin:     General: Skin is warm and dry.   Neurological:      Mental Status: He is alert and oriented to person, place, and time.      Deep Tendon Reflexes: Reflexes are normal and symmetric.   Psychiatric:         Behavior: Behavior normal.         Thought Content: Thought content normal.         Judgment: Judgment normal.         Assessment:     1. Pleuritic chest pain      Outpatient Encounter Medications as of 8/9/2022   Medication Sig Dispense Refill     aspirin 81 MG Chew Take 1 tablet (81 mg total) by mouth once daily.  0    atorvastatin (LIPITOR) 40 MG tablet TAKE 1 TABLET BY MOUTH EVERY DAY 90 tablet 3    [] benzonatate (TESSALON) 200 MG capsule Take 1 capsule (200 mg total) by mouth 3 (three) times daily as needed for Cough. 30 capsule 0    cetirizine (ZYRTEC) 10 MG tablet Take 10 mg by mouth once daily.      clobetasol (TEMOVATE) 0.05 % cream APPLY TO AFFECTED AREA ON HAND TWICE A DAY AS NEEDED FOR RASH  1    desonide (DESOWEN) 0.05 % cream APPLY TO AFFECTED AREA ON FACE TWICE A DAY AS NEEDED FOR SCALE  1    finasteride (PROSCAR) 5 mg tablet 1 tablet once daily.      [] ibuprofen (ADVIL,MOTRIN) 800 MG tablet Take 1 tablet (800 mg total) by mouth every 6 (six) hours as needed for Pain. 40 tablet 0    nitroGLYCERIN (NITROSTAT) 0.4 MG SL tablet Place 1 tablet (0.4 mg total) under the tongue every 5 (five) minutes as needed for Chest pain. (Patient not taking: Reported on 2022) 30 tablet 1    pantoprazole (PROTONIX) 40 MG tablet Take 1 tablet (40 mg total) by mouth once daily. 90 tablet 3    predniSONE (DELTASONE) 10 MG tablet Take 3 tablets x 7 days then 2 tablets x 7days. 36 tablet 0    triamcinolone acetonide 0.1% (KENALOG) 0.1 % ointment APPLY TO AFFECTED AREA ON AXILLAS TWICE A DAY AS NEEDED FOR ITCH/RASH  1    [DISCONTINUED] ketorolac 0.5% (ACULAR) 0.5 % Drop 1 drop 2 (two) times daily. left       No facility-administered encounter medications on file as of 2022.       Plan:     Problem List Items Addressed This Visit     Pleuritic chest pain - Primary        Complete one last day of 20 mg of prednisone

## 2022-08-11 PROBLEM — R41.9 COGNITIVE COMPLAINTS: Status: ACTIVE | Noted: 2022-08-11

## 2022-08-11 PROBLEM — F95.2 TOURETTES SYNDROME: Status: ACTIVE | Noted: 2022-08-11

## 2022-08-11 NOTE — ASSESSMENT & PLAN NOTE
Compensatory Mechanisms: Mr. Short would likely benefit from increasing organizational systems, including placing items in a consistent location.     Driving: He may benefit from a formal, on-the-road driving evaluation given his wife's concerns, although this is mainly due to vision.     Neuropsychiatric Symptoms: Monitor possible irritability/verbal disinhibition as this may be a point of intervention.     Optimize Brain Health: Prioritize physical/social/cognitive activity/stimulation. Maintain a heart healthy diet.     Follow-up: Testing on 8/15.

## 2022-08-11 NOTE — ASSESSMENT & PLAN NOTE
Treatment: Mr. Short may benefit from therapy specifically designed for individuals with Tourette's syndrome. Counseling may also be helpful for managing several of the life transitions that have occurred over the past several years.

## 2022-08-15 ENCOUNTER — OFFICE VISIT (OUTPATIENT)
Dept: NEUROLOGY | Facility: CLINIC | Age: 77
End: 2022-08-15
Payer: COMMERCIAL

## 2022-08-15 DIAGNOSIS — G31.84 MILD NEUROCOGNITIVE DISORDER: Primary | ICD-10-CM

## 2022-08-15 DIAGNOSIS — R41.3 MEMORY LOSS: ICD-10-CM

## 2022-08-15 DIAGNOSIS — F95.2 TOURETTES SYNDROME: ICD-10-CM

## 2022-08-15 PROCEDURE — 96132 NRPSYC TST EVAL PHYS/QHP 1ST: CPT | Mod: S$GLB,,, | Performed by: PSYCHIATRY & NEUROLOGY

## 2022-08-15 PROCEDURE — 96139 PR PSYCH/NEUROPSYCH TEST ADMIN/SCORING, BY TECH, 2+ TESTS, EA ADDTL 30 MIN: ICD-10-PCS | Mod: S$GLB,,, | Performed by: PSYCHIATRY & NEUROLOGY

## 2022-08-15 PROCEDURE — 96138 PSYCL/NRPSYC TECH 1ST: CPT | Mod: S$GLB,,, | Performed by: PSYCHIATRY & NEUROLOGY

## 2022-08-15 PROCEDURE — 96138 PR PSYCH/NEUROPSYCH TEST ADMIN/SCORING, BY TECH, 2+ TESTS, 1ST 30 MIN: ICD-10-PCS | Mod: S$GLB,,, | Performed by: PSYCHIATRY & NEUROLOGY

## 2022-08-15 PROCEDURE — 96133 PR NEUROPSYCHOLOGIC TEST EVAL SVCS, EA ADDTL HR: ICD-10-PCS | Mod: S$GLB,,, | Performed by: PSYCHIATRY & NEUROLOGY

## 2022-08-15 PROCEDURE — 99999 PR PBB SHADOW E&M-EST. PATIENT-LVL I: CPT | Mod: PBBFAC,,,

## 2022-08-15 PROCEDURE — 99999 PR PBB SHADOW E&M-EST. PATIENT-LVL I: ICD-10-PCS | Mod: PBBFAC,,,

## 2022-08-15 PROCEDURE — 96132 PR NEUROPSYCHOLOGIC TEST EVAL SVCS, 1ST HR: ICD-10-PCS | Mod: S$GLB,,, | Performed by: PSYCHIATRY & NEUROLOGY

## 2022-08-15 PROCEDURE — 99499 UNLISTED E&M SERVICE: CPT | Mod: S$GLB,,, | Performed by: PSYCHIATRY & NEUROLOGY

## 2022-08-15 PROCEDURE — 99499 NO LOS: ICD-10-PCS | Mod: S$GLB,,, | Performed by: PSYCHIATRY & NEUROLOGY

## 2022-08-15 PROCEDURE — 96139 PSYCL/NRPSYC TST TECH EA: CPT | Mod: S$GLB,,, | Performed by: PSYCHIATRY & NEUROLOGY

## 2022-08-15 PROCEDURE — 96133 NRPSYC TST EVAL PHYS/QHP EA: CPT | Mod: S$GLB,,, | Performed by: PSYCHIATRY & NEUROLOGY

## 2022-08-15 NOTE — PROGRESS NOTES
"NEUROPSYCHOLOGY CONSULT  Referral Information  Name: Patrick Short  MRN: [id number]  : 1945  Age: 77 y.o.  Race: White  Gender: male  REFERRAL SOURCE: Rafael Freedman MD  DATE CONDUCTED: 8/15/2022  SOURCES OF INFORMATION:  The following was gathered from a clinical interview with Mr. Patrick Short, a separate interview with his wife, and review of the available medical records. Mr. Short expressed an understanding of the purpose of the evaluation and consented to all procedures. Total licensed billing psychologists professional time including clinical interview, test administration and interpretation of tests administered by the billing psychologist, integration of test results and other clinical data, preparing the final report, and personally reporting results to the patient   [place] - 60 minutes (2022), [id number] - 180 minutes (8/15/2022), [id number] - 244 minutes (8/15/2022).     NEUROPSYCHOLOGICAL EVALUATION - CONFIDENTIAL    SUMMARY/TREATMENT PLAN   Mr. Short is a 77 year old male with history of Tourette's syndrome and more recent mild parkinsonism (2021) with cognitive complaints. 2022 DaTSCAN was interpreted as normal with KAITLIN Kuo noting "asymmetric uptake in the R > L caudate and putamen." Mr. Short described exacerbation of baseline cognitive weaknesses, noting that he is increasingly more likely to lose his train of thought, misplace an item, and procrastinate. His wife largely concurred with his description of cognitive errors, but does not believe they are a significant change from baseline. 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 Behaviorally, she described possible mild irritability and verbal disinhibition, noting several instances of him "sharply" talking to restaurant and hospital staff.     Neuropsychological testing revealed a consistent pattern of " at least mild executive dysfunction, highlighted by reduced encoding, cognitive organization/source memory, processing speed, and set shifting. Etiology is unclear and may be multifaceted. He has a history of Tourette's syndrome that is undocumented in his medical record, which is noteworthy for several reasons, including the high frequency of co-occurring conditions such as ADHD. In fact, he presented with several inattentive and impulsive symptoms during the intake and testing that could be consistent with baseline ADHD. He also reported limited frustration tolerance during testing, which could have impacted his performance to some degree. While baseline ADHD in the setting of advancing and vascular risk factors may explain his cognitive weaknesses, his history is also remarkable for mild parkinsonism of unclear etiology. Follow-up with movement neurology is recommended. He may also benefit from updated neuroimaging as he has a head CT on file from 2019, but no brain MRI. He does not present with the type of hayde forgetting seen in Alzheimer's disease. Further evaluation is warranted.     Diagnoses  Problem List Items Addressed This Visit        Neuro    Mild neurocognitive disorder - Primary    Current Assessment & Plan     Diagnostics: He may benefit from a brain MRI, updated neurological exam, and/or PET scan.     Compensatory Mechanisms: Mr. Short would likely benefit from increasing organizational systems, including placing items in a consistent location.      Driving: He may benefit from a vision evaluation.      Neuropsychiatric Symptoms: Monitor possible irritability as this may be a point of intervention in the future.       Optimize Brain Health: Prioritize physical/social/cognitive activity/stimulation. Maintain a heart healthy diet.      Follow-up: Interval testing in 1-2 years.            Tourettes syndrome    Current Assessment & Plan     Treatment: Mr. Short may benefit from therapy specifically  "designed for individuals with Tourette's syndrome. Counseling may also be helpful for managing several of the life transitions that have occurred over the past several years.              Other Visit Diagnoses     Memory loss             Mr. Short will be provided the results of the evaluation.     Thank you for allowing me to participate in Mr. Fink care.  If you have any questions, please contact me at [phone].    Jose Hill Psy.D., SARAH BETHP  Board Certified in Clinical Neuropsychology  Department of Neurology    HISTORY OF PRESENT ILLNESS: Mr. Patrick Short is a 77 y.o., right-handed, male with 16 years of education who was referred for a neuropsychological evaluation in the setting of cognitive concerns.     Mr. Short was initially referred to neurology in 12/2018 by his PCP. Per Dr. Lloyd's note: "He called me at home over the holidays and wanted me to check him out for an inguinal hernia. When he arrived I noted that he had a shuffle to his gait and began a neurological history, He notes that getting out of his car, he must just his hands to help get his right leg in and out. He is a bit slower in cognitive efforts. Recently took a questionnaire and he had dropped 10 points in performing the assessment that when he did it several years ago. No head aches, slight change in vision. Walks with a slight shuffle and unsteadiness,, does not fall.  Heel to toe test is normal and Rhomberg is negative. He tells me that In July when he was in [place] for the running of the bulls he fell in the shower, a very small contained space and hit the back of his head.  Several months ago he had severe occipital headaches which hadve  subsided and never returned. His gross neurological function is normal but has  muscle weakness in his lower extremities is weaker R>L." Head CT was unremarkable. He was seen by Dr. Freedman in 2/2019 who noted that his symptoms were most consistent with myeloneuropathy. No parkinsonism was " "noted on exam. 4/2019 EMG was consistent with "RIGHT L5-S1 RADICULOPATHY."    Mr. Short re-established care with Dr. Freedman in 12/2021 who noted mild parkinsonism on exam, including mild facial masking, mild b/l cogwheel rigidity, and mild bradykinesia. He completed a DaTSCAN in 2/2022. Per KAITLIN Kuo: "Reported normal. There is asymmetric uptake in the R > L caudate and putamen." He was referred for neuropsychological evaluation due to primarily self-reported cognitive concerns.     Mr. Short reported gradual decline of what he believes to be baseline cognitive weaknesses. For instance, he is very prone to misplacing/losing items, indicating that he has lost multiple pairs of glasses and frequently misplaces his keys. He also purchased an Apple Watch that syncs with his phone and sounds an alarm when he can't find his phone. His wife agreed that this issue is longstanding and has been persistent throughout their marriage, stating that he has always been the type of person to set something down anywhere rather than in a consistent location. She indicated that he may misplace items more consistently than in the past, but only mildly so. Mr. Short also finds that he can lose his train of thought in conversation, especially when interrupted/distracted. He has occasional retrieval issues, such as not being able to recall someone's name or job title that he had worked with for years. He otherwise denied word finding issues. He feels that his sustained attention/focus isn't as sharp as in the past. He has been bothered by a few instances of telling someone that he can't find his phone and not immediately realize that he was actually talking on his phone. His wife has not appreciated any clear cognitive decline beyond what would be expected for age.     Mr. Short indicated that he does not feel that he is the best version of himself at the present time, specifically related to his drive. He acknowledged that this may " "be circumstantial, noting that he does not have the same pressures, especially financially, that were great motivating/driving factors when he was younger. 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 His wife indicated that the transition was made even more challenging/upsetting as his son effectively stated that he no longer wanted his father involved with the business following the transition. Mr. Short subsequently successfully started a new, smaller company that has been gratifying. He feels that he is doing well with the company all things considered, but suspects that he could be doing better. He feels that he has always been a procrastinator, but now even more so than in the past. His wife still feels that he is "at the top of his game."    Neuropsychiatric Symptoms:  Hallucinations: Denied  Delusional/Paranoid Thinking: Denied  Apathy: Denied  Irritability/Agitation: Endorsed, mildly so. His wife indicated that he has never been a "yeller," but there have been several recent instances of him talking very "sharply" towards people, including waitresses and ER staff at [facility].   Disinhibition: Denied, but noted that he has "always been a wordy prachi." For instance, he previously wrote a book about his life philosophies that was hundreds of pages. He is aware that he typically doesn't answer questions succinctly, especially yes/no questions.   Depression/Labile Mood: Denied. Feels his life is exceptional, he is very grateful. He denied active SI, plan, or intent. His wife described him as the "most optimistic person I know." He has always been quick to bounce back from adversity, but she does wonder if recent medical events have him facing his mortality more than in the past.   Anxiety: Denied    DAILY FUNCTIONING:  BASIC ADLS:  Feeding: independent  Dressing: independent  Bathing: independent    IADLS:  Support System: Resides with wife.   Appointment Management: " "independent, no issues managing his personal schedule.   Medication Compliance: Wife fills a pillbox. He never used to take medications, so he initially required a lot of prompting/reminders to take his medications. He now has a routine and does well, but his wife still provides oversight.   Financial Management: Wife manages the household finances. He has a  that manages the company finances.   Cooking: He doesn't cook.   Driving: His wife worries about his driving due to vision issues to the extent that she won't get into the car with him. He essentially can't see out of his left eye and drifts to the left. He doesn't drive at night.     BRAIN HEALTH RISK FACTORS:  Hearing Loss: Endorsed, bilateral hearing aides.   Sleep: No problems with sleep initiation/maitneance. He very infrequently talks in his sleep, but otherwise no dream enactment behavior per wife.     MEDICAL HISTORY: Mr. Short  has a past medical history of CAD (coronary artery disease) (2016), Heart block, MI (myocardial infarction, 71 years), and Reflux.    NEUROIMAGIN2019 Head CT: "No evidence of recent hemorrhage or other acute intracranial pathology."    3/2022: "DaTscan reviewed. Reported normal. There is asymmetric uptake in the R > L caudate and putamen. "    SUBSTANCE USE: Mr. Short  reports that he has never smoked. He has never used smokeless tobacco. Intermittent alcohol use. He reports that he does not use drugs.    CURRENT MEDICATIONS:    Current Outpatient Medications:     aspirin 81 MG Chew, Take 1 tablet (81 mg total) by mouth once daily., Disp: , Rfl: 0    atorvastatin (LIPITOR) 40 MG tablet, TAKE 1 TABLET BY MOUTH EVERY DAY, Disp: 90 tablet, Rfl: 3    cetirizine (ZYRTEC) 10 MG tablet, Take 10 mg by mouth once daily., Disp: , Rfl:     clobetasol (TEMOVATE) 0.05 % cream, APPLY TO AFFECTED AREA ON HAND TWICE A DAY AS NEEDED FOR RASH, Disp: , Rfl: 1    desonide (DESOWEN) 0.05 % cream, APPLY TO AFFECTED AREA ON " FACE TWICE A DAY AS NEEDED FOR SCALE, Disp: , Rfl: 1    finasteride (PROSCAR) 5 mg tablet, 1 tablet once daily., Disp: , Rfl:     nitroGLYCERIN (NITROSTAT) 0.4 MG SL tablet, Place 1 tablet (0.4 mg total) under the tongue every 5 (five) minutes as needed for Chest pain. (Patient not taking: Reported on 7/26/2022), Disp: 30 tablet, Rfl: 1    pantoprazole (PROTONIX) 40 MG tablet, Take 1 tablet (40 mg total) by mouth once daily., Disp: 90 tablet, Rfl: 3    predniSONE (DELTASONE) 10 MG tablet, Take 3 tablets x 7 days then 2 tablets x 7days., Disp: 36 tablet, Rfl: 0    triamcinolone acetonide 0.1% (KENALOG) 0.1 % ointment, APPLY TO AFFECTED AREA ON AXILLAS TWICE A DAY AS NEEDED FOR ITCH/RASH, Disp: , Rfl: 1     PSYCHIATRIC HISTORY: Tourette's syndrome since adolescence, almost exclusively a head tic. He continues to have daily tics and compulsions/urges. His wife also notices persistent sniffling, coughing, and belching when he seems especially stressed. He does not believe these symptoms have any functional impact at this time, but were certainly frustrating/embarrassing in adolescence. He recalls being a prescribed a medication by a psychiatriast at one point, but he does not recall the medication. He has never been in therapy/counseling. Mr. Short reported a longstanding fear of medical professionals, noting that he essentially avoided all medical care until around 62 years when his insurance company forced him into a physical. A heart attack in his late 60's has also necessitated closer medical follow-up. Blood draws have been especially challenging as he will become lightheaded/presyncopal. He has a tendency to worry about his daughter with a history of bipolar disorder.     FAMILY HISTORY: family history includes Coronary artery disease in his father; Emphysema in his father; Hypertension in his mother. Son with Tourette's syndrome.     PSYCHOSOCIAL HISTORY:   Education:   Level Attained: Civil engineering from  [employer]   Learning Difficulties: Denied, but doesn't believe he was the strongest student.      Vocation:  [employer], retired in his early 70's. He started a new, smaller company over the past several years, [employer], that is currently compromised of himself and 3 employees.     Relationship Status:   : yes, almost 40 years.    Children: 8    MENTAL STATUS AND OBSERVATIONS:  APPEARANCE: Appropriately dressed/groomed.  ALERTNESS/ORIENTATION: Attentive and alert. Demonstrated intact episodic memory for recent events.    GAIT/MOTOR: Ambulated independently. Mild right hand tremor was noted during testing. He often broke the tips of pencils due to pushing down hard.   SENSORY: Corrective lenses.    SPEECH/LANGUAGE: Normal in rate, rhythm, tone, and volume. Expressive and receptive language were grossly intact.  STATED MOOD/AFFECT: Mood was euthymic. He cursed several times during the intake. He also rob a line graph to explain one of his philosophies/values and rob part of it on the examiner's desk, with no acknowledgment of it.   INTERPERSONAL BEHAVIOR: Rapport was quickly and easily established   THOUGHT PROCESSES: Thoughts seemed logical and goal-directed.   BEHAVIORAL OBSERVATIONS: Scores on a standalone PVT were WNL. 1/2 scores on embedded PVTs was at established cut scores. He appeared frustrated with performance on tests of learning/memory. He had no difficulty comprehending/retaining test instructions. Test results are believed to be a valid/reliable measure of his current cognitive abilities.      APPENDIX/TEST RESULTS:  TESTS ADMINISTERED:  Clinical Interview and Review of Records, MSVT, Test of Premorbid Functioning (TOPF), Adal Cognitive Assessment (MoCA), selected subtests from the Wechsler Adult Intelligence Scale - 4th edition (WAIS-IV, ACS Demographically Adjusted Norms), [place] Verbal Learning Test - second edition (CVLT-2), Logical Memory  subtest from the WMS-IV ([facility] Demographically Adjusted Norms), Naming subtest from the NAB, Controlled Oral Word Association Test ([facility] Norms), Animal Fluency ([facility] Norms), Trailmaking Test ([facility] Norms), Marco A Complex Figure (Copy Trial), Grooved Pegboard Test ([facility] Norms), [place] Card Sorting Test - 64 card version (WCST-64), Generalized Anxiety Disorder - 7 (MARTIN-7), and the Contreras Depression Scale - 2nd edition (BDI-2).     Score Label T-Score Standard Score Z-Score Scaled Score %ile Rank   Exceptionally High > 70 > 130 > 2.0  > 16 > 98   Above Average 64-69 120-129 1.4-1.9 15 91-97   High Average 57-63 110-119 0.7-1.3 12-14 75-90   Average 44-56  0.6 to -0.6 8-11 25-74   Low Average 37-43 80-89 -1.3 to -0.7 6-7 9-24   Below Average 30-36 70-79 -2.0 to -1.4 4-5 2-8   Exceptionally Low < 30 < 70  < -2.0 < 4 < 2      Mental Status: He was oriented to the month, year, and day of the week. He was 2 days off the exact date. He was fully oriented to location.   8/2022 MoCA = 22/30     Pre-morbid/Baseline: Single word reading was average while educational/occupational attainment was suggestive of high average range abilities.      Language: Low average semantic verbal fluency. Below average performance on a test of letter verbal fluency. He demonstrated poor persistence on all verbal fluency trials with very few words in the last 30 seconds. He offered 0 words in the last 30 seconds for 1 trials and only 1 word for another. Average performance on a test of confrontation naming with 2 semantic paraphasic errors (lemon and kiwi for lime, celery for asparagus).      Visuospatial: He rob the numbers outside of the contour for his drawing of a clock face, but all were included and in the correct position. He accurately set the clock hands, but they were the incorrect length. Disorganized and rushed approach to his copy of a complex figure. While he made several errors, they appeared secondary to his disorganized  approach rather than visuospatial dysfunction.      Learning/Memory: Overall encoding of a supraspan word list was low average as he recalled 3, 7, 7, 7, and 6 of 16 words across the learning trials. He grouped the words by serial position rather than semantic category. He then encoded 5/16 words from a second, distracter list (average). He freely recalled 4/16 words following exposure to the distracter list (low average) and 3/16 words with categorical cueing (exceptionally low). He freely recalled 2/16 words following a long delay (below average, 2 intrusion errors) and 4/16 with categorical cueing (below average, 4 intrusion errors). Recognition was WNL, but remarkable for a positive response bias (16/16 hits, 14/16 false positives, 8 list B). He encoded 4/5 words after 2 trials on the MoCA, freely recalling 2/5 following a brief delay. He recalled 1 word with categorical cueing. He did not correctly identify the remaining 2 words with multiple choice cueing. Overall encoding of two short stories was average. He required a cue for the first story following a long delay, then recalled 7/13 previously encoded details. He also required a cue for the second story and then recalled 11/11 previously encoded details. His overall recall was average. Responses to yes/no questions pertaining to the stories was high average.      Executive Functioning: One trial learning/encoding depended on the task as he performed in the average range on a story learning test and below average on word learning tests. He provided 5/5 correct responses on a serial 7 subtraction task. High average performance on a test of conceptual/abstract reasoning. Low average to average performance on tests of processing speed. Average performance on a test requiring him to maintain a complex set. Significant difference between 2 tests of working memory was he performed in the below average range on a digit span task, but in the average range on a test  of mental arithmetic. He quickly solved the first category on a card sorting test, but then perseverated on that category for the remaining 54 trials on the test.     Motor: Fine motor abilities was average, bilaterally. He attempted to use his off hand on 2 occasions for the left hand trial.      Mood: Responses on a self-reported inventory were not indicative of clinical depression. He also did not endorse clinical anxiety.       Raw Score Type of Standardized Score Standardized Score Percentile/CP   MSVT  - - -   MSVT  - - -   MSVT Cons 100 - - -   MSVT PA 70 - - -   MSVT FR 50 - - -   ACS RDS 7 - - -   CVLT-II FC 16 - - -   PREMORBID FUNCTIONING Raw Score Type of Standardized Score Standardized Score Percentile/CP   TOPF simple dem. eFSIQ -  82   TOPF pred. eFSIQ -  53   TOPF simple + pred. eFSIQ -  73   INTELLECTUAL FUNCTIONING Raw Score Type of Standardized Score Standardized Score Percentile/CP   WAIS-IV       WMI - T 44    PSI - T 40    Similarities 30 T 57    Digit Span 19 T 35          DS Forward 9 ss 9 37         DS Backward 5 ss 6 9         DS Sequence 5 ss 8 25         Longest Digit Forward 6 - - -         Longest Digit Backward 3 - - -         Longest Digit Sequence 4 - - -   Arithmetic 17 T 54    Symbol Search 16 T 39    Coding 41 T 44    COGNITIVE SCREENING Raw Score Type of Standardized Score Standardized Score Percentile/CP   MoCA 22 - - -   Orientation - Place 2/2 - - -   Orientation - Date 3/4 - - -   LANGUAGE FUNCTIONING Raw Score Type of Standardized Score Standardized Score Percentile/CP   WAIS-IV Similarities 30 ss 14 91   TOPF Word Reading 38 SS 99 47   NAB Naming 29 Tscore 46 34   NAB Naming Percent Correct After Semantic Cuing 50 - - 71   NAB Naming Percent Correct After Phonemic Cuing 100 - - 100   FAS 21 Tscore 31 3   Animal Naming 14 Tscore 40 16   VISUOSPATIAL FUNCTIONING Raw Score Type of Standardized Score Standardized Score Percentile/CP   RCFT Copy  22.5 - - <1   RCFT Time to Copy 119 - - >16   LEARNING & MEMORY Raw Score Type of Standardized Score Standardized Score Percentile/CP   CVLT-II       Trials 1-5 (T-Score) 30 Tscore 42 21   List A Trial 1 3 zscore -1.5 6.681   List A Trial 5 6 zscore -1.5 7   List B 5 zscore 0 50   SDFR 4 zscore -1 16   SDCR 3 zscore -2.5 1   LDFR 2 zscore -2 2   LDCR 4 zscore -2 2   Semantic Clustering 0 zscore -0.5 31   Learning [place] 0.6 zscore -1 16   Repetitions 1 zscore -0.5 31   Intrusions 14 zscore 1.5 93   Recognition Hits 16 zscore 1 84   False Positives 14 zscore 2.5 99   Discriminability 2 zscore -0.5 31   WMS-IV       Auditory Immediate (additional score) - SS 97 42   Auditory Delayed (additional score) - SS 86 18   Auditory Memory - T 41    WMS-IV Subtests       LM I 34 T 49    LM II 18 T 52    LM Recognition 20 - - >75   (CVLT-II Trials 1-5) 42 T 41    (CVLT-II Long Delay) -2 T 28    ATTENTION/WORKING MEMORY Raw Score Type of Standardized Score Standardized Score Percentile/CP   WAIS-IV WMI -  63   WAIS-IV Digit Span 19 ss 8 25         DS Forward 9 ss 9 37         DS Backward 5 ss 6 9         DS Sequence 5 ss 8 25         Longest Digit Forward 6 - - -         Longest Digit Backward 3 - - -         Longest Digit Sequence 4 - - -   WAIS-IV Arithmetic 17 ss 14 91   MENTAL PROCESSING SPEED Raw Score Type of Standardized Score Standardized Score Percentile/CP   WAIS-IV PSI - SS 92 30   WAIS-IV Symbol Search 16 ss 8 25   WAIS-IV Coding 41 ss 9 37   TMT A  32 Tscore 53 62   TMT A errors 0 - - -   EXECUTIVE FUNCTIONING Raw Score Type of Standardized Score Standardized Score Percentile/CP   TMT B 122 Tscore 44 27   TMT B errors 2 - - -   WCST-64       Total Correct 24 - - -   Total Errors 40 SS 72 3   Perseverative Resp. 54 SS <55 <1   Perseverative Err. 40 SS <55 <1   Nonperseverative Err. 0 SS >145 >99   Concept. Level Response 13 SS 71 3   Categories Completed 1 - - 11-16   FMS 0 - -    Learning to Learn N/A - -     WAIS-IV Similarities 30 ss 14 91   FRONTOMOTOR  Raw Score Type of Standardized Score Standardized Score Percentile/CP   GPT DH 75 Tscore 53 62   GPD ND 84 Tscore 52 58   MOOD & PERSONALITY Raw Score Type of Standardized Score Standardized Score Percentile/CP   BDI-2 5 - - -   MARTIN-7 2 - - -

## 2022-08-19 PROBLEM — G31.84 MILD NEUROCOGNITIVE DISORDER: Status: ACTIVE | Noted: 2022-08-11

## 2022-08-19 NOTE — ASSESSMENT & PLAN NOTE
Diagnostics: He may benefit from a brain MRI, updated neurological exam, and/or PET scan.     Compensatory Mechanisms: Mr. Short would likely benefit from increasing organizational systems, including placing items in a consistent location.      Driving: He may benefit from a vision evaluation.      Neuropsychiatric Symptoms: Monitor possible irritability as this may be a point of intervention in the future.       Optimize Brain Health: Prioritize physical/social/cognitive activity/stimulation. Maintain a heart healthy diet.      Follow-up: Interval testing in 1-2 years.

## 2022-08-25 ENCOUNTER — OFFICE VISIT (OUTPATIENT)
Dept: NEUROLOGY | Facility: CLINIC | Age: 77
End: 2022-08-25
Payer: COMMERCIAL

## 2022-08-25 DIAGNOSIS — G31.84 MILD NEUROCOGNITIVE DISORDER: Primary | ICD-10-CM

## 2022-08-25 DIAGNOSIS — F95.2 TOURETTES SYNDROME: ICD-10-CM

## 2022-08-25 PROCEDURE — 99499 UNLISTED E&M SERVICE: CPT | Mod: S$GLB,,, | Performed by: PSYCHIATRY & NEUROLOGY

## 2022-08-25 PROCEDURE — 99499 NO LOS: ICD-10-PCS | Mod: S$GLB,,, | Performed by: PSYCHIATRY & NEUROLOGY

## 2022-08-25 NOTE — PROGRESS NOTES
NEUROPSYCHOLOGICAL EVALUATION - CONFIDENTIAL  FEEDBACK NOTE    On 8/25/2022, I provided Mr. Patrick Short and his wife the neuropsychological evaluation results. Please see the full report for a comprehensive overview of the findings. Mr. Short was provided a copy of the report and invited to call with additional questions.      Jose Hill Psy.D., ABPP  Board Certified in Clinical Neuropsychology  [facility] - Department of Neurology

## 2022-08-26 ENCOUNTER — PATIENT MESSAGE (OUTPATIENT)
Dept: NEUROLOGY | Facility: CLINIC | Age: 77
End: 2022-08-26
Payer: COMMERCIAL

## 2022-09-28 DIAGNOSIS — R47.89 OTHER SPEECH DISTURBANCE: ICD-10-CM

## 2022-09-28 DIAGNOSIS — I25.10 CORONARY ARTERY DISEASE INVOLVING NATIVE CORONARY ARTERY OF NATIVE HEART WITHOUT ANGINA PECTORIS: Primary | ICD-10-CM

## 2022-10-06 ENCOUNTER — OFFICE VISIT (OUTPATIENT)
Dept: NEUROLOGY | Facility: CLINIC | Age: 77
End: 2022-10-06
Payer: COMMERCIAL

## 2022-10-06 ENCOUNTER — OFFICE VISIT (OUTPATIENT)
Dept: PULMONOLOGY | Facility: CLINIC | Age: 77
End: 2022-10-06
Payer: COMMERCIAL

## 2022-10-06 VITALS
HEART RATE: 81 BPM | DIASTOLIC BLOOD PRESSURE: 90 MMHG | BODY MASS INDEX: 32.35 KG/M2 | WEIGHT: 231.06 LBS | WEIGHT: 230.19 LBS | SYSTOLIC BLOOD PRESSURE: 153 MMHG | HEIGHT: 71 IN | BODY MASS INDEX: 32.23 KG/M2 | SYSTOLIC BLOOD PRESSURE: 138 MMHG | DIASTOLIC BLOOD PRESSURE: 78 MMHG | OXYGEN SATURATION: 97 % | HEIGHT: 71 IN | HEART RATE: 67 BPM

## 2022-10-06 DIAGNOSIS — G45.9 TIA DUE TO EMBOLISM: Primary | ICD-10-CM

## 2022-10-06 DIAGNOSIS — G62.9 POLYNEUROPATHY: ICD-10-CM

## 2022-10-06 DIAGNOSIS — G45.1 HEMISPHERIC CAROTID ARTERY SYNDROME: Primary | ICD-10-CM

## 2022-10-06 DIAGNOSIS — G45.1 TIA INVOLVING CAROTID ARTERY: Primary | ICD-10-CM

## 2022-10-06 DIAGNOSIS — G45.1 TIA INVOLVING LEFT INTERNAL CAROTID ARTERY: ICD-10-CM

## 2022-10-06 DIAGNOSIS — I74.9 TIA DUE TO EMBOLISM: Primary | ICD-10-CM

## 2022-10-06 PROCEDURE — 99214 PR OFFICE/OUTPT VISIT, EST, LEVL IV, 30-39 MIN: ICD-10-PCS | Mod: S$GLB,,, | Performed by: INTERNAL MEDICINE

## 2022-10-06 PROCEDURE — 3080F DIAST BP >= 90 MM HG: CPT | Mod: CPTII,S$GLB,, | Performed by: PSYCHIATRY & NEUROLOGY

## 2022-10-06 PROCEDURE — 99999 PR PBB SHADOW E&M-EST. PATIENT-LVL III: ICD-10-PCS | Mod: PBBFAC,,, | Performed by: INTERNAL MEDICINE

## 2022-10-06 PROCEDURE — 1101F PT FALLS ASSESS-DOCD LE1/YR: CPT | Mod: CPTII,S$GLB,, | Performed by: INTERNAL MEDICINE

## 2022-10-06 PROCEDURE — 1126F AMNT PAIN NOTED NONE PRSNT: CPT | Mod: CPTII,S$GLB,, | Performed by: PSYCHIATRY & NEUROLOGY

## 2022-10-06 PROCEDURE — 99999 PR PBB SHADOW E&M-EST. PATIENT-LVL III: ICD-10-PCS | Mod: PBBFAC,,, | Performed by: PSYCHIATRY & NEUROLOGY

## 2022-10-06 PROCEDURE — 1100F PTFALLS ASSESS-DOCD GE2>/YR: CPT | Mod: CPTII,S$GLB,, | Performed by: PSYCHIATRY & NEUROLOGY

## 2022-10-06 PROCEDURE — 1100F PR PT FALLS ASSESS DOC 2+ FALLS/FALL W/INJURY/YR: ICD-10-PCS | Mod: CPTII,S$GLB,, | Performed by: PSYCHIATRY & NEUROLOGY

## 2022-10-06 PROCEDURE — 3288F PR FALLS RISK ASSESSMENT DOCUMENTED: ICD-10-PCS | Mod: CPTII,S$GLB,, | Performed by: INTERNAL MEDICINE

## 2022-10-06 PROCEDURE — 3078F PR MOST RECENT DIASTOLIC BLOOD PRESSURE < 80 MM HG: ICD-10-PCS | Mod: CPTII,S$GLB,, | Performed by: INTERNAL MEDICINE

## 2022-10-06 PROCEDURE — 1159F MED LIST DOCD IN RCRD: CPT | Mod: CPTII,S$GLB,, | Performed by: PSYCHIATRY & NEUROLOGY

## 2022-10-06 PROCEDURE — 1101F PR PT FALLS ASSESS DOC 0-1 FALLS W/OUT INJ PAST YR: ICD-10-PCS | Mod: CPTII,S$GLB,, | Performed by: INTERNAL MEDICINE

## 2022-10-06 PROCEDURE — 3078F DIAST BP <80 MM HG: CPT | Mod: CPTII,S$GLB,, | Performed by: INTERNAL MEDICINE

## 2022-10-06 PROCEDURE — 3288F PR FALLS RISK ASSESSMENT DOCUMENTED: ICD-10-PCS | Mod: CPTII,S$GLB,, | Performed by: PSYCHIATRY & NEUROLOGY

## 2022-10-06 PROCEDURE — 3288F FALL RISK ASSESSMENT DOCD: CPT | Mod: CPTII,S$GLB,, | Performed by: INTERNAL MEDICINE

## 2022-10-06 PROCEDURE — 3077F PR MOST RECENT SYSTOLIC BLOOD PRESSURE >= 140 MM HG: ICD-10-PCS | Mod: CPTII,S$GLB,, | Performed by: PSYCHIATRY & NEUROLOGY

## 2022-10-06 PROCEDURE — 99999 PR PBB SHADOW E&M-EST. PATIENT-LVL III: CPT | Mod: PBBFAC,,, | Performed by: PSYCHIATRY & NEUROLOGY

## 2022-10-06 PROCEDURE — 99215 PR OFFICE/OUTPT VISIT, EST, LEVL V, 40-54 MIN: ICD-10-PCS | Mod: S$GLB,,, | Performed by: PSYCHIATRY & NEUROLOGY

## 2022-10-06 PROCEDURE — 3288F FALL RISK ASSESSMENT DOCD: CPT | Mod: CPTII,S$GLB,, | Performed by: PSYCHIATRY & NEUROLOGY

## 2022-10-06 PROCEDURE — 1159F PR MEDICATION LIST DOCUMENTED IN MEDICAL RECORD: ICD-10-PCS | Mod: CPTII,S$GLB,, | Performed by: PSYCHIATRY & NEUROLOGY

## 2022-10-06 PROCEDURE — 3080F PR MOST RECENT DIASTOLIC BLOOD PRESSURE >= 90 MM HG: ICD-10-PCS | Mod: CPTII,S$GLB,, | Performed by: PSYCHIATRY & NEUROLOGY

## 2022-10-06 PROCEDURE — 1159F MED LIST DOCD IN RCRD: CPT | Mod: CPTII,S$GLB,, | Performed by: INTERNAL MEDICINE

## 2022-10-06 PROCEDURE — 1126F PR PAIN SEVERITY QUANTIFIED, NO PAIN PRESENT: ICD-10-PCS | Mod: CPTII,S$GLB,, | Performed by: PSYCHIATRY & NEUROLOGY

## 2022-10-06 PROCEDURE — 99214 OFFICE O/P EST MOD 30 MIN: CPT | Mod: S$GLB,,, | Performed by: INTERNAL MEDICINE

## 2022-10-06 PROCEDURE — 3075F PR MOST RECENT SYSTOLIC BLOOD PRESS GE 130-139MM HG: ICD-10-PCS | Mod: CPTII,S$GLB,, | Performed by: INTERNAL MEDICINE

## 2022-10-06 PROCEDURE — 3075F SYST BP GE 130 - 139MM HG: CPT | Mod: CPTII,S$GLB,, | Performed by: INTERNAL MEDICINE

## 2022-10-06 PROCEDURE — 1159F PR MEDICATION LIST DOCUMENTED IN MEDICAL RECORD: ICD-10-PCS | Mod: CPTII,S$GLB,, | Performed by: INTERNAL MEDICINE

## 2022-10-06 PROCEDURE — 3077F SYST BP >= 140 MM HG: CPT | Mod: CPTII,S$GLB,, | Performed by: PSYCHIATRY & NEUROLOGY

## 2022-10-06 PROCEDURE — 99999 PR PBB SHADOW E&M-EST. PATIENT-LVL III: CPT | Mod: PBBFAC,,, | Performed by: INTERNAL MEDICINE

## 2022-10-06 PROCEDURE — 99215 OFFICE O/P EST HI 40 MIN: CPT | Mod: S$GLB,,, | Performed by: PSYCHIATRY & NEUROLOGY

## 2022-10-06 RX ORDER — OFLOXACIN 3 MG/ML
SOLUTION/ DROPS OPHTHALMIC
COMMUNITY
Start: 2022-09-20 | End: 2022-12-08

## 2022-10-06 NOTE — LETTER
October 6, 2022        Darian Lloyd MD  [address]  [facility] [address]             [facility]  [address]  [facility] [address]  Phone: [phone]  Fax: [phone]   Patient: Patrick Short   MR Number: [id number]   YOB: 1945   Date of Visit: 10/6/2022       Dear Dr. Lloyd:    Thank you for referring Patrick Short to me for evaluation. Below are the relevant portions of my assessment and plan of care.            If you have questions, please do not hesitate to call me. I look forward to following Patrick along with you.    Sincerely,      Frantz Bee MD           CC    No Recipients

## 2022-10-06 NOTE — PROGRESS NOTES
Subjective:      Patient ID: Patrick Short is a 77 y.o. male.    Chief Complaint: No chief complaint on file.  78 yo long term friend and patient comes for assessment of what clinically appears to be a TIA event last evening. He has hx of CAD with several stents and is followed by Dr. Mendoza.    Last evening he felt wobbly and  while trying   to  converse with his wife he had dysarthria. His speech was garbled.  This cleared in several minutes.  He has noted in recent months problems with recent memory.  He had a cataract operation recently without incident.     He is scheduled on going to go on a cruise with his wife to [facility] in several days.     Have  spokento Dr. Bynum and he has agreed to see him this AM.      No flowsheet data found.  Review of Systems   Constitutional: Negative.    HENT: Negative.     Eyes: Negative.         Recent successful cataract operation.   Respiratory: Negative.     Cardiovascular: Negative.         CAD with several stents   Genitourinary: Negative.    Musculoskeletal: Negative.    Skin: Negative.    Gastrointestinal: Negative.    Neurological: Negative.         Recent TIA event    Psychiatric/Behavioral: Negative.     Objective:     Physical Exam  Constitutional:       Appearance: He is well-developed.   HENT:      Head: Normocephalic and atraumatic.      Right Ear: External ear normal.      Left Ear: External ear normal.   Eyes:      Conjunctiva/sclera: Conjunctivae normal.      Pupils: Pupils are equal, round, and reactive to light.   Cardiovascular:      Rate and Rhythm: Normal rate and regular rhythm.      Heart sounds: Normal heart sounds.      Comments: /90    Sa02: 98%  Pulmonary:      Effort: Pulmonary effort is normal.      Breath sounds: Normal breath sounds.   Abdominal:      General: Bowel sounds are normal.      Palpations: Abdomen is soft.   Musculoskeletal:         General: Normal range of motion.      Cervical back: Normal range of motion and neck supple.    Skin:     General: Skin is warm and dry.   Neurological:      Mental Status: He is alert and oriented to person, place, and time.      Deep Tendon Reflexes: Reflexes are normal and symmetric.   Psychiatric:         Behavior: Behavior normal.         Thought Content: Thought content normal.         Judgment: Judgment normal.       Assessment:     1. TIA due to embolism      Outpatient Encounter Medications as of 10/6/2022   Medication Sig Dispense Refill    aspirin 81 MG Chew Take 1 tablet (81 mg total) by mouth once daily.  0    atorvastatin (LIPITOR) 40 MG tablet TAKE 1 TABLET BY MOUTH EVERY DAY 90 tablet 3    cetirizine (ZYRTEC) 10 MG tablet Take 10 mg by mouth once daily.      clobetasol (TEMOVATE) 0.05 % cream APPLY TO AFFECTED AREA ON HAND TWICE A DAY AS NEEDED FOR RASH  1    desonide (DESOWEN) 0.05 % cream APPLY TO AFFECTED AREA ON FACE TWICE A DAY AS NEEDED FOR SCALE  1    finasteride (PROSCAR) 5 mg tablet 1 tablet once daily.      nitroGLYCERIN (NITROSTAT) 0.4 MG SL tablet Place 1 tablet (0.4 mg total) under the tongue every 5 (five) minutes as needed for Chest pain. (Patient not taking: Reported on 7/26/2022) 30 tablet 1    pantoprazole (PROTONIX) 40 MG tablet Take 1 tablet (40 mg total) by mouth once daily. 90 tablet 3    predniSONE (DELTASONE) 10 MG tablet Take 3 tablets x 7 days then 2 tablets x 7days. 36 tablet 0    triamcinolone acetonide 0.1% (KENALOG) 0.1 % ointment APPLY TO AFFECTED AREA ON AXILLAS TWICE A DAY AS NEEDED FOR ITCH/RASH  1     No facility-administered encounter medications on file as of 10/6/2022.       Plan:     Problem List Items Addressed This Visit    None  Visit Diagnoses       TIA due to embolism    -  Primary            Recent TIA event last evening

## 2022-10-06 NOTE — PROGRESS NOTES
Outpatient Neurology Consultation    Requesting physician: Dr. Lloyd    Impression:  Probable L cortical TIA 10/5/22:  focal seizure in DDx (given fall, will exclude SDH with MRI); acephalgic migraine unlikely  Exam suggestive of sensory polyneuropathy  MCI  Mild parkinsonism    Plan:  Continue ASA  CTA head/neck  MRI brain  Clopidogrel 75mg/d x 21 days if MRI c/w TIA  HgA1c, B12, TSH  Continue atorvastatin 40mg/d; target LDL <70mg/dL  30 day event monitor if above unrevealing for etiology  I will arrange f/u after MRI and CTA    Problem List Items Addressed This Visit    None  Visit Diagnoses       Hemispheric carotid artery syndrome    -  Primary    Relevant Orders    MRI Brain Without Contrast    CTA Head and Neck (xpd)    TIA involving left internal carotid artery        Relevant Orders    MRI Brain Without Contrast    CTA Head and Neck (xpd)    Polyneuropathy        Relevant Orders    Hemoglobin A1C    Vitamin B12    TSH            CC: TIA    HPI:  76 y/o WM referred by Dr. Lloyd for episode of language dysfunction.  He presents with his wife who assists with the history.  He had a mechanical fall yesterday afternoon.  He reports hitting his head without LOC.  Around 5p, sudden word salad x 1-2 mins.  No focal symptoms.  He was tired after and took a nap.  No headache. No history of similar symptoms.    Echo in Jun '22 unremarkable; ECG 7/28/22 - NSR    Past Medical History:   Diagnosis Date    CAD (coronary artery disease) 7/26/2016    Heart block     MI (myocardial infarction)     Reflux       Past Surgical History:   Procedure Laterality Date    CARDIAC CATHETERIZATION      EYE SURGERY Left 02/2017    HIP SURGERY Right 12/2021      Outpatient Medications Marked as Taking for the 10/6/22 encounter (Office Visit) with Frantz Bee MD   Medication Sig Dispense Refill    aspirin 81 MG Chew Take 1 tablet (81 mg total) by mouth once daily.  0    atorvastatin (LIPITOR) 40 MG tablet TAKE 1 TABLET BY MOUTH  "EVERY DAY 90 tablet 3    cetirizine (ZYRTEC) 10 MG tablet Take 10 mg by mouth once daily.      clobetasol (TEMOVATE) 0.05 % cream APPLY TO AFFECTED AREA ON HAND TWICE A DAY AS NEEDED FOR RASH  1    desonide (DESOWEN) 0.05 % cream APPLY TO AFFECTED AREA ON FACE TWICE A DAY AS NEEDED FOR SCALE  1    finasteride (PROSCAR) 5 mg tablet 1 tablet once daily.      ofloxacin (OCUFLOX) 0.3 % ophthalmic solution       pantoprazole (PROTONIX) 40 MG tablet Take 1 tablet (40 mg total) by mouth once daily. 90 tablet 3    triamcinolone acetonide 0.1% (KENALOG) 0.1 % ointment APPLY TO AFFECTED AREA ON AXILLAS TWICE A DAY AS NEEDED FOR ITCH/RASH  1      Review of patient's allergies indicates:  No Known Allergies   Family History   Problem Relation Age of Onset    Hypertension Mother     Emphysema Father     Coronary artery disease Father       Social History     Socioeconomic History    Marital status:    Tobacco Use    Smoking status: Never    Smokeless tobacco: Never   Substance and Sexual Activity    Alcohol use: Yes     Comment: twice a month/beer wine    Drug use: Never    Sexual activity: Yes     Partners: Female     Comment: Wife     Review Of Systems:  General: Negative for fever   HENT: Negative for tinnitus, nose bleeds, neck stiffness   Cardiac Negative for palpitations   Vascular: Negative for easy bruising   Pulmonary: Negative for cough   Gastrointestinal: Negative for constipation   Urinary: Negative for incomplete bladder emptying   Musculoskeletal: Negative for muscle aches   Neurological: As above. +tics   Psychiatric:  Negative for depression     BP (!) 153/90   Pulse 67   Ht 5' 11" (1.803 m)   Wt 104.4 kg (230 lb 2.6 oz)   BMI 32.10 kg/m²    Well developed, well nourished male  Extremities: no edema    Mental status:   Awake, alert and appropriately oriented   Normal recent and remote memory   Normal attention and concentration   Normal speech and language   Normal fund of knowledge   No " extinction  Cranial nerves:   Normal funduscopic - discs sharp   PERRLA   EOMF without nystagmus   VFF   Normal facial sensation   Normal facial movements   Intact hearing bilaterally   Palate elevates symmetrically   Normal SCM and trapezius strength   Tongue midline  Motor:   No pronator drift   Normal FF movements bilaterally   Normal muscle tone, bulk and power (no cogwheeling)   No abnormal movements  Sensory   Intact to LT  Glove-stocking decrease PP, temperature  DTRs   1-2+ and symmetric except absent AJs   Plantar responses are flexor bilaterally  Coordination   Intact to FNF, RAH, and HTS  Gait   Normal base and gait   No Romberg    Data Reviewed:  Lab Results   Component Value Date    LDLCALC 58.8 (L) 07/26/2022     Lab Results   Component Value Date    HGBA1C 5.3 11/16/2021     Lab Results   Component Value Date    TSH 2.361 11/16/2021     60 mins chart review, face to face, documentation    Frantz Bee MD

## 2022-10-07 ENCOUNTER — HOSPITAL ENCOUNTER (OUTPATIENT)
Dept: CARDIOLOGY | Facility: HOSPITAL | Age: 77
Discharge: HOME | End: 2022-10-07
Attending: INTERNAL MEDICINE
Payer: COMMERCIAL

## 2022-10-07 DIAGNOSIS — G45.1 TIA INVOLVING CAROTID ARTERY: ICD-10-CM

## 2022-10-07 LAB
LEFT ARM DIASTOLIC BLOOD PRESSURE: 97 MMHG
LEFT ARM SYSTOLIC BLOOD PRESSURE: 160 MMHG
LEFT CBA DIAS: 9 CM/S
LEFT CBA SYS: 38 CM/S
LEFT CCA DIST DIAS: 12 CM/S
LEFT CCA DIST SYS: 59 CM/S
LEFT CCA MID DIAS: 13 CM/S
LEFT CCA MID SYS: 59 CM/S
LEFT CCA PROX DIAS: 15 CM/S
LEFT CCA PROX SYS: 64 CM/S
LEFT ECA DIAS: 9 CM/S
LEFT ECA SYS: 64 CM/S
LEFT ICA DIST DIAS: 20 CM/S
LEFT ICA DIST SYS: 56 CM/S
LEFT ICA MID DIAS: 15 CM/S
LEFT ICA MID SYS: 56 CM/S
LEFT ICA PROX DIAS: 12 CM/S
LEFT ICA PROX SYS: 41 CM/S
LEFT VERTEBRAL DIAS: 12 CM/S
LEFT VERTEBRAL SYS: 35 CM/S
OHS CV CAROTID RIGHT ICA EDV HIGHEST: 14
OHS CV CAROTID ULTRASOUND LEFT ICA/CCA RATIO: 0.95
OHS CV CAROTID ULTRASOUND RIGHT ICA/CCA RATIO: 1.18
OHS CV PV CAROTID LEFT HIGHEST CCA: 64
OHS CV PV CAROTID LEFT HIGHEST ICA: 56
OHS CV PV CAROTID RIGHT HIGHEST CCA: 68
OHS CV PV CAROTID RIGHT HIGHEST ICA: 59
OHS CV US CAROTID LEFT HIGHEST EDV: 20
RIGHT ARM DIASTOLIC BLOOD PRESSURE: 97 MMHG
RIGHT ARM SYSTOLIC BLOOD PRESSURE: 165 MMHG
RIGHT CBA DIAS: 10 CM/S
RIGHT CBA SYS: 40 CM/S
RIGHT CCA DIST DIAS: 10 CM/S
RIGHT CCA DIST SYS: 50 CM/S
RIGHT CCA MID DIAS: 16 CM/S
RIGHT CCA MID SYS: 68 CM/S
RIGHT CCA PROX DIAS: 13 CM/S
RIGHT CCA PROX SYS: 56 CM/S
RIGHT ECA DIAS: 11 CM/S
RIGHT ECA SYS: 83 CM/S
RIGHT ICA DIST DIAS: 13 CM/S
RIGHT ICA DIST SYS: 57 CM/S
RIGHT ICA MID DIAS: 14 CM/S
RIGHT ICA MID SYS: 58 CM/S
RIGHT ICA PROX DIAS: 10 CM/S
RIGHT ICA PROX SYS: 59 CM/S
RIGHT VERTEBRAL DIAS: 8 CM/S
RIGHT VERTEBRAL SYS: 22 CM/S

## 2022-10-07 PROCEDURE — 93880 CV US DOPPLER CAROTID (CUPID ONLY): ICD-10-PCS | Mod: 26,,, | Performed by: INTERNAL MEDICINE

## 2022-10-07 PROCEDURE — 93880 EXTRACRANIAL BILAT STUDY: CPT

## 2022-10-07 PROCEDURE — 93880 EXTRACRANIAL BILAT STUDY: CPT | Mod: 26,,, | Performed by: INTERNAL MEDICINE

## 2022-10-10 ENCOUNTER — HOSPITAL ENCOUNTER (EMERGENCY)
Facility: HOSPITAL | Age: 77
Discharge: HOME | End: 2022-10-10
Attending: EMERGENCY MEDICINE
Payer: COMMERCIAL

## 2022-10-10 VITALS
HEART RATE: 55 BPM | OXYGEN SATURATION: 97 % | SYSTOLIC BLOOD PRESSURE: 140 MMHG | RESPIRATION RATE: 18 BRPM | DIASTOLIC BLOOD PRESSURE: 70 MMHG | TEMPERATURE: 98 F

## 2022-10-10 DIAGNOSIS — R55 SYNCOPE: ICD-10-CM

## 2022-10-10 DIAGNOSIS — I63.9 FOCAL INFARCTION OF BRAIN: ICD-10-CM

## 2022-10-10 DIAGNOSIS — R79.89 TROPONIN I ABOVE REFERENCE RANGE: Primary | ICD-10-CM

## 2022-10-10 DIAGNOSIS — R00.1 BRADYCARDIA: ICD-10-CM

## 2022-10-10 DIAGNOSIS — E83.39 HYPOPHOSPHATEMIA: ICD-10-CM

## 2022-10-10 LAB
ALBUMIN SERPL BCP-MCNC: 3.7 G/DL (ref 3.5–5.2)
ALP SERPL-CCNC: 64 U/L (ref 55–135)
ALT SERPL W/O P-5'-P-CCNC: 25 U/L (ref 10–44)
ANION GAP SERPL CALC-SCNC: 7 MMOL/L (ref 8–16)
AST SERPL-CCNC: 22 U/L (ref 10–40)
BASOPHILS # BLD AUTO: 0.03 K/UL (ref 0–0.2)
BASOPHILS NFR BLD: 0.6 % (ref 0–1.9)
BILIRUB SERPL-MCNC: 0.8 MG/DL (ref 0.1–1)
BILIRUB UR QL STRIP: NEGATIVE
BNP SERPL-MCNC: 83 PG/ML (ref 0–99)
BUN SERPL-MCNC: 15 MG/DL (ref 8–23)
CALCIUM SERPL-MCNC: 9 MG/DL (ref 8.7–10.5)
CHLORIDE SERPL-SCNC: 108 MMOL/L (ref 95–110)
CLARITY UR REFRACT.AUTO: CLEAR
CO2 SERPL-SCNC: 25 MMOL/L (ref 23–29)
COLOR UR AUTO: YELLOW
CREAT SERPL-MCNC: 0.8 MG/DL (ref 0.5–1.4)
DIFFERENTIAL METHOD: ABNORMAL
EOSINOPHIL # BLD AUTO: 0.2 K/UL (ref 0–0.5)
EOSINOPHIL NFR BLD: 4.1 % (ref 0–8)
ERYTHROCYTE [DISTWIDTH] IN BLOOD BY AUTOMATED COUNT: 13 % (ref 11.5–14.5)
EST. GFR  (NO RACE VARIABLE): >60 ML/MIN/1.73 M^2
GLUCOSE SERPL-MCNC: 104 MG/DL (ref 70–110)
GLUCOSE UR QL STRIP: NEGATIVE
HCT VFR BLD AUTO: 43.4 % (ref 40–54)
HGB BLD-MCNC: 14.9 G/DL (ref 14–18)
HGB UR QL STRIP: NEGATIVE
IMM GRANULOCYTES # BLD AUTO: 0.01 K/UL (ref 0–0.04)
IMM GRANULOCYTES NFR BLD AUTO: 0.2 % (ref 0–0.5)
KETONES UR QL STRIP: NEGATIVE
LEUKOCYTE ESTERASE UR QL STRIP: NEGATIVE
LYMPHOCYTES # BLD AUTO: 1.5 K/UL (ref 1–4.8)
LYMPHOCYTES NFR BLD: 30.2 % (ref 18–48)
MAGNESIUM SERPL-MCNC: 1.6 MG/DL (ref 1.6–2.6)
MCH RBC QN AUTO: 31.6 PG (ref 27–31)
MCHC RBC AUTO-ENTMCNC: 34.3 G/DL (ref 32–36)
MCV RBC AUTO: 92 FL (ref 82–98)
MONOCYTES # BLD AUTO: 0.4 K/UL (ref 0.3–1)
MONOCYTES NFR BLD: 7.1 % (ref 4–15)
NEUTROPHILS # BLD AUTO: 2.9 K/UL (ref 1.8–7.7)
NEUTROPHILS NFR BLD: 57.8 % (ref 38–73)
NITRITE UR QL STRIP: NEGATIVE
NRBC BLD-RTO: 0 /100 WBC
PH UR STRIP: 8 [PH] (ref 5–8)
PHOSPHATE SERPL-MCNC: 2.6 MG/DL (ref 2.7–4.5)
PLATELET # BLD AUTO: 150 K/UL (ref 150–450)
PMV BLD AUTO: 11.8 FL (ref 9.2–12.9)
POCT GLUCOSE: 102 MG/DL (ref 70–110)
POTASSIUM SERPL-SCNC: 3.9 MMOL/L (ref 3.5–5.1)
PROT SERPL-MCNC: 6.3 G/DL (ref 6–8.4)
PROT UR QL STRIP: NEGATIVE
RBC # BLD AUTO: 4.72 M/UL (ref 4.6–6.2)
SODIUM SERPL-SCNC: 140 MMOL/L (ref 136–145)
SP GR UR STRIP: >1.03 (ref 1–1.03)
TROPONIN I SERPL DL<=0.01 NG/ML-MCNC: 0.06 NG/ML (ref 0–0.03)
TROPONIN I SERPL DL<=0.01 NG/ML-MCNC: 0.06 NG/ML (ref 0–0.03)
TSH SERPL DL<=0.005 MIU/L-ACNC: 3.1 UIU/ML (ref 0.4–4)
URN SPEC COLLECT METH UR: ABNORMAL
WBC # BLD AUTO: 5.07 K/UL (ref 3.9–12.7)

## 2022-10-10 PROCEDURE — 84484 ASSAY OF TROPONIN QUANT: CPT | Mod: 91

## 2022-10-10 PROCEDURE — 81003 URINALYSIS AUTO W/O SCOPE: CPT

## 2022-10-10 PROCEDURE — 93005 ELECTROCARDIOGRAM TRACING: CPT

## 2022-10-10 PROCEDURE — 93010 ELECTROCARDIOGRAM REPORT: CPT | Mod: ,,, | Performed by: INTERNAL MEDICINE

## 2022-10-10 PROCEDURE — 85025 COMPLETE CBC W/AUTO DIFF WBC: CPT

## 2022-10-10 PROCEDURE — 99285 EMERGENCY DEPT VISIT HI MDM: CPT | Mod: ,,, | Performed by: PSYCHIATRY & NEUROLOGY

## 2022-10-10 PROCEDURE — 99285 EMERGENCY DEPT VISIT HI MDM: CPT | Mod: 25

## 2022-10-10 PROCEDURE — 99285 PR EMERGENCY DEPT VISIT,LEVEL V: ICD-10-PCS | Mod: ,,, | Performed by: PSYCHIATRY & NEUROLOGY

## 2022-10-10 PROCEDURE — 84100 ASSAY OF PHOSPHORUS: CPT

## 2022-10-10 PROCEDURE — 83735 ASSAY OF MAGNESIUM: CPT

## 2022-10-10 PROCEDURE — 93010 EKG 12-LEAD: ICD-10-PCS | Mod: ,,, | Performed by: INTERNAL MEDICINE

## 2022-10-10 PROCEDURE — 80053 COMPREHEN METABOLIC PANEL: CPT

## 2022-10-10 PROCEDURE — 83880 ASSAY OF NATRIURETIC PEPTIDE: CPT

## 2022-10-10 PROCEDURE — 25000003 PHARM REV CODE 250

## 2022-10-10 PROCEDURE — 99285 EMERGENCY DEPT VISIT HI MDM: CPT | Mod: CS,,, | Performed by: EMERGENCY MEDICINE

## 2022-10-10 PROCEDURE — 84443 ASSAY THYROID STIM HORMONE: CPT

## 2022-10-10 PROCEDURE — 99285 PR EMERGENCY DEPT VISIT,LEVEL V: ICD-10-PCS | Mod: CS,,, | Performed by: EMERGENCY MEDICINE

## 2022-10-10 PROCEDURE — 82962 GLUCOSE BLOOD TEST: CPT

## 2022-10-10 PROCEDURE — 25500020 PHARM REV CODE 255: Performed by: EMERGENCY MEDICINE

## 2022-10-10 RX ORDER — ACETAMINOPHEN 500 MG
1000 TABLET ORAL
Status: COMPLETED | OUTPATIENT
Start: 2022-10-10 | End: 2022-10-10

## 2022-10-10 RX ORDER — SODIUM,POTASSIUM PHOSPHATES 280-250MG
2 POWDER IN PACKET (EA) ORAL ONCE
Status: COMPLETED | OUTPATIENT
Start: 2022-10-10 | End: 2022-10-10

## 2022-10-10 RX ORDER — LEVOCETIRIZINE DIHYDROCHLORIDE 5 MG/1
5 TABLET, FILM COATED ORAL NIGHTLY
COMMUNITY

## 2022-10-10 RX ADMIN — IOHEXOL 75 ML: 350 INJECTION, SOLUTION INTRAVENOUS at 09:10

## 2022-10-10 RX ADMIN — POTASSIUM & SODIUM PHOSPHATES POWDER PACK 280-160-250 MG 2 PACKET: 280-160-250 PACK at 10:10

## 2022-10-10 RX ADMIN — ACETAMINOPHEN 1000 MG: 500 TABLET ORAL at 12:10

## 2022-10-10 NOTE — ED PROVIDER NOTES
Encounter Date: 10/10/2022       History     Chief Complaint   Patient presents with    Loss of Consciousness      Passed out while getting an IV across the street at the imaging center. Also had a TIA last week. Pt currently AAOx4     77-year-old male with a past medical history of CAD, previous MI and TIA presents to the ED with wife at bedside via EMS from clinic where he was going to receive a workup for the TIA he suffered last week.  Patient was having an IV placed when he spontaneously syncopized lasting approximately 5 minutes. This occurred about 15-20 minutes PTA. This was witnessed and no trauma occurred.  Wife states that when he regained consciousness he had excessive yawning, however, he returned to baseline without any confusion.  Patient states that this has happened in the past and is usually related to needles.  Wife states that approximately 6 years ago he had an episode of syncope but at that time he also had an episode of asystole secondary to an MI. He also reports of an associated mild headache, however, he denies chest pain, shortness of breath, fever, chills, nausea, vomiting, diarrhea, dysuria and hematuria.  No other complaints at this time.    The history is provided by the patient and the spouse.   Review of patient's allergies indicates:  No Known Allergies  Past Medical History:   Diagnosis Date    CAD (coronary artery disease) 7/26/2016    Heart block     MI (myocardial infarction)     Reflux      Past Surgical History:   Procedure Laterality Date    CARDIAC CATHETERIZATION      EYE SURGERY Left 02/2017    HIP SURGERY Right 12/2021     Family History   Problem Relation Age of Onset    Hypertension Mother     Emphysema Father     Coronary artery disease Father      Social History     Tobacco Use    Smoking status: Never    Smokeless tobacco: Never   Substance Use Topics    Alcohol use: Yes     Comment: twice a month/beer wine    Drug use: Never     Review of Systems   Constitutional:   Negative for activity change, chills and fever.   HENT:  Negative for congestion, ear pain and sore throat.    Respiratory:  Negative for shortness of breath and stridor.    Cardiovascular:  Negative for chest pain and palpitations.   Gastrointestinal:  Negative for abdominal pain, nausea and vomiting.   Genitourinary:  Negative for dysuria and urgency.   Musculoskeletal:  Negative for back pain.   Skin:  Negative for rash.   Allergic/Immunologic: Negative for environmental allergies, food allergies and immunocompromised state.   Neurological:  Positive for syncope and headaches. Negative for dizziness and weakness.   Hematological:  Does not bruise/bleed easily.     Physical Exam     Initial Vitals [10/10/22 0724]   BP Pulse Resp Temp SpO2   (!) 147/63 (!) 55 16 97.6 °F (36.4 °C) 98 %      MAP       --         Physical Exam    Nursing note and vitals reviewed.  Constitutional: Vital signs are normal. He appears well-developed and well-nourished. He is not diaphoretic. No distress.   HENT:   Head: Normocephalic and atraumatic.   Right Ear: External ear normal.   Left Ear: External ear normal.   Eyes: EOM are normal. Right eye exhibits no discharge. Left eye exhibits no discharge.   Neck: Trachea normal. Neck supple. No thyroid mass present.   Cardiovascular:  Normal rate, regular rhythm, normal heart sounds and intact distal pulses.     Exam reveals no gallop and no friction rub.       No murmur heard.  Pulmonary/Chest: Breath sounds normal. No respiratory distress. He has no wheezes. He has no rhonchi. He has no rales.   Abdominal: Abdomen is soft. Bowel sounds are normal. He exhibits no distension. There is no abdominal tenderness. There is no rebound and no guarding.   Musculoskeletal:      Cervical back: Neck supple.     Neurological: He is alert and oriented to person, place, and time. He has normal strength. No cranial nerve deficit or sensory deficit. GCS score is 15. GCS eye subscore is 4. GCS verbal  subscore is 5. GCS motor subscore is 6.   Normal finger-to-nose exam.  5/5 strength in all 4 extremities.  No facial droop, no slurred speech.   Skin: Skin is warm and dry. Capillary refill takes less than 2 seconds. No rash noted.   Psychiatric: He has a normal mood and affect.       ED Course   Procedures  Labs Reviewed   CBC W/ AUTO DIFFERENTIAL - Abnormal; Notable for the following components:       Result Value    MCH 31.6 (*)     All other components within normal limits   COMPREHENSIVE METABOLIC PANEL - Abnormal; Notable for the following components:    Anion Gap 7 (*)     All other components within normal limits   TROPONIN I - Abnormal; Notable for the following components:    Troponin I 0.061 (*)     All other components within normal limits   URINALYSIS, REFLEX TO URINE CULTURE - Abnormal; Notable for the following components:    Specific Gravity, UA >1.030 (*)     All other components within normal limits    Narrative:     Specimen Source->Urine   PHOSPHORUS - Abnormal; Notable for the following components:    Phosphorus 2.6 (*)     All other components within normal limits   TROPONIN I - Abnormal; Notable for the following components:    Troponin I 0.056 (*)     All other components within normal limits   B-TYPE NATRIURETIC PEPTIDE   MAGNESIUM   TSH   POCT GLUCOSE   POCT GLUCOSE MONITORING CONTINUOUS        ECG Results              EKG 12-lead (Final result)  Result time 10/10/22 09:19:05      Final result by Interface, Lab In [facility] (10/10/22 09:19:05)                   Narrative:    Test Reason : R55,    Vent. Rate : 053 BPM     Atrial Rate : 053 BPM     P-R Int : 166 ms          QRS Dur : 094 ms      QT Int : 418 ms       P-R-T Axes : 053 033 007 degrees     QTc Int : 392 ms    Sinus bradycardia  Low voltage QRS  Tiny inferior Qs  Low anterior forces and R wave progression- Cannot rule out Anterior  infarct ,age undetermined but doubt  Abnormal ECG  When compared with ECG of 28-JUL-2022  19:39,  Vent. rate has decreased BY  26 BPM  QT has shortened  Confirmed by Tahng WIGGINS MD ([id number]) on 10/10/2022 9:18:54 AM    Referred By: [email or web address]   SELF           Confirmed By:Thang WIGGINS MD                                  Imaging Results               MRI Brain Without Contrast (Final result)  Result time 10/10/22 12:33:45      Final result by Jas Sigala MD (10/10/22 12:33:45)                   Impression:      Subcentimeter acute left parietal subcortical infarct.    Mild chronic small vessel ischemic change.    Multiple parenchymal microhemorrhages with multifocal areas of superficial siderosis affecting both cerebral hemispheres.  The appearance is not entirely specific but suggestive of cerebral amyloid angiopathy in a patient of this age.  For clinical correlation.    This report was flagged in Epic as abnormal.      Electronically signed by: Jas Sigala MD  Date:    10/10/2022  Time:    12:33               Narrative:    EXAMINATION:  MRI BRAIN WITHOUT CONTRAST    CLINICAL HISTORY:  Transient ischemic attack (TIA);    TECHNIQUE:  Multiplanar multisequence MR imaging of the brain was performed without intravenous contrast.    COMPARISON:  CT 10/10/2022    FINDINGS:  Intracranial Compartment:    Ventricles are normal in size for age without evidence of hydrocephalus.    Subcentimeter focus of diffusion restriction in the subcortical white matter of the left parietal lobe.  Findings in keeping with an acute infarct.  No corresponding mass effect or hemorrhage.  No additional areas of acute infarction elsewhere.  Mild patchy T2/FLAIR hyperintensity in the supratentorial white matter, nonspecific but most likely reflecting chronic small vessel ischemic changes. No remote major vascular distribution infarct.    Multifocal regions of curvilinear susceptibility artifact lining the cerebral sulci particularly in the frontal and parietal lobes bilaterally.  There is also multiple small scattered punctate  foci prior parenchymal hemorrhage.  No acute hematoma.    No intracranial mass effect or midline shift.    No extra-axial blood or fluid collections.    Normal vascular flow voids are preserved.    Skull/Extracranial Contents (limited evaluation):    Bone marrow signal intensity is normal.                                       CTA Head and Neck (xpd) (Final result)  Result time 10/10/22 09:22:05      Final result by Jas Sigala MD (10/10/22 09:22:05)                   Impression:      No evidence of acute hemorrhage or major vascular distribution infarct.    Mild chronic small-vessel ischemic change.    Modest atherosclerosis without evidence of high-grade stenosis or large vessel occlusion.      Electronically signed by: Jas Sgiala MD  Date:    10/10/2022  Time:    09:22               Narrative:    EXAMINATION:  CTA HEAD AND NECK (XPD)    CLINICAL HISTORY:  Transient ischemic attack (TIA);Syncope, recurrent;    TECHNIQUE:  Axial CT images obtained throughout the region of the head before and after the administration of intravenous contrast.  CT angiogram was performed through the cervical and intracranial vasculature during the IV bolus administration of 75mL of Omnipaque 350.  Multiplanar MPR and MIP reformats were performed.    CT source data was analyzed using artificial intelligence software for detection of a large vessel occlusions (LVO) in order to enable computer assisted triage notification and aid clinical stroke decision making.    COMPARISON:  None    FINDINGS:  The ventricles are normal in size without evidence of hydrocephalus.    Mild patchy hypoattenuation in the supratentorial white matter, nonspecific but most likely reflecting chronic small vessel ischemic changes. No recent or remote major vascular distribution infarct. No acute hemorrhage.  No mass effect or midline shift.  No enhancing lesions.    No extra-axial blood or fluid collections.    The cranium appears intact.    Mastoid  air cells and paranasal sinuses are essentially clear.    Left-sided pseudophakia.    Moderate cervical spondylosis.      CTA:    The aortic arch maintains a normal branching pattern.    The common carotid arteries are normal in caliber.  No significant plaque or stenosis at either carotid bifurcation.  Both internal carotid arteries demonstrating mild calcified plaque in the distal cervical segments without significant luminal narrowing.  Additional punctate calcification in cavernous left ICA.    The vertebral origins are patent. The cervical vertebral arteries are normal in course and caliber. Vertebrobasilar system is within normal limits without focal abnormality.    The anterior, middle, and posterior cerebral arteries are within normal limits, without evidence of significant stenosis, focal occlusion, or intracranial aneurysm formation.                                       X-Ray Chest PA And Lateral (Final result)  Result time 10/10/22 09:05:51      Final result by Isac Guerrero MD (10/10/22 09:05:51)                   Impression:      No acute cardiopulmonary disease and no detrimental interval change.      Electronically signed by: Isac Guerrero MD  Date:    10/10/2022  Time:    09:05               Narrative:    EXAMINATION:  XR CHEST PA AND LATERAL    CLINICAL HISTORY:  Syncope and collapse    TECHNIQUE:  PA and lateral views of the chest were performed.    COMPARISON:  08/09/2022    FINDINGS:  Cardiac silhouette is at the upper limits of normal size and unchanged.  Tortuous thoracic aorta.  Pulmonary vascularity does not appear congested.  Lungs are satisfactorily expanded and appear free of active disease.  No definite pleural fluid on today's study.  No pneumothorax.  Skeletal structures appear intact.                                       Medications   acetaminophen tablet 1,000 mg (1,000 mg Oral Given 10/10/22 1243)   iohexoL (OMNIPAQUE 350) injection 75 mL (75 mLs Intravenous Given 10/10/22 0906)    potassium, sodium phosphates 280-160-250 mg packet 2 packet (2 packets Oral Given 10/10/22 1000)     Medical Decision Making:   Initial Assessment:   77-year-old male who appears to be in no acute distress presents to the ED with wife at bedside following a syncope episode.  ABC's intact.  Physical exam is grossly unremarkable.  Differential Diagnosis:   Vasovagal syncope  Dehydration  Electrolyte abnormality  Cardiac arrhythmia  Anemia             ED Course as of 10/10/22 1439   Mon Oct 10, 2022   0917 Troponin I(!): 0.061  Troponin is elevated.  EKG did not show any obvious ischemic findings.  I will trend the troponin. [BG]   0921 Phosphorus(!): 2.6  Phosphorus is slightly below normal limits.  I will replete with 2 packets. [BG]   1151 Troponin I(!): 0.056  Troponin is down trending. [BG]   1251 MRI shows a subcentimeter acute left parietal subcortical infarct.  Consulted vascular neurology and they will come down to see the patient. [BG]   1252 Repeat EKG shows 55 beats per minute.  Sinus rhythm.  Normal axis.  No obvious ST segment or T-wave changes. [BG]   1436 Signed out patient to oncoming provider pending vascular neuro recommendations. [BG]      ED Course User Index  [BG] Vita Hernandez MD                 Clinical Impression:   Final diagnoses:  [R55] Syncope  [R00.1] Bradycardia  [R77.8] Troponin I above reference range (Primary)  [E83.39] Hypophosphatemia  [I63.9] Focal infarction of brain               Vita Hernandez MD  Resident  10/10/22 0366

## 2022-10-10 NOTE — DISCHARGE INSTRUCTIONS
Thank you for visiting us Today!    Please follow up with your primary care physician and Vascular neurology concerning your symptoms.

## 2022-10-10 NOTE — ED TRIAGE NOTES
Patrick Short, a 77 y.o. male presents to the ED w/ complaint of syncope    Triage note:  Chief Complaint   Patient presents with    Loss of Consciousness      Passed out while getting an IV across the street at the imaging center. Also had a TIA last week. Pt currently AAOx4     Review of patient's allergies indicates:  No Known Allergies  Past Medical History:   Diagnosis Date    CAD (coronary artery disease) 7/26/2016    Heart block     MI (myocardial infarction)     Reflux

## 2022-10-11 ENCOUNTER — PATIENT MESSAGE (OUTPATIENT)
Dept: NEUROLOGY | Facility: CLINIC | Age: 77
End: 2022-10-11
Payer: COMMERCIAL

## 2022-10-11 ENCOUNTER — TELEPHONE (OUTPATIENT)
Dept: NEUROLOGY | Facility: CLINIC | Age: 77
End: 2022-10-11
Payer: COMMERCIAL

## 2022-10-11 DIAGNOSIS — G45.1 HEMISPHERIC CAROTID ARTERY SYNDROME: Primary | ICD-10-CM

## 2022-10-11 RX ORDER — CLOPIDOGREL BISULFATE 75 MG/1
75 TABLET ORAL DAILY
Qty: 21 TABLET | Refills: 0 | Status: SHIPPED | OUTPATIENT
Start: 2022-10-11 | End: 2022-11-21

## 2022-10-11 NOTE — CONSULTS
[facility] - Emergency Dept  Vascular Neurology  [facility]  Consult Note    Consults-consult placed to vascular neurology    Assessment/Plan:     Patient is a 77 y.o. year old male with:    * Cerebral ischemic stroke due to global hypoperfusion with watershed infarct  77-year-old male with past medical history of previous MI, TIA, and CAD who presents to the ED the after suffering a bout of syncope while having an IV placed for CTA today which was recommended with MRI after being seen by Dr. Bee on 10/06 following an event on 10/05 during which he experienced difficulty speaking, with word salad for 1-2 minutes, which resolved spontaneously with only some fatigue noted afterwards.  MRI was completed in the ED and demonstrated a small subcentimeter acute left parietal infarction. CTA with no LVO.  NIH in the ED is 0. Etiology of infarction at this time is believed to most likely be due to watershed event related to hypoperfusion during syncopal event. Due to suspected etiology of infarction, will not start Plavix at this time, as there is no evidence of previous insult as relates to the event which initially brought the patient to see Dr. Bee.     Antithrombotics for secondary stroke prevention: Antiplatelets: Aspirin: 81 mg daily    Statins for secondary stroke prevention and hyperlipidemia, if present:   Statins: Atorvastatin- 40 mg daily    Aggressive risk factor modification: Diet, Obesity, CAD     Rehab efforts: The patient has been evaluated by a stroke team provider and the therapy needs have been fully considered based off the presenting complaints and exam findings. The following therapy evaluations are needed: None    Diagnostics ordered/pending: HgbA1C to assess blood glucose levels, TSH to assess thyroid function    VTE prophylaxis: None: Reason for No Pharmacological VTE Prophylaxis: Patient with NIH of 0 discharging from ED.    BP parameters: Infarct: No intervention, SBP  <220        CAD (coronary artery disease)  -chronic  -stroke risk factor  -previous MI        STROKE DOCUMENTATION          NIH Scale:  Interval: baseline  1a. Level of Consciousness: 0-->Alert, keenly responsive  1b. LOC Questions: 0-->Answers both questions correctly  1c. LOC Commands: 0-->Performs both tasks correctly  2. Best Gaze: 0-->Normal  3. Visual: 0-->No visual loss  4. Facial Palsy: 0-->Normal symmetrical movements  5a. Motor Arm, Left: 0-->No drift, limb holds 90 (or 45) degrees for full 10 secs  5b. Motor Arm, Right: 0-->No drift, limb holds 90 (or 45) degrees for full 10 secs  6a. Motor Leg, Left: 0-->No drift, leg holds 30 degree position for full 5 secs  6b. Motor Leg, Right: 0-->No drift, leg holds 30 degree position for full 5 secs  7. Limb Ataxia: 0-->Absent  8. Sensory: 0-->Normal, no sensory loss  9. Best Language: 0-->No aphasia, normal  10. Dysarthria: 0-->Normal  11. Extinction and Inattention (formerly Neglect): 0-->No abnormality  Total (NIH Stroke Scale): 0    Modified Henrietta Score: 0  Ruthie Coma Scale:15   ABCD2 Score:    [id number] Score:   HAS -BLED Score:   ICH Score:   Hunt & Macedo Classification:       Thrombolysis Candidate? No, Patient back to neurological baseline     Delays to Thrombolysis?  No    Interventional Revascularization Candidate?   Is the patient eligible for mechanical endovascular reperfusion (CRISTI)?  No; No large vessel occlusion identified on imaging  and No; no significant neurologic deficit (NIHSS <6)     Delays to Thrombectomy? No    Hemorrhagic change of an Ischemic Stroke: Does this patient have an ischemic stroke with hemorrhagic changes? No     Subjective:     History of Present Illness:  Patient is 77-year-old male with past medical history of previous MI, TIA, and CAD who presents to the ED the after suffering a bout of syncope while having an IV placed for CTA today.  The patient is back to baseline, and he and his wife state that this is been a  recurrent issue throughout his life, due to fear of needles.  His syncopal episode lasted about 5 minutes, and he returned to baseline without any prolonged confusion following the event, and without any additional focal neurologic symptoms.  He was scheduled to have MRI and CTA performed today, after being seen by Dr. Bee on 10/06 following an event on 10/05 during which he experienced difficulty speaking, with word salad for 1-2 minutes, which resolved spontaneously with only some fatigue noted afterwards.  MRI was completed in the ED and demonstrated a small subcentimeter acute left parietal infarction. CTA with no LVO.  NIH in the ED is 0.  The patient denies any chest pain, shortness of breath, headache, or new onset numbness or weakness.  He has a family history of CAD in his father.  His social history is negative for smoking or illicit drugs, and he states he has an occasional drink once or twice a month.          Past Medical History:   Diagnosis Date    CAD (coronary artery disease) 7/26/2016    Heart block     MI (myocardial infarction)     Reflux      Past Surgical History:   Procedure Laterality Date    CARDIAC CATHETERIZATION      EYE SURGERY Left 02/2017    HIP SURGERY Right 12/2021     Family History   Problem Relation Age of Onset    Hypertension Mother     Emphysema Father     Coronary artery disease Father      Social History     Tobacco Use    Smoking status: Never    Smokeless tobacco: Never   Substance Use Topics    Alcohol use: Yes     Comment: twice a month/beer wine    Drug use: Never     Review of patient's allergies indicates:  No Known Allergies    Medications: I have reviewed the current medication administration record.    (Not in a hospital admission)      Review of Systems   Constitutional:  Negative for chills and fever.   HENT:  Negative for nosebleeds and sore throat.    Eyes:  Positive for visual disturbance. Negative for photophobia.   Respiratory:  Negative  for cough and shortness of breath.    Cardiovascular:  Negative for chest pain and palpitations.   Gastrointestinal:  Negative for abdominal pain and vomiting.   Musculoskeletal:  Negative for gait problem and neck stiffness.   Skin:  Negative for pallor and wound.   Allergic/Immunologic: Negative for food allergies and immunocompromised state.   Neurological:  Negative for facial asymmetry, speech difficulty and weakness.   Psychiatric/Behavioral:  Negative for agitation, confusion and decreased concentration.    Objective:     Vital Signs (Most Recent):  Temp: 97.6 °F (36.4 °C) (10/10/22 0724)  Pulse: (!) 55 (10/10/22 1532)  Resp: 18 (10/10/22 1245)  BP: (!) 140/70 (10/10/22 1532)  SpO2: 97 % (10/10/22 1543)    Vital Signs Range (Last 24H):  Temp:  [97.6 °F (36.4 °C)]   Pulse:  [53-62]   Resp:  [16-20]   BP: (123-173)/(63-90)   SpO2:  [95 %-99 %]     Physical Exam  Constitutional:       General: He is not in acute distress.  HENT:      Head: Normocephalic and atraumatic.   Eyes:      Extraocular Movements: Extraocular movements intact.      Pupils: Pupils are equal, round, and reactive to light.   Cardiovascular:      Rate and Rhythm: Normal rate.      Pulses: Normal pulses.   Pulmonary:      Effort: Pulmonary effort is normal. No respiratory distress.   Abdominal:      Palpations: Abdomen is soft.      Tenderness: There is no abdominal tenderness.   Musculoskeletal:      Cervical back: Normal range of motion and neck supple.   Skin:     General: Skin is warm and dry.   Neurological:      General: No focal deficit present.      Mental Status: He is alert and oriented to person, place, and time.   Psychiatric:         Mood and Affect: Mood normal.         Behavior: Behavior normal.       Neurological Exam:   LOC: alert  Attention Span: Good   Language: No aphasia  Articulation: No dysarthria  Orientation: Person, Place, Time   EOM (CN III, IV, VI): Full/intact  Facial Sensation (CN V): Normal  Facial Movement (CN  VII): Symmetric facial expression    Motor: Arm left  Normal 5/5  Leg left  Normal 5/5  Arm right  Normal 5/5  Leg right Normal 5/5  Cerebellum: No evidence of appendicular or axial ataxia  Sensation: Intact to light touch, temperature and vibration      Laboratory:  CMP:   Recent Labs   Lab 10/10/22  0804   CALCIUM 9.0   ALBUMIN 3.7   PROT 6.3      K 3.9   CO2 25      BUN 15   CREATININE 0.8   ALKPHOS 64   ALT 25   AST 22   BILITOT 0.8     CBC:   Recent Labs   Lab 10/10/22  0804   WBC 5.07   RBC 4.72   HGB 14.9   HCT 43.4      MCV 92   MCH 31.6*   MCHC 34.3     Lipid Panel: No results for input(s): CHOL, LDLCALC, HDL, TRIG in the last 168 hours.  Coagulation: No results for input(s): PT, INR, APTT in the last 168 hours.  Hgb A1C: No results for input(s): HGBA1C in the last 168 hours.  TSH:   Recent Labs   Lab 10/10/22  0804   TSH 3.098       Diagnostic Results:      Brain imaging:  MRI Brain 10/10/22  Impression:   Subcentimeter acute left parietal subcortical infarct.   Mild chronic small vessel ischemic change.  Multiple parenchymal microhemorrhages with multifocal areas of superficial siderosis affecting both cerebral hemispheres.  The appearance is not entirely specific but suggestive of cerebral amyloid angiopathy in a patient of this age.  For clinical correlation.    Vessel Imaging:  CTA H&N 10/10/22  Impression:   No evidence of acute hemorrhage or major vascular distribution infarct.   Mild chronic small-vessel ischemic change.   Modest atherosclerosis without evidence of high-grade stenosis or large vessel occlusion.    Cardiac Evaluation:   STEPAN Salomon MD  [facility]  Department of Vascular Neurology   [facility] - Emergency Dept

## 2022-10-11 NOTE — TELEPHONE ENCOUNTER
Called pt and spoke w his assistant. I informed her that Dr. Bee does not feel that an in-person hospital f/u is necessary but has ordered a cardiac event monitor which will be scheduled

## 2022-10-11 NOTE — SUBJECTIVE & OBJECTIVE
Past Medical History:   Diagnosis Date    CAD (coronary artery disease) 7/26/2016    Heart block     MI (myocardial infarction)     Reflux      Past Surgical History:   Procedure Laterality Date    CARDIAC CATHETERIZATION      EYE SURGERY Left 02/2017    HIP SURGERY Right 12/2021     Family History   Problem Relation Age of Onset    Hypertension Mother     Emphysema Father     Coronary artery disease Father      Social History     Tobacco Use    Smoking status: Never    Smokeless tobacco: Never   Substance Use Topics    Alcohol use: Yes     Comment: twice a month/beer wine    Drug use: Never     Review of patient's allergies indicates:  No Known Allergies    Medications: I have reviewed the current medication administration record.    (Not in a hospital admission)      Review of Systems   Constitutional:  Negative for chills and fever.   HENT:  Negative for nosebleeds and sore throat.    Eyes:  Positive for visual disturbance. Negative for photophobia.   Respiratory:  Negative for cough and shortness of breath.    Cardiovascular:  Negative for chest pain and palpitations.   Gastrointestinal:  Negative for abdominal pain and vomiting.   Musculoskeletal:  Negative for gait problem and neck stiffness.   Skin:  Negative for pallor and wound.   Allergic/Immunologic: Negative for food allergies and immunocompromised state.   Neurological:  Negative for facial asymmetry, speech difficulty and weakness.   Psychiatric/Behavioral:  Negative for agitation, confusion and decreased concentration.    Objective:     Vital Signs (Most Recent):  Temp: 97.6 °F (36.4 °C) (10/10/22 0724)  Pulse: (!) 55 (10/10/22 1532)  Resp: 18 (10/10/22 1245)  BP: (!) 140/70 (10/10/22 1532)  SpO2: 97 % (10/10/22 1543)    Vital Signs Range (Last 24H):  Temp:  [97.6 °F (36.4 °C)]   Pulse:  [53-62]   Resp:  [16-20]   BP: (123-173)/(63-90)   SpO2:  [95 %-99 %]     Physical Exam  Constitutional:       General: He is not in acute distress.  HENT:       Head: Normocephalic and atraumatic.   Eyes:      Extraocular Movements: Extraocular movements intact.      Pupils: Pupils are equal, round, and reactive to light.   Cardiovascular:      Rate and Rhythm: Normal rate.      Pulses: Normal pulses.   Pulmonary:      Effort: Pulmonary effort is normal. No respiratory distress.   Abdominal:      Palpations: Abdomen is soft.      Tenderness: There is no abdominal tenderness.   Musculoskeletal:      Cervical back: Normal range of motion and neck supple.   Skin:     General: Skin is warm and dry.   Neurological:      General: No focal deficit present.      Mental Status: He is alert and oriented to person, place, and time.   Psychiatric:         Mood and Affect: Mood normal.         Behavior: Behavior normal.       Neurological Exam:   LOC: alert  Attention Span: Good   Language: No aphasia  Articulation: No dysarthria  Orientation: Person, Place, Time   EOM (CN III, IV, VI): Full/intact  Facial Sensation (CN V): Normal  Facial Movement (CN VII): Symmetric facial expression    Motor: Arm left  Normal 5/5  Leg left  Normal 5/5  Arm right  Normal 5/5  Leg right Normal 5/5  Cerebellum: No evidence of appendicular or axial ataxia  Sensation: Intact to light touch, temperature and vibration      Laboratory:  CMP:   Recent Labs   Lab 10/10/22  0804   CALCIUM 9.0   ALBUMIN 3.7   PROT 6.3      K 3.9   CO2 25      BUN 15   CREATININE 0.8   ALKPHOS 64   ALT 25   AST 22   BILITOT 0.8     CBC:   Recent Labs   Lab 10/10/22  0804   WBC 5.07   RBC 4.72   HGB 14.9   HCT 43.4      MCV 92   MCH 31.6*   MCHC 34.3     Lipid Panel: No results for input(s): CHOL, LDLCALC, HDL, TRIG in the last 168 hours.  Coagulation: No results for input(s): PT, INR, APTT in the last 168 hours.  Hgb A1C: No results for input(s): HGBA1C in the last 168 hours.  TSH:   Recent Labs   Lab 10/10/22  0804   TSH 3.098       Diagnostic Results:      Brain imaging:  MRI Brain 10/10/22  Impression:    Subcentimeter acute left parietal subcortical infarct.   Mild chronic small vessel ischemic change.  Multiple parenchymal microhemorrhages with multifocal areas of superficial siderosis affecting both cerebral hemispheres.  The appearance is not entirely specific but suggestive of cerebral amyloid angiopathy in a patient of this age.  For clinical correlation.    Vessel Imaging:  CTA H&N 10/10/22  Impression:   No evidence of acute hemorrhage or major vascular distribution infarct.   Mild chronic small-vessel ischemic change.   Modest atherosclerosis without evidence of high-grade stenosis or large vessel occlusion.    Cardiac Evaluation:   NA

## 2022-10-11 NOTE — TELEPHONE ENCOUNTER
Spoke w pt's assistant and explained that Dr. Bee will review pt's hospital encounter and advise a follow up time. Once this occurs we will call back to schedule an appointment.

## 2022-10-11 NOTE — TELEPHONE ENCOUNTER
Abby Grant,      A Cardiac event monitor has been ordered for this pt and will need to be scheduled, please. I believe I already routed this to you but I wanted to route it with a note.     Thank you!  Randy

## 2022-10-11 NOTE — ASSESSMENT & PLAN NOTE
77-year-old male with past medical history of previous MI, TIA, and CAD who presents to the ED the after suffering a bout of syncope while having an IV placed for CTA today which was recommended with MRI after being seen by Dr. Bee on 10/06 following an event on 10/05 during which he experienced difficulty speaking, with word salad for 1-2 minutes, which resolved spontaneously with only some fatigue noted afterwards.  MRI was completed in the ED and demonstrated a small subcentimeter acute left parietal infarction. CTA with no LVO.  NIH in the ED is 0. Etiology of infarction at this time is believed to most likely be due to watershed event related to hypoperfusion during syncopal event. Due to suspected etiology of infarction, will not start Plavix at this time, as there is no evidence of previous insult as relates to the event which initially brought the patient to see Dr. Bee.     Antithrombotics for secondary stroke prevention: Antiplatelets: Aspirin: 81 mg daily    Statins for secondary stroke prevention and hyperlipidemia, if present:   Statins: Atorvastatin- 40 mg daily    Aggressive risk factor modification: Diet, Obesity, CAD     Rehab efforts: The patient has been evaluated by a stroke team provider and the therapy needs have been fully considered based off the presenting complaints and exam findings. The following therapy evaluations are needed: None    Diagnostics ordered/pending: HgbA1C to assess blood glucose levels, TSH to assess thyroid function    VTE prophylaxis: None: Reason for No Pharmacological VTE Prophylaxis: Patient with NIH of 0 discharging from ED.    BP parameters: Infarct: No intervention, SBP <220

## 2022-10-11 NOTE — HPI
Patient is 77-year-old male with past medical history of previous MI, TIA, and CAD who presents to the ED the after suffering a bout of syncope while having an IV placed for CTA today.  The patient is back to baseline, and he and his wife state that this is been a recurrent issue throughout his life, due to fear of needles.  His syncopal episode lasted about 5 minutes, and he returned to baseline without any prolonged confusion following the event, and without any additional focal neurologic symptoms.  He was scheduled to have MRI and CTA performed today, after being seen by Dr. Bee on 10/06 following an event on 10/05 during which he experienced difficulty speaking, with word salad for 1-2 minutes, which resolved spontaneously with only some fatigue noted afterwards.  MRI was completed in the ED and demonstrated a small subcentimeter acute left parietal infarction. CTA with no LVO.  NIH in the ED is 0.  The patient denies any chest pain, shortness of breath, headache, or new onset numbness or weakness.  He has a family history of CAD in his father.  His social history is negative for smoking or illicit drugs, and he states he has an occasional drink once or twice a month.

## 2022-10-14 ENCOUNTER — CLINICAL SUPPORT (OUTPATIENT)
Dept: CARDIOLOGY | Facility: HOSPITAL | Age: 77
End: 2022-10-14
Attending: PSYCHIATRY & NEUROLOGY
Payer: COMMERCIAL

## 2022-10-14 DIAGNOSIS — G45.1 HEMISPHERIC CAROTID ARTERY SYNDROME: ICD-10-CM

## 2022-10-14 PROCEDURE — 93272 ECG/REVIEW INTERPRET ONLY: CPT | Mod: ,,, | Performed by: INTERNAL MEDICINE

## 2022-10-14 PROCEDURE — 93270 REMOTE 30 DAY ECG REV/REPORT: CPT

## 2022-10-14 PROCEDURE — 93272 CARDIAC EVENT MONITOR (CUPID ONLY): ICD-10-PCS | Mod: ,,, | Performed by: INTERNAL MEDICINE

## 2022-10-28 ENCOUNTER — TELEPHONE (OUTPATIENT)
Dept: NEUROLOGY | Facility: CLINIC | Age: 77
End: 2022-10-28

## 2022-10-28 NOTE — TELEPHONE ENCOUNTER
Dr. Bee below is a copy/paste of pt's last clinic note, just to confirm he no longer needs Plavix afetr he completes the 21 days?    Plan:  Continue ASA  CTA head/neck  MRI brain  Clopidogrel 75mg/d x 21 days if MRI c/w TIA  HgA1c, B12, TSH  Continue atorvastatin 40mg/d; target LDL <70mg/dL  30 day event monitor if above unrevealing for etiology  I will arrange f/u after MRI and CTA

## 2022-10-28 NOTE — TELEPHONE ENCOUNTER
----- Message from Carli Shaw sent at 10/28/2022  8:14 AM CDT -----  Contact: pt @ [phone]  Pt is calling regarding his medication clopidogreL (PLAVIX) 75 mg tablet. Wondering if he will still have to take it or not  ends on 11/1 asking for call back      [id number] - [place], [place] - [address] [place] [address]  [address]  [facility] [address]  Phone: [phone] Fax: [phone]

## 2022-10-31 ENCOUNTER — PATIENT MESSAGE (OUTPATIENT)
Dept: NEUROLOGY | Facility: CLINIC | Age: 77
End: 2022-10-31
Payer: COMMERCIAL

## 2022-10-31 DIAGNOSIS — G62.9 POLYNEUROPATHY: Primary | ICD-10-CM

## 2022-10-31 DIAGNOSIS — G44.89 OTHER HEADACHE SYNDROME: ICD-10-CM

## 2022-11-01 ENCOUNTER — PATIENT MESSAGE (OUTPATIENT)
Dept: NEUROLOGY | Facility: CLINIC | Age: 77
End: 2022-11-01
Payer: COMMERCIAL

## 2022-11-01 NOTE — TELEPHONE ENCOUNTER
Left message on Mr. Short voicemail requesting a call back. My chart message sent as well.         MD Naima Perez MA  Caller: Unspecified (4 days ago, 10:51 AM)  Hi,   No clopidogrel after he runs out but he should continue ASA 81mg/d.   Please confirm he has the event monitor on.     Also, please arrange f/u with me in Dec.   Thanks,   MANDI

## 2022-11-02 ENCOUNTER — PATIENT MESSAGE (OUTPATIENT)
Dept: NEUROLOGY | Facility: CLINIC | Age: 77
End: 2022-11-02
Payer: COMMERCIAL

## 2022-11-07 ENCOUNTER — TELEPHONE (OUTPATIENT)
Dept: NEUROLOGY | Facility: CLINIC | Age: 77
End: 2022-11-07
Payer: COMMERCIAL

## 2022-11-07 NOTE — TELEPHONE ENCOUNTER
Randy,  I sent the patient the message below but it has not been read.  Can you call him and pass this along?  Thanks,  MANDI            Patient Message  Open   11/2/2022  [facility] - Neurology 8th Fl  Frantz Bee MD  Neurology     Conversation: Labs  (Newest Message First)  November 2, 2022  Me  to Tushar Deja      11:03 AM  Hi,  Randy passed along your message about headaches that I am assuming are a new problem.  I had ordered some labs but don't see you had them drawn yet so I wanted to be sure you got the message.     After I see the labs, I will connect about next steps.  RZ    This Patient Portal message has not been read.

## 2022-11-07 NOTE — TELEPHONE ENCOUNTER
Called pt and informed him that Dr. Bee called in some lab work orders and instructed him to go to an [facility] lab to get that done. Pt confirmed that he would this week.

## 2022-11-11 ENCOUNTER — CLINICAL SUPPORT (OUTPATIENT)
Dept: INTERNAL MEDICINE | Facility: CLINIC | Age: 77
End: 2022-11-11
Payer: COMMERCIAL

## 2022-11-11 ENCOUNTER — TELEPHONE (OUTPATIENT)
Dept: NEUROLOGY | Facility: HOSPITAL | Age: 77
End: 2022-11-11
Payer: COMMERCIAL

## 2022-11-11 DIAGNOSIS — G62.9 POLYNEUROPATHY: ICD-10-CM

## 2022-11-11 DIAGNOSIS — G44.89 OTHER HEADACHE SYNDROME: ICD-10-CM

## 2022-11-11 LAB
CRP SERPL-MCNC: 1.6 MG/L (ref 0–8.2)
ERYTHROCYTE [SEDIMENTATION RATE] IN BLOOD BY PHOTOMETRIC METHOD: 7 MM/HR (ref 0–23)
ESTIMATED AVG GLUCOSE: 100 MG/DL (ref 68–131)
HBA1C MFR BLD: 5.1 % (ref 4–5.6)
TSH SERPL DL<=0.005 MIU/L-ACNC: 1.63 UIU/ML (ref 0.4–4)
VIT B12 SERPL-MCNC: 401 PG/ML (ref 210–950)

## 2022-11-11 PROCEDURE — 84443 ASSAY THYROID STIM HORMONE: CPT | Performed by: PSYCHIATRY & NEUROLOGY

## 2022-11-11 PROCEDURE — 85652 RBC SED RATE AUTOMATED: CPT | Performed by: PSYCHIATRY & NEUROLOGY

## 2022-11-11 PROCEDURE — 82607 VITAMIN B-12: CPT | Performed by: PSYCHIATRY & NEUROLOGY

## 2022-11-11 PROCEDURE — 83036 HEMOGLOBIN GLYCOSYLATED A1C: CPT | Performed by: PSYCHIATRY & NEUROLOGY

## 2022-11-11 PROCEDURE — 86140 C-REACTIVE PROTEIN: CPT | Performed by: PSYCHIATRY & NEUROLOGY

## 2022-11-11 NOTE — TELEPHONE ENCOUNTER
Randy,  Aide call Mr. Short and tell him the labs look good.  Please ask if he still has a headache.  If so, I will call him Mon when I am back in town.  Thanks,  MANDI

## 2022-11-15 ENCOUNTER — TELEPHONE (OUTPATIENT)
Dept: NEUROLOGY | Facility: CLINIC | Age: 77
End: 2022-11-15
Payer: COMMERCIAL

## 2022-11-15 DIAGNOSIS — G20.A1 PARKINSON DISEASE: ICD-10-CM

## 2022-11-15 DIAGNOSIS — G44.89 OTHER HEADACHE SYNDROME: ICD-10-CM

## 2022-11-15 DIAGNOSIS — I63.89 OTHER CEREBRAL INFARCTION: Primary | ICD-10-CM

## 2022-11-15 NOTE — TELEPHONE ENCOUNTER
Spoke to pt who explained that he has been having more constant headaches and has trouble sometimes completing his sentences during casual conversations. Wife was on the phone and stated that pt has begun shuffling his feet. I stated that I would forward this information to Dr. Bee for a recommendation and get back to them, when he responds. Pt verbalized understanding.

## 2022-11-21 ENCOUNTER — OFFICE VISIT (OUTPATIENT)
Dept: NEUROLOGY | Facility: CLINIC | Age: 77
End: 2022-11-21
Payer: COMMERCIAL

## 2022-11-21 ENCOUNTER — TELEPHONE (OUTPATIENT)
Dept: NEUROLOGY | Facility: CLINIC | Age: 77
End: 2022-11-21

## 2022-11-21 ENCOUNTER — HOSPITAL ENCOUNTER (OUTPATIENT)
Dept: RADIOLOGY | Facility: HOSPITAL | Age: 77
Discharge: HOME | End: 2022-11-21
Attending: PSYCHIATRY & NEUROLOGY
Payer: COMMERCIAL

## 2022-11-21 ENCOUNTER — PATIENT MESSAGE (OUTPATIENT)
Dept: NEUROLOGY | Facility: CLINIC | Age: 77
End: 2022-11-21
Payer: COMMERCIAL

## 2022-11-21 VITALS
SYSTOLIC BLOOD PRESSURE: 158 MMHG | HEART RATE: 61 BPM | HEIGHT: 70 IN | WEIGHT: 233.94 LBS | BODY MASS INDEX: 33.49 KG/M2 | DIASTOLIC BLOOD PRESSURE: 92 MMHG

## 2022-11-21 DIAGNOSIS — G44.89 OTHER HEADACHE SYNDROME: ICD-10-CM

## 2022-11-21 DIAGNOSIS — I61.1 NONTRAUMATIC CORTICAL HEMORRHAGE OF RIGHT CEREBRAL HEMISPHERE: Primary | ICD-10-CM

## 2022-11-21 DIAGNOSIS — I10 PRIMARY HYPERTENSION: ICD-10-CM

## 2022-11-21 DIAGNOSIS — G20.A1 PARKINSON DISEASE: ICD-10-CM

## 2022-11-21 DIAGNOSIS — Z86.73 HISTORY OF ISCHEMIC LEFT MCA STROKE: ICD-10-CM

## 2022-11-21 DIAGNOSIS — I63.89 OTHER CEREBRAL INFARCTION: ICD-10-CM

## 2022-11-21 PROCEDURE — 1159F MED LIST DOCD IN RCRD: CPT | Mod: CPTII,S$GLB,, | Performed by: PSYCHIATRY & NEUROLOGY

## 2022-11-21 PROCEDURE — 99215 PR OFFICE/OUTPT VISIT, EST, LEVL V, 40-54 MIN: ICD-10-PCS | Mod: S$GLB,,, | Performed by: PSYCHIATRY & NEUROLOGY

## 2022-11-21 PROCEDURE — 3080F PR MOST RECENT DIASTOLIC BLOOD PRESSURE >= 90 MM HG: ICD-10-PCS | Mod: CPTII,S$GLB,, | Performed by: PSYCHIATRY & NEUROLOGY

## 2022-11-21 PROCEDURE — 3044F PR MOST RECENT HEMOGLOBIN A1C LEVEL <7.0%: ICD-10-PCS | Mod: CPTII,S$GLB,, | Performed by: PSYCHIATRY & NEUROLOGY

## 2022-11-21 PROCEDURE — 70450 CT HEAD/BRAIN W/O DYE: CPT | Mod: TC

## 2022-11-21 PROCEDURE — 1159F PR MEDICATION LIST DOCUMENTED IN MEDICAL RECORD: ICD-10-PCS | Mod: CPTII,S$GLB,, | Performed by: PSYCHIATRY & NEUROLOGY

## 2022-11-21 PROCEDURE — 1100F PTFALLS ASSESS-DOCD GE2>/YR: CPT | Mod: CPTII,S$GLB,, | Performed by: PSYCHIATRY & NEUROLOGY

## 2022-11-21 PROCEDURE — 3044F HG A1C LEVEL LT 7.0%: CPT | Mod: CPTII,S$GLB,, | Performed by: PSYCHIATRY & NEUROLOGY

## 2022-11-21 PROCEDURE — 1125F AMNT PAIN NOTED PAIN PRSNT: CPT | Mod: CPTII,S$GLB,, | Performed by: PSYCHIATRY & NEUROLOGY

## 2022-11-21 PROCEDURE — 4010F ACE/ARB THERAPY RXD/TAKEN: CPT | Mod: CPTII,S$GLB,, | Performed by: PSYCHIATRY & NEUROLOGY

## 2022-11-21 PROCEDURE — 99999 PR PBB SHADOW E&M-EST. PATIENT-LVL IV: ICD-10-PCS | Mod: PBBFAC,,, | Performed by: PSYCHIATRY & NEUROLOGY

## 2022-11-21 PROCEDURE — 1125F PR PAIN SEVERITY QUANTIFIED, PAIN PRESENT: ICD-10-PCS | Mod: CPTII,S$GLB,, | Performed by: PSYCHIATRY & NEUROLOGY

## 2022-11-21 PROCEDURE — 4010F PR ACE/ARB THEARPY RXD/TAKEN: ICD-10-PCS | Mod: CPTII,S$GLB,, | Performed by: PSYCHIATRY & NEUROLOGY

## 2022-11-21 PROCEDURE — 3077F PR MOST RECENT SYSTOLIC BLOOD PRESSURE >= 140 MM HG: ICD-10-PCS | Mod: CPTII,S$GLB,, | Performed by: PSYCHIATRY & NEUROLOGY

## 2022-11-21 PROCEDURE — 99999 PR PBB SHADOW E&M-EST. PATIENT-LVL IV: CPT | Mod: PBBFAC,,, | Performed by: PSYCHIATRY & NEUROLOGY

## 2022-11-21 PROCEDURE — 70450 CT HEAD/BRAIN W/O DYE: CPT | Mod: 26,,, | Performed by: RADIOLOGY

## 2022-11-21 PROCEDURE — 99215 OFFICE O/P EST HI 40 MIN: CPT | Mod: S$GLB,,, | Performed by: PSYCHIATRY & NEUROLOGY

## 2022-11-21 PROCEDURE — 3288F PR FALLS RISK ASSESSMENT DOCUMENTED: ICD-10-PCS | Mod: CPTII,S$GLB,, | Performed by: PSYCHIATRY & NEUROLOGY

## 2022-11-21 PROCEDURE — 3077F SYST BP >= 140 MM HG: CPT | Mod: CPTII,S$GLB,, | Performed by: PSYCHIATRY & NEUROLOGY

## 2022-11-21 PROCEDURE — 3080F DIAST BP >= 90 MM HG: CPT | Mod: CPTII,S$GLB,, | Performed by: PSYCHIATRY & NEUROLOGY

## 2022-11-21 PROCEDURE — 1100F PR PT FALLS ASSESS DOC 2+ FALLS/FALL W/INJURY/YR: ICD-10-PCS | Mod: CPTII,S$GLB,, | Performed by: PSYCHIATRY & NEUROLOGY

## 2022-11-21 PROCEDURE — 70450 CT HEAD WITHOUT CONTRAST: ICD-10-PCS | Mod: 26,,, | Performed by: RADIOLOGY

## 2022-11-21 PROCEDURE — 3288F FALL RISK ASSESSMENT DOCD: CPT | Mod: CPTII,S$GLB,, | Performed by: PSYCHIATRY & NEUROLOGY

## 2022-11-21 RX ORDER — LISINOPRIL 10 MG/1
10 TABLET ORAL DAILY
Qty: 90 TABLET | Refills: 1 | Status: SHIPPED | OUTPATIENT
Start: 2022-11-21 | End: 2023-05-04 | Stop reason: SDUPTHER

## 2022-11-21 RX ORDER — CARBIDOPA AND LEVODOPA 25; 100 MG/1; MG/1
1 TABLET ORAL 3 TIMES DAILY
Qty: 180 TABLET | Refills: 0 | Status: SHIPPED | OUTPATIENT
Start: 2022-11-21 | End: 2022-12-13

## 2022-11-21 NOTE — PROGRESS NOTES
"Neurology Clinic Follow-Up Note    mpression:  R frontal, cortical ICH, onset 2 weeks ago:  etiology = likely amyloid angiopathy  L parietal ischemic stroke 10/5/22: ESUS  Likely small, silent L occipital pole infarct: ESUS  MCI  Mild parkinsonism  HTN: newly diagnosed    Plan:  OK to continue ASA 81mg/d  Repeat CT head in 1 week, or sooner, prn  F/U event monitor final report  He will discuss ILR with Dr. Mendoza tomorrow.  If event monitor negative, I think it is reasonable given multiple prior strokes and episodes of paroxysmal "anxiety"  PT/OT  Continue atorvastatin 40mg/d; target LDL <70mg/dL  Lisinopril 10mg/d and f/u with Dr. Lloyd  Trial of Sinemet 25/100 bid with plan for escalation to tid and f/u with Dr. 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  He and his wife report 2 weeks of headache.  Today, he endorses there is mild associated LUE numbness  CT this AM shows small R frontal ICH.  No recurrent aphasia.  Over the last few months, there has been increased gait shuffling and a few falls.     Past Medical History:   Diagnosis Date    CAD (coronary artery disease) 7/26/2016    Cerebral ischemic stroke due to global hypoperfusion with watershed infarct 10/10/2022    Heart block     MI (myocardial infarction)     Primary hypertension 11/21/2022    Reflux          Outpatient Medications Marked as Taking for the 11/21/22 " "encounter (Office Visit) with Frantz Bee MD   Medication Sig Dispense Refill    atorvastatin (LIPITOR) 40 MG tablet TAKE 1 TABLET BY MOUTH EVERY DAY 90 tablet 3    cetirizine (ZYRTEC) 10 MG tablet Take 10 mg by mouth once daily.      clobetasol (TEMOVATE) 0.05 % cream APPLY TO AFFECTED AREA ON HAND TWICE A DAY AS NEEDED FOR RASH  1    desonide (DESOWEN) 0.05 % cream APPLY TO AFFECTED AREA ON FACE TWICE A DAY AS NEEDED FOR SCALE  1    finasteride (PROSCAR) 5 mg tablet 1 tablet once daily.      levocetirizine (XYZAL) 5 MG tablet Take 5 mg by mouth every evening.      ofloxacin (OCUFLOX) 0.3 % ophthalmic solution       pantoprazole (PROTONIX) 40 MG tablet Take 1 tablet (40 mg total) by mouth once daily. 90 tablet 3    triamcinolone acetonide 0.1% (KENALOG) 0.1 % ointment APPLY TO AFFECTED AREA ON AXILLAS TWICE A DAY AS NEEDED FOR ITCH/RASH  1       BP (!) 158/92   Pulse 61   Ht 5' 10" (1.778 m)   Wt 106.1 kg (233 lb 14.5 oz)   BMI 33.56 kg/m²   Well-developed, well nourished.  Awake, alert and oriented.  Language is normal.  EOMF without nystagmus, VFF, Mild L lower facial weakness.  LUE drift. Mild L hemiparesis in UMN pattern (UE > LE). Sensation intact. No extinction to double simultaneous tactile stimulation.  Coordination intact.  Mild leno and cogwheeling. Mild decrease in facial expression.     Lab Results   Component Value Date    [id number] 11/11/2022     Lab Results   Component Value Date    HGBA1C 5.1 11/11/2022     Lab Results   Component Value Date    TSH 1.632 11/11/2022     82 mins chart review, face to face, documentation    Frantz Bee MD     "

## 2022-11-21 NOTE — TELEPHONE ENCOUNTER
CT brain shows small R cortical ICH.  Looking back at MRI, he likely has CAA.  I left  for pt to call me (office and cell).  Can likely manage as outpatient as COOLEY has been present for a couple weeks but will confirm when we speak.  Otherwise, will direct to ED.

## 2022-11-22 ENCOUNTER — OFFICE VISIT (OUTPATIENT)
Dept: CARDIOLOGY | Facility: CLINIC | Age: 77
End: 2022-11-22
Payer: COMMERCIAL

## 2022-11-22 ENCOUNTER — HOSPITAL ENCOUNTER (OUTPATIENT)
Dept: CARDIOLOGY | Facility: HOSPITAL | Age: 77
Discharge: HOME | End: 2022-11-22
Attending: INTERNAL MEDICINE
Payer: COMMERCIAL

## 2022-11-22 VITALS
OXYGEN SATURATION: 95 % | SYSTOLIC BLOOD PRESSURE: 161 MMHG | BODY MASS INDEX: 33.77 KG/M2 | HEART RATE: 62 BPM | WEIGHT: 235.88 LBS | DIASTOLIC BLOOD PRESSURE: 91 MMHG | HEIGHT: 70 IN

## 2022-11-22 VITALS — BODY MASS INDEX: 29.54 KG/M2 | WEIGHT: 211 LBS | HEIGHT: 71 IN

## 2022-11-22 DIAGNOSIS — I25.10 CORONARY ARTERY DISEASE INVOLVING NATIVE CORONARY ARTERY OF NATIVE HEART WITHOUT ANGINA PECTORIS: ICD-10-CM

## 2022-11-22 DIAGNOSIS — I62.9 INTRACRANIAL HEMORRHAGE: ICD-10-CM

## 2022-11-22 LAB
CV STRESS BASE HR: 61 BPM
DIASTOLIC BLOOD PRESSURE: 85 MMHG
OHS CV CPX 1 MINUTE RECOVERY HEART RATE: 126 BPM
OHS CV CPX 85 PERCENT MAX PREDICTED HEART RATE MALE: 123
OHS CV CPX ESTIMATED METS: 8
OHS CV CPX MAX PREDICTED HEART RATE: 145
OHS CV CPX PATIENT IS FEMALE: 0
OHS CV CPX PATIENT IS MALE: 1
OHS CV CPX PEAK DIASTOLIC BLOOD PRESSURE: 94 MMHG
OHS CV CPX PEAK HEAR RATE: 157 BPM
OHS CV CPX PEAK RATE PRESSURE PRODUCT: NORMAL
OHS CV CPX PEAK SYSTOLIC BLOOD PRESSURE: 184 MMHG
OHS CV CPX PERCENT MAX PREDICTED HEART RATE ACHIEVED: 108
OHS CV CPX RATE PRESSURE PRODUCT PRESENTING: 8967
STRESS ECHO POST EXERCISE DUR MIN: 5 MINUTES
STRESS ECHO POST EXERCISE DUR SEC: 59 SECONDS
SYSTOLIC BLOOD PRESSURE: 147 MMHG

## 2022-11-22 PROCEDURE — 93018 CV STRESS TEST I&R ONLY: CPT | Mod: ,,, | Performed by: INTERNAL MEDICINE

## 2022-11-22 PROCEDURE — 99214 PR OFFICE/OUTPT VISIT, EST, LEVL IV, 30-39 MIN: ICD-10-PCS | Mod: S$GLB,,, | Performed by: INTERNAL MEDICINE

## 2022-11-22 PROCEDURE — 3044F HG A1C LEVEL LT 7.0%: CPT | Mod: CPTII,S$GLB,, | Performed by: INTERNAL MEDICINE

## 2022-11-22 PROCEDURE — 3044F PR MOST RECENT HEMOGLOBIN A1C LEVEL <7.0%: ICD-10-PCS | Mod: CPTII,S$GLB,, | Performed by: INTERNAL MEDICINE

## 2022-11-22 PROCEDURE — 3077F SYST BP >= 140 MM HG: CPT | Mod: CPTII,S$GLB,, | Performed by: INTERNAL MEDICINE

## 2022-11-22 PROCEDURE — 1160F PR REVIEW ALL MEDS BY PRESCRIBER/CLIN PHARMACIST DOCUMENTED: ICD-10-PCS | Mod: CPTII,S$GLB,, | Performed by: INTERNAL MEDICINE

## 2022-11-22 PROCEDURE — 93016 CV STRESS TEST SUPVJ ONLY: CPT | Mod: ,,, | Performed by: INTERNAL MEDICINE

## 2022-11-22 PROCEDURE — 93018 EXERCISE STRESS - EKG (CUPID ONLY): ICD-10-PCS | Mod: ,,, | Performed by: INTERNAL MEDICINE

## 2022-11-22 PROCEDURE — 1126F PR PAIN SEVERITY QUANTIFIED, NO PAIN PRESENT: ICD-10-PCS | Mod: CPTII,S$GLB,, | Performed by: INTERNAL MEDICINE

## 2022-11-22 PROCEDURE — 99999 PR PBB SHADOW E&M-EST. PATIENT-LVL IV: ICD-10-PCS | Mod: PBBFAC,,, | Performed by: INTERNAL MEDICINE

## 2022-11-22 PROCEDURE — 93016 EXERCISE STRESS - EKG (CUPID ONLY): ICD-10-PCS | Mod: ,,, | Performed by: INTERNAL MEDICINE

## 2022-11-22 PROCEDURE — 99214 OFFICE O/P EST MOD 30 MIN: CPT | Mod: S$GLB,,, | Performed by: INTERNAL MEDICINE

## 2022-11-22 PROCEDURE — 99999 PR PBB SHADOW E&M-EST. PATIENT-LVL IV: CPT | Mod: PBBFAC,,, | Performed by: INTERNAL MEDICINE

## 2022-11-22 PROCEDURE — 1159F MED LIST DOCD IN RCRD: CPT | Mod: CPTII,S$GLB,, | Performed by: INTERNAL MEDICINE

## 2022-11-22 PROCEDURE — 3077F PR MOST RECENT SYSTOLIC BLOOD PRESSURE >= 140 MM HG: ICD-10-PCS | Mod: CPTII,S$GLB,, | Performed by: INTERNAL MEDICINE

## 2022-11-22 PROCEDURE — 1160F RVW MEDS BY RX/DR IN RCRD: CPT | Mod: CPTII,S$GLB,, | Performed by: INTERNAL MEDICINE

## 2022-11-22 PROCEDURE — 4010F ACE/ARB THERAPY RXD/TAKEN: CPT | Mod: CPTII,S$GLB,, | Performed by: INTERNAL MEDICINE

## 2022-11-22 PROCEDURE — 3080F PR MOST RECENT DIASTOLIC BLOOD PRESSURE >= 90 MM HG: ICD-10-PCS | Mod: CPTII,S$GLB,, | Performed by: INTERNAL MEDICINE

## 2022-11-22 PROCEDURE — 1126F AMNT PAIN NOTED NONE PRSNT: CPT | Mod: CPTII,S$GLB,, | Performed by: INTERNAL MEDICINE

## 2022-11-22 PROCEDURE — 3080F DIAST BP >= 90 MM HG: CPT | Mod: CPTII,S$GLB,, | Performed by: INTERNAL MEDICINE

## 2022-11-22 PROCEDURE — 93017 CV STRESS TEST TRACING ONLY: CPT

## 2022-11-22 PROCEDURE — 1159F PR MEDICATION LIST DOCUMENTED IN MEDICAL RECORD: ICD-10-PCS | Mod: CPTII,S$GLB,, | Performed by: INTERNAL MEDICINE

## 2022-11-22 PROCEDURE — 4010F PR ACE/ARB THEARPY RXD/TAKEN: ICD-10-PCS | Mod: CPTII,S$GLB,, | Performed by: INTERNAL MEDICINE

## 2022-11-22 NOTE — ASSESSMENT & PLAN NOTE
CT from yesterday showed 1.2 cm intraparenchymal hemorrhage centered in the subcortical white matter of the right postcentral gyrus  Patient is being managed as outpatient per Neurology   If Neurology favors Aspirin to be held, it can be stopped

## 2022-11-22 NOTE — PROGRESS NOTES
PCP - Nilton Lloyd MD  Subjective:   Patient ID:  Patrick Short is a 77 y.o. male who presents for follow up     HPI: 78 yo Male. Hx of CAD ( PCI to LAD ), TIA, Parkinson's disease . Who presented for follow up, he stated that yesterday he had a CT of the head the showed intracranial 1.5 cm hemorrhage, managed as outpatient by his neurologist   Today, he had EKG stress, he did 5 minutes and 59 seconds, similar to prior study. Denied any chest pain or dyspnea upon exertion.        History:     Past Medical History:   Diagnosis Date    CAD (coronary artery disease) 7/26/2016    Cerebral ischemic stroke due to global hypoperfusion with watershed infarct 10/10/2022    Heart block     MI (myocardial infarction)     Primary hypertension 11/21/2022    Reflux      Past Surgical History:   Procedure Laterality Date    CARDIAC CATHETERIZATION      EYE SURGERY Left 02/2017    HIP SURGERY Right 12/2021     Social History     Tobacco Use    Smoking status: Never    Smokeless tobacco: Never   Substance Use Topics    Alcohol use: Yes     Comment: twice a month/beer wine     Family History   Problem Relation Age of Onset    Hypertension Mother     Emphysema Father     Coronary artery disease Father        Meds:   Review of patient's allergies indicates:  No Known Allergies    Current Outpatient Medications:     aspirin 81 MG Chew, Take 1 tablet (81 mg total) by mouth once daily., Disp: , Rfl: 0    atorvastatin (LIPITOR) 40 MG tablet, TAKE 1 TABLET BY MOUTH EVERY DAY, Disp: 90 tablet, Rfl: 3    clobetasol (TEMOVATE) 0.05 % cream, APPLY TO AFFECTED AREA ON HAND TWICE A DAY AS NEEDED FOR RASH, Disp: , Rfl: 1    desonide (DESOWEN) 0.05 % cream, APPLY TO AFFECTED AREA ON FACE TWICE A DAY AS NEEDED FOR SCALE, Disp: , Rfl: 1    finasteride (PROSCAR) 5 mg tablet, 1 tablet once daily., Disp: , Rfl:     levocetirizine (XYZAL) 5 MG tablet, Take 5 mg by mouth every evening., Disp: , Rfl:     pantoprazole (PROTONIX) 40 MG tablet, Take 1  "tablet (40 mg total) by mouth once daily., Disp: 90 tablet, Rfl: 3    triamcinolone acetonide 0.1% (KENALOG) 0.1 % ointment, APPLY TO AFFECTED AREA ON AXILLAS TWICE A DAY AS NEEDED FOR ITCH/RASH, Disp: , Rfl: 1    carbidopa-levodopa  mg (SINEMET)  mg per tablet, Take 1 tablet by mouth 3 (three) times daily. (Patient not taking: Reported on 11/22/2022), Disp: 180 tablet, Rfl: 0    cetirizine (ZYRTEC) 10 MG tablet, Take 10 mg by mouth once daily., Disp: , Rfl:     lisinopriL 10 MG tablet, Take 1 tablet (10 mg total) by mouth once daily. (Patient not taking: Reported on 11/22/2022), Disp: 90 tablet, Rfl: 1    nitroGLYCERIN (NITROSTAT) 0.4 MG SL tablet, Place 1 tablet (0.4 mg total) under the tongue every 5 (five) minutes as needed for Chest pain. (Patient not taking: Reported on 11/22/2022), Disp: 30 tablet, Rfl: 1    ofloxacin (OCUFLOX) 0.3 % ophthalmic solution, , Disp: , Rfl:     predniSONE (DELTASONE) 10 MG tablet, Take 3 tablets x 7 days then 2 tablets x 7days. (Patient not taking: Reported on 10/6/2022), Disp: 36 tablet, Rfl: 0      Review of Systems   Constitutional:  Negative for chills and fever.   HENT:  Negative for tinnitus.    Eyes:  Negative for double vision.   Respiratory:  Negative for shortness of breath.    Cardiovascular:  Negative for chest pain, palpitations and claudication.   Gastrointestinal:  Negative for abdominal pain and vomiting.   Genitourinary:  Negative for urgency.   Musculoskeletal:  Negative for myalgias.   Skin:  Negative for rash.   Neurological:  Positive for tingling and sensory change.     Objective:   BP (!) 161/91 (BP Location: Right arm, Patient Position: Sitting, BP Method: Large (Automatic))   Pulse 62   Ht 5' 10" (1.778 m)   Wt 107 kg (235 lb 14.3 oz)   SpO2 95%   BMI 33.85 kg/m²   Physical Exam  Gen awake and alert   Neck supple  Heart RRR, no appreciated M/R/G  Lungs CTA B   Abdomen soft non tender  Ext no edema   Pulses pedal pulses are palpable "     Labs:     Lab Results   Component Value Date     10/10/2022    K 3.9 10/10/2022     10/10/2022    CO2 25 10/10/2022    BUN 15 10/10/2022    CREATININE 0.8 10/10/2022    ANIONGAP 7 (L) 10/10/2022     Lab Results   Component Value Date    HGBA1C 5.1 11/11/2022     Lab Results   Component Value Date    BNP 83 10/10/2022    BNP 61 07/27/2022     (H) 07/24/2016       Lab Results   Component Value Date    WBC 5.07 10/10/2022    HGB 14.9 10/10/2022    HCT 43.4 10/10/2022    HCT 41 07/28/2022     10/10/2022    GRAN 2.9 10/10/2022    GRAN 57.8 10/10/2022     Lab Results   Component Value Date    CHOL 117 (L) 07/26/2022    HDL 41 07/26/2022    LDLCALC 58.8 (L) 07/26/2022    TRIG 86 07/26/2022       Lab Results   Component Value Date     10/10/2022    K 3.9 10/10/2022     10/10/2022    CO2 25 10/10/2022    BUN 15 10/10/2022    CREATININE 0.8 10/10/2022    ANIONGAP 7 (L) 10/10/2022     Lab Results   Component Value Date    HGBA1C 5.1 11/11/2022     Lab Results   Component Value Date    BNP 83 10/10/2022    BNP 61 07/27/2022     (H) 07/24/2016    Lab Results   Component Value Date    WBC 5.07 10/10/2022    HGB 14.9 10/10/2022    HCT 43.4 10/10/2022    HCT 41 07/28/2022     10/10/2022    GRAN 2.9 10/10/2022    GRAN 57.8 10/10/2022     Lab Results   Component Value Date    CHOL 117 (L) 07/26/2022    HDL 41 07/26/2022    LDLCALC 58.8 (L) 07/26/2022    TRIG 86 07/26/2022                Cardiovascular Imaging:       Echo:   EF   Date Value Ref Range Status   07/26/2022 65 % Final   11/16/2021 65 % Final       Stress test: 11/22/2022   The EKG portion of this study is negative for ischemia.    The patient reported no chest pain during the stress test.    The blood pressure response to stress was normal.    There were no arrhythmias during stress.    The exercise capacity was below average.      Assessment & Plan:   Coronary artery disease involving native coronary artery without  angina pectoris  Stable   Stress test is negative from today   Asymptomatic   Continue current regiment   Continue Aspirin ( if Neurology favors holding Aspirin, Aspirin can be held )  Continue Statin   Started on Lisinopril ( will leave management of BP to Neurology, due to acute event yesterday )     Intracranial hemorrhage  CT from yesterday showed 1.2 cm intraparenchymal hemorrhage centered in the subcortical white matter of the right postcentral gyrus  Patient is being managed as outpatient per Neurology   If Neurology favors Aspirin to be held, it can be stopped           Signed:  Quinton Roca MD  Interventional fellow   Interventional Cardiology Staff  I have personally taken the history and examined this patient. I have discussed and agree with the resident's findings and plan as documented in the resident's note.    Sung Mendoza

## 2022-11-22 NOTE — ASSESSMENT & PLAN NOTE
Stable   Stress test is negative from today   Asymptomatic   Continue current regiment   Continue Aspirin ( if Neurology favors holding Aspirin, Aspirin can be held )  Continue Statin   Started on Lisinopril ( will leave management of BP to Neurology, due to acute event yesterday )

## 2022-11-28 ENCOUNTER — HOSPITAL ENCOUNTER (OUTPATIENT)
Dept: RADIOLOGY | Facility: HOSPITAL | Age: 77
Discharge: HOME | End: 2022-11-28
Attending: PSYCHIATRY & NEUROLOGY
Payer: COMMERCIAL

## 2022-11-28 ENCOUNTER — PATIENT MESSAGE (OUTPATIENT)
Dept: NEUROLOGY | Facility: CLINIC | Age: 77
End: 2022-11-28
Payer: COMMERCIAL

## 2022-11-28 DIAGNOSIS — I63.89 OTHER CEREBRAL INFARCTION: ICD-10-CM

## 2022-11-28 DIAGNOSIS — I61.1 NONTRAUMATIC CORTICAL HEMORRHAGE OF RIGHT CEREBRAL HEMISPHERE: ICD-10-CM

## 2022-11-28 PROCEDURE — 70450 CT HEAD WITHOUT CONTRAST: ICD-10-PCS | Mod: 26,,, | Performed by: RADIOLOGY

## 2022-11-28 PROCEDURE — 70450 CT HEAD/BRAIN W/O DYE: CPT | Mod: TC

## 2022-11-28 PROCEDURE — 70450 CT HEAD/BRAIN W/O DYE: CPT | Mod: 26,,, | Performed by: RADIOLOGY

## 2022-12-01 ENCOUNTER — PATIENT MESSAGE (OUTPATIENT)
Dept: NEUROLOGY | Facility: CLINIC | Age: 77
End: 2022-12-01
Payer: COMMERCIAL

## 2022-12-01 DIAGNOSIS — Z86.73 HISTORY OF ISCHEMIC LEFT MCA STROKE: Primary | ICD-10-CM

## 2022-12-02 ENCOUNTER — CLINICAL SUPPORT (OUTPATIENT)
Dept: REHABILITATION | Facility: HOSPITAL | Age: 77
End: 2022-12-02
Attending: PSYCHIATRY & NEUROLOGY
Payer: COMMERCIAL

## 2022-12-02 DIAGNOSIS — R26.89 IMPAIRED GAIT AND MOBILITY: ICD-10-CM

## 2022-12-02 DIAGNOSIS — I61.1 NONTRAUMATIC CORTICAL HEMORRHAGE OF RIGHT CEREBRAL HEMISPHERE: ICD-10-CM

## 2022-12-02 DIAGNOSIS — G20.A1 PARKINSON DISEASE: ICD-10-CM

## 2022-12-02 PROCEDURE — 97162 PT EVAL MOD COMPLEX 30 MIN: CPT | Mod: PO

## 2022-12-02 NOTE — PLAN OF CARE
[facility]  Physical Therapy Neurological Rehabilitation Initial Evaluation    Name: Patrick Short  [facility] Number: [id number]    Therapy Diagnosis:   Encounter Diagnoses   Name Primary?    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   History of current condition - Tushar reports: TIA on October 7th. Within the last three weeks the patient has been having headaches. Due to the headaches, the patient went to the doctor on November 15, 2022 and had a CT scan that showed the patient had a brain bleed. The patients wife reports he has been having some memory issues since the stroke in October. The patient's wife reports the patient has been having numbness and tingling in the L arm. The patient reports he has been having issues with word finding and having conversations with people since the stroke. The patient reports he also has difficulty using his left arm/ hand, including buttoning buttons and performing fine motor tasks. The patient also deals with parkinson's symptoms such as shuffling of the feet resulting in multiple falls recently. The patient was diagnosed with parkinson's three year prior.      Medical History:   Past Medical History:   Diagnosis Date    CAD (coronary artery disease) 7/26/2016    Cerebral ischemic stroke due to global hypoperfusion with watershed infarct  10/10/2022    Heart block     MI (myocardial infarction)     Primary hypertension 11/21/2022    Reflux        Surgical History:   Patrick Short  has a past surgical history that includes Cardiac catheterization; Eye surgery (Left, 02/2017); and Hip surgery (Right, 12/2021).    Medications:   Patrick has a current medication list which includes the following prescription(s): aspirin, atorvastatin, carbidopa-levodopa  mg, cetirizine, clobetasol, desonide, finasteride, levocetirizine, lisinopril, nitroglycerin, ofloxacin, pantoprazole, prednisone, and triamcinolone acetonide 0.1%.    Allergies:   Review of patient's allergies indicates:  No Known Allergies     Imaging: CT on 11/15/2022: New small intraparenchymal hemorrhage in the subcortical white matter of the right postcentral gyrus. Mild chronic small vessel ischemic change and small remote left occipital infarct.    Prior Therapy: ortho PT following hip replacement  Social History: wife    Falls: multiple recent falls. 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   Prior Level of Function: independent with all functional mobility, ADLS and driving   Current Level of Function: independent with all functional mobility and ADLs but patient requires increased time to perform tasks and the wife does not ask the patient to do as much     Pain:  Current 0/10, worst 0/10, best 0/10   Location: NA  Description: NA  Aggravating Factors: NA  Easing Factors: NA    Patient's goals: Improve gait and overall functional mobility    Objective     Mental status: alert, oriented to person, place, and time  Appearance: Casually dressed  Behavior:  calm and cooperative  Attention Span and Concentration:  Decreased and  "Easily distracted    Dominant hand:  right     Posture Alignment in sitting/ standing:   Head: forward head   Scapulae: slouched posture   Trunk: WNL  Pelvis: WNL   Legs: WNL     Sensation: Light Touch: patient reports numbness and tingling in the LUE          Tone: no increase in muscle tone notes       Visual/Auditory: denies changes in vision and hearing          RANGE OF MOTION--LOWER EXTREMITIES  (R) LE Hip: normal   Knee: normal   Ankle: normal    (L) LE: Hip: normal   Knee: normal   Ankle: normal    Strength: manual muscle test grades below     Lower Extremity Strength  Right LE  Left LE    Hip Flexion: 4/5 Hip Flexion: 4/5   Hip Extension:  5/5 Hip Extension: 5/5   Hip Abduction: 5/5 Hip Abduction: 5/5   Hip Adduction: 5/5 Hip Adduction 5/5   Knee Extension: 5/5 Knee Extension: 5/5   Knee Flexion: 5/5 Knee Flexion: 5/5   Ankle Dorsiflexion: 5/5 Ankle Dorsiflexion: 5/5   Ankle Plantarflexion: 5/5 Ankle Plantarflexion: 5/5     Abdominal Strength: WNL    Flexibility: WNL     Evaluation   Single Limb Stance R LE To be assessed at follow up session   (<10 sec = HIGH FALL RISK)   Single Limb Stance L LE To be assessed at follow up session   (<10 sec = HIGH FALL RISK)      Evaluation   30 second Chair Rise  (adults > 61 y/o) 12 completed with no arms   5 times sit-stand  (adults 18-63 y/o) 11 seconds  >12 sec= fall risk for general elderly  >16 sec= fall risk for Parkinson's disease  >10 sec= balance/vestibular dysfunction (<61 y/o)  >14.2 sec= balance/vestibular dysfunction (>61 y/o)  >12 sec= fall risk for CVA     Postural control:  ASTRID SENSORY TESTING:  (P= Pass, F= Fail; note any sway; hold each position for 30")  Condition 1: (firm surface/feet together/eyes open) P  Condition 2: (firm surface/feet together/eyes closed) P  Condition 3: (firm surface/feet in tandem/eyes open) 17 seconds RLE leading, 2 seconds LLE leading   Condition 4: (firm surface/feet in tandem/eyes closed) F 2 seconds BLE leading "   Condition 5: (soft surface/feet together/eyes open) P  Condition 6: (soft surface/feet together/eyes closed) P mod sway   Condition 7: (Fakuda step test), measure distance varied from center starting position NT      Gait Assessment:   - AD used: none   - Assistance: independent   - Distance: ambulatory throughout session     GAIT DEVIATIONS:  Patrick displays the following deviations with ambulation: decreased bilateral LE foot clearance, decreased step length and marcelle, short shuffling steps with forward trunk lean when ambulating    Impairments contributing to deviations: impaired motor control, impaired endurance, impaired coordination     Endurance Deficit: minimal       Evaluation   Timed Up and Go 9 sec  < 20 sec safe for independent transfers, < 30 sec safe for dependent transfers/assist required   Self Selected Walking Speed 1.2 m/sec (6m/5s)   Fast Walking Speed 1.5 m/sec (6m/4s)       Functional Gait Assessment:   1. Gait on level surface =  3  2. Change in Gait Speed = 3  3. Gait with horizontal head turns  = 2  4. Gait with vertical head turns = 2  5. Gait with pivot turns = 3  6. Step over obstacle = 3  7. Gait with Narrow BRANDON = 1  8. Gait with eyes closed = 3  9. Ambulating Backwards = 2  10. Steps = 2     Score 24/30   Score:   <22/30 fall risk   <20/30 fall risk in older adults   <18/30 fall risk in Parkinsons          CMS Impairment/Limitation/Restriction for FOTO Degenerative CNS Disorders Survey    Therapist reviewed FOTO scores for Patrick Short on 12/2/2022.   FOTO documents entered into [employer] - see Media section.    Limitation Score: 30%         TREATMENT   Treatment not performed due to evaluation lasting duration of session.     Home Exercises and Patient Education Provided    Education provided:   - POC, purpose of PT, discharge planning     Written Home Exercises Provided: home exercise program to be provided at follow up session     Assessment   Patrick is a 77 y.o. 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 Patient presents with complaints of gait instability with multiple recent falls. Patient reports he has been very active throughout his life but recently had two strokes and was diagnosed with parkinson's three years prior. The patient presents with good BLE strength as noted by the patient's MMT scores. The patient's time to complete the TUG places the patient outside of the fall risk category. The patient's SSWS and FSWS place the patient in the community ambulator category. Impaired gait stability noted when performing the FGA although the patient's score on the FGA places the patient right outside of the fall risk category. When performing the FGA, the patient had the most difficulty with gait with a narrow base of support. On the GST, the patient had the most difficulty with conditions 3 and 4 (tandem stance with eyes open/closed). The patient ambulates without an AD, decreased BLE foot clearance and short shuffling steps noted which is likely the cause of the patients recent falls. Based on the objective measures, the patient's primary impairments include static/ dynamic balance, gait stability and overall gait mechanics. The patient will benefit from continued physical therapy intervention to address functional mobility deficits listed above.     Patient prognosis is Good.   Patient will benefit from skilled outpatient Physical Therapy to address the deficits stated above and in the chart below, provide patient/family education, and to maximize patient's level of independence.     Plan of care discussed with patient: Yes  Patient's spiritual, cultural and educational needs considered and patient is agreeable to the plan of care and goals as stated below:     Anticipated Barriers for therapy: co morbidities     Medical Necessity is demonstrated by the  following  History  Co-morbidities and personal factors that may impact the plan of care Co-morbidities:   lumbar radiculopathy, parkinsons, CAD, HTN    Personal Factors:   no deficits     high   Examination  Body Structures and Functions, activity limitations and participation restrictions that may impact the plan of care Body Regions:   back  lower extremities  trunk    Body Systems:    gross symmetry  ROM  strength  gross coordinated movement  balance  gait  transfers  transitions  motor control  motor learning    Participation Restrictions:   Patient motivation    Activity limitations:   Learning and applying knowledge  no deficits    General Tasks and Commands  undertaking multiple tasks    Communication  communicating with/receiving spoken language  communicating with/receiving non-verbal language    Mobility  lifting and carrying objects  fine hand use (grasping/picking up)  walking  driving (bike, car, motorcycle)    Self care  washing oneself (bathing, drying, washing hands)  dressing    Domestic Life  no deficits    Interactions/Relationships  no deficits    Life Areas  no deficits    Community and Social Life  community life  recreation and leisure         moderate   Clinical Presentation evolving clinical presentation with changing clinical characteristics moderate   Decision Making/ Complexity Score: moderate     Goals:  Short Term Goals: 4 weeks   Patient will be independent with established home exercise program.   Patient to improve FGA score to 26/30 for improved stability with functional mobility.   Patient to improve GST condition 3 to 12 seconds for improved balance.   Patient to improve GST condition 4 to 7 seconds for improved stability with NBOS.   SLS goal to be made at follow up session.         Long Term Goals: 8 weeks   Patient will be independent with advanced home exercise program.   Patient to improve FGA score to 27/30 for improved stability with functional mobility.   Patient to  improve GST condition 3 to 16 seconds for improved balance.   Patient to improve GST condition 4 to 14 seconds for improved stability with NBOS.   SLS goal to be made at follow up session.      Plan   Plan of care Certification: 12/2/2022 to 1/27/2023.    Outpatient Physical Therapy 2 times weekly for 8 weeks to include the following interventions: Gait Training, Manual Therapy, Neuromuscular Re-ed, Orthotic Management and Training, Patient Education, Therapeutic Activities, and Therapeutic Exercise.     Salma Phipps, PT

## 2022-12-04 DIAGNOSIS — G20.A1 PARKINSON DISEASE: ICD-10-CM

## 2022-12-04 DIAGNOSIS — Z86.73 HISTORY OF ISCHEMIC LEFT MCA STROKE: Primary | ICD-10-CM

## 2022-12-05 ENCOUNTER — TELEPHONE (OUTPATIENT)
Dept: ELECTROPHYSIOLOGY | Facility: CLINIC | Age: 77
End: 2022-12-05
Payer: COMMERCIAL

## 2022-12-05 NOTE — TELEPHONE ENCOUNTER
Called patient to schedule an appointment with one of our electorphysiologists for consideration of ILR. Patient referred by Dr. Bee. Patient did not answer. I left a message with my call back number. I also left my name and number with his wife.

## 2022-12-06 ENCOUNTER — TELEPHONE (OUTPATIENT)
Dept: NEUROLOGY | Facility: CLINIC | Age: 77
End: 2022-12-06
Payer: COMMERCIAL

## 2022-12-06 NOTE — TELEPHONE ENCOUNTER
Pt called about a possible appt on 12/6. I explained to pt that I had canceled that appt because Dr. Bee asked me to have him come in sooner, which is why pt had an appt on 11/21. I explained that the appt today was that appt he had on 11/21. I also noticed that that Dr. Bee asked for a 4-6 week f/u, so I scheduled pt for 1/31 at 11am. I also reminded pt that, due to the 111/21 appt, we had order a loop recorder. Pt confirmed he had an EP appt on Thursday. Pt also verbalized understanding of details which I explained to him.

## 2022-12-06 NOTE — PROGRESS NOTES
[facility]   Occupational Therapy Neurological Rehabilitation   Initial Evaluation     Patient: Patrick Short  MRN: [id number]  Today's Date: 12/7/2022    See plan of care.ISAAK Tatum 12/7/2022

## 2022-12-07 ENCOUNTER — TELEPHONE (OUTPATIENT)
Dept: ELECTROPHYSIOLOGY | Facility: CLINIC | Age: 77
End: 2022-12-07
Payer: COMMERCIAL

## 2022-12-07 ENCOUNTER — CLINICAL SUPPORT (OUTPATIENT)
Dept: REHABILITATION | Facility: HOSPITAL | Age: 77
End: 2022-12-07
Attending: PSYCHIATRY & NEUROLOGY
Payer: COMMERCIAL

## 2022-12-07 DIAGNOSIS — I25.10 CORONARY ARTERY DISEASE, UNSPECIFIED VESSEL OR LESION TYPE, UNSPECIFIED WHETHER ANGINA PRESENT, UNSPECIFIED WHETHER NATIVE OR TRANSPLANTED HEART: Primary | ICD-10-CM

## 2022-12-07 DIAGNOSIS — H51.11 CONVERGENCE INSUFFICIENCY: ICD-10-CM

## 2022-12-07 DIAGNOSIS — Z86.73 HISTORY OF ISCHEMIC LEFT MCA STROKE: ICD-10-CM

## 2022-12-07 DIAGNOSIS — R29.898 WEAKNESS OF LEFT UPPER EXTREMITY: ICD-10-CM

## 2022-12-07 DIAGNOSIS — G20.A1 PARKINSON DISEASE: ICD-10-CM

## 2022-12-07 PROCEDURE — 97165 OT EVAL LOW COMPLEX 30 MIN: CPT | Mod: PO

## 2022-12-07 NOTE — PLAN OF CARE
"  [facility]   Occupational Therapy Neurological Rehabilitation   Initial Evaluation     Patient: Patrick Short  MRN: [id number]  Today's Date: 12/7/2022    Therapy Diagnosis:   Encounter Diagnoses   Name Primary?    History of ischemic left MCA stroke     Parkinson disease     Convergence insufficiency     Weakness of left upper extremity      Physician: Frantz Bee MD  Physician Orders: Neuro Program     Medical Diagnosis: Z86.73 (ICD-10-CM) - History of ischemic left MCA stroke G20 (ICD-10-CM) - Parkinson disease   Evaluation Date: 12/7/2022  Plan of Care Expiration Period: 1/18/2023 (6 weeks)  Insurance Authorization period Expiration: 12/4/2023  Date of Return to MD: 12/8/22: Cardiology; 1/31/23: Neurology   Visit # / Visits Authorized: 1 / 1  FOTO: 1/3     Time In:8:50  Time Out: 9:45  Total Billable (one on one) Time: 55 minutes    Precautions: Standard and Fall; hearing impaired     Subjective   Occupational Profile:  Prior Level of Function: dx with PD ~5 years ago. Ongoing memory deficits.     History of Current Level of Function: Per PT note, "Tushar reports: TIA on October 7th. Within the last three weeks the patient has been having headaches. Due to the headaches, the patient went to the doctor on November 15, 2022 and had a CT scan that showed the patient had a brain bleed. The patients wife reports he has been having some memory issues since the stroke in October. The patient's wife reports the patient has been having numbness and tingling in the L arm. The patient reports he has been having issues with word finding and having conversations with people since the stroke. The patient reports he also has difficulty using his left arm/ hand, including buttoning buttons and performing fine motor tasks. The patient also deals with parkinson's symptoms such as shuffling of the feet resulting in multiple falls recently."     Pt is not currently taking PD medication.     Living " Environment: Pt lives with spouse in 2 SH patient does not go upstairs. Threshold walk in shower with built in seat.   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 The patient's wife reports she does not think the patient tells her every time he falls     Involved Side: Left  Dominant Side/Hand Dominance: Right  Date of Onset: 11/15/22  Imaging: CT scan films 11/21/2022; FINDINGS:  Small parenchymal hemorrhage in the right postcentral gyrus as decreased in density when compared to prior.  There is stable surrounding edema.  No  new intracranial hemorrhage or new major vascular territory infarct.  There are chronic microvascular ischemic changes with small remote right frontal infarct.  No abnormal extra-axial fluid collections.  Calvarium shows no displaced fracture.  Paranasal sinuses and mastoid air cells are essentially clear.     Impression: Expected temporal evolution of known right postcentral gyrus parenchymal hemorrhage.  No significant detrimental changes from prior exam.    Patient's Goals for Occupational Therapy: to increase his quality of life and remain independent     Pain:  Pain Related Behaviors Observed: no ; occasional headaches     Functional Status      Functional Mobility:   Bed mobility: Mod I  Roll to left: Mod I  Roll to right: Mod I  Supine to sit: Mod I  Sit to supine: Mod I  Transfers to bed: Mod I  Transfers to toilet: I  Car transfers: Mod I    ADL's:  Feeding: Mod I  Grooming: Mod I  Hygiene: Mod I  Upper Body Dressing: Mod I  Lower Body  Dressing: Mod I; able to tie shoes   Toileting: Mod I  Bathing: Mod I    IADL's:  Homecare: does not complete   Cooking: does not complete  Laundry: does not complete  Yard work: does not complete  Use of telephone: Mod I; increased difficulty with cognitive component   Money management: Mod I  Medication management: Mod I; wife fills in pill organizer   Handwriting:Mod I; smaller   Technology Use:Min A- with cognitive component     Objective   Cognitive Exam:  Oriented: Person, Place, Time, and Situation  Behaviors: Cooperative  Follows Commands/attention: Follows multistep  commands  Communication: clear/fluent  Memory: impaired  Safety awareness/insight to disability: aware of diagnosis, treatment, and prognosis  Coping skills/emotional control: Appropriate to situation    Visual/Perceptual:  Tracking: intact functionally all directions   Saccades: impaired slowed all directions    Acuity: uses readers  Convergence and divergence: insuffiencey noted; >10 cm  Nystagmus: none noted    R/L discrimination: intact  Comments: left cataract sx prior - torn retina following     Physical Exam:  Postural examination/scapula alignment: Rounded shoulder and Head forward  Joint integrity: intact  Skin integrity: intact  Edema: none noted      Joint Evaluation: bilateral upper extremity  AROM WFL     RTC tears on right side awaiting repair     Fist: normal; right hand with 4th digit contracture     Strength: 5/5 right upper extremity  4+/5 shoulder flex, ext, abd, add for left upper extremity   5/5 distally      Strength: (GUERLINE Dynamometer in lbs.) Position II:     12/7/2022 12/7/2022    Right Left   Rung II 81.6 # 76.0 #   Norms for  Strength (70+)  Male: Right Left   54 lbs 48 lbs      Pinch Strength (Measured in psi)     12/7/2022 12/7/2022    Right Left   Key Pinch 22 psi 20 psi   3pt Pinch 14 psi 19 psi   2pt Pinch 11 psi 13 psi     Fine Motor Coordination: 9 Hole Peg Test    Right   12/7/2022 Left   12/7/2022                 27.2 s              29.8 s       Fine Motor Coordination: Box and Block    Right   12/7/2022 Left   12/7/2022               51              53     Gross motor coordination:   JOLIE (Rapid Alternating Movements): mild delay  Finger to Nose (3 times): intact; no dysmetria   Finger Flicks (coordination moving from digit flexion to digit extension): intact   Opposition: intact bilateral      Tone:  Modified Ila Scale:   0 - No increase in muscle tone    Sensation:  Patrick  reports no deficits   Kinesthesia: bilateralintact  Proprioception: bilateral intact      Balance:   Static Sitting- GOOD+: Takes MAXIMAL challenges from all directions.    Dynamic Sitting- GOOD+: Takes MAXIMAL challenges from all directions.    Static Standing - GOOD: Takes MODERATE challenges from all directions  Dynamic Standing - defer to PT; not completed in OT evaluation on this date     Endurance Deficit: mild                      CMS Impairment/Limitation/Restriction for FOTO Survey- Stroke UE    Therapist reviewed FOTO scores for Patrick Short on 12/7/2022.   FOTO documents entered into [employer] - see Media section.    Limitation Score: 17%         Additional Treatment:   No time for additional treatment; evaluation only     Education provided:   - role of Occupational Therapy in care, goals for Occupational Therapy for this plan of care,  scheduling      Assessment     Patrick Short is a 77 y.o. male referred to outpatient neurological occupational therapy and presents with  Small parenchymal hemorrhage in the right postcentral gyrus, resulting in left side weakness and mild coordination deficits. It should also be noted that patient has PD resulting in slower amplitude movement overall. Following medical record review it is determined that patient will benefit from occupational therapy services in order to maximize functional indep and safety.     Patient prognosis is Good due to  motivation and caregiver support   Patient  will benefit from skilled outpatient Occupational Therapy to address the deficits stated above and in the chart below, provide patient/family education, and to maximize patient's level of independence.     Plan of care discussed with patient: Yes  Patient's spiritual, cultural and educational needs considered and patient is agreeable to the plan of care and goals as stated below.     Anticipated Barriers for therapy: none noted     Past Medical History/Physical Systems Review:   Past Medical History:  Patrick Short  has a past medical history of CAD (coronary artery disease), Cerebral ischemic stroke due to global hypoperfusion with watershed infarct, Heart block, MI (myocardial infarction), Primary hypertension, and Reflux.     Past Surgical History:  Patrick Short  has a past surgical history that includes Cardiac catheterization; Eye surgery (Left, 02/2017); and Hip surgery (Right, 12/2021).    Current Medications:  Patrick has a current medication list which includes the following prescription(s): aspirin, atorvastatin, carbidopa-levodopa  mg, cetirizine, clobetasol, desonide, finasteride, levocetirizine, lisinopril, nitroglycerin, ofloxacin, pantoprazole, prednisone, and triamcinolone acetonide 0.1%.    Allergies:  Review of patient's allergies indicates:  No Known Allergies     Medical Necessity is demonstrated by the following  Profile and History Assessment of Occupational Performance Level of Clinical Decision Making Complexity Score   Occupational Profile:   Patrick Short is a 77 y.o. male who lives with their spouse and is currently employed. Patrick Short has difficulty with  Fine motor coordination, sustained grasp, endurance, smaller amplitude movement  affecting his/her daily functional abilities. His/her main goal for therapy is to remain indep.     Comorbidities:    has a past medical history of CAD (coronary artery disease), Cerebral ischemic stroke due to global hypoperfusion with  watershed infarct, Heart block, MI (myocardial infarction), Primary hypertension, and Reflux.    Medical and Therapy History Review:   Brief   Performance Deficits    Physical:  Muscle Power/Strength  Muscle Endurance  Pinch Strength    Cognitive:  Attention  Memory    Psychosocial:    No Deficits     Clinical Decision Making:  low    Assessment Process:  Problem-Focused Assessments    Modification/Need for Assistance:  Not Necessary    Intervention Selection:  Limited Treatment Options       low  Based on PMHX, co morbidities , data from assessments and functional level of assistance required with task and clinical presentation directly impacting function.         Goals: 6 weeks; long term= short term  Pt will demo understanding for HEP/HAP provided with teach back understanding.   Pt will demo 5/5 for proximal stability and strength for left upper extremity.   Pt will demo understanding for convergence/divergence sufficiency exercises with teach back understanding.   Pt will demo understanding for modifications and adaptations for handwriting and fine motor tasks.   Pt will reduce limitation score on FOTO by less than or equal to 12% to demonstrate an increase in self-perceived functional performance.     The following goals were discussed with the patient and patient is in agreement with them as to be addressed in the treatment plan.     Plan   Certification Period/Plan of care expiration: 12/7/2022 to 1/18/2023.    Outpatient Occupational Therapy 2 times weekly for 6 weeks to include the following interventions: Neuromuscular Re-ed, Patient Education, Self Care, Therapeutic Activities, and Therapeutic Exercise.      ISAAK Dubois  Neuro Occupational Therapist   [facility]  12/7/2022       I certify the need for these services furnished under this plan of treatment and while under my care.  ____________________________________ Physician/Referring Practitioner   Date of Signature

## 2022-12-08 ENCOUNTER — CLINICAL SUPPORT (OUTPATIENT)
Dept: REHABILITATION | Facility: HOSPITAL | Age: 77
End: 2022-12-08
Attending: INTERNAL MEDICINE
Payer: COMMERCIAL

## 2022-12-08 ENCOUNTER — CLINICAL SUPPORT (OUTPATIENT)
Dept: REHABILITATION | Facility: HOSPITAL | Age: 77
End: 2022-12-08
Attending: PSYCHIATRY & NEUROLOGY
Payer: COMMERCIAL

## 2022-12-08 ENCOUNTER — OFFICE VISIT (OUTPATIENT)
Dept: ELECTROPHYSIOLOGY | Facility: CLINIC | Age: 77
End: 2022-12-08
Payer: COMMERCIAL

## 2022-12-08 VITALS
SYSTOLIC BLOOD PRESSURE: 158 MMHG | WEIGHT: 235.69 LBS | DIASTOLIC BLOOD PRESSURE: 70 MMHG | BODY MASS INDEX: 33.74 KG/M2 | HEART RATE: 58 BPM | HEIGHT: 70 IN

## 2022-12-08 DIAGNOSIS — M54.16 LUMBAR RADICULOPATHY: ICD-10-CM

## 2022-12-08 DIAGNOSIS — G31.84 MILD NEUROCOGNITIVE DISORDER: Primary | ICD-10-CM

## 2022-12-08 DIAGNOSIS — G20.A1 PARKINSON DISEASE: ICD-10-CM

## 2022-12-08 DIAGNOSIS — R40.0 DAYTIME SLEEPINESS: ICD-10-CM

## 2022-12-08 DIAGNOSIS — Z86.73 HISTORY OF ISCHEMIC LEFT MCA STROKE: ICD-10-CM

## 2022-12-08 DIAGNOSIS — I61.1 NONTRAUMATIC CORTICAL HEMORRHAGE OF RIGHT CEREBRAL HEMISPHERE: ICD-10-CM

## 2022-12-08 DIAGNOSIS — I10 PRIMARY HYPERTENSION: ICD-10-CM

## 2022-12-08 DIAGNOSIS — E66.9 CLASS 1 OBESITY WITH BODY MASS INDEX (BMI) OF 33.0 TO 33.9 IN ADULT, UNSPECIFIED OBESITY TYPE, UNSPECIFIED WHETHER SERIOUS COMORBIDITY PRESENT: ICD-10-CM

## 2022-12-08 DIAGNOSIS — I62.9 INTRACRANIAL HEMORRHAGE: ICD-10-CM

## 2022-12-08 DIAGNOSIS — I25.10 CORONARY ARTERY DISEASE INVOLVING NATIVE CORONARY ARTERY OF NATIVE HEART WITHOUT ANGINA PECTORIS: ICD-10-CM

## 2022-12-08 DIAGNOSIS — I63.9 CEREBRAL ISCHEMIC STROKE DUE TO GLOBAL HYPOPERFUSION WITH WATERSHED INFARCT: ICD-10-CM

## 2022-12-08 DIAGNOSIS — I25.10 CORONARY ARTERY DISEASE, UNSPECIFIED VESSEL OR LESION TYPE, UNSPECIFIED WHETHER ANGINA PRESENT, UNSPECIFIED WHETHER NATIVE OR TRANSPLANTED HEART: ICD-10-CM

## 2022-12-08 DIAGNOSIS — Z95.5 H/O HEART ARTERY STENT: ICD-10-CM

## 2022-12-08 DIAGNOSIS — R26.89 IMPAIRED GAIT AND MOBILITY: Primary | ICD-10-CM

## 2022-12-08 DIAGNOSIS — E78.2 MIXED HYPERLIPIDEMIA: ICD-10-CM

## 2022-12-08 DIAGNOSIS — G31.84 MILD NEUROCOGNITIVE DISORDER: ICD-10-CM

## 2022-12-08 DIAGNOSIS — I63.9 CRYPTOGENIC STROKE: Primary | ICD-10-CM

## 2022-12-08 DIAGNOSIS — K21.9 GASTROESOPHAGEAL REFLUX DISEASE, UNSPECIFIED WHETHER ESOPHAGITIS PRESENT: ICD-10-CM

## 2022-12-08 PROCEDURE — 99205 PR OFFICE/OUTPT VISIT, NEW, LEVL V, 60-74 MIN: ICD-10-PCS | Mod: S$GLB,,, | Performed by: STUDENT IN AN ORGANIZED HEALTH CARE EDUCATION/TRAINING PROGRAM

## 2022-12-08 PROCEDURE — 99999 PR PBB SHADOW E&M-EST. PATIENT-LVL III: CPT | Mod: PBBFAC,,, | Performed by: STUDENT IN AN ORGANIZED HEALTH CARE EDUCATION/TRAINING PROGRAM

## 2022-12-08 PROCEDURE — 3077F SYST BP >= 140 MM HG: CPT | Mod: CPTII,S$GLB,, | Performed by: STUDENT IN AN ORGANIZED HEALTH CARE EDUCATION/TRAINING PROGRAM

## 2022-12-08 PROCEDURE — 96125 COGNITIVE TEST BY HC PRO: CPT | Mod: PO

## 2022-12-08 PROCEDURE — 97112 NEUROMUSCULAR REEDUCATION: CPT | Mod: PO

## 2022-12-08 PROCEDURE — 1100F PTFALLS ASSESS-DOCD GE2>/YR: CPT | Mod: CPTII,S$GLB,, | Performed by: STUDENT IN AN ORGANIZED HEALTH CARE EDUCATION/TRAINING PROGRAM

## 2022-12-08 PROCEDURE — 1100F PR PT FALLS ASSESS DOC 2+ FALLS/FALL W/INJURY/YR: ICD-10-PCS | Mod: CPTII,S$GLB,, | Performed by: STUDENT IN AN ORGANIZED HEALTH CARE EDUCATION/TRAINING PROGRAM

## 2022-12-08 PROCEDURE — 3078F DIAST BP <80 MM HG: CPT | Mod: CPTII,S$GLB,, | Performed by: STUDENT IN AN ORGANIZED HEALTH CARE EDUCATION/TRAINING PROGRAM

## 2022-12-08 PROCEDURE — 3288F PR FALLS RISK ASSESSMENT DOCUMENTED: ICD-10-PCS | Mod: CPTII,S$GLB,, | Performed by: STUDENT IN AN ORGANIZED HEALTH CARE EDUCATION/TRAINING PROGRAM

## 2022-12-08 PROCEDURE — 97110 THERAPEUTIC EXERCISES: CPT | Mod: PO

## 2022-12-08 PROCEDURE — 1159F MED LIST DOCD IN RCRD: CPT | Mod: CPTII,S$GLB,, | Performed by: STUDENT IN AN ORGANIZED HEALTH CARE EDUCATION/TRAINING PROGRAM

## 2022-12-08 PROCEDURE — 99999 PR PBB SHADOW E&M-EST. PATIENT-LVL III: ICD-10-PCS | Mod: PBBFAC,,, | Performed by: STUDENT IN AN ORGANIZED HEALTH CARE EDUCATION/TRAINING PROGRAM

## 2022-12-08 PROCEDURE — 4010F PR ACE/ARB THEARPY RXD/TAKEN: ICD-10-PCS | Mod: CPTII,S$GLB,, | Performed by: STUDENT IN AN ORGANIZED HEALTH CARE EDUCATION/TRAINING PROGRAM

## 2022-12-08 PROCEDURE — 3078F PR MOST RECENT DIASTOLIC BLOOD PRESSURE < 80 MM HG: ICD-10-PCS | Mod: CPTII,S$GLB,, | Performed by: STUDENT IN AN ORGANIZED HEALTH CARE EDUCATION/TRAINING PROGRAM

## 2022-12-08 PROCEDURE — 93010 RHYTHM STRIP: ICD-10-PCS | Mod: S$GLB,,, | Performed by: INTERNAL MEDICINE

## 2022-12-08 PROCEDURE — 3044F PR MOST RECENT HEMOGLOBIN A1C LEVEL <7.0%: ICD-10-PCS | Mod: CPTII,S$GLB,, | Performed by: STUDENT IN AN ORGANIZED HEALTH CARE EDUCATION/TRAINING PROGRAM

## 2022-12-08 PROCEDURE — 99205 OFFICE O/P NEW HI 60 MIN: CPT | Mod: S$GLB,,, | Performed by: STUDENT IN AN ORGANIZED HEALTH CARE EDUCATION/TRAINING PROGRAM

## 2022-12-08 PROCEDURE — 3077F PR MOST RECENT SYSTOLIC BLOOD PRESSURE >= 140 MM HG: ICD-10-PCS | Mod: CPTII,S$GLB,, | Performed by: STUDENT IN AN ORGANIZED HEALTH CARE EDUCATION/TRAINING PROGRAM

## 2022-12-08 PROCEDURE — 1159F PR MEDICATION LIST DOCUMENTED IN MEDICAL RECORD: ICD-10-PCS | Mod: CPTII,S$GLB,, | Performed by: STUDENT IN AN ORGANIZED HEALTH CARE EDUCATION/TRAINING PROGRAM

## 2022-12-08 PROCEDURE — 3044F HG A1C LEVEL LT 7.0%: CPT | Mod: CPTII,S$GLB,, | Performed by: STUDENT IN AN ORGANIZED HEALTH CARE EDUCATION/TRAINING PROGRAM

## 2022-12-08 PROCEDURE — 93010 ELECTROCARDIOGRAM REPORT: CPT | Mod: S$GLB,,, | Performed by: INTERNAL MEDICINE

## 2022-12-08 PROCEDURE — 3288F FALL RISK ASSESSMENT DOCD: CPT | Mod: CPTII,S$GLB,, | Performed by: STUDENT IN AN ORGANIZED HEALTH CARE EDUCATION/TRAINING PROGRAM

## 2022-12-08 PROCEDURE — 93005 ELECTROCARDIOGRAM TRACING: CPT | Mod: S$GLB,,, | Performed by: STUDENT IN AN ORGANIZED HEALTH CARE EDUCATION/TRAINING PROGRAM

## 2022-12-08 PROCEDURE — 93005 RHYTHM STRIP: ICD-10-PCS | Mod: S$GLB,,, | Performed by: STUDENT IN AN ORGANIZED HEALTH CARE EDUCATION/TRAINING PROGRAM

## 2022-12-08 PROCEDURE — 4010F ACE/ARB THERAPY RXD/TAKEN: CPT | Mod: CPTII,S$GLB,, | Performed by: STUDENT IN AN ORGANIZED HEALTH CARE EDUCATION/TRAINING PROGRAM

## 2022-12-08 NOTE — PROGRESS NOTES
Electrophysiology Clinic Note    Reason for new patient visit: Evaluation and recommendations regarding cryptogenic stroke.     PRESENTING HISTORY:     History of Present Illness:  Mr. Patrick Short is a suki 77-year-old gentleman who presents today for evaluation and recommendations regarding cryptogenic stroke. He has a past medical history significant for a history of prior strokes - a left parietal ischemic stroke 10/5/2022 with a left occipital pole infarct, and a recent right cortical intracerebral hemorrhage, coronary artery disease s/p PCI of the LAD, Parkinsons disease, mild cognitive impairment, hypertension, GERD, and obesity.     He is followed in general cardiology with Dr. Mendoza and was recently seen in clinic on 11/22/2022. He is followed in neurology with Dr. Bee, where they discussed the possibility of an ischemic disease, and discussed the topic of an ILR. He has been stopped on his aspirin therapy following his recent intracerebral hemorrhage. He recently sustained a fall around the time of his hemorrhage, and it is unclear if this was mediated by his recent fall. He continues to have mild cognitive impairment, although he denies any residual deficits of which he is aware. His wife reports that he often has word-searching issues and increased forgetfulness. He has been participating with PT/OT and has resumed physical exercise with walking without limitation. He has never had any evidence of atrial fibrillation on serial ECGs, nor on a prior 30-day ambulatory event monitor. His systolic function remains preserved, with LVEF 65%.    Mr. Short presents to clinic today with his wife. In discussion with Mr. Short today, he tells me that he is feeling overall quite well. He denies any episodes of dizziness, lightheadedness, syncope/presyncope, chest pain or chest discomfort, palpitations, nausea or vomiting, orthopnea, lower extremity edema, or PND. He reports mild baseline shortness of  breath and dyspnea with exertion that he feels has remained stable. He is hard of hearing, and requires his hearing aides. He can climb one flight of stairs prior to needing to take a break, and is limited by bilateral knee osteoarthritis.     Review of Systems:  Review of Systems   Constitutional:  Negative for activity change.   HENT:  Positive for hearing loss. Negative for nasal congestion, nosebleeds, postnasal drip, rhinorrhea, sinus pressure/congestion, sneezing and sore throat.    Respiratory:  Positive for shortness of breath. Negative for apnea, cough, chest tightness and wheezing.    Cardiovascular:  Negative for chest pain, palpitations and leg swelling.   Gastrointestinal:  Negative for abdominal distention, abdominal pain, blood in stool, change in bowel habit, constipation, diarrhea, nausea, vomiting and change in bowel habit.   Genitourinary:  Negative for dysuria and hematuria.   Musculoskeletal:  Positive for arthralgias and back pain. Negative for gait problem.   Neurological:  Positive for memory loss. Negative for dizziness, seizures, syncope, weakness, light-headedness, headaches, coordination difficulties and coordination difficulties.        Mild cognitive impairment with word-searching and increased memory loss.       PAST HISTORY:     Past Medical History:   Diagnosis Date    CAD (coronary artery disease) 7/26/2016    Cerebral ischemic stroke due to global hypoperfusion with watershed infarct 10/10/2022    Heart block     MI (myocardial infarction)     Primary hypertension 11/21/2022    Reflux        Past Surgical History:   Procedure Laterality Date    CARDIAC CATHETERIZATION      EYE SURGERY Left 02/2017    HIP SURGERY Right 12/2021       Family History:  Family History   Problem Relation Age of Onset    Hypertension Mother     Emphysema Father     Coronary artery disease Father        Social History:  He  reports that he has never smoked. He has never used smokeless tobacco. He reports  "current alcohol use. He reports that he does not use drugs.      MEDICATIONS & ALLERGIES:     Review of patient's allergies indicates:  No Known Allergies    Current Outpatient Medications on File Prior to Visit   Medication Sig Dispense Refill    aspirin 81 MG Chew Take 1 tablet (81 mg total) by mouth once daily.  0    atorvastatin (LIPITOR) 40 MG tablet TAKE 1 TABLET BY MOUTH EVERY DAY 90 tablet 3    carbidopa-levodopa  mg (SINEMET)  mg per tablet Take 1 tablet by mouth 3 (three) times daily. (Patient not taking: Reported on 11/22/2022) 180 tablet 0    cetirizine (ZYRTEC) 10 MG tablet Take 10 mg by mouth once daily.      clobetasol (TEMOVATE) 0.05 % cream APPLY TO AFFECTED AREA ON HAND TWICE A DAY AS NEEDED FOR RASH  1    desonide (DESOWEN) 0.05 % cream APPLY TO AFFECTED AREA ON FACE TWICE A DAY AS NEEDED FOR SCALE  1    finasteride (PROSCAR) 5 mg tablet 1 tablet once daily.      levocetirizine (XYZAL) 5 MG tablet Take 5 mg by mouth every evening.      lisinopriL 10 MG tablet Take 1 tablet (10 mg total) by mouth once daily. (Patient not taking: Reported on 11/22/2022) 90 tablet 1    nitroGLYCERIN (NITROSTAT) 0.4 MG SL tablet Place 1 tablet (0.4 mg total) under the tongue every 5 (five) minutes as needed for Chest pain. (Patient not taking: Reported on 11/22/2022) 30 tablet 1    ofloxacin (OCUFLOX) 0.3 % ophthalmic solution       pantoprazole (PROTONIX) 40 MG tablet Take 1 tablet (40 mg total) by mouth once daily. 90 tablet 3    predniSONE (DELTASONE) 10 MG tablet Take 3 tablets x 7 days then 2 tablets x 7days. (Patient not taking: Reported on 10/6/2022) 36 tablet 0    triamcinolone acetonide 0.1% (KENALOG) 0.1 % ointment APPLY TO AFFECTED AREA ON AXILLAS TWICE A DAY AS NEEDED FOR ITCH/RASH  1     No current facility-administered medications on file prior to visit.        OBJECTIVE:     Vital Signs:  BP (!) 158/70   Pulse (!) 58   Ht 5' 10" (1.778 m)   Wt 106.9 kg (235 lb 10.8 oz)   BMI 33.82 kg/m² "     Physical Exam:  Physical Exam  Constitutional:       General: He is not in acute distress.     Appearance: Normal appearance. He is obese. He is not ill-appearing or diaphoretic.      Comments: Well-appearing man in NAD.   HENT:      Head: Normocephalic and atraumatic.      Nose: Nose normal.      Mouth/Throat:      Mouth: Mucous membranes are moist.      Pharynx: Oropharynx is clear.   Eyes:      Pupils: Pupils are equal, round, and reactive to light.   Cardiovascular:      Rate and Rhythm: Regular rhythm. Bradycardia present.      Pulses: Normal pulses.      Heart sounds: Normal heart sounds. No murmur heard.    No friction rub. No gallop.   Pulmonary:      Effort: Pulmonary effort is normal. No respiratory distress.      Breath sounds: Normal breath sounds. No wheezing, rhonchi or rales.   Chest:      Chest wall: No tenderness.   Abdominal:      General: There is no distension.      Palpations: Abdomen is soft.      Tenderness: There is no abdominal tenderness.   Musculoskeletal:         General: No swelling or tenderness.      Cervical back: Normal range of motion.      Right lower leg: No edema.      Left lower leg: No edema.   Skin:     General: Skin is warm and dry.      Findings: No erythema, lesion or rash.   Neurological:      General: No focal deficit present.      Mental Status: He is alert and oriented to person, place, and time. Mental status is at baseline.      Motor: No weakness.      Gait: Gait normal.   Psychiatric:         Mood and Affect: Mood normal.         Behavior: Behavior normal.        Laboratory Data:  Lab Results   Component Value Date    WBC 5.07 10/10/2022    HGB 14.9 10/10/2022    HCT 43.4 10/10/2022    MCV 92 10/10/2022     10/10/2022     Lab Results   Component Value Date     10/10/2022     10/10/2022    K 3.9 10/10/2022     10/10/2022    CO2 25 10/10/2022    BUN 15 10/10/2022    CREATININE 0.8 10/10/2022    CALCIUM 9.0 10/10/2022    MG 1.6 10/10/2022      Lab Results   Component Value Date    INR 1.0 07/18/2016       Pertinent Cardiac Data:  ECG: Sinus bradycardia with rate of 58 bpm,  ms, QRS 88 ms, QT/QTc 402/394 ms, inferior Q waves.     Coronary Angiogram - 8/26/2016:    Successful PCI.     LM FFR=0.92.     Treadmill Stress Echocardiogram - 11/16/2021:  The stress echo portion of this study is negative for myocardial ischemia. Target heart rate was achieved.  The ECG portion of this study is negative for myocardial ischemia.  During stress, the following significant arrhythmias were observed: rare PACs, rare PVCs.  The patient's exercise capacity was below average. Achieved 9 METs.  The test was stopped because the patient experienced fatigue.  The left ventricle is normal in size with normal systolic function.  The estimated ejection fraction is 65%.  Indeterminate left ventricular diastolic function.  Normal right ventricular size with normal right ventricular systolic function.  The estimated PA systolic pressure is 25 mmHg.  Normal central venous pressure (3 mmHg).  Mild left atrial enlargement.    Resting 2D Transthoracic Echocardiogram - 7/26/2022:  The left ventricle is normal in size with normal systolic function. The estimated ejection fraction is 65%.  Normal right ventricular size with normal right ventricular systolic function.  Normal left ventricular diastolic function.  The estimated PA systolic pressure is 22 mmHg.  Normal central venous pressure (3 mmHg).  The ascending aorta is mildly dilated.    30-Day Ambulatory Event Monitor - 10/14/2022:  Three symptom episodes correlating with normal sinus rhythm.      ASSESSMENT & PLAN:   Mr. Patrick Short is a suki 77-year-old gentleman who presents today for evaluation and recommendations regarding cryptogenic stroke. He has a past medical history significant for a history of prior strokes - a left parietal ischemic stroke 10/5/2022 with a left occipital pole infarct, and a recent right  cortical intracerebral hemorrhage, coronary artery disease s/p PCI of the LAD, Parkinsons disease, mild cognitive impairment, hypertension, GERD, and obesity.     - We discussed the fact that some strokes may be secondary to an underlying arrhythmic etiology, specifically atrial fibrillation. While he has had no evidence of atrial fibrillation on telemetry while he was admitted, nor on serial ECGs or a prior 30-day ambulatory event monitor, this does not definitively rule out brief paroxysms of atrial fibrillation.   - Based on the most recent AHA/ACC/HRS atrial fibrillation guidelines, implantation of an implantable loop recorder or other device detection method of atrial fibrillation is recommended in patients with cryptogenic stroke (Class IIa, MARLON B-R recommendation). Furthermore, based on the CRYSTAL-AF study, short and intermediate-term cardiac monitoring may miss patients with paroxysmal atrial fibrillation (In this study, 88% of patients who had atrial fibrillation would have been missed if only monitored with a 30-day ambulatory monitor.)  - We discussed the indications for implantation of an implantable loop recorder, in addition to a description of the procedure, relative risks and benefits, and proposed follow-up after implantation, including setting up a remote monitor at home and returning for in-office interrogations. He voices understanding is would be amenable to undergoing this procedure. Informed consent was obtained today.  - Should he evidence paroxysmal atrial fibrillation, his [id number] would be elevated at 6 (HTN, CAD, prior CVA, male gender, age >/= 75), portending an annual adjusted risk of CVA of 9.8%. We would advise initiation of oral anticoagulation should the diagnosis of paroxysmal atrial fibrillation be determined. Given his history of intracranial hemorrhage, a WATCHMAN left atrial appendage occlusion device may be preferred in an effort to minimize his duration of oral  anticoagulation. We briefly discussed this possibility and will await readings from his ILR for further guidance.    - Ongoing evaluation with neurology continues.     This patient will return to the EP lab to undergo implantation of an ILR for further assessment of cryptogenic CVA. All questions and concerns were addressed at this encounter.      Signing Physician:       SOLEDAD Roth MD  Electrophysiology Attending

## 2022-12-08 NOTE — PROGRESS NOTES
[facility]   Physical Therapy Treatment Note     Name: Patrick Short  [facility] Number: [id number]    Therapy Diagnosis:   Encounter Diagnosis   Name Primary?    Impaired gait and mobility Yes     Physician: No ref. provider found    Visit Date: 12/8/2022    Physician Orders: PT Eval and Treat   Medical Diagnosis from Referral:   G20 (ICD-10-CM) - Parkinson disease   I61.1 (ICD-10-CM) - Nontraumatic cortical hemorrhage of right cerebral hemisphere      Evaluation Date: 12/2/2022  Authorization Period Expiration: 02/02/2023  Plan of Care Expiration: 1/27/2023  Visit # / Visits authorized: 01/ 20 (+eval)      PTA Visit #: 0/5     Time In: 11:30  Time Out: 12:15  Total Billable Time: 45 minutes    SUBJECTIVE     Pt reports: that he feels fine and has no new complaints .  He was given home exercise program 12/8/2022. Patient verbalizes and demonstrates understanding.   Response to previous treatment: good  Functional change: ongoing     Pain: 0/10  Location: NA    OBJECTIVE     Objective Measures updated at progress report unless specified.     Treatment     Tushar received the treatments listed below:      therapeutic exercises to develop strength, endurance, ROM, flexibility, posture, and core stabilization for 25 minutes including:    10 minutes on the [employer] seated elliptical for CV endurance and LE strength level 3.0    Home exercise program below performed with patient: Patient verbalizes/ demonstrates understanding  Standing marches with BUE support, 3 x 10 reps    Standing squats, 3 x 10 reps    Tandem stance in corner with eyes open, 3 x 30 seconds BLE leading     R SLS time: 2 seconds   L SLS time: 1 seconds     neuromuscular re-education activities to improve: Balance, Coordination, Kinesthetic, Proprioception, and Posture for 20 minutes. The following activities were included:    2 x 100 feet ambulation with horizontal head turns, SBA  2 x 100 feet ambulation with vertical head turns,  SBA    1 x 30 seconds LLE on ground RLE on foam fitter, CGA, eyes open   1 x 30 seconds RLE on ground LLE on foam fitter, CGA, eyes open   2 x 30 seconds LLE on ground RLE on foam fitter, CGA, eyes closed  2 x 30 seconds RLE on ground LLE on foam fitter, CGA, eyes closed      1 x 10 reps large cone tap with BLE, CGA, occ touchdown support      Patient Education and Home Exercises     Home Exercises Provided and Patient Education Provided     Education provided:   - home exercise program     Written Home Exercises Provided: yes. Exercises were reviewed and Tushar was able to demonstrate them prior to the end of the session.  Tushar demonstrated good  understanding of the education provided. See EMR under Patient Instructions for exercises provided during therapy sessions    ASSESSMENT     Tushar tolerated today's first follow up session well this afternoon. The patients SLS times today placed the patient in the elevated fall risk category. The patient was given home exercise program today that focused primarily on LE strength and balance exercises. The patient demonstrates and verbalizes understanding of the home exercise program. Remainder of the session focused on static/ dynamic balance activities. The patient will benefit from continued physical therapy intervention to address remaining functional mobility deficits.     Tushar Is progressing well towards his goals.   Pt prognosis is Good.     Pt will continue to benefit from skilled outpatient physical therapy to address the deficits listed in the problem list box on initial evaluation, provide pt/family education and to maximize pt's level of independence in the home and community environment.     Pt's spiritual, cultural and educational needs considered and pt agreeable to plan of care and goals.     Anticipated barriers to physical therapy: co-morbidities, progressive nature of illness    Goals:  Short Term Goals: 4 weeks   Patient will be independent with  established home exercise program. Ongoing   Patient to improve FGA score to 26/30 for improved stability with functional mobility. Ongoing   Patient to improve GST condition 3 to 12 seconds for improved balance. Ongoing   Patient to improve GST condition 4 to 7 seconds for improved stability with NBOS. Ongoing   5. Patient to improve SLS time to 4 seconds with BLE for improved stability with ambulation. Ongoing         Long Term Goals: 8 weeks   Patient will be independent with advanced home exercise program. Ongoing   Patient to improve FGA score to 27/30 for improved stability with functional mobility. Ongoing   Patient to improve GST condition 3 to 16 seconds for improved balance. Ongoing   Patient to improve GST condition 4 to 14 seconds for improved stability with NBOS. Ongoing   5. Patient to improve SLS time to 10 seconds with BLE for improved stability with ambulation. Ongoing     PLAN     Check home exercise program compliance    Continue progressing static/ dynamic balance exercises     Salma Phipps, PT

## 2022-12-08 NOTE — PROGRESS NOTES
Please see initial plan of care for evaluation details.     VIDA Sharpe, CCC-SLP  Speech Language Pathologist   12/8/2022 12/8/2022

## 2022-12-08 NOTE — PLAN OF CARE
Outpatient Neurological Rehabilitation  Speech and Language Therapy Evaluation    Date: 12/8/2022     Name: Patrick Short   MRN: [id number]    Therapy Diagnosis:   Encounter Diagnoses   Name Primary?    History of ischemic left MCA stroke     Parkinson disease     Physician: Frantz Bee MD  Physician Orders: [id number] - AMB REFERRAL/CONSULT TO SPEECH THERAPY    Medical Diagnosis from Referral:   Z86.73 (ICD-10-CM) - History of ischemic left MCA stroke   G20 (ICD-10-CM) - Parkinson disease       Visit #/Visits authorized: 1/ 1  Date of Evaluation:  12/8/2022   Insurance Authorization Period: 12/8/22-12/31/22   Plan of Care Expiration:  12/8/2022 to 1/19/23     Time In: 12:20pm  Time Out: 1:00pm  Test interpretation time:  Procedure Min.   Cognitive Communication Evaluation  40           Precautions:Standard  Subjective   Date of Onset: 10/6/22, 2 weeks later Brain bleed   History of Current Condition:   Patrick Short is a 77 y.o. male male who presents to [facility] for evaluation secondary to Parkinson's and recent TIA. Patient was referred to therapy by Frantz Bee MD , which is the patient's neurologist. Patient was accompanied to the evaluation by his wife, Valeria. She reports he was diagnosed with PD 3 years ago, however recently had a TIA and then a brain bleed 2 weeks later. He reports difficulty in memory and word finding at this time. Symptoms began years ago, however have significantly increased since TIA. He denies any swallowing difficulties at this time.       Past Medical History: Patrick Short  has a past medical history of CAD (coronary artery disease) (7/26/2016), Cerebral ischemic stroke due to global hypoperfusion with watershed infarct (10/10/2022), Heart block, MI (myocardial infarction), Primary hypertension (11/21/2022), and Reflux.  Patrick Short  has a past surgical history that includes Cardiac catheterization; Eye surgery (Left,  02/2017); and Hip surgery (Right, 12/2021).  Medical Hx and Allergies:  Patrick has a current medication list which includes the following prescription(s): aspirin, atorvastatin, carbidopa-levodopa  mg, clobetasol, desonide, finasteride, levocetirizine, lisinopril, nitroglycerin, pantoprazole, and triamcinolone acetonide 0.1%. Review of patient's allergies indicates:  No Known Allergies  Imaging: CT scan 11/21/22  FINDINGS:  Small parenchymal hemorrhage in the right postcentral gyrus as decreased in density when compared to prior.  There is stable surrounding edema.  No  new intracranial hemorrhage or new major vascular territory infarct.  There are chronic microvascular ischemic changes with small remote right frontal infarct.  No abnormal extra-axial fluid collections.  Calvarium shows no displaced fracture.  Paranasal sinuses and mastoid air cells are essentially clear.   Prior Therapy:  NO ST  Social History:  [employer]; does overlooking of project;  lives with their spouse  Prior Level of Function: functional for work and home   Current Level of Function: Difficulty noted in memory and attention  Pain:   0/10  Pain Location / Description: n/a  Nutrition:  IDDSI 0 (Thin) and IDDSI 7 (Regular)  Patient's Therapy Goals:  memory and word finding   Objective   Formal Assessment:  Cognitive Linguistic Quick Test (CLQT), Aphasia Administration was administered to quickly assess the patient's overall cognitive-linguistic function and to determine cognitive strengths and weaknesses.     Cognitive Domain  Score  Severity Rating    Attention 64/215 moderate   Memory 140/185 mild   Executive Functions 16/40 moderate   Language 27/37 mild   Visuospatial Skills 46/105 moderate   Clock Drawing 11/13 mild          Composite Score = 2.2/4 moderate     Task Score Ages 18-69 Cut Score Below?   Personal Facts  8  8 average   Symbol Cancellation 0  11 below average   Confrontational naming  10  10 below average    Clock drawing  11  12 above average   Story Retelling 7  6 above average   Symbol Trails 8  9 below average   Generative Naming 2  5 below average   Design Memory 4  5 below average   Mazes 4  7 below average   Design Generation  2  6 below average       Cognition: Cognitive communication skills are considered moderately  impaired. Patient answered orientation questions with 100% accuracy. Patient cancelled pre-determined symbol out of a field of many similar looking symbols with 0% accuracy, indicating significantly reduced selective attention skills. Patient named line item photographs with 100% accuracy. Patient scored 11  (cut off score 12) on clock drawing task, indicating mildly reduced planning, organizing, self-monitoring, and self-correction as the patient's clock model contained 11/12 numbers with appropriate spacing and orientation, 2/2 hands, and was  set to the correct time. Patient recalled 10/18 details from a paragraph presented auditorily. Patient answered 6/6 y/n questions about the paragraph indicating adequate  comprehension and auditory recall. When asked to recall the details upon conclusion of the test, patient was able to recall 7 details. Patient completed a symbol trails task (alternating size and shape) with 80% acc indicating reduced divided attention.  Patient completed divergent naming of concrete categories with 7 named items within one minute; completed divergent naming of abstract categories with 3 named items within one minute.  Patient recalled  5/6 designs in a design recall task indicating slightly reduced visual recall skills. Patient completed a simple maze with  100% acc; completed a complex maze with 0% acc indicating  adequate planning and organization for simple and reduced for complex information. Patient created 2 designs with 4 lines, 0 designs with more than 4 lines, 3 designs with less than 4 lines, and 0 perseverative designs indicating reduced mental flexibility  skills.  Patient was alert and cooperative throughout evaluation. He was  oriented to person, time, place, and situation. He did not require cues to attend to evaluation tasks throughout session. Patient with adequate insight into severity of deficits.       Auditory Comprehension: No concerns reported or observed; WFL for the purpose of assessment and conversation.  Verbal Expression: No concerns reported or observed; WFL for the purpose of assessment and conversation.   Reading Comprehension:  Did not assess. No concerns reported or observed.    Written Expression:  Did not assess. No concerns reported or observed.     Motor Speech/Fluency/Voice:  Pt's motor speech, speech intelligibility, fluency, and voice were judged to be WFL  Swallowing: No swallowing difficulties reported at this time.   Hearing / Vision: No difficulties reported, functional for purpose of evaluation.        Treatment   Treatment Time In: n/a  Treatment Time Out: n/a  Total Treatment Time: n/a  no treatment performed 2/2 time to complete evaluation.    Education: Plan of Care, role of SLP in care, and scheduling/ cancellation policy was discussed with pt. Patient and family members expressed understanding.     Home Program: not yet established   Assessment     Tushar presents to [facility] s/p medical diagnosis of  History of ischemic left MCA stroke [Z86.73], Parkinson disease [G20].  Demonstrates impairments including limitations as described in the problem list. He presents with moderate cognitive impairment c/b mildly reduced language and moderately reduced attention, memory, executive functioning, and visuospatial skills.  Positive prognostic factors include family support. Negative prognostic factors include none. No barriers to therapy identified. Patient will benefit from skilled, outpatient neurological rehabilitation speech therapy.    Rehab Potential: good  Pt's spiritual, cultural and educational needs  considered and pt agreeable to plan of care and goals.    Short Term Goals (4 weeks):   Patient will name 10-15 items in a concrete category in one minute to improve word fluency and thought organization.    2. Patient will complete tasks requiring divided attention and alternating attention with 90% accuracy given min cues to improve attention skills  3. Pt will complete divided attention tasks in distracting environment with 90% accuracy independently.     4. Patient will immediately recall specific details from information recently seen or heard using memory retrieval strategies with 90% accuracy given min cues to improve auditory and visual recall.   5. Patient will complete visual recall tasks with 90% acc indly to improve visual recall.   6. Patient will complete reasoning/problem solving tasks with 90% accuracy given min cues  7. Patient will complete mental manipulation tasks with 90% accuracy given min cues to improve working memory/mental flexibility.        Long Term Goals (6 weeks):   He will use appropriate memory strategies to schedule and recall weekly activities, express needs and recall names to maintain safety and participate socially in functional living environment.    He  will demonstrate improved problem solving skills and provide appropriate solutions to problems in order to improve safety and awareness in functional living environment.    Pt will improve  attention skills to effectively attend to and communicate in complex daily living tasks in functional living environment.      Plan     Plan of Care Certification Period: 12/8/2022  to 1/19/23    Recommended Treatment Plan:  Patient will participate in the [facility] program for speech therapy 2 times per week to address his  Cognition deficits, to educate patient and their family, and to participate in a home exercise program.    Other Recommendations: not at this time     Therapist's Name:   Karma Allan (Ali)  VIDA, CCC-SLP  Speech Language Pathologist   12/8/2022

## 2022-12-13 ENCOUNTER — CLINICAL SUPPORT (OUTPATIENT)
Dept: REHABILITATION | Facility: HOSPITAL | Age: 77
End: 2022-12-13
Attending: INTERNAL MEDICINE
Payer: COMMERCIAL

## 2022-12-13 DIAGNOSIS — R29.898 WEAKNESS OF LEFT UPPER EXTREMITY: ICD-10-CM

## 2022-12-13 DIAGNOSIS — G31.84 MILD NEUROCOGNITIVE DISORDER: ICD-10-CM

## 2022-12-13 DIAGNOSIS — H51.11 CONVERGENCE INSUFFICIENCY: Primary | ICD-10-CM

## 2022-12-13 DIAGNOSIS — Z86.73 HISTORY OF ISCHEMIC LEFT MCA STROKE: ICD-10-CM

## 2022-12-13 DIAGNOSIS — G20.A1 PARKINSON DISEASE: Primary | ICD-10-CM

## 2022-12-13 PROCEDURE — 97129 THER IVNTJ 1ST 15 MIN: CPT | Mod: PO

## 2022-12-13 PROCEDURE — 97112 NEUROMUSCULAR REEDUCATION: CPT | Mod: PO

## 2022-12-13 PROCEDURE — 97130 THER IVNTJ EA ADDL 15 MIN: CPT | Mod: PO

## 2022-12-13 PROCEDURE — 97110 THERAPEUTIC EXERCISES: CPT | Mod: PO

## 2022-12-13 NOTE — PROGRESS NOTES
"  [facility]   Occupational Therapy Neurological Rehabilitation   Treatment Note   Name: Patrick Short  MRN: [id number]  Today's Date: 12/13/2022    Therapy Diagnosis:   Encounter Diagnoses   Name Primary?    Convergence insufficiency Yes    Weakness of left upper extremity      Physician: Frantz Bee MD  Physician Orders: Neuro Program      Medical Diagnosis: Z86.73 (ICD-10-CM) - History of ischemic left MCA stroke G20 (ICD-10-CM) - Parkinson disease   Evaluation Date: 12/7/2022  Plan of Care Expiration Period: 1/18/2023 (6 weeks)  Insurance Authorization period Expiration: 12/4/2023  Date of Return to MD: 12/8/22: Cardiology; 1/31/23: Neurology   FOTO: 1/3     Visit # / Visits Authorized: 1 / 12 (+evaluation)     Time In:9:30  Time Out: 10:15  Total Billable Time: 45 minutes    Precautions: Standard and Fall; hearing impaired     Subjective   Pt reports: had a 5 min "swaying" episode over the weekend     Response to previous treatment:evaluation only  Functional change: has been on PD medication for 3 day   Patient's Goals for Occupational Therapy: to increase his quality of life and remain independent     Pain upon arrival: 0/10  Location: n/a  Pain at cessation of session: 0/10    Objective     Tushar participated in neuromuscular re-education activities to improve: Balance, Coordination, and Proprioception for 15 minutes. The following activities were included:  Seated in chair:   -pencil push ups for convergence exercise with demo understanding x3 sets of 10 reps   -convergence/divergence with 2 points     Tushar participated in dynamic functional therapeutic activities to improve functional performance for 5  minutes, including:  -x5 min: to watch video on what PD is and how id affects the body and mind     Tushar received therapeutic exercises for 25 minutes including:  -sit<>stand x10 reps with out reached arms   -Quadruped: x2 sets of 10: scapular push ups for proximal stability "   -large amplitude movement: seated edge of mat: floor to ceiling; side reaching and standing with unilateral step and reach x10 reps each   -upper body ergonometer on level 2.5; completed for 10 minutes switching directions mid way. Focus on postural control and breathing techniques with exhale with exertion of force. MET average 3.2     Hand off to speech language pathology  at cessation of session   Home Exercises and Education Provided   Education provided:   - edu on [employer] BIG and LOUD program; edu on what PD is   - role of Occupational Therapy in care, goals for Occupational Therapy for this plan of care,  scheduling  - Progress towards goals     Written Home Exercises Provided: yes.  Exercises were reviewed and Tushar was able to demonstrate them prior to the end of the session.  Tushar demonstrated good  understanding of the HEP provided.   .   See EMR under Patient Instructions for exercises provided 12/13/2022: Convergence exercises      Assessment   Tushar was referred to outpatient neurological occupational therapy and presents with a small parenchymal hemorrhage in the right postcentral gyrus, resulting in left side weakness and mild coordination deficits. It should also be noted that patient has PD resulting in slower amplitude movement overall. With convergence: > difficulty with left with exotropia noted with close convergence. With dynamic balance with unilateral (left) step forward with reach, pt with LOB x3 /10 trials requiring contact guard assist to recover. Tushar tolerated session well and demo high motivation and participation throughout therapy session.     Tushar is progressing well towards his goals and there are no updates to goals at this time. Pt prognosis is Good.     Pt will continue to benefit from skilled outpatient occupational therapy to address the deficits listed in the problem list on initial evaluation provide pt/family education and to maximize pt's level of independence in the  home and community environment.     Anticipated barriers to occupational therapy: none noted     Pt's spiritual, cultural and educational needs considered and pt agreeable to plan of care and goals.    Goals: 6 weeks; long term= short term  Pt will demo understanding for HEP/HAP provided with teach back understanding. Ongoing   Pt will demo 5/5 for proximal stability and strength for left upper extremity. Ongoing   Pt will demo understanding for convergence/divergence sufficiency exercises with teach back understanding. Ongoing   Pt will demo understanding for modifications and adaptations for handwriting and fine motor tasks. Ongoing   Pt will reduce limitation score on FOTO by less than or equal to 12% to demonstrate an increase in self-perceived functional performance. Ongoing      The following goals were discussed with the patient and patient is in agreement with them as to be addressed in the treatment plan.     Plan   Certification Period/Plan of care expiration: 12/7/2022 to 1/18/2023.     Outpatient Occupational Therapy 2 times weekly for 6 weeks to include the following interventions: Neuromuscular Re-ed, Patient Education, Self Care, Therapeutic Activities, and Therapeutic Exercise.      ISAAK Dubois  Neuro Occupational Therapist   [facility]   12/13/2022

## 2022-12-13 NOTE — PATIENT INSTRUCTIONS
"  Convergence    "Hold a pen or pencil, with the tip facing up, at arm's length in front of your face.   Focus your eyes on the tip.    Bring the item toward your nose slowly.   Continue to focus only on the tip.    Note when instead of seeing one pen or pencil, you may see two.   Hold it still and focus on it for 5 seconds x 10 reps.    Move it slowly back to its original position. at arm's length."      © 2010- 2019 [employer] All Rights Reserved          "

## 2022-12-13 NOTE — PROGRESS NOTES
[facility]  Speech Therapy Treatment Note- Neurological Rehabilitation  Date: 12/13/2022     Name: Patrick Short   MRN: [id number]   Therapy Diagnosis:   Encounter Diagnoses   Name Primary?    Parkinson disease Yes    Mild neurocognitive disorder     History of ischemic left MCA stroke      Physician: Frantz Bee MD  Physician Orders: [id number] - AMB REFERRAL/CONSULT TO SPEECH THERAPY  Medical Diagnosis: History of ischemic left MCA stroke [Z86.73], Parkinson disease [G20]    Visit #/ Visits Authorized: 1/ 12  Date of Evaluation:  12/8/22  Insurance Authorization Period: 12/8/22-12/31/22  Plan of Care Expiration Date:    12/8/2022 to 1/19/23   Extended Plan of Care:  n/a   Progress Note: 1/8/23   Visits Cancelled: 0  Visits No Show: 0    Time In:  10:15am   Time Out:  11:00am   Total Billable Time: 45 minutes      Precautions: Standard, Fall, and Cognition  Subjective:   Patient reports: reported difficulty with attention in conversation. Difficulties with phone numbers.   He was compliant to home exercise program.   Response to previous treatment: positive    Pain Scale:  0/10 on a Visual Analog Scale currently.   Pain Location: n/a  Objective:   TIMED  Procedure Min.   Cognitive Therapeutic Interventions, first 15 minutes CPT 59830  15   Cognitive Therapeutic Interventions, each additional 15 minutes CPT 53686  30         UNTIMED  Procedure Min.             Total Timed Units: 3  Total Untimed Units: 0  Charges Billed/Number of units: 3    Short Term Goals: (4 weeks) Current Progress:   Patient will name 10-15 items in a concrete category in one minute to improve word fluency and thought organization.     Progressing/ Not Met 12/13/2022   Not formally addressed       2. Patient will complete tasks requiring divided attention and alternating attention with 90% accuracy given min cues to improve attention skills      Progressing/ Not Met 12/13/2022   Completed alternating symbols L-5 with 87%  accuracy Independently       Opened door [facility] through activity   3. Pt will complete divided attention tasks in distracting environment with 90% accuracy independently.      Progressing/ Not Met 12/13/2022   Not formally addressed       4. Patient will immediately recall specific details from information recently seen or heard using memory retrieval strategies with 90% accuracy given min cues to improve auditory and visual recall.      Progressing/ Not Met 12/13/2022   Not formally addressed      5. Patient will complete visual recall tasks with 90% acc indly to improve visual recall      Progressing/ Not Met 12/13/2022   Not formally addressed       6. Patient will complete reasoning/problem solving tasks with 90% accuracy given min cues      Progressing/ Not Met 12/13/2022   Completed with 85% accuracy Independently      7. Patient will complete mental manipulation tasks with 90% accuracy given min cues to improve working memory/mental flexibility.        Progressing/ Not Met 12/13/2022   Not formally addressed         Patient Education/Response:   Extensive education provided regarding both memory and word finding strategies.     Home program established: yes-use strategies and see how to apply to daily living.   Exercises were reviewed and Tushar was able to demonstrate them prior to the end of the session.  Tushar demonstrated poor understanding of the education provided.     See Electronic Medical Record under Patient Instructions for exercises provided throughout therapy.  Assessment:   Tushar is progressing well towards his goals. Extensive education provided today regarding both memory and word finding strategies. Targeted alternating attention today, next session target divided attention and completing 2 activities at one time. Patient demonstrated frustration today with having previous ADHD (when working attention) and having someone do scheduling/planning for him because he does not like it (when  targeting problem solving). Throughout session, patient asked many questions regarding purpose of activities and how they benefit him. Discussed importance of all activities and deficits targeting. Patient reports motivation to improve memory. He reports possibly retiring.  Encouraged patient to come up with realistic goals he would like to improve within speech therapy.Current goals remain appropriate. Goals to be updated as necessary.     Patient prognosis is Good. Patient will continue to benefit from skilled outpatient speech and language therapy to address the deficits listed in the problem list on initial evaluation, provide patient/family education and to maximize patient's level of independence in the home and community environment.   Medical necessity is demonstrated by the following IMPAIRMENTS:  Deficits in executive functioning, attention, and memory prevent the pt from relaying medically and safety relevant information in a timely manner in a state of emergency.   Barriers to Therapy: none  Patient's spiritual, cultural and educational needs considered and patient agreeable to plan of care and goals.  Plan:   Continue Plan of Care with focus on memory, attention, word finding, and problem solving.     VIDA Sharpe, CCC-SLP   12/13/2022

## 2022-12-15 ENCOUNTER — CLINICAL SUPPORT (OUTPATIENT)
Dept: REHABILITATION | Facility: HOSPITAL | Age: 77
End: 2022-12-15
Attending: PSYCHIATRY & NEUROLOGY
Payer: COMMERCIAL

## 2022-12-15 DIAGNOSIS — R26.89 IMPAIRED GAIT AND MOBILITY: Primary | ICD-10-CM

## 2022-12-15 PROCEDURE — 97110 THERAPEUTIC EXERCISES: CPT | Mod: PO

## 2022-12-15 PROCEDURE — 97112 NEUROMUSCULAR REEDUCATION: CPT | Mod: PO

## 2022-12-15 NOTE — PROGRESS NOTES
[facility]   Physical Therapy Treatment Note     Name: Patrick Short  [facility] Number: [id number]    Therapy Diagnosis:   Encounter Diagnosis   Name Primary?    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 Patient verbalizes and demonstrates understanding.   Response to previous treatment: good  Functional change: ongoing     Pain: 0/10  Location: NA    OBJECTIVE     Objective Measures updated at progress report unless specified.     Treatment     Tushar received the treatments listed below:      therapeutic exercises to develop strength, endurance, ROM, flexibility, posture, and core stabilization for 15 minutes including:    10 minutes on the Victrix seated elliptical for CV endurance and LE strength level 4.0    2 x 10 sit to stands from low mat while standing on foam pad, CGA     neuromuscular re-education activities to improve: Balance, Coordination, Kinesthetic, Proprioception, and Posture for 30 minutes. The following activities were included:    2 x 100 feet ambulation with horizontal head turns, SBA  2 x 100 feet ambulation with vertical head turns, SBA    4 laps tandem ambulation, occ touchdown support, CGA   4 laps marching with three second holds, CGA      2 x 30 seconds LLE on ground RLE on foam fitter, CGA, eyes closed  2 x 30 seconds RLE on ground LLE on foam fitter,  CGA, eyes closed      2 x 10 reps two large cone tap with BLE, CGA, occ touchdown support      Patient Education and Home Exercises     Home Exercises Provided and Patient Education Provided     Education provided:   - home exercise program     Written Home Exercises Provided: yes. Exercises were reviewed and Tushar was able to demonstrate them prior to the end of the session.  Tusahr demonstrated good  understanding of the education provided. See EMR under Patient Instructions for exercises provided during therapy sessions    ASSESSMENT     Tushar tolerated today's session well this afternoon. Patient requires occasional touchdown support with all balance activities. Patient with most difficulty performing SLS activities. All exercises appropriately challenging today. The patient will benefit from continued physical therapy intervention to address remaining functional mobility deficits.       Tushar Is progressing well towards his goals.   Pt prognosis is Good.     Pt will continue to benefit from skilled outpatient physical therapy to address the deficits listed in the problem list box on initial evaluation, provide pt/family education and to maximize pt's level of independence in the home and community environment.     Pt's spiritual, cultural and educational needs considered and pt agreeable to plan of care and goals.     Anticipated barriers to physical therapy: co-morbidities, progressive nature of illness    Goals:  Short Term Goals: 4 weeks   Patient will be independent with established home exercise program. Ongoing   Patient to improve FGA score to 26/30 for improved stability with functional mobility. Ongoing   Patient to improve GST condition 3 to 12 seconds for improved balance. Ongoing   Patient to improve GST condition 4 to 7 seconds for improved stability with NBOS. Ongoing   5. Patient to improve SLS time to 4 seconds with BLE for improved stability with ambulation. Ongoing         Long Term Goals: 8  weeks   Patient will be independent with advanced home exercise program. Ongoing   Patient to improve FGA score to 27/30 for improved stability with functional mobility. Ongoing   Patient to improve GST condition 3 to 16 seconds for improved balance. Ongoing   Patient to improve GST condition 4 to 14 seconds for improved stability with NBOS. Ongoing   5. Patient to improve SLS time to 10 seconds with BLE for improved stability with ambulation. Ongoing     PLAN     Check home exercise program compliance    Continue progressing static/ dynamic balance exercises     Salma Phipps, PT

## 2022-12-15 NOTE — PROGRESS NOTES
"  [facility]   Occupational Therapy Neurological Rehabilitation   Treatment Note   Name: Patrick Short  MRN: [id number]  Today's Date: 12/16/2022    Therapy Diagnosis:   Encounter Diagnoses   Name Primary?    Convergence insufficiency Yes    Weakness of left upper extremity      Physician: Frantz Bee MD  Physician Orders: Neuro Program      Medical Diagnosis: Z86.73 (ICD-10-CM) - History of ischemic left MCA stroke G20 (ICD-10-CM) - Parkinson disease   Evaluation Date: 12/7/2022  Plan of Care Expiration Period: 1/18/2023 (6 weeks)  Insurance Authorization period Expiration: 12/4/2023  Date of Return to MD: 12/8/22: Cardiology; 1/31/23: Neurology   FOTO: 1/3     Visit # / Visits Authorized: 2 / 12 (+evaluation)     Time In: 8:30  Time Out: 9:10  Total Billable Time: 40 minutes    Precautions: Standard and Fall; hearing impaired     Subjective   Pt reports: no complaints this morning     Response to previous treatment: completed HEP for eye exercises "hard to tell if I'm doing anything"  Functional change: reported he has started taking PD medication  Patient's Goals for Occupational Therapy: to increase his quality of life and remain independent     Pain upon arrival: 0/10  Location: chronic right side neck pain/stiffness  Pain at cessation of session: 0/10    Objective     Tushar received therapeutic exercises for 30 minutes including:  -upper body ergonometer on level 2.5; completed for 10 minutes switching directions mid way. Focus on postural control and breathing techniques with exhale with exertion of force. MET average 2.8   -sit<>stand from low mat (blue) x10 reps with out reached arms   -seated edge of mat : large amplitude reaching floor<>ceiling and side to side with finger flicks x10 reps each  -pencil push ups for convergence and divergence x2 sets of 10 reps     Tushar participated in dynamic functional therapeutic activities to improve functional performance for 10  minutes, " including:  Seated at table top: fine motor activity as seen below  -distal stability handwriting task with large  pen     Home Exercises and Education Provided   Education provided:   - large amplitude movement   - large  pen to aid in stability; finger flicks   - edu on LSVT BIG and LOUD program; edu on what PD is   - role of Occupational Therapy in care, goals for Occupational Therapy for this plan of care,  scheduling  - Progress towards goals     Written Home Exercises Provided: yes.  Exercises were reviewed and Tushar was able to demonstrate them prior to the end of the session.  Tushar demonstrated good  understanding of the HEP provided.   .   See EMR under Patient Instructions for exercises provided 12/13/2022: Convergence exercises      Assessment   Tushar was referred to outpatient neurological occupational therapy and presents with a small parenchymal hemorrhage in the right postcentral gyrus, resulting in left side weakness and mild coordination deficits. Moreover, pt with PD with amplitude deficits. Mr Finley tolerated session well today- he requires min cues for redirection to task due to poor divided attention in busy gym setting.     Tushar is progressing well towards his goals and there are no updates to goals at this time. Pt prognosis is Good.     Pt will continue to benefit from skilled outpatient occupational therapy to address the deficits listed in the problem list on initial evaluation provide pt/family education and to maximize pt's level of independence in the home and community environment.     Anticipated barriers to occupational therapy: none noted     Pt's spiritual, cultural and educational needs considered and pt agreeable to plan of care and goals.    Goals: 6 weeks; long term= short term  Pt will demo understanding for HEP/HAP provided with teach back understanding. Ongoing   Pt will demo 5/5 for proximal stability and strength for left upper extremity. Ongoing   Pt will demo  understanding for convergence/divergence sufficiency exercises with teach back understanding. Ongoing   Pt will demo understanding for modifications and adaptations for handwriting and fine motor tasks. Ongoing   Pt will reduce limitation score on FOTO by less than or equal to 12% to demonstrate an increase in self-perceived functional performance. Ongoing      The following goals were discussed with the patient and patient is in agreement with them as to be addressed in the treatment plan.     Plan   Certification Period/Plan of care expiration: 12/7/2022 to 1/18/2023.     Outpatient Occupational Therapy 2 times weekly for 6 weeks to include the following interventions: Neuromuscular Re-ed, Patient Education, Self Care, Therapeutic Activities, and Therapeutic Exercise.    ISAAK Dubois  Neuro Occupational Therapist   [facility]   12/16/2022

## 2022-12-16 ENCOUNTER — CLINICAL SUPPORT (OUTPATIENT)
Dept: REHABILITATION | Facility: HOSPITAL | Age: 77
End: 2022-12-16
Attending: PSYCHIATRY & NEUROLOGY
Payer: COMMERCIAL

## 2022-12-16 ENCOUNTER — PATIENT MESSAGE (OUTPATIENT)
Dept: NEUROLOGY | Facility: CLINIC | Age: 77
End: 2022-12-16
Payer: COMMERCIAL

## 2022-12-16 DIAGNOSIS — R29.898 WEAKNESS OF LEFT UPPER EXTREMITY: ICD-10-CM

## 2022-12-16 DIAGNOSIS — H51.11 CONVERGENCE INSUFFICIENCY: Primary | ICD-10-CM

## 2022-12-16 PROCEDURE — 97110 THERAPEUTIC EXERCISES: CPT | Mod: PO

## 2022-12-16 PROCEDURE — 97530 THERAPEUTIC ACTIVITIES: CPT | Mod: PO

## 2022-12-16 NOTE — PROGRESS NOTES
"  [facility]   Occupational Therapy Neurological Rehabilitation   Treatment Note   Name: Patrick Short  MRN: [id number]  Today's Date: 12/19/2022    Therapy Diagnosis:   Encounter Diagnoses   Name Primary?    Convergence insufficiency Yes    Weakness of left upper extremity        Physician: Frantz Bee MD  Physician Orders: Neuro Program      Medical Diagnosis: Z86.73 (ICD-10-CM) - History of ischemic left MCA stroke G20 (ICD-10-CM) - Parkinson disease   Evaluation Date: 12/7/2022  Plan of Care Expiration Period: 1/18/2023 (6 weeks)  Insurance Authorization period Expiration: 12/4/2023  Date of Return to MD: 12/8/22: Cardiology; 1/31/23: Neurology   FOTO: 1/3     Visit # / Visits Authorized: 3 / 12 (+evaluation)     Time In: 8:50  Time Out: 9:30  Total Billable Time: 40 minutes    Precautions: Standard and Fall; hearing impaired     Subjective   Pt reports: he has plans to get back     Response to previous treatment: completed HEP for eye exercises "hard to tell if I'm doing anything"  Functional change: reported he has started taking PD medication  Patient's Goals for Occupational Therapy: to increase his quality of life and remain independent     Pain upon arrival: 0/10  Location: chronic right side neck pain/stiffness  Pain at cessation of session: 0/10    Objective   Received from speech language pathology session     Tushar received therapeutic exercises for 28 minutes including:  -upper body ergonometer on level 2.5; completed for 10 minutes switching directions mid way. Focus on postural control and breathing techniques with exhale with exertion of force. MET average 2.8   -pencil push ups for convergence and divergence x2 sets of 10 reps   seated edge of mat:   -dark green resistance band for bilateral upper extremity strength and endurance exercises (attached) x10 reps each     Tushar participated in dynamic functional therapeutic activities to improve functional performance for 12 " minutes, including:  Seated at table top:   -large  pen for loop group words- intention on large, fluid movement     Home Exercises and Education Provided   Education provided:   - large amplitude movement   - large  pen to aid in stability; finger flicks   - edu on LSVT BIG and LOUD program; edu on what PD is   - role of Occupational Therapy in care, goals for Occupational Therapy for this plan of care,  scheduling  - Progress towards goals     Written Home Exercises Provided: yes. (Folder provided to keep HEP/HAPs from therapies 12/19)  Exercises were reviewed and Tushar was able to demonstrate them prior to the end of the session.  Tushar demonstrated good  understanding of the HEP provided.     See EMR under Patient Instructions for exercises provided 12/13/2022: Convergence exercises   12/19/2022: Resistance band HEP for bilateral upper extremities      Assessment   Tushar tolerated session well today. He demo improvement in amplitude of handwriting following exercise task. He demo fair+ understanding for education provided.     He would greatly benefit from LSVT BIG program following traditional therapy approach.     Tushar is progressing well towards his goals and there are no updates to goals at this time. Pt prognosis is Good.     Pt will continue to benefit from skilled outpatient occupational therapy to address the deficits listed in the problem list on initial evaluation provide pt/family education and to maximize pt's level of independence in the home and community environment.     Anticipated barriers to occupational therapy: none noted     Pt's spiritual, cultural and educational needs considered and pt agreeable to plan of care and goals.    Goals: 6 weeks; long term= short term  Pt will demo understanding for HEP/HAP provided with teach back understanding. Ongoing   Pt will demo 5/5 for proximal stability and strength for left upper extremity. Ongoing   Pt will demo understanding for  convergence/divergence sufficiency exercises with teach back understanding. Progressing; improvement noted on 12/19  Pt will demo understanding for modifications and adaptations for handwriting and fine motor tasks. Ongoing   Pt will reduce limitation score on FOTO by less than or equal to 12% to demonstrate an increase in self-perceived functional performance. Ongoing      The following goals were discussed with the patient and patient is in agreement with them as to be addressed in the treatment plan.     Plan   Certification Period/Plan of care expiration: 12/7/2022 to 1/18/2023.     Outpatient Occupational Therapy 2 times weekly for 6 weeks to include the following interventions: Neuromuscular Re-ed, Patient Education, Self Care, Therapeutic Activities, and Therapeutic Exercise.    ISAAK Dubois  Neuro Occupational Therapist   [facility]   12/19/2022

## 2022-12-19 ENCOUNTER — CLINICAL SUPPORT (OUTPATIENT)
Dept: REHABILITATION | Facility: HOSPITAL | Age: 77
End: 2022-12-19
Attending: PSYCHIATRY & NEUROLOGY
Payer: COMMERCIAL

## 2022-12-19 DIAGNOSIS — G20.A1 PARKINSON DISEASE: Primary | ICD-10-CM

## 2022-12-19 DIAGNOSIS — Z86.73 HISTORY OF ISCHEMIC LEFT MCA STROKE: ICD-10-CM

## 2022-12-19 DIAGNOSIS — H51.11 CONVERGENCE INSUFFICIENCY: Primary | ICD-10-CM

## 2022-12-19 DIAGNOSIS — R29.898 WEAKNESS OF LEFT UPPER EXTREMITY: ICD-10-CM

## 2022-12-19 DIAGNOSIS — G31.84 MILD NEUROCOGNITIVE DISORDER: ICD-10-CM

## 2022-12-19 PROCEDURE — 97130 THER IVNTJ EA ADDL 15 MIN: CPT | Mod: PO

## 2022-12-19 PROCEDURE — 97110 THERAPEUTIC EXERCISES: CPT | Mod: PO

## 2022-12-19 PROCEDURE — 97530 THERAPEUTIC ACTIVITIES: CPT | Mod: PO

## 2022-12-19 PROCEDURE — 97129 THER IVNTJ 1ST 15 MIN: CPT | Mod: PO

## 2022-12-19 NOTE — PROGRESS NOTES
[facility]  Speech Therapy Treatment Note- Neurological Rehabilitation  Date: 12/19/2022     Name: Patrick Short   MRN: [id number]   Therapy Diagnosis:   Encounter Diagnoses   Name Primary?    Parkinson disease Yes    Mild neurocognitive disorder     History of ischemic left MCA stroke      Physician: Frantz Bee MD  Physician Orders: [id number] - AMB REFERRAL/CONSULT TO SPEECH THERAPY  Medical Diagnosis: History of ischemic left MCA stroke [Z86.73], Parkinson disease [G20]    Visit #/ Visits Authorized: 2/ 12  Date of Evaluation:  12/8/22  Insurance Authorization Period: 12/8/22-12/31/22  Plan of Care Expiration Date:    12/8/2022 to 1/19/23   Extended Plan of Care:  n/a   Progress Note: 1/8/23   Visits Cancelled: 0  Visits No Show: 0    Time In:  8:00 am   Time Out:  8:45 am   Total Billable Time: 45 minutes      Precautions: Standard, Fall, and Cognition  Subjective:   Patient reports:  that he is not working well.     He was compliant to home exercise program.   Response to previous treatment: positive    Pain Scale:  0/10 on a Visual Analog Scale currently.   Pain Location: n/a  Objective:   TIMED  Procedure Min.   Cognitive Therapeutic Interventions, first 15 minutes CPT 44067  15   Cognitive Therapeutic Interventions, each additional 15 minutes CPT 43257  30         UNTIMED  Procedure Min.             Total Timed Units: 3  Total Untimed Units: 0  Charges Billed/Number of units: 3    Short Term Goals: (4 weeks) Current Progress:   Patient will name 10-15 items in a concrete category in one minute to improve word fluency and thought organization.     Progressing/ 12/19/2022   States- 15  Females - 10   Met x 1   2. Patient will complete tasks requiring divided attention and alternating attention with 90% accuracy given min cues to improve attention skills      Progressing/ 12/19/2022   Completed alternating symbols L-8  with 80% accuracy given minimum cues can he lost his place during  conversation or interruption.     Opened door through activity and typing in the background.    3. Pt will complete divided attention tasks in distracting environment with 90% accuracy independently.      Progressing/  12/19/2022   Constant Therapy remember picture order L1- 93% accuracy independently while engaged in conversation.     Completed alternating symbols L-8  with 80% accuracy given minimum cues can he lost his place during conversation or interruption.      Opened door through activity and typing in the background.    4. Patient will immediately recall specific details from information recently seen or heard using memory retrieval strategies with 90% accuracy given min cues to improve auditory and visual recall.      Progressing/  12/19/2022   Constant Therapy remember picture order L1- 93% accuracy independently   Met x 1   5. Patient will complete visual recall tasks with 90% acc indly to improve visual recall      Progressing/ Not Met 12/19/2022   Constant Therapy remember picture order L1- 93% accuracy independently      6. Patient will complete reasoning/problem solving tasks with 90% accuracy given min cues      Progressing/ Not Met 12/19/2022   Completed alternating symbols L-8  with 80% accuracy given minimum cues can he lost his place during conversation or interruption.       7. Patient will complete mental manipulation tasks with 90% accuracy given min cues to improve working memory/mental flexibility.        Progressing/ Not Met 12/19/2022   Completed alternating symbols L-8  with 80% accuracy given minimum cues can he lost his place during conversation or interruption.            Patient Education/Response:   Extensive education provided regarding both memory and word finding strategies. Discussed progress on tasks today. Patient verbalized understanding.     Home program established: yes-use strategies and see how to apply to daily living.   Exercises were reviewed and Tushar was able to  demonstrate them prior to the end of the session.  Tushar demonstrated poor understanding of the education provided.     See Electronic Medical Record under Patient Instructions for exercises provided throughout therapy.  Assessment:   Tushar is progressing well towards his goals. Increased accuracy for tasks today. Patient was receptive to therapy goals stated. Encouraged patient to come up with realistic goals he would like to improve within speech therapy.Current goals remain appropriate. Goals to be updated as necessary.     Patient prognosis is Good. Patient will continue to benefit from skilled outpatient speech and language therapy to address the deficits listed in the problem list on initial evaluation, provide patient/family education and to maximize patient's level of independence in the home and community environment.   Medical necessity is demonstrated by the following IMPAIRMENTS:  Deficits in executive functioning, attention, and memory prevent the pt from relaying medically and safety relevant information in a timely manner in a state of emergency.   Barriers to Therapy: none  Patient's spiritual, cultural and educational needs considered and patient agreeable to plan of care and goals.  Plan:   Continue Plan of Care with focus on memory, attention, word finding, and problem solving.     VIDA Ta, CCC-SLP,CBIS  12/19/2022

## 2022-12-22 ENCOUNTER — CLINICAL SUPPORT (OUTPATIENT)
Dept: REHABILITATION | Facility: HOSPITAL | Age: 77
End: 2022-12-22
Attending: INTERNAL MEDICINE
Payer: COMMERCIAL

## 2022-12-22 DIAGNOSIS — G31.84 MILD NEUROCOGNITIVE DISORDER: ICD-10-CM

## 2022-12-22 DIAGNOSIS — G20.A1 PARKINSON DISEASE: Primary | ICD-10-CM

## 2022-12-22 DIAGNOSIS — R26.89 IMPAIRED GAIT AND MOBILITY: Primary | ICD-10-CM

## 2022-12-22 DIAGNOSIS — Z86.73 HISTORY OF ISCHEMIC LEFT MCA STROKE: ICD-10-CM

## 2022-12-22 DIAGNOSIS — H51.11 CONVERGENCE INSUFFICIENCY: Primary | ICD-10-CM

## 2022-12-22 DIAGNOSIS — R29.898 WEAKNESS OF LEFT UPPER EXTREMITY: ICD-10-CM

## 2022-12-22 PROCEDURE — 97112 NEUROMUSCULAR REEDUCATION: CPT | Mod: PO

## 2022-12-22 PROCEDURE — 97130 THER IVNTJ EA ADDL 15 MIN: CPT | Mod: PO

## 2022-12-22 PROCEDURE — 97110 THERAPEUTIC EXERCISES: CPT | Mod: PO

## 2022-12-22 PROCEDURE — 97530 THERAPEUTIC ACTIVITIES: CPT | Mod: PO

## 2022-12-22 PROCEDURE — 97129 THER IVNTJ 1ST 15 MIN: CPT | Mod: PO

## 2022-12-22 NOTE — PROGRESS NOTES
[facility]   Physical Therapy Treatment Note     Name: Patrick Short  [facility] Number: [id number]    Therapy Diagnosis:   Encounter Diagnosis   Name Primary?    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 Patient verbalizes and demonstrates understanding.   Response to previous treatment: good  Functional change: ongoing     Pain: 0/10  Location: NA    OBJECTIVE     Objective Measures updated at progress report unless specified.     Treatment     Tushar received the treatments listed below:      therapeutic exercises to develop strength, endurance, ROM, flexibility, posture, and core stabilization for 15 minutes including:    10 minutes on the "[employer] seated elliptical for CV endurance and LE strength level 4.0    2 x 10 sit to stands from low mat while standing on foam pad, no UE support,  CGA     neuromuscular re-education activities to improve: Balance, Coordination, Kinesthetic, Proprioception, and Posture for 30 minutes. The following activities were included:    2 x 100 feet ambulation with horizontal head turns, SBA  2 x 100 feet ambulation with vertical head turns, SBA    4 laps tandem ambulation, occ touchdown support, CGA   4 laps marching with three second holds, CGA      2 x 30 seconds LLE on ground RLE on foam fitter, CGA, eyes closed  2 x 30 seconds RLE on ground  LLE on foam fitter, CGA, eyes closed    2 x 10 reps step up to foam fitter with opposite leg hike, CGA, one UE support   2 x 10 reps step up and over foam fitter with BLE, CGA, one UE support     2 x 10 reps two large cone tap with BLE, CGA, occ touchdown support      Patient Education and Home Exercises     Home Exercises Provided and Patient Education Provided     Education provided:   - home exercise program     Written Home Exercises Provided: yes. Exercises were reviewed and Tushar was able to demonstrate them prior to the end of the session.  Tushar demonstrated good  understanding of the education provided. See EMR under Patient Instructions for exercises provided during therapy sessions    ASSESSMENT     Tushar tolerated today's session well this afternoon. Patient requires encouragement to decrease UE support throughout all balance activities. The patient required one seated rest break throughout session. The patient will benefit from continued physical therapy intervention to address remaining functional mobility deficits.      Tushar Is progressing well towards his goals.   Pt prognosis is Good.     Pt will continue to benefit from skilled outpatient physical therapy to address the deficits listed in the problem list box on initial evaluation, provide pt/family education and to maximize pt's level of independence in the home and community environment.     Pt's spiritual, cultural and educational needs considered and pt agreeable to plan of care and goals.     Anticipated barriers to physical therapy: co-morbidities, progressive nature of illness    Goals:  Short Term Goals: 4 weeks   Patient will be independent with established home exercise program. Ongoing   Patient to improve FGA score to 26/30 for improved stability with functional mobility. Ongoing   Patient to improve GST condition 3 to 12 seconds for improved balance. Ongoing   Patient to improve GST condition 4 to 7 seconds for improved stability with  NBOS. Ongoing   5. Patient to improve SLS time to 4 seconds with BLE for improved stability with ambulation. Ongoing         Long Term Goals: 8 weeks   Patient will be independent with advanced home exercise program. Ongoing   Patient to improve FGA score to 27/30 for improved stability with functional mobility. Ongoing   Patient to improve GST condition 3 to 16 seconds for improved balance. Ongoing   Patient to improve GST condition 4 to 14 seconds for improved stability with NBOS. Ongoing   5. Patient to improve SLS time to 10 seconds with BLE for improved stability with ambulation. Ongoing     PLAN     Check home exercise program compliance    Continue progressing static/ dynamic balance exercises     Salma Phipps, PT

## 2022-12-22 NOTE — PROGRESS NOTES
[facility]  Speech Therapy Treatment Note- Neurological Rehabilitation  Date: 12/22/2022     Name: Patrick Short   MRN: [id number]   Therapy Diagnosis:   Encounter Diagnoses   Name Primary?    Parkinson disease Yes    Mild neurocognitive disorder     History of ischemic left MCA stroke      Physician: Frantz Bee MD  Physician Orders: [id number] - AMB REFERRAL/CONSULT TO SPEECH THERAPY  Medical Diagnosis: History of ischemic left MCA stroke [Z86.73], Parkinson disease [G20]    Visit #/ Visits Authorized: 2/ 12  Date of Evaluation:  12/8/22  Insurance Authorization Period: 12/8/22-12/31/22  Plan of Care Expiration Date:    12/8/2022 to 1/19/23   Extended Plan of Care:  n/a   Progress Note: 1/8/23   Visits Cancelled: 0  Visits No Show: 0    Time In:  8:00 am   Time Out:  8:45 am   Total Billable Time: 45 minutes      Precautions: Standard, Fall, and Cognition  Subjective:   Patient reports:  that he is not working well.     He was compliant to home exercise program.   Response to previous treatment: positive    Pain Scale:  0/10 on a Visual Analog Scale currently.   Pain Location: n/a  Objective:   TIMED  Procedure Min.   Cognitive Therapeutic Interventions, first 15 minutes CPT 47550  15   Cognitive Therapeutic Interventions, each additional 15 minutes CPT 93057  30         UNTIMED  Procedure Min.             Total Timed Units: 3  Total Untimed Units: 0  Charges Billed/Number of units: 3    Short Term Goals: (4 weeks) Current Progress:   Patient will name 10-15 items in a concrete category in one minute to improve word fluency and thought organization.     Progressing/ 12/22/2022   Not formally addressed      Met x 1   2. Patient will complete tasks requiring divided attention and alternating attention with 90% accuracy given min cues to improve attention skills      Progressing/ 12/22/2022   Not formally addressed        3. Pt will complete divided attention tasks in distracting environment  "with 90% accuracy independently.      Progressing/  12/22/2022   Constant Therapy follow auditory instructions L2 ("place the bandana to the right of the monkey, then place the swimsuit to the left of the monkey") - 85% accuracy independently     Constant Therapy follow directions L1- 95% accuracy independently     Constant Therapy infer from voice messages - 60% accuracy independently     Constant Therapy understand voicemail -  50% accuracy independently   Some difficulty hearing higher pitch voices due to his hearing loss.     Opened door through activity and typing in the background. Air conditioner noise in the background.   4. Patient will immediately recall specific details from information recently seen or heard using memory retrieval strategies with 90% accuracy given min cues to improve auditory and visual recall.      Progressing/  12/22/2022   Constant Therapy follow auditory instructions L2 ("place the bandana to the right of the monkey, then place the swimsuit to the left of the monkey") -  85% accuracy independently     Constant Therapy follow directions L1- 95%  accuracy independently     Constant Therapy infer from voice messages - 60% accuracy independently     Constant Therapy understand voicemail -  50% accuracy independently  Met x 1   5. Patient will complete visual recall tasks with 90% acc indly to improve visual recall      Progressing/ Not Met 12/22/2022   Constant Therapy follow auditory instructions L2 ("place the bandana to the right of the monkey, then place the swimsuit to the left of the monkey") -  85% accuracy independently     Constant Therapy follow directions L1- 95% accuracy independently      6. Patient will complete reasoning/problem solving tasks with 90% accuracy given min cues      Progressing/ Not Met 12/22/2022   Deduction puzzle -maximum cues needed. Patient stated that he was never go with this type of task. He became frustrated so task was discontinued.    Constant " "Therapy infer from voice messages - 60% accuracy independently     Constant Therapy understand voicemail - 50% accuracy independently    7. Patient will complete mental manipulation tasks with 90% accuracy given min cues to improve working memory/mental flexibility.        Progressing/ Not Met 12/22/2022   Constant Therapy follow auditory instructions L2 ("place the bandana to the right of the monkey, then place the swimsuit to the left of the monkey") - 85% accuracy independently     Constant Therapy follow directions L1- 95% accuracy independently     Patient Education/Response:   Extensive education provided regarding both memory and attention/listening strategies. Discussed progress on tasks today. Patient verbalized understanding.     Home program established: yes-use strategies and see how to apply to daily living.   Exercises were reviewed and Tushar was able to demonstrate them prior to the end of the session.  Tushar demonstrated poor understanding of the education provided.     See Electronic Medical Record under Patient Instructions for exercises provided throughout therapy.  Assessment:   Tushar is progressing well towards his goals. He reported difficulty hearing higher pitch sounds even with his hearing aids. Encouraged patient to write information as he hears and look at the speaker to read lips. His hearing loss negatively affected his performance on the auditory tasks today. Current goals remain appropriate. Goals to be updated as necessary.     Patient prognosis is Good. Patient will continue to benefit from skilled outpatient speech and language therapy to address the deficits listed in the problem list on initial evaluation, provide patient/family education and to maximize patient's level of independence in the home and community environment.   Medical necessity is demonstrated by the following IMPAIRMENTS:  Deficits in executive functioning, attention, and memory prevent the pt from relaying " medically and safety relevant information in a timely manner in a state of emergency.   Barriers to Therapy: none  Patient's spiritual, cultural and educational needs considered and patient agreeable to plan of care and goals.  Plan:   Continue Plan of Care with focus on memory, attention, word finding, and problem solving.     VIDA Ta, CCC-SLP,CBIS  12/22/2022

## 2022-12-22 NOTE — PROGRESS NOTES
"  [facility]   Occupational Therapy Neurological Rehabilitation   Treatment Note   Name: Patrick Short  MRN: [id number]  Today's Date: 12/22/2022    Therapy Diagnosis:   Encounter Diagnoses   Name Primary?    Convergence insufficiency Yes    Weakness of left upper extremity      Physician: Frantz Bee MD  Physician Orders: Neuro Program      Medical Diagnosis: Z86.73 (ICD-10-CM) - History of ischemic left MCA stroke G20 (ICD-10-CM) - Parkinson disease   Evaluation Date: 12/7/2022  Plan of Care Expiration Period: 1/18/2023 (6 weeks)  Insurance Authorization period Expiration: 12/4/2023  Date of Return to MD: 1/31/23: Neurology   FOTO: 1/3     Visit # / Visits Authorized: 4 / 12 (+evaluation)     Time In: 2:30  Time Out: 3:20  Total Billable Time: 50 minutes    Precautions: Standard and Fall; hearing impaired     Subjective   Pt reports: "I hate to admit it, but I was frustrated earlier doing speech therapy today. I didn't realize what was hard for me"    Response to previous treatment: completed HEP for eye exercises "hard to tell if I'm doing anything"  Functional change: reported he has started taking PD medication  Patient's Goals for Occupational Therapy: to increase his quality of life and remain independent     Pain upon arrival: 0/10  Location: chronic right side neck pain/stiffness  Pain at cessation of session: 0/10    Objective   Received from waiting room     Tushar received therapeutic exercises for 30 minutes including:  -upper body ergonometer on level 3.0; completed for 10 minutes switching directions mid way. Focus on postural control and breathing techniques with exhale with exertion of force. MET average 2.4 (pt with increased left lateral lean)    -pencil push ups for convergence and divergence x2 sets of 10 reps     seated edge of mat:   -floor to ceiling and side reaching x10 reps each for large amplitude movement with finger flicks     Tushar participated in dynamic " functional therapeutic activities to improve functional performance for 20 minutes, including:  Seated at table top:   -large  highlighter for word search - completed for cognition and convergence activity (glasses donned)-- 4 completed     Hand off to PT   Home Exercises and Education Provided   Education provided:   - large amplitude movement   - large  pen to aid in stability; finger flicks   - edu on LSVT BIG and LOUD program; edu on what PD is   - role of Occupational Therapy in care, goals for Occupational Therapy for this plan of care,  scheduling  - Progress towards goals   - Loop group handwriting HEP/handout    Written Home Exercises Provided: yes. (Folder provided to keep HEP/HAPs from therapies 12/19)  Exercises were reviewed and Tushar was able to demonstrate them prior to the end of the session.  Tushar demonstrated good  understanding of the HEP provided.   -word search    See EMR under Patient Instructions for exercises provided 12/13/2022: Convergence exercises   12/19/2022: Resistance band HEP for bilateral upper extremities      Assessment   With word search task, requiring scanning assist for elimination of busy environment. Tushar demonstrated increased fatigue at afternoon appointment with noted left lateral lean with posutal control. Moreover, he required min cues for large amplitude movement with seated exercises. He would greatly benefit from LSVT BIG program following traditional therapy approach.     Tushar is progressing well towards his goals and there are no updates to goals at this time. Pt prognosis is Good.     Pt will continue to benefit from skilled outpatient occupational therapy to address the deficits listed in the problem list on initial evaluation provide pt/family education and to maximize pt's level of independence in the home and community environment.     Anticipated barriers to occupational therapy: none noted     Pt's spiritual, cultural and educational needs  considered and pt agreeable to plan of care and goals.    Goals: 6 weeks; long term= short term  Pt will demo understanding for HEP/HAP provided with teach back understanding. Ongoing   Pt will demo 5/5 for proximal stability and strength for left upper extremity. Ongoing   Pt will demo understanding for convergence/divergence sufficiency exercises with teach back understanding. Progressing; improvement noted on 12/19  Pt will demo understanding for modifications and adaptations for handwriting and fine motor tasks. Ongoing   Pt will reduce limitation score on FOTO by less than or equal to 12% to demonstrate an increase in self-perceived functional performance. Ongoing      The following goals were discussed with the patient and patient is in agreement with them as to be addressed in the treatment plan.     Plan   Certification Period/Plan of care expiration: 12/7/2022 to 1/18/2023.     Outpatient Occupational Therapy 2 times weekly for 6 weeks to include the following interventions: Neuromuscular Re-ed, Patient Education, Self Care, Therapeutic Activities, and Therapeutic Exercise.    Next session: ISAAK Felipe  Neuro Occupational Therapist   [facility]   12/22/2022

## 2022-12-27 ENCOUNTER — CLINICAL SUPPORT (OUTPATIENT)
Dept: REHABILITATION | Facility: HOSPITAL | Age: 77
End: 2022-12-27
Attending: PSYCHIATRY & NEUROLOGY
Payer: COMMERCIAL

## 2022-12-27 DIAGNOSIS — R26.89 IMPAIRED GAIT AND MOBILITY: Primary | ICD-10-CM

## 2022-12-27 DIAGNOSIS — H51.11 CONVERGENCE INSUFFICIENCY: Primary | ICD-10-CM

## 2022-12-27 DIAGNOSIS — R29.898 WEAKNESS OF LEFT UPPER EXTREMITY: ICD-10-CM

## 2022-12-27 PROCEDURE — 97110 THERAPEUTIC EXERCISES: CPT | Mod: PO

## 2022-12-27 PROCEDURE — 97530 THERAPEUTIC ACTIVITIES: CPT | Mod: PO

## 2022-12-27 PROCEDURE — 97112 NEUROMUSCULAR REEDUCATION: CPT | Mod: PO

## 2022-12-27 NOTE — PLAN OF CARE
"[facility]   Occupational Therapy Neurological Rehabilitation   Treatment; Re-assessment + Update to Plan of Care   Name: Patrick Short  MRN: [id number]  Today's Date: 12/27/2022    Therapy Diagnosis:   Encounter Diagnoses   Name Primary?    Convergence insufficiency Yes    Weakness of left upper extremity        Physician: Frantz Bee MD  Physician Orders: Neuro Program      Medical Diagnosis: Z86.73 (ICD-10-CM) - History of ischemic left MCA stroke G20 (ICD-10-CM) - Parkinson disease   Evaluation Date: 12/7/2022  Plan of Care Expiration Period: 1/31/2023  Insurance Authorization period Expiration: 12/31/2022  Date of Return to MD: 1/31/23: Neurology   FOTO: 2 /3     Visit # / Visits Authorized: 5 / 12 (+evaluation)     Time In: 2:00  Time Out: 2:45  Total Billable Time: 45 minutes    Precautions: Standard and Fall; hearing impaired     Subjective   Pt reports: if someone asked me how I'm doing, I really wouldn't know what to say"    Response to previous treatment: completed HEP for eye exercises "hard to tell if I'm doing anything"  Functional change: reported he has started taking PD medication  Patient's Goals for Occupational Therapy: to increase his quality of life and remain independent     Pain upon arrival: 0/10  Location: chronic right side neck pain/stiffness  Pain at cessation of session: 0/10    Objective   Received from PT session     Tushar received therapeutic exercises for 15 minutes including:  seated edge of mat:   -floor to ceiling and side reaching x10 reps each for large amplitude movement with finger flicks     Standing:   -dark green resistance band: x2 sets of 10: lat pull downs     Tushar participated in dynamic functional therapeutic activities to improve functional performance for 30 minutes, including:  -iPad use with left hand for FOTO completion     Functional Status      Functional Mobility:   Bed mobility: Mod I  Roll to left: Mod I  Roll to right: Mod " I  Supine to sit: Mod I  Sit to supine: Mod I  Transfers to bed: Mod I  Transfers to toilet: I  Car transfers: Mod I     ADL's:  Feeding: Mod I  Grooming: Mod I  Hygiene: Mod I  Upper Body Dressing: Mod I  Lower Body Dressing: Mod I; able to tie shoes   Toileting: Mod I  Bathing: Mod I     IADL's:  Homecare: does not complete   Cooking: does not complete  Laundry: does not complete  Yard work: does not complete  Use of telephone: Mod I; increased difficulty with cognitive component   Money management: Mod I  Medication management: Mod I; wife fills in pill organizer   Handwriting:Mod I; smaller   Technology Use:Min A- with cognitive component      Objective   Cognitive Exam:  Oriented: Person, Place, Time, and Situation  Behaviors: Cooperative  Follows Commands/attention: Follows multistep  commands  Communication: clear/fluent  Memory: impaired  Safety awareness/insight to disability: aware of diagnosis, treatment, and prognosis  Coping skills/emotional control: Appropriate to situation     Visual/Perceptual:  Tracking: intact functionally all directions   Saccades: impaired slowed all directions    Acuity: uses readers  Convergence and divergence: 2cm 12.27.22  Nystagmus: none noted    R/L discrimination: intact  Comments: left cataract sx prior - torn retina following      Physical Exam:  Postural examination/scapula alignment: Rounded shoulder and Head forward  Joint integrity: intact  Skin integrity: intact  Edema: none noted       Joint Evaluation: bilateral upper extremity  AROM WFL      RTC tears on right side awaiting repair      Fist: normal; right hand with 4th digit contracture      Strength: 5/5 right upper extremity  4+/5 shoulder flex, ext, abd, add for left upper extremity   5/5 distally       Strength: (GUERLINE Dynamometer in lbs.) Position II:       12/7/2022 12/7/2022 12/27/22 12/27/22     Right Left Right Left   Rung II 81.6 # 76.0 # 80.4 # 78.7 #   Norms for  Strength (70+)  Male: Right  Left   54 lbs 48 lbs      Pinch Strength (Measured in psi)       12/7/2022 12/7/2022 12/27/22 12/27/22     Right Left Right Left   Key Pinch 22 psi 20 psi 23 16.5   3pt Pinch 14 psi 19 psi 17.8 18.2   2pt Pinch 11 psi 13 psi 11.8 14.3      Fine Motor Coordination: 9 Hole Peg Test     Right   12/7/2022 Left   12/7/2022 Right  12/27/22 Left  12/27/22                27.2 s              29.8 s 33 s 26 s         Fine Motor Coordination: Box and Block     Right   12/7/2022 Left   12/7/2022 Right  12/27/22 Left  12/27/22               51              53 50 53      Gross motor coordination:   JOLIE (Rapid Alternating Movements): mild delay  Finger to Nose (3 times): intact; no dysmetria   Finger Flicks (coordination moving from digit flexion to digit extension): intact   Opposition: intact bilateral       Tone:  Modified Ila Scale:   0 - No increase in muscle tone     Sensation:  Patrick  reports no deficits   Kinesthesia: bilateralintact  Proprioception: bilateral intact        Balance:   Static Sitting- GOOD+: Takes MAXIMAL challenges from all directions.    Dynamic Sitting- GOOD+: Takes MAXIMAL challenges from all directions.    Static Standing - GOOD: Takes MODERATE challenges from all directions  Dynamic Standing - defer to PT; not completed in OT evaluation on this date      Endurance Deficit: mild    Additional: mild radial tremor noted with iPad completion on left hand    Home Exercises and Education Provided   Education provided:   - large amplitude movement   - large  pen to aid in stability; finger flicks   - edu on LSVT BIG and LOUD program; edu on what PD is   - role of Occupational Therapy in care, goals for Occupational Therapy for this plan of care,  scheduling  - Progress towards goals   - Loop group handwriting HEP/handout    Written Home Exercises Provided: yes. (Folder provided to keep HEP/HAPs from therapies 12/19)  Exercises were reviewed and Tushar was able to demonstrate them prior to the end  of the session.  Tushar demonstrated good  understanding of the HEP provided.   -word search    See EMR under Patient Instructions for exercises provided 12/13/2022: Convergence exercises   12/19/2022: Resistance band HEP for bilateral upper extremities      Assessment   Tushar demo very mild deficits from brain bleed. Upon follow up treatments, it has been most notable that pt's deficits impacting his functional indep and safety are his PD symptoms (including his cognitive deficits). Requesting a plan of care change to 4x/w for 4 weeks for LSVT protocol for patient- discussed with patient on this date. Pt is in agreement.     Tushar is progressing well towards his goals and there are no updates to goals at this time. Pt prognosis is Good.     Pt will continue to benefit from skilled outpatient occupational therapy to address the deficits listed in the problem list on initial evaluation provide pt/family education and to maximize pt's level of independence in the home and community environment.     Anticipated barriers to occupational therapy: cognition; carry over at home     Pt's spiritual, cultural and educational needs considered and pt agreeable to plan of care and goals.    Goals: 4 weeks; long term= short term  Pt will demo understanding for HEP/HAP provided with teach back understanding. Ongoing   Pt will demo 5/5 for proximal stability and strength for left upper extremity. Ongoing   Pt will demo understanding for convergence/divergence sufficiency exercises with teach back understanding. Progressing; improvement noted on 12/19  Pt will demo understanding for modifications and adaptations for handwriting and fine motor tasks. Ongoing   Pt will reduce limitation score on FOTO by less than or equal to 12% to demonstrate an increase in self-perceived functional performance. Ongoing       Additional goals:   Pt will demo understanding for LSVT BIG protocol with teachback understanding.   Pt will verbalize  improvement in balance confidence as evident by ABC (on FOTO).       The following goals were discussed with the patient and patient is in agreement with them as to be addressed in the treatment plan.     Plan   Certification Period/Plan of care expiration: 1/31/2023     Outpatient Occupational Therapy 4 times weekly for 4 weeks to include the following interventions: Neuromuscular Re-ed, Patient Education, Self Care, Therapeutic Activities, and Therapeutic Exercise.    Next session:  standing from lower chairs -without hand use; complete PPT; initiation of LSVT BIG protocol info and packet     ISAAK Dubois  Neuro Occupational Therapist   [facility] - [facility]   12/27/2022          I certify the need for these services furnished under this plan of treatment and while under my care.  ____________________________________ Physician/Referring Practitioner   Date of Signature

## 2022-12-27 NOTE — PROGRESS NOTES
"  [facility]   Occupational Therapy Neurological Rehabilitation   Treatment; Re-assessment + Update to Plan of Care   Name: Patrick Short  MRN: [id number]  Today's Date: 12/27/2022    Therapy Diagnosis:   Encounter Diagnoses   Name Primary?    Convergence insufficiency Yes    Weakness of left upper extremity        Physician: Frantz Bee MD  Physician Orders: Neuro Program      Medical Diagnosis: Z86.73 (ICD-10-CM) - History of ischemic left MCA stroke G20 (ICD-10-CM) - Parkinson disease   Evaluation Date: 12/7/2022  Plan of Care Expiration Period: 1/31/2023  Insurance Authorization period Expiration: 12/31/2022  Date of Return to MD: 1/31/23: Neurology   FOTO: 2 /3     Visit # / Visits Authorized: 5 / 12 (+evaluation)     Time In: 2:00  Time Out: 2:45  Total Billable Time: 45 minutes    Precautions: Standard and Fall; hearing impaired     Subjective   Pt reports: if someone asked me how I'm doing, I really wouldn't know what to say"    Response to previous treatment: completed HEP for eye exercises "hard to tell if I'm doing anything"  Functional change: reported he has started taking PD medication  Patient's Goals for Occupational Therapy: to increase his quality of life and remain independent     Pain upon arrival: 0/10  Location: chronic right side neck pain/stiffness  Pain at cessation of session: 0/10    Objective   Received from PT session     Tushar received therapeutic exercises for 15 minutes including:  seated edge of mat:   -floor to ceiling and side reaching x10 reps each for large amplitude movement with finger flicks     Standing:   -dark green resistance band: x2 sets of 10: lat pull downs     Tushar participated in dynamic functional therapeutic activities to improve functional performance for 30 minutes, including:  -iPad use with left hand for FOTO completion     Functional Status      Functional Mobility:   Bed mobility: Mod I  Roll to left: Mod I  Roll to right: Mod " I  Supine to sit: Mod I  Sit to supine: Mod I  Transfers to bed: Mod I  Transfers to toilet: I  Car transfers: Mod I     ADL's:  Feeding: Mod I  Grooming: Mod I  Hygiene: Mod I  Upper Body Dressing: Mod I  Lower Body Dressing: Mod I; able to tie shoes   Toileting: Mod I  Bathing: Mod I     IADL's:  Homecare: does not complete   Cooking: does not complete  Laundry: does not complete  Yard work: does not complete  Use of telephone: Mod I; increased difficulty with cognitive component   Money management: Mod I  Medication management: Mod I; wife fills in pill organizer   Handwriting:Mod I; smaller   Technology Use:Min A- with cognitive component      Objective   Cognitive Exam:  Oriented: Person, Place, Time, and Situation  Behaviors: Cooperative  Follows Commands/attention: Follows multistep  commands  Communication: clear/fluent  Memory: impaired  Safety awareness/insight to disability: aware of diagnosis, treatment, and prognosis  Coping skills/emotional control: Appropriate to situation     Visual/Perceptual:  Tracking: intact functionally all directions   Saccades: impaired slowed all directions    Acuity: uses readers  Convergence and divergence: 2cm 12.27.22  Nystagmus: none noted    R/L discrimination: intact  Comments: left cataract sx prior - torn retina following      Physical Exam:  Postural examination/scapula alignment: Rounded shoulder and Head forward  Joint integrity: intact  Skin integrity: intact  Edema: none noted       Joint Evaluation: bilateral upper extremity  AROM WFL      RTC tears on right side awaiting repair      Fist: normal; right hand with 4th digit contracture      Strength: 5/5 right upper extremity  4+/5 shoulder flex, ext, abd, add for left upper extremity   5/5 distally       Strength: (GUERLINE Dynamometer in lbs.) Position II:       12/7/2022 12/7/2022 12/27/22 12/27/22     Right Left Right Left   Rung II 81.6 # 76.0 # 80.4 # 78.7 #   Norms for  Strength (70+)  Male: Right  Left   54 lbs 48 lbs      Pinch Strength (Measured in psi)       12/7/2022 12/7/2022 12/27/22 12/27/22     Right Left Right Left   Key Pinch 22 psi 20 psi 23 16.5   3pt Pinch 14 psi 19 psi 17.8 18.2   2pt Pinch 11 psi 13 psi 11.8 14.3      Fine Motor Coordination: 9 Hole Peg Test     Right   12/7/2022 Left   12/7/2022 Right  12/27/22 Left  12/27/22                27.2 s              29.8 s 33 s 26 s         Fine Motor Coordination: Box and Block     Right   12/7/2022 Left   12/7/2022 Right  12/27/22 Left  12/27/22               51              53 50 53      Gross motor coordination:   JOLIE (Rapid Alternating Movements): mild delay  Finger to Nose (3 times): intact; no dysmetria   Finger Flicks (coordination moving from digit flexion to digit extension): intact   Opposition: intact bilateral       Tone:  Modified Ila Scale:   0 - No increase in muscle tone     Sensation:  Patrick  reports no deficits   Kinesthesia: bilateralintact  Proprioception: bilateral intact        Balance:   Static Sitting- GOOD+: Takes MAXIMAL challenges from all directions.    Dynamic Sitting- GOOD+: Takes MAXIMAL challenges from all directions.    Static Standing - GOOD: Takes MODERATE challenges from all directions  Dynamic Standing - defer to PT; not completed in OT evaluation on this date      Endurance Deficit: mild    Additional: mild radial tremor noted with iPad completion on left hand    Home Exercises and Education Provided   Education provided:   - large amplitude movement   - large  pen to aid in stability; finger flicks   - edu on LSVT BIG and LOUD program; edu on what PD is   - role of Occupational Therapy in care, goals for Occupational Therapy for this plan of care,  scheduling  - Progress towards goals   - Loop group handwriting HEP/handout    Written Home Exercises Provided: yes. (Folder provided to keep HEP/HAPs from therapies 12/19)  Exercises were reviewed and Tushar was able to demonstrate them prior to the end  of the session.  Tushar demonstrated good  understanding of the HEP provided.   -word search    See EMR under Patient Instructions for exercises provided 12/13/2022: Convergence exercises   12/19/2022: Resistance band HEP for bilateral upper extremities      Assessment   Tushar demo very mild deficits from brain bleed. Upon follow up treatments, it has been most notable that pt's deficits impacting his functional indep and safety are his PD symptoms (including his cognitive deficits). Requesting a plan of care change to 4x/w for 4 weeks for LSVT protocol for patient- discussed with patient on this date. Pt is in agreement.     Tushar is progressing well towards his goals and there are no updates to goals at this time. Pt prognosis is Good.     Pt will continue to benefit from skilled outpatient occupational therapy to address the deficits listed in the problem list on initial evaluation provide pt/family education and to maximize pt's level of independence in the home and community environment.     Anticipated barriers to occupational therapy: cognition; carry over at home     Pt's spiritual, cultural and educational needs considered and pt agreeable to plan of care and goals.    Goals: 4 weeks; long term= short term  Pt will demo understanding for HEP/HAP provided with teach back understanding. Ongoing   Pt will demo 5/5 for proximal stability and strength for left upper extremity. Ongoing   Pt will demo understanding for convergence/divergence sufficiency exercises with teach back understanding. Progressing; improvement noted on 12/19  Pt will demo understanding for modifications and adaptations for handwriting and fine motor tasks. Ongoing   Pt will reduce limitation score on FOTO by less than or equal to 12% to demonstrate an increase in self-perceived functional performance. Ongoing       Additional goals:   Pt will demo understanding for LSVT BIG protocol with teachback understanding.   Pt will verbalize  improvement in balance confidence as evident by ABC (on FOTO).       The following goals were discussed with the patient and patient is in agreement with them as to be addressed in the treatment plan.     Plan   Certification Period/Plan of care expiration: 1/31/2023     Outpatient Occupational Therapy 4 times weekly for 4 weeks to include the following interventions: Neuromuscular Re-ed, Patient Education, Self Care, Therapeutic Activities, and Therapeutic Exercise.    Next session:  standing from lower chairs -without hand use; complete PPT; initiation of LSVT BIG protocol info and packet     ISAAK Dubois  Neuro Occupational Therapist   [facility] - [facility]   12/27/2022          I certify the need for these services furnished under this plan of treatment and while under my care.  ____________________________________ Physician/Referring Practitioner   Date of Signature

## 2022-12-27 NOTE — PROGRESS NOTES
"[facility]   Physical Therapy Treatment Note     Name: Patrick Short  [facility] Number: [id number]    Therapy Diagnosis:   Encounter Diagnosis   Name Primary?    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 Patient verbalizes and demonstrates understanding.   Response to previous treatment: good  Functional change: ongoing     Pain: 0/10  Location: NA    OBJECTIVE     Objective Measures updated at progress report unless specified.     Treatment     Tushar received the treatments listed below:      therapeutic exercises to develop strength, endurance, ROM, flexibility, posture, and core stabilization for 10 minutes including:    10 minutes on the SCIFIT seated elliptical for CV endurance and LE strength level 4.5      neuromuscular re-education activities to improve: Balance, Coordination, Kinesthetic, Proprioception, and Posture for 30 minutes. The following activities were included:    2 x 10 reps step up to 4" step with opposite leg hike, CGA, no UE support   - first set non-alternating, second set alternating  1 x 10 reps step up to foam fitter with opposite leg hike, CGA with no UE support, occasional Renny    1 x 10 reps each step up/over + back up/over foam fitter with BLE, CGA-Renny, no UE support with R SLS; 1 UE touchdown support for L SLS    30 sec each side tandem stance, " frequent min and frequent UE touchdown support    23 sec R foot fwd tandem stance, CGA with no UE support  30 sec L foot fwd tandem stance, CGA with no UE support    1 x 10 reps two green cone tap on foam fitter with BLE, CGA-Renny for weight shifting, no UE support     1 x 30 seconds LLE on ground RLE on basketball, CGA, eyes open  1 x 30 seconds RLE on ground LLE on basketball, CGA, eyes open         Patient Education and Home Exercises     Home Exercises Provided and Patient Education Provided     Education provided:   - home exercise program     Written Home Exercises Provided: yes. Exercises were reviewed and Tushar was able to demonstrate them prior to the end of the session.  Tushar demonstrated good  understanding of the education provided. See EMR under Patient Instructions for exercises provided during therapy sessions    ASSESSMENT     Tushar tolerated today's session well this afternoon. He tolerated removal of continuous UE support but required intermittent Renny for lateral weight shifting to perform functional SLS movements. Decreased activity tolerance noted with dynamic balance interventions. Forward trunk lean noted throughout session. The patient will benefit from continued physical therapy intervention to address remaining functional mobility deficits.    Tushar Is progressing well towards his goals.   Pt prognosis is Good.     Pt will continue to benefit from skilled outpatient physical therapy to address the deficits listed in the problem list box on initial evaluation, provide pt/family education and to maximize pt's level of independence in the home and community environment.     Pt's spiritual, cultural and educational needs considered and pt agreeable to plan of care and goals.     Anticipated barriers to physical therapy: co-morbidities, progressive nature of illness    Goals:  Short Term Goals: 4 weeks   Patient will be independent with established home exercise program. Ongoing   Patient to  improve FGA score to 26/30 for improved stability with functional mobility. Ongoing   Patient to improve GST condition 3 to 12 seconds for improved balance. Ongoing   Patient to improve GST condition 4 to 7 seconds for improved stability with NBOS. Ongoing   5. Patient to improve SLS time to 4 seconds with BLE for improved stability with ambulation. Ongoing         Long Term Goals: 8 weeks   Patient will be independent with advanced home exercise program. Ongoing   Patient to improve FGA score to 27/30 for improved stability with functional mobility. Ongoing   Patient to improve GST condition 3 to 16 seconds for improved balance. Ongoing   Patient to improve GST condition 4 to 14 seconds for improved stability with NBOS. Ongoing   5. Patient to improve SLS time to 10 seconds with BLE for improved stability with ambulation. Ongoing     PLAN     Check home exercise program compliance    Continue progressing static/ dynamic balance exercises     Giovanni Coe, PT

## 2022-12-29 NOTE — PROGRESS NOTES
[facility]   Physical Therapy Treatment Note     Name: Patrick Short  [facility] Number: [id number]    Therapy Diagnosis:   Encounter Diagnosis   Name Primary?    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 Patient verbalizes and demonstrates understanding.   Response to previous treatment: good  Functional change: ongoing     Pain: 0/10  Location: NA    OBJECTIVE     Objective Measures updated at progress report unless specified.        Lower Extremity Strength  Right LE   1/4/2023 Left LE   1/4/2023   Hip Flexion: 4/5 4+/5 Hip Flexion: 4/5 4+/5   Hip Extension:  5/5 5/5 Hip Extension: 5/5 5/5   Hip Abduction: 5/5 5/5 Hip Abduction: 5/5 5/5   Hip Adduction: 5/5 5/5 Hip Adduction 5/5 5/5   Knee Extension: 5/5 5/5 Knee Extension: 5/5 5/5   Knee Flexion: 5/5 5/5 Knee Flexion: 5/5 5/5   Ankle Dorsiflexion: 5/5 5/5 Ankle Dorsiflexion: 5/5 5/5   Ankle Plantarflexion: 5/5 5/5 Ankle Plantarflexion: 5/5 5/5      Abdominal Strength: WNL     Flexibility: WNL       Evaluation 1/4/2023   Single Limb Stance R LE To be assessed at follow up session   (<10 sec = HIGH FALL RISK) 3 seconds   Single Limb Stance L LE To be assessed at follow up session   (<10 sec = HIGH FALL RISK) 2 seconds         Evaluation 1/4/2023   30 second Chair Rise  (adults > 61 y/o) 12 completed with no arms 12  "completed with no arms   5 times sit-stand  (adults 18-65 y/o) 11 seconds  >12 sec= fall risk for general elderly  >16 sec= fall risk for Parkinson's disease  >10 sec= balance/vestibular dysfunction (<59 y/o)  >14.2 sec= balance/vestibular dysfunction (>59 y/o)  >12 sec= fall risk for CVA 11 seconds      Postural control:  ASTRID SENSORY TESTING:  (P= Pass, F= Fail; note any sway; hold each position for 30")  Condition 1: (firm surface/feet together/eyes open) P  Condition 2: (firm surface/feet together/eyes closed) P  Condition 3: (firm surface/feet in tandem/eyes open) 10 seconds RLE leading, 4 seconds LLE leading   Condition 4: (firm surface/feet in tandem/eyes closed) F 2 seconds BLE leading   Condition 5: (soft surface/feet together/eyes open) P  Condition 6: (soft surface/feet together/eyes closed) P min/mod sway   Condition 7: (Fakuda step test), measure distance varied from center starting position NT       Gait Assessment:   - AD used: none   - Assistance: independent   - Distance: ambulatory throughout session      GAIT DEVIATIONS:  Patrick displays the following deviations with ambulation: decreased bilateral LE foot clearance, decreased step length and marcelle, short shuffling steps with forward trunk lean when ambulating     Impairments contributing to deviations: impaired motor control, impaired endurance, impaired coordination      Endurance Deficit: minimal         Evaluation 1/4/2023   Timed Up and Go 9 sec  < 20 sec safe for independent transfers, < 30 sec safe for dependent transfers/assist required 9 seconds    Self Selected Walking Speed 1.2 m/sec (6m/5s) 1.2 m/sec (6m/5s)   Fast Walking Speed 1.5 m/sec (6m/4s) 1.5 m/sec (6m/4s)         Functional Gait Assessment:   1. Gait on level surface =  3  2. Change in Gait Speed = 3  3. Gait with horizontal head turns  = 2  4. Gait with vertical head turns = 2  5. Gait with pivot turns = 3  6. Step over obstacle = 3  7. Gait with Narrow BRANDON = 2  8. Gait " with eyes closed = 2  9. Ambulating Backwards = 2  10. Steps = 2      Score 24/30   Score:   <22/30 fall risk   <20/30 fall risk in older adults   <18/30 fall risk in Parkinsons        Treatment     Tushar received the treatments listed below:      therapeutic exercises to develop strength, endurance, ROM, flexibility, posture, and core stabilization for 45 minutes including:    10 minutes on the SCIFIT seated elliptical for CV endurance and LE strength level 4.5    2 x 30 seconds R side levator scap stretch   2 x 30 seconds upper trap stretch   10 reps cervical retraction with 5 second holds   2 minutes prone on elbows with cervical retraction holds     Time above includes time to complete objective measures     neuromuscular re-education activities to improve: Balance, Coordination, Kinesthetic, Proprioception, and Posture for 0 minutes. The following activities were included:      Patient Education and Home Exercises     Home Exercises Provided and Patient Education Provided     Education provided:   - home exercise program     Written Home Exercises Provided: yes. Exercises were reviewed and Tushar was able to demonstrate them prior to the end of the session.  Tushar demonstrated good  understanding of the education provided. See EMR under Patient Instructions for exercises provided during therapy sessions    ASSESSMENT     Tushar tolerated today's reassessment session well. Majority of patient's measurements changed minimally since evaluation. The patient's SSWS places the patient in the community ambulator category. The patients time to complete the TUG places the patient outside of the fall risk category. The patient's SLS times place the patient in the elevated fall risk category and remain the same as the last time assessed. Slight increase noted with the patient's hip flexion strength, no other LE strength deficits noted. With GST, the patient has the most difficulty with tandem stance and eyes closed  conditions. No change with the FGA, the patient continues to have difficulty with ambulation with NBOS and with head turns. The patient reports he has been having some neck stiffness/ pain. The remainder of the session was spent performing neck stretches and postural exercises.  The patient will benefit from continued physical therapy intervention to address remaining functional mobility deficits.        Tushar Is progressing well towards his goals.   Pt prognosis is Good.     Pt will continue to benefit from skilled outpatient physical therapy to address the deficits listed in the problem list box on initial evaluation, provide pt/family education and to maximize pt's level of independence in the home and community environment.     Pt's spiritual, cultural and educational needs considered and pt agreeable to plan of care and goals.     Anticipated barriers to physical therapy: co-morbidities, progressive nature of illness    Goals:  Short Term Goals: 4 weeks   Patient will be independent with established home exercise program. Ongoing   Patient to improve FGA score to 26/30 for improved stability with functional mobility. Ongoing   Patient to improve GST condition 3 to 12 seconds for improved balance. Ongoing   Patient to improve GST condition 4 to 7 seconds for improved stability with NBOS. Ongoing   5. Patient to improve SLS time to 4 seconds with BLE for improved stability with ambulation. Ongoing         Long Term Goals: 8 weeks   Patient will be independent with advanced home exercise program. Ongoing   Patient to improve FGA score to 27/30 for improved stability with functional mobility. Ongoing   Patient to improve GST condition 3 to 16 seconds for improved balance. Ongoing   Patient to improve GST condition 4 to 14 seconds for improved stability with NBOS. Ongoing   5. Patient to improve SLS time to 10 seconds with BLE for improved stability with ambulation. Ongoing     PLAN     Check home exercise program  compliance    Continue progressing static/ dynamic balance exercises     Salma Phipps, PT

## 2023-01-03 ENCOUNTER — CLINICAL SUPPORT (OUTPATIENT)
Dept: REHABILITATION | Facility: HOSPITAL | Age: 78
End: 2023-01-03
Attending: PSYCHIATRY & NEUROLOGY
Payer: COMMERCIAL

## 2023-01-03 DIAGNOSIS — Z86.73 HISTORY OF ISCHEMIC LEFT MCA STROKE: ICD-10-CM

## 2023-01-03 DIAGNOSIS — H51.11 CONVERGENCE INSUFFICIENCY: Primary | ICD-10-CM

## 2023-01-03 DIAGNOSIS — G20.A1 PARKINSON DISEASE: Primary | ICD-10-CM

## 2023-01-03 DIAGNOSIS — R29.898 WEAKNESS OF LEFT UPPER EXTREMITY: ICD-10-CM

## 2023-01-03 DIAGNOSIS — G31.84 MILD NEUROCOGNITIVE DISORDER: ICD-10-CM

## 2023-01-03 PROCEDURE — 97112 NEUROMUSCULAR REEDUCATION: CPT | Mod: PO

## 2023-01-03 PROCEDURE — 97530 THERAPEUTIC ACTIVITIES: CPT | Mod: PO

## 2023-01-03 PROCEDURE — 97129 THER IVNTJ 1ST 15 MIN: CPT | Mod: PO

## 2023-01-03 PROCEDURE — 97130 THER IVNTJ EA ADDL 15 MIN: CPT | Mod: PO

## 2023-01-03 NOTE — PROGRESS NOTES
"[facility]  Speech Therapy Treatment Note- Neurological Rehabilitation  Date: 1/3/2023     Name: Patrick Short   MRN: [id number]   Therapy Diagnosis:   Encounter Diagnoses   Name Primary?    Parkinson disease Yes    Mild neurocognitive disorder     History of ischemic left MCA stroke        Physician: Frantz Bee MD  Physician Orders: [id number] - AMB REFERRAL/CONSULT TO SPEECH THERAPY  Medical Diagnosis: History of ischemic left MCA stroke [Z86.73], Parkinson disease [G20]    Visit #/ Visits Authorized: 4/ 12  Date of Evaluation:  12/8/22  Insurance Authorization Period: 12/8/22-12/31/22  Plan of Care Expiration Date:    12/8/2022 to 1/19/23   Extended Plan of Care:  n/a   Progress Note: 1/8/23   Visits Cancelled: 0  Visits No Show: 0    Time In:  8:45 am   Time Out:  9:30 am   Total Billable Time: 45 minutes      Precautions: Standard, Fall, and Cognition  Subjective:   Patient reports: he does not notice any changes in his memory, has been using his phone as a memory resource. Reports biggest complaint of incorporating too many details in stories and not being able to "get to the point"    He was compliant to home exercise program.   Response to previous treatment: positive    Pain Scale:  0/10 on a Visual Analog Scale currently.   Pain Location: n/a  Objective:   TIMED  Procedure Min.   Cognitive Therapeutic Interventions, first 15 minutes CPT 08414  15   Cognitive Therapeutic Interventions, each additional 15 minutes CPT 66628  30         UNTIMED  Procedure Min.             Total Timed Units: 3  Total Untimed Units: 0  Charges Billed/Number of units: 3    Short Term Goals: (4 weeks) Current Progress:   Patient will name 10-15 items in a concrete category in one minute to improve word fluency and thought organization.     Progressing/ 1/3/2023   Not formally addressed      Met x 1   2. Patient will complete tasks requiring divided attention and alternating attention with 90% accuracy given " min cues to improve attention skills      Progressing/ 1/3/2023   Not formally addressed        3. Pt will complete divided attention tasks in distracting environment with 90% accuracy independently.      Progressing/  1/3/2023    Constant Therapy understand voicemail -  70% accuracy independently (improved from 50%)    Door open    4. Patient will immediately recall specific details from information recently seen or heard using memory retrieval strategies with 90% accuracy given min cues to improve auditory and visual recall.      Progressing/  1/3/2023   Not formally addressed     Met x 1   5. Patient will complete visual recall tasks with 90% acc indly to improve visual recall      Progressing/ Not Met 1/3/2023   Not formally addressed        6. Patient will complete reasoning/problem solving tasks with 90% accuracy given min cues      Progressing/ Not Met 1/3/2023   Constant Therapy understand voicemail -  70% accuracy independently (improved from 50%)           7. Patient will complete mental manipulation tasks with 90% accuracy given min cues to improve working memory/mental flexibility.        Progressing/ Not Met 1/3/2023   Discussed in extensive detail importance of mental manipulation and how that skill is used daily. Discussed the irrelevant details within the puzzle are not important, however the skills and concept of completing the puzzle are important       Probed: Patient will rate his communication/explanation in personal/scenario based situations in a scale of 5 (1= not his best, 5= best) with 8/10 trails rated 4 or higher.     Rate:   Summary of reading: 3  Personal story:4.5     Encouraged patient to write down key points prior to discussing topic.       Patient Education/Response:   Extensive education provided regarding both memory and attention/listening strategies. Discussed progress on tasks today. Patient verbalized understanding.     Home program established: yes-use strategies and see how  to apply to daily living.   Exercises were reviewed and Tushar was able to demonstrate them prior to the end of the session.  Tushar demonstrated poor understanding of the education provided.     See Electronic Medical Record under Patient Instructions for exercises provided throughout therapy.  Assessment:   Tushar is progressing well towards his goals. Improvement noted in understanding voicemail's with sustained attention and memory. He reports difficulty with things he find irrelevant. Discussed importance of mental manipulation and how that skills can be practiced and enhanced for his daily living. Additionally probed goal today targeting prioritization and summary within communication. Deduction puzzles provided for homework, patient reports he will compete and bring back. Current goals remain appropriate. Goals to be updated as necessary.     Patient prognosis is Good. Patient will continue to benefit from skilled outpatient speech and language therapy to address the deficits listed in the problem list on initial evaluation, provide patient/family education and to maximize patient's level of independence in the home and community environment.   Medical necessity is demonstrated by the following IMPAIRMENTS:  Deficits in executive functioning, attention, and memory prevent the pt from relaying medically and safety relevant information in a timely manner in a state of emergency.   Barriers to Therapy: none  Patient's spiritual, cultural and educational needs considered and patient agreeable to plan of care and goals.  Plan:   Continue Plan of Care with focus on memory, attention, word finding, and problem solving.     VIDA Sharpe, CCC-SLP  1/3/2023

## 2023-01-03 NOTE — PROGRESS NOTES
"  [facility]   Occupational Therapy Neurological Rehabilitation   LSVT BIG- Treatment Note   Name: Patrick Short  MRN: [id number]  Today's Date: 1/4/2023    Therapy Diagnosis:   Encounter Diagnoses   Name Primary?    Convergence insufficiency Yes    Weakness of left upper extremity      Physician: Frantz Bee MD  Physician Orders: Neuro Program      Medical Diagnosis: Z86.73 (ICD-10-CM) - History of ischemic left MCA stroke G20 (ICD-10-CM) - Parkinson disease   Evaluation Date: 12/7/2022  Plan of Care Expiration Period: 1/31/2023  Insurance Authorization period Expiration: 12/31/2022  Date of Return to MD: 1/31/23: Neurology   FOTO: 2 / 3     Visit # / Visits Authorized: 2 / 20  (+evaluation and 5 visits in 2022)      Time In: 12:00  Time Out: 12:38  Total Billable Time: 38 minutes    Precautions: Standard and Fall; hearing impaired     Subjective   Pt reports: this is harder for me today (in regards to balance)    Response to previous treatment: completed HEP for eye exercises "hard to tell if I'm doing anything"  Functional change: reported he has started taking PD medication  Patient's Goals for Occupational Therapy: to increase his quality of life and remain independent     Pain upon arrival: 0/10   Location: chronic right side neck pain/stiffness  Pain at cessation of session: 0/10    Objective     LSVT Protocol    Week: 1/4   LSVT visit # 2/16     Patrick participated in neuro re-education activities to improve Balance, Coordination, Posture, and Movement Amplitude for 38 minutes. The following activities were included:   LSVT Maximal Daily Exercises to promote amplitude:  Sustained Movements (sitting) - 10 reps each side   Daily Exercises   Performance  Comments/Modifications   1  Floor<>ceiling  good Finger flicks   2  Side<>side  good Finger flicks; not alternating      Multidirectional Repetitive Movements (standing) - 10 reps each side   Daily Exercises  Performance " "Comments/Modifications    3  Step & reach  (forward)   fair with upper extremity support     4  Step & reach (sideways)   fair with upper extremity support  ; difficulty with coordination for left    5  Step & reach  (backwards)   fair with upper extremity support  ; cues for bilateral coordination with UPPER EXTREMITIES   6  Rock & reach  (foward)   fair with upper extremity support  ; difficulty with LE weight shift    7  Rock & reach   (sideways twist)   fair with upper extremity support       Functional Component Tasks:   Sit to stands: 5 reps from chair - without use of arm rest     Hierarchy Task:   -BIG handwriting - completed for alphabet with large letters filling the page; large  pen     Gait Training/ "BIG walking":  down hallways and around obstacles, avoiding various objects with focus on bigger amplitude and reciprocal arm swings x5 minutes       Home Exercises and Education Provided   Education provided:   - re calibration of movement with large amplitude   - large  pen to aid in stability; finger flicks   - edu on LSVT BIG and LOUD program; edu on what PD is   - role of Occupational Therapy in care, goals for Occupational Therapy for this plan of care,  scheduling  - Progress towards goals   - Loop group handwriting HEP/handout    Written Home Exercises Provided: yes. (Folder provided to keep HEP/HAPs from therapies 12/19)  Exercises were reviewed and Tushar was able to demonstrate them prior to the end of the session.  Tushar demonstrated good  understanding of the HEP provided.   -word search    See EMR under Patient Instructions for exercises provided 12/13/2022: Convergence exercises   12/19/2022: Resistance band HEP for bilateral upper extremities   1/3/2023: Modified LSVT BIG     Assessment   Tushar with 3 LOB throughout session requiring min(A) to recover. Pt seen today following physical therapy- decreased endurance for task noted. Max difficulty with reciprocal arm swing with BIG " walking.     Tushar is progressing well towards his goals and there are no updates to goals at this time. Pt prognosis is Good.     Pt will continue to benefit from skilled outpatient occupational therapy to address the deficits listed in the problem list on initial evaluation provide pt/family education and to maximize pt's level of independence in the home and community environment.     Anticipated barriers to occupational therapy: cognition; carry over at home     Pt's spiritual, cultural and educational needs considered and pt agreeable to plan of care and goals.    Goals: 4 weeks; long term= short term  Pt will demo understanding for HEP/HAP provided with teach back understanding. Ongoing   Pt will demo 5/5 for proximal stability and strength for left upper extremity. Ongoing   Pt will demo understanding for convergence/divergence sufficiency exercises with teach back understanding. Progressing; improvement noted on 12/19  Pt will demo understanding for modifications and adaptations for handwriting and fine motor tasks. Ongoing   Pt will reduce limitation score on FOTO by less than or equal to 12% to demonstrate an increase in self-perceived functional performance. Ongoing     Additional goals:   Pt will demo understanding for LSVT BIG protocol with teachback understanding. Ongoing   Pt will verbalize improvement in balance confidence as evident by ABC (on FOTO).  Ongoing      The following goals were discussed with the patient and patient is in agreement with them as to be addressed in the treatment plan.     Plan   Certification Period/Plan of care expiration: 1/31/2023     Outpatient Occupational Therapy 4 times weekly for 4 weeks to include the following interventions: Neuromuscular Re-ed, Patient Education, Self Care, Therapeutic Activities, and Therapeutic Exercise.    ISAAK Dubois  Neuro Occupational Therapist   [facility]   1/4/2023

## 2023-01-03 NOTE — PROGRESS NOTES
"  [facility]   Occupational Therapy Neurological Rehabilitation   LSVT BIG- Treatment Note   Name: Patrick Short  MRN: [id number]  Today's Date: 1/3/2023    Therapy Diagnosis:   Encounter Diagnoses   Name Primary?    Convergence insufficiency Yes    Weakness of left upper extremity        Physician: Frantz Bee MD  Physician Orders: Neuro Program      Medical Diagnosis: Z86.73 (ICD-10-CM) - History of ischemic left MCA stroke G20 (ICD-10-CM) - Parkinson disease   Evaluation Date: 12/7/2022  Plan of Care Expiration Period: 1/31/2023  Insurance Authorization period Expiration: 12/31/2022  Date of Return to MD: 1/31/23: Neurology   FOTO: 2 / 3     Visit # / Visits Authorized: 1 / 20  (+evaluation and 5 visits in 2022)      Time In: 9:30  Time Out: 10:20  Total Billable Time: 50 minutes    Precautions: Standard and Fall; hearing impaired     Subjective   Pt reports: he notices his posture is getting more rounded     Response to previous treatment: completed HEP for eye exercises "hard to tell if I'm doing anything"  Functional change: reported he has started taking PD medication  Patient's Goals for Occupational Therapy: to increase his quality of life and remain independent     Pain upon arrival: 0/10  Location: chronic right side neck pain/stiffness  Pain at cessation of session: 0/10    Objective     LSVT Protocol    Week: 1/4   LSVT visit # 1/16     Patrick participated in neuro re-education activities to improve Balance, Coordination, Posture, and Movement Amplitude for 40 minutes. The following activities were included:   LSVT Maximal Daily Exercises to promote amplitude:  Sustained Movements (sitting) - 10 reps each side   Daily Exercises   Performance  Comments/Modifications   1  Floor<>ceiling  good Finger flicks   2  Side<>side  good Finger flicks; not alternating      Multidirectional Repetitive Movements (standing) - 10 reps each side   Daily Exercises  Performance " "Comments/Modifications    3  Step & reach  (forward)   fair with upper extremity support     4  Step & reach (sideways)   fair with upper extremity support  ; difficulty with coordination for left    5  Step & reach  (backwards)   fair with upper extremity support  ; cues for bilateral coordination with UPPER EXTREMITIES   6  Rock & reach  (foward)   fair with upper extremity support  ; difficulty with LE weight shift    7  Rock & reach   (sideways twist)   fair with upper extremity support       Functional Component Tasks:   Sit to stands: 5 reps from chair     Hierarchy Task:   -donning shirt    Gait Training/ "BIG walking":  down hallways and around obstacles, avoiding various objects with focus on bigger amplitude and reciprocal arm swings x5 minutes     Tushar participated in dynamic functional therapeutic activities to improve functional performance for 10 minutes, including:  Carryover assignment tracking:   Date Activity   1 1/3/2023 Sit<>Stand low surface x10 reps      Physical Performance Test:        1/3/2023  Time    1.   Write a sentence.  ([place] 18 s   2.  Simulated eating Not completed    3. Lift a book and put it on a shelf      Book PDR 1988: 5.5 lbs      Bed height 59 cm      Shelf height 118 cm      All sitting with feet on floor    4. Put on and remove a jacket  Standing  Use of bathrobe; button down shirt; hospital gown. 12 s   5.  a bailey from floor. 3 s   6. Turn 360 degrees 4 s        7. 50-foot walk test.    Starting sitting for instructions. 14 s   8. Climb one flight of stairs.+ NT   9. Climb stairs.+ NT    TOTAL SCORE (maximum 36 for nine-item, 28 for seven-item)     (*Round time measurements to nearest 0.5 seconds.)  (+ omit for 7 item test)        Home Exercises and Education Provided   Education provided:   - re calibration of movement with large amplitude   - large  pen to aid in stability; finger flicks   - edu on LSVT BIG and LOUD program; edu on " what PD is   - role of Occupational Therapy in care, goals for Occupational Therapy for this plan of care,  scheduling  - Progress towards goals   - Loop group handwriting HEP/handout    Written Home Exercises Provided: yes. (Folder provided to keep HEP/HAPs from therapies 12/19)  Exercises were reviewed and Tushar was able to demonstrate them prior to the end of the session.  Tushar demonstrated good  understanding of the HEP provided.   -word search    See EMR under Patient Instructions for exercises provided 12/13/2022: Convergence exercises   12/19/2022: Resistance band HEP for bilateral upper extremities   1/3/2023: Modified LSVT BIG     Assessment   Tushar tolerated LSVT BIG exercises well with 2 needed rest breaks. He requires min cues for weight shifts for LE with standing task. With BIG amplitude walking, pt required 2 cues for reciprocal movement with initiation of task.     Tushar is progressing well towards his goals and there are no updates to goals at this time. Pt prognosis is Good.     Pt will continue to benefit from skilled outpatient occupational therapy to address the deficits listed in the problem list on initial evaluation provide pt/family education and to maximize pt's level of independence in the home and community environment.     Anticipated barriers to occupational therapy: cognition; carry over at home     Pt's spiritual, cultural and educational needs considered and pt agreeable to plan of care and goals.    Goals: 4 weeks; long term= short term  Pt will demo understanding for HEP/HAP provided with teach back understanding. Ongoing   Pt will demo 5/5 for proximal stability and strength for left upper extremity. Ongoing   Pt will demo understanding for convergence/divergence sufficiency exercises with teach back understanding. Progressing; improvement noted on 12/19  Pt will demo understanding for modifications and adaptations for handwriting and fine motor tasks. Ongoing   Pt will reduce  limitation score on FOTO by less than or equal to 12% to demonstrate an increase in self-perceived functional performance. Ongoing     Additional goals:   Pt will demo understanding for LSVT BIG protocol with teachback understanding. Ongoing   Pt will verbalize improvement in balance confidence as evident by ABC (on FOTO).  Ongoing      The following goals were discussed with the patient and patient is in agreement with them as to be addressed in the treatment plan.     Plan   Certification Period/Plan of care expiration: 1/31/2023     Outpatient Occupational Therapy 4 times weekly for 4 weeks to include the following interventions: Neuromuscular Re-ed, Patient Education, Self Care, Therapeutic Activities, and Therapeutic Exercise.    Next session: hierarchy task -- handwriting     ISAAK Dubois  Neuro Occupational Therapist   [facility]   1/3/2023

## 2023-01-04 ENCOUNTER — CLINICAL SUPPORT (OUTPATIENT)
Dept: REHABILITATION | Facility: HOSPITAL | Age: 78
End: 2023-01-04
Attending: INTERNAL MEDICINE
Payer: COMMERCIAL

## 2023-01-04 DIAGNOSIS — R29.898 WEAKNESS OF LEFT UPPER EXTREMITY: ICD-10-CM

## 2023-01-04 DIAGNOSIS — R26.89 IMPAIRED GAIT AND MOBILITY: Primary | ICD-10-CM

## 2023-01-04 DIAGNOSIS — H51.11 CONVERGENCE INSUFFICIENCY: Primary | ICD-10-CM

## 2023-01-04 PROCEDURE — 97110 THERAPEUTIC EXERCISES: CPT | Mod: PO

## 2023-01-04 PROCEDURE — 97112 NEUROMUSCULAR REEDUCATION: CPT | Mod: PO

## 2023-01-04 NOTE — PROGRESS NOTES
"  [facility]   Occupational Therapy Neurological Rehabilitation   LSVT BIG- Treatment Note   Name: Patrick Short  MRN: [id number]  Today's Date: 1/5/2023    Therapy Diagnosis:   Encounter Diagnoses   Name Primary?    Convergence insufficiency Yes    Weakness of left upper extremity        Physician: Frantz Bee MD  Physician Orders: Neuro Program      Medical Diagnosis: Z86.73 (ICD-10-CM) - History of ischemic left MCA stroke G20 (ICD-10-CM) - Parkinson disease   Evaluation Date: 12/7/2022  Plan of Care Expiration Period: 1/31/2023  Insurance Authorization period Expiration: 12/31/2023  Date of Return to MD: 1/31/23: Neurology   FOTO: 2 / 3     Visit # / Visits Authorized: 3 / 20  (+evaluation and 5 visits in 2022)      Time In: 9:05  Time Out: 9:45  Total Billable Time: 40 minutes    Precautions: Standard and Fall; hearing impaired     Subjective   Pt reports: no complaints     Response to previous treatment: doing exercises at home   Functional change: "every day is different"  Patient's Goals for Occupational Therapy: to increase his quality of life and remain independent     Pain upon arrival: 0/10   Location: chronic right side neck pain/stiffness  Pain at cessation of session: 0/10    Objective     LSVT Protocol    Week: 1/4   LSVT visit # 3/16     Patrick participated in neuro re-education activities to improve Balance, Coordination, Posture, and Movement Amplitude for 40 minutes. The following activities were included:   LSVT Maximal Daily Exercises to promote amplitude:  Sustained Movements (sitting) - 10 reps each side   Daily Exercises   Performance  Comments/Modifications   1  Floor<>ceiling  good Finger flicks   2  Side<>side  good Finger flicks; not alternating      Multidirectional Repetitive Movements (standing) - 10 reps each side   Daily Exercises  Performance Comments/Modifications    3  Step & reach  (forward)   fair with upper extremity support  ; cue for left upper " "extremity side placement   4  Step & reach (sideways)   fair with upper extremity support   5  Step & reach  (backwards)   fair with upper extremity support  ; cues for left upper extremity coordination    6  Rock & reach  (foward)   fair with upper extremity support     7  Rock & reach   (sideways twist)   fair with upper extremity support       Functional Component Tasks:   Sit to stands: x10 reps from low mat (OT gym- blue mat)    Hierarchy Task:   -BIG handwriting- college ruled paper for BIG writing for grandchildren's names   -standing from low chair- not completed today   -sit<>stand with initiation of walking- not completed today     Gait Training/ "BIG walking":  down hallways and around obstacles, avoiding various objects with focus on bigger amplitude and reciprocal arm swings x5 minutes     Carryover assignment tracking:    Date Activity   1 1/3/2023 (week 1) Sit<>Stand low surface x10 reps         Home Exercises and Education Provided   Education provided:   - edu on PD symptoms and deficits from brain bleed region   - re calibration of movement with large amplitude   - large  pen to aid in stability; finger flicks   - edu on LSVT BIG and LOUD program; edu on what PD is   - role of Occupational Therapy in care, goals for Occupational Therapy for this plan of care,  scheduling  - Progress towards goals   - Loop group handwriting HEP/handout    Written Home Exercises Provided: yes. (Folder provided to keep HEP/HAPs from therapies 12/19)  Exercises were reviewed and Tushar was able to demonstrate them prior to the end of the session.  Tushar demonstrated good  understanding of the HEP provided.   -word search    See EMR under Patient Instructions for exercises provided 12/13/2022: Convergence exercises   12/19/2022: Resistance band HEP for bilateral upper extremities   1/3/2023: Modified LSVT BIG     Assessment   Tushar was seen in the morning on today's session and not followed by other therapy " disciplines. He demo improvement in reciprocal movement with BIG walking; no LOB throughout today's session. Moreover, he required less cues for BIG exercises- demo carry over at home.     Tushar is progressing well towards his goals and there are no updates to goals at this time. Pt prognosis is Good.     Pt will continue to benefit from skilled outpatient occupational therapy to address the deficits listed in the problem list on initial evaluation provide pt/family education and to maximize pt's level of independence in the home and community environment.     Anticipated barriers to occupational therapy: cognition; comorbidities      Pt's spiritual, cultural and educational needs considered and pt agreeable to plan of care and goals.    Goals: 4 weeks; long term= short term  Pt will demo understanding for HEP/HAP provided with teach back understanding. Ongoing   Pt will demo 5/5 for proximal stability and strength for left upper extremity. Ongoing   Pt will demo understanding for convergence/divergence sufficiency exercises with teach back understanding. Progressing; improvement noted on 12/19  Pt will demo understanding for modifications and adaptations for handwriting and fine motor tasks. Ongoing   Pt will reduce limitation score on FOTO by less than or equal to 12% to demonstrate an increase in self-perceived functional performance. Ongoing     Additional goals:   Pt will demo understanding for LSVT BIG protocol with teachback understanding. Ongoing   Pt will verbalize improvement in balance confidence as evident by ABC (on FOTO).  Ongoing      The following goals were discussed with the patient and patient is in agreement with them as to be addressed in the treatment plan.     Plan   Certification Period/Plan of care expiration: 1/31/2023     Outpatient Occupational Therapy 4 times weekly for 4 weeks to include the following interventions: Neuromuscular Re-ed, Patient Education, Self Care, Therapeutic  Activities, and Therapeutic Exercise.    ISAAK Dubois  Neuro Occupational Therapist   [facility]   1/5/2023

## 2023-01-05 ENCOUNTER — CLINICAL SUPPORT (OUTPATIENT)
Dept: REHABILITATION | Facility: HOSPITAL | Age: 78
End: 2023-01-05
Attending: PSYCHIATRY & NEUROLOGY
Payer: COMMERCIAL

## 2023-01-05 DIAGNOSIS — R29.898 WEAKNESS OF LEFT UPPER EXTREMITY: ICD-10-CM

## 2023-01-05 DIAGNOSIS — H51.11 CONVERGENCE INSUFFICIENCY: Primary | ICD-10-CM

## 2023-01-05 PROCEDURE — 97112 NEUROMUSCULAR REEDUCATION: CPT | Mod: PO

## 2023-01-05 NOTE — PROGRESS NOTES
"  [facility]   Occupational Therapy Neurological Rehabilitation   LSVT BIG- Treatment Note   Name: Patrick Short  MRN: [id number]  Today's Date: 1/6/2023    Therapy Diagnosis:   Encounter Diagnoses   Name Primary?    Convergence insufficiency Yes    Weakness of left upper extremity      Physician: Frantz Bee MD  Physician Orders: Neuro Program      Medical Diagnosis: Z86.73 (ICD-10-CM) - History of ischemic left MCA stroke G20 (ICD-10-CM) - Parkinson disease   Evaluation Date: 12/7/2022  Plan of Care Expiration Period: 1/31/2023  Insurance Authorization period Expiration: 12/31/2023  Date of Return to MD: 1/31/23: Neurology   FOTO: 2 / 3     Visit # / Visits Authorized: 4 / 20  (+evaluation and 5 visits in 2022)      Time In: 11:43  Time Out: 12:27  Total Billable Time: 49 minutes    Precautions: Standard and Fall; hearing impaired     Subjective   Pt reports: compliance with HEP    Response to previous treatment: doing exercises at home (BIG and convergence tasks)  Functional change: "every day is different"  Patient's Goals for Occupational Therapy: to increase his quality of life and remain independent     Pain upon arrival: 0/10   Location: chronic right side neck pain/stiffness  Pain at cessation of session: 0/10    Objective     LSVT Protocol    Week: 1/4   LSVT visit # 4/16     Patrick participated in neuro re-education activities to improve Balance, Coordination, Posture, and Movement Amplitude for 26 minutes. The following activities were included:   LSVT Maximal Daily Exercises to promote amplitude:  Sustained Movements (sitting) - 10 reps each side   Daily Exercises   Performance  Comments/Modifications   1  Floor<>ceiling  good Finger flicks   2  Side<>side  good Finger flicks; not alternating      Multidirectional Repetitive Movements (standing) - 10 reps each side   Daily Exercises  Performance Comments/Modifications    3  Step & reach  (forward)   fair with upper extremity " "support  ; cue for left upper extremity side placement   4  Step & reach (sideways)   fair with upper extremity support   5  Step & reach  (backwards)   fair with upper extremity support  ; cues for left upper extremity coordination    6  Rock & reach  (foward)   fair with upper extremity support     7  Rock & reach   (sideways twist)   fair with upper extremity support       Functional Component Tasks:   Sit to stands: x10 reps from low mat (blue mat)    Tushar participated in dynamic functional therapeutic activities to improve functional performance for 23  minutes, including:  Hierarchy Task:   -BIG handwriting- not completed today  -standing from low chair- not completed today   -sit<>stand with initiation of walking- not completed today   -sit<>stand from chair for 25 ft walk to obtain a book x5 reps -- to focus on initiation     Gait Training/ "BIG walking":  down hallways and around obstacles, avoiding various objects with focus on bigger amplitude and reciprocal arm swings x5 minutes - cognitive task of naming major cities in each state     Convergence and Divergence exercises with Navneet's String x2 min duration     Standing:   -2# weight to left wrist: D2 flex/ext for PVC pipe pieces to tree- matching colors    Seated:   -right hand as stabilizer and left hand to complete butterfly nuts and bolts x3 reps- don/doff    Carryover assignment tracking:    Date Activity   1 1/3/2023 (week 1) Sit<>Stand low surface x10 reps         Home Exercises and Education Provided   Education provided:   - edu on PD symptoms and deficits from brain bleed region   - re calibration of movement with large amplitude   - large  pen to aid in stability; finger flicks   - edu on LSVT BIG and LOUD program; edu on what PD is   - role of Occupational Therapy in care, goals for Occupational Therapy for this plan of care,  scheduling  - Progress towards goals   - Loop group handwriting HEP/handout    Written Home Exercises Provided: " yes. (Folder provided to keep HEP/HAPs from therapies 12/19)  Exercises were reviewed and Tushar was able to demonstrate them prior to the end of the session.  Tushar demonstrated good  understanding of the HEP provided.   -word search    See EMR under Patient Instructions for exercises provided 12/13/2022: Convergence exercises   12/19/2022: Resistance band HEP for bilateral upper extremities   1/3/2023: Modified LSVT BIG     Assessment   Tushar was seen today following speech language pathology and PT sessions. He demo good tolerance for activity and good form with exercises. Moreover, he demo improvement with reciprocal gait with BIG walking; however, demo forward leaning posture. Tushar demo mild dysmetria with Left upper extremity reaching task; however functional for task completion.     Tushar is progressing well towards his goals and there are no updates to goals at this time. Pt prognosis is Good.     Pt will continue to benefit from skilled outpatient occupational therapy to address the deficits listed in the problem list on initial evaluation provide pt/family education and to maximize pt's level of independence in the home and community environment.     Anticipated barriers to occupational therapy: cognition; comorbidities      Pt's spiritual, cultural and educational needs considered and pt agreeable to plan of care and goals.    Goals: 4 weeks; long term= short term  Pt will demo understanding for HEP/HAP provided with teach back understanding. Ongoing   Pt will demo 5/5 for proximal stability and strength for left upper extremity. Ongoing   Pt will demo understanding for convergence/divergence sufficiency exercises with teach back understanding. Progressing; improvement noted on 12/19  Pt will demo understanding for modifications and adaptations for handwriting and fine motor tasks. Ongoing   Pt will reduce limitation score on FOTO by less than or equal to 12% to demonstrate an increase in self-perceived  functional performance. Ongoing     Additional goals:   Pt will demo understanding for LSVT BIG protocol with teachback understanding. Ongoing   Pt will verbalize improvement in balance confidence as evident by ABC (on FOTO).  Ongoing      The following goals were discussed with the patient and patient is in agreement with them as to be addressed in the treatment plan.     Plan   Certification Period/Plan of care expiration: 1/31/2023     Outpatient Occupational Therapy 4 times weekly for 4 weeks to include the following interventions: Neuromuscular Re-ed, Patient Education, Self Care, Therapeutic Activities, and Therapeutic Exercise.    ISAAK Dubois  Neuro Occupational Therapist   [facility] - [facility]   1/6/2023

## 2023-01-05 NOTE — PROGRESS NOTES
[facility]  Speech Therapy PROGRESS Note- Neurological Rehabilitation  Date: 1/6/2023     Name: Patrick Short   MRN: [id number]   Therapy Diagnosis:   Encounter Diagnoses   Name Primary?    Parkinson disease Yes    Mild neurocognitive disorder     History of ischemic left MCA stroke          Physician: Frantz Bee MD  Physician Orders: [id number] - AMB REFERRAL/CONSULT TO SPEECH THERAPY  Medical Diagnosis: History of ischemic left MCA stroke [Z86.73], Parkinson disease [G20]    Visit #/ Visits Authorized: 4/ 12  Date of Evaluation:  12/8/22  Insurance Authorization Period: 12/8/22-12/31/22  Plan of Care Expiration Date:    12/8/2022 to 1/19/23   Extended Plan of Care:  n/a   Progress Note: 1/8/23   Visits Cancelled: 0  Visits No Show: 0    Time In:  10:15 am   Time Out:  11:00 am   Total Billable Time: 45 minutes      Precautions: Standard, Fall, and Cognition  Subjective:   Patient reports: Continues to endorse memory changes and difficulties in holding moderate-high level conversations.  He was compliant to home exercise program.   Response to previous treatment: positive    Pain Scale:  0/10 on a Visual Analog Scale currently.   Pain Location: n/a  Objective:   TIMED  Procedure Min.   Cognitive Therapeutic Interventions, first 15 minutes CPT 14119  15   Cognitive Therapeutic Interventions, each additional 15 minutes CPT 97392  30         UNTIMED  Procedure Min.             Total Timed Units: 3  Total Untimed Units: 0  Charges Billed/Number of units: 3    Short Term Goals: (4 weeks) Current Progress:   Patient will name 10-15 items in a concrete category in one minute to improve word fluency and thought organization.     Progressing/ 1/6/2023   Veggies- x2 given max cues patient did not change accuracy  Animals- x8 independently, given minimal cues patient achieved x12     Met x 1   2. Patient will complete tasks requiring divided attention and alternating attention with 90% accuracy given  min cues to improve attention skills      Progressing/ 1/6/2023   Not addressed in today's session.     3. Pt will complete divided attention tasks in distracting environment with 90% accuracy independently.      Progressing/  1/6/2023   Patient demonstrated adequate divided attention skills while  participate in conversation with an unfamiliar listener given distractions from the gym.    Met x1     4. Patient will immediately recall specific details from information recently seen or heard using memory retrieval strategies with 90% accuracy given min cues to improve auditory and visual recall.      Progressing/  1/6/2023   During mental manipulation task patient was provided 4 words and demonstrated difficulties remembering them in order to perform task(60%).   After being given the strategy of saying the words x2 times he demonstrated increased accuracy (80%)  Met x 1   5. Patient will complete visual recall tasks with 90% acc indly to improve visual recall      Progressing/ Not Met 1/6/2023   Simple visual scene- 90% independently  Moderate level Visual scene- 80% independently     6. Patient will complete reasoning/problem solving tasks with 90% accuracy given min cues      Progressing/ Not Met 1/6/2023   Not addressed in today's session.             7. Patient will complete mental manipulation tasks with 90% accuracy given min cues to improve working memory/mental flexibility.        Progressing/ Not Met 1/6/2023   Discussed in extensive detail importance of mental manipulation and how that skill is used daily. Discussed the irrelevant details within the puzzle are not important, however the skills and concept of completing the puzzle are important    Category inclusion- 60% accuracy independently. Given minimal -moderate cues patient achieved 80% accuracy.          Probed: Patient will rate his communication/explanation in personal/scenario based situations in a scale of 5 (1= not his best, 5= best) with 8/10  trails rated 4 or higher.     Rate:   Summary of reading: 3  Personal story:4.5     Encouraged patient to write down key points prior to discussing topic.       Patient Education/Response:   Extensive education provided regarding both memory and attention/listening strategies. Discussed progress on tasks today. Patient verbalized understanding.     Home program established: yes-use strategies and see how to apply to daily living.   Exercises were reviewed and Tushar was able to demonstrate them prior to the end of the session.  Tushar demonstrated poor understanding of the education provided.     See Electronic Medical Record under Patient Instructions for exercises provided throughout therapy.  Assessment:   PROGRESS NOTE - Tushar is progressing well towards his goals. Patient with great motivation and participation in ST sessions. Improvement noted in divided attention while in moderate level conversation.  Patient with difficulties completing low-moderate level mental manipulation task and unable to independently generate strategies independently. After being given  strategy but accuracy improved to 80%. Additionally probed goal today targeting prioritization and summary within communication. Patient expressed he continues to work on Deduction puzzles (homework) from prior ST session. Patient reports he will compete and bring back on next visit. Current goals remain appropriate. Goals to be updated as necessary.     Patient prognosis is Good. Patient will continue to benefit from skilled outpatient speech and language therapy to address the deficits listed in the problem list on initial evaluation, provide patient/family education and to maximize patient's level of independence in the home and community environment.   Medical necessity is demonstrated by the following IMPAIRMENTS:  Deficits in executive functioning, attention, and memory prevent the pt from relaying medically and safety relevant information in a  timely manner in a state of emergency.   Barriers to Therapy: none  Patient's spiritual, cultural and educational needs considered and patient agreeable to plan of care and goals.  Plan:   Continue Plan of Care with focus on memory, attention, word finding, and problem solving.     CALLY Coulter-SLP  1/6/2023

## 2023-01-05 NOTE — PROGRESS NOTES
[facility]   Physical Therapy Treatment Note     Name: Patrick Short  [facility] Number: [id number]    Therapy Diagnosis:   Encounter Diagnosis   Name Primary?    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 Patient verbalizes and demonstrates understanding.   Response to previous treatment: good  Functional change: ongoing     Pain: 0/10  Location: NA    OBJECTIVE     Objective Measures updated at progress report unless specified.       Treatment     Tushar received the treatments listed below:      therapeutic exercises to develop strength, endurance, ROM, flexibility, posture, and core stabilization for 15 minutes including:    10 minutes on the SCIFIT seated elliptical for CV endurance and LE strength level 4.5    2 x 10 sit to stands from low mat on foam pad, CGA, no UE support    neuromuscular re-education activities to improve: Balance, Coordination, Kinesthetic, Proprioception, and Posture for 25 minutes. The following activities were included:    6 laps forward ambulation over 5 hurdles, CGA   4 laps side stepping over 5 hurdles, CGA     2 x 100 feet ambulation with horizontal head turns, SBA  2 x 100 feet ambulation with vertical head turns, SBA     4 laps tandem ambulation, occ touchdown support, CGA   4 laps  marching with three second holds, CGA   4 laps backwards ambulation, CG, no UE support      2 x 30 seconds LLE on ground RLE on foam fitter, CGA, eyes closed  2 x 30 seconds RLE on ground LLE on foam fitter, CGA, eyes closed    2 x 10 reps step up to foam fitter with opposite leg hike with large cone tap , CGA, one UE support         Patient Education and Home Exercises     Home Exercises Provided and Patient Education Provided     Education provided:   - home exercise program     Written Home Exercises Provided: yes. Exercises were reviewed and Tushar was able to demonstrate them prior to the end of the session.  Tushar demonstrated good  understanding of the education provided. See EMR under Patient Instructions for exercises provided during therapy sessions    ASSESSMENT     Tushar tolerated today's session well. Session focused primarily on LE strength, endurance and balance training. Patient reports he has been compliant with  cervical range of motion home exercise program and believes it has been helping. No rest breaks required. The patient will benefit from continued physical therapy intervention to address remaining functional mobility deficits      Tushar Is progressing well towards his goals.   Pt prognosis is Good.     Pt will continue to benefit from skilled outpatient physical therapy to address the deficits listed in the problem list box on initial evaluation, provide pt/family education and to maximize pt's level of independence in the home and community environment.     Pt's spiritual, cultural and educational needs considered and pt agreeable to plan of care and goals.     Anticipated barriers to physical therapy: co-morbidities, progressive nature of illness    Goals:  Short Term Goals: 4 weeks   Patient will be independent with established home exercise program. Ongoing   Patient to improve FGA score to 26/30 for improved stability with functional mobility. Ongoing   Patient to improve GST condition  3 to 12 seconds for improved balance. Ongoing   Patient to improve GST condition 4 to 7 seconds for improved stability with NBOS. Ongoing   5. Patient to improve SLS time to 4 seconds with BLE for improved stability with ambulation. Ongoing         Long Term Goals: 8 weeks   Patient will be independent with advanced home exercise program. Ongoing   Patient to improve FGA score to 27/30 for improved stability with functional mobility. Ongoing   Patient to improve GST condition 3 to 16 seconds for improved balance. Ongoing   Patient to improve GST condition 4 to 14 seconds for improved stability with NBOS. Ongoing   5. Patient to improve SLS time to 10 seconds with BLE for improved stability with ambulation. Ongoing     PLAN       Continue progressing static/ dynamic balance exercises     Salma Phipps, PT

## 2023-01-06 ENCOUNTER — CLINICAL SUPPORT (OUTPATIENT)
Dept: REHABILITATION | Facility: HOSPITAL | Age: 78
End: 2023-01-06
Attending: PSYCHIATRY & NEUROLOGY
Payer: COMMERCIAL

## 2023-01-06 DIAGNOSIS — Z86.73 HISTORY OF ISCHEMIC LEFT MCA STROKE: ICD-10-CM

## 2023-01-06 DIAGNOSIS — G20.A1 PARKINSON DISEASE: Primary | ICD-10-CM

## 2023-01-06 DIAGNOSIS — G31.84 MILD NEUROCOGNITIVE DISORDER: ICD-10-CM

## 2023-01-06 DIAGNOSIS — R26.89 IMPAIRED GAIT AND MOBILITY: Primary | ICD-10-CM

## 2023-01-06 DIAGNOSIS — H51.11 CONVERGENCE INSUFFICIENCY: Primary | ICD-10-CM

## 2023-01-06 DIAGNOSIS — R29.898 WEAKNESS OF LEFT UPPER EXTREMITY: ICD-10-CM

## 2023-01-06 PROCEDURE — 97112 NEUROMUSCULAR REEDUCATION: CPT | Mod: PO

## 2023-01-06 PROCEDURE — 97129 THER IVNTJ 1ST 15 MIN: CPT | Mod: PO

## 2023-01-06 PROCEDURE — 97130 THER IVNTJ EA ADDL 15 MIN: CPT | Mod: PO

## 2023-01-06 PROCEDURE — 97110 THERAPEUTIC EXERCISES: CPT | Mod: PO

## 2023-01-06 PROCEDURE — 97530 THERAPEUTIC ACTIVITIES: CPT | Mod: PO

## 2023-01-06 NOTE — PROGRESS NOTES
"  [facility]   Occupational Therapy Neurological Rehabilitation   LSVT BIG- Treatment Note   Name: Patrick Short  MRN: [id number]  Today's Date: 1/9/2023    Therapy Diagnosis:   Encounter Diagnoses   Name Primary?    Convergence insufficiency Yes    Weakness of left upper extremity        Physician: Frantz Bee MD  Physician Orders: Neuro Program      Medical Diagnosis: Z86.73 (ICD-10-CM) - History of ischemic left MCA stroke G20 (ICD-10-CM) - Parkinson disease   Evaluation Date: 12/7/2022; re-assessment 12/27  Plan of Care Expiration Period: 1/31/2023  Insurance Authorization period Expiration: 12/31/2023  Date of Return to MD: 1/31/23: Neurology   FOTO: 2 / 3     Visit # / Visits Authorized: 5 / 20  (+evaluation and 5 visits in 2022)      Time In: 9:30  Time Out: 10:14  Total Billable Time: 44 minutes    Precautions: Standard and Fall; hearing impaired     Subjective   Pt reports: has been doing his BIG exercises daily; noticed when he was fishing over the weekend that he saw 2 corks in the water     Response to previous treatment: doing exercises at home (BIG and convergence tasks)  Functional change: "every day is different"  Patient's Goals for Occupational Therapy: to increase his quality of life and remain independent     Pain upon arrival: 0/10   Location: chronic right side neck pain/stiffness  Pain at cessation of session: 0/10    Objective     LSVT Protocol    Week: 2/4   LSVT visit # 5/16     Patrick participated in neuro re-education activities to improve Balance, Coordination, Posture, and Movement Amplitude for 23 minutes. The following activities were included:   LSVT Maximal Daily Exercises to promote amplitude:  Sustained Movements (sitting) - 10 reps each side   Daily Exercises   Performance  Comments/Modifications   1  Floor<>ceiling  good Finger flicks   2  Side<>side  good Finger flicks; alternating      Multidirectional Repetitive Movements (standing) - 10 reps each " "side   Daily Exercises  Performance Comments/Modifications    3  Step & reach  (forward)   good with upper extremity support at ballet bar   4  Step & reach (sideways)   fair with upper extremity support   5  Step & reach  (backwards)   fair with upper extremity support  ; cues for left upper extremity coordination and full effort    6  Rock & reach  (foward)   fair with upper extremity support  ; cue for rocking over LLE   7  Rock & reach   (sideways twist)   fair with upper extremity support       Functional Component Tasks:   Sit to stands: x10 reps from low mat (blue mat)- LOB (posterior)x1- able to self correct     Tushar participated in dynamic functional therapeutic activities to improve functional performance for 21 minutes, including:  Hierarchy Task:   -BIG handwriting- not completed today  -standing from low chair- completed x10 reps   -sit<>stand with initiation of walking- not completed today   -sit<>stand from chair for 25 ft walk to obtain a book x5 reps - not completed today    Gait Training/ "BIG walking":  down hallways and around obstacles, avoiding various objects with focus on bigger amplitude and reciprocal arm swings x5 minutes - cognitive task of naming major cities in [place] with alphabet - min cues to sustain large amplitude with arm swing     Convergence and Divergence exercises with 14 font x2 sets of 10 reps     Seated:   -right hand as stabilizer and left hand to complete butterfly nuts and bolts- don/doff    Carryover assignment tracking:    Date Activity   1 1/3/2023 (week 1) Sit<>Stand low surface x10 reps    2 1/9/2023 (week 2) Convergence/divergence exercises / pencil push up         Home Exercises and Education Provided   Education provided:   - edu on PD symptoms and deficits from brain bleed region   - re calibration of movement with large amplitude   - large  pen to aid in stability; finger flicks   - edu on LSVT BIG and LOUD program; edu on what PD is   - role of Occupational " Therapy in care, goals for Occupational Therapy for this plan of care,  scheduling  - Progress towards goals   - Loop group handwriting HEP/handout    Written Home Exercises Provided: yes. (Folder provided to keep HEP/HAPs from therapies 12/19)  Exercises were reviewed and Tushar was able to demonstrate them prior to the end of the session.  Tushar demonstrated good  understanding of the HEP provided.   -word search    See EMR under Patient Instructions for exercises provided 12/13/2022: Convergence exercises   12/19/2022: Resistance band HEP for bilateral upper extremities   1/3/2023: Modified LSVT BIG     Assessment   Mr Tushar demo diplopia with Navneet's string today; therefore, grading down to 14 font cards completed on this date. Re-printed convergence and divergence HEP on this date and encouraged patient to complete exercises x10 reps daily. He demo good understanding for BIG exercises on this date- will progress to stepping over items with standing task. Moreover, pt with mild difficulty with multi tasking with walking while talking (cognitive component) in busy gym- will continue to address this week.     Tushar is progressing well towards his goals and there are no updates to goals at this time. Pt prognosis is Good.     Pt will continue to benefit from skilled outpatient occupational therapy to address the deficits listed in the problem list on initial evaluation provide pt/family education and to maximize pt's level of independence in the home and community environment.     Anticipated barriers to occupational therapy: cognition; comorbidities      Pt's spiritual, cultural and educational needs considered and pt agreeable to plan of care and goals.    Goals: 4 weeks; long term= short term  Pt will demo understanding for HEP/HAP provided with teach back understanding. Ongoing   Pt will demo 5/5 for proximal stability and strength for left upper extremity. Ongoing   Pt will demo understanding for  convergence/divergence sufficiency exercises with teach back understanding. Progressing; improvement noted on 12/19  Pt will demo understanding for modifications and adaptations for handwriting and fine motor tasks. Ongoing   Pt will reduce limitation score on FOTO by less than or equal to 12% to demonstrate an increase in self-perceived functional performance. Ongoing     Additional goals:   Pt will demo understanding for LSVT BIG protocol with teachback understanding. Ongoing   Pt will verbalize improvement in balance confidence as evident by ABC (on FOTO).  Ongoing      The following goals were discussed with the patient and patient is in agreement with them as to be addressed in the treatment plan.     Plan   Certification Period/Plan of care expiration: 1/31/2023     Outpatient Occupational Therapy 4 times weekly for 4 weeks to include the following interventions: Neuromuscular Re-ed, Patient Education, Self Care, Therapeutic Activities, and Therapeutic Exercise.    Next sessions: left upper extremity reaching for modulation and control ; sit<>stand from low surface with initiation of task   Progression with BIG exercise - stepping over bolster     ISAAK Dubois  Neuro Occupational Therapist   [facility]   1/9/2023

## 2023-01-06 NOTE — PROGRESS NOTES
[facility]  Speech Therapy Treatment Note- Neurological Rehabilitation  Date: 1/9/2023     Name: Patrick Short   MRN: [id number]   Therapy Diagnosis:   Encounter Diagnoses   Name Primary?    Parkinson disease Yes    Mild neurocognitive disorder     History of ischemic left MCA stroke          Physician: Frantz Bee MD  Physician Orders: [id number] - AMB REFERRAL/CONSULT TO SPEECH THERAPY  Medical Diagnosis: History of ischemic left MCA stroke [Z86.73], Parkinson disease [G20]    Visit #/ Visits Authorized: 7/ 12  Date of Evaluation:  12/8/22  Insurance Authorization Period: 12/8/22-12/31/22  Plan of Care Expiration Date:    12/8/2022 to 1/19/23   Extended Plan of Care:  n/a   Progress Note: 1/8/23   Visits Cancelled: 0  Visits No Show: 0    Time In:  8:45 am   Time Out:  9:30 am   Total Billable Time: 45 minutes      Precautions: Standard, Fall, and Cognition  Subjective:   Patient reports: Patient enjoyed his weekend in his home in [facility]. Patient feels that Speech therapy has been beneficial in making him more aware of his deficits.  He was compliant to home exercise program.   Response to previous treatment: positive    Pain Scale:  0/10 on a Visual Analog Scale currently.   Pain Location: n/a  Objective:   TIMED  Procedure Min.   Cognitive Therapeutic Interventions, first 15 minutes CPT 46281  15   Cognitive Therapeutic Interventions, each additional 15 minutes CPT 08192  30         UNTIMED  Procedure Min.             Total Timed Units: 3  Total Untimed Units: 0  Charges Billed/Number of units: 3    Short Term Goals: (4 weeks) Current Progress:   Patient will name 10-15 items in a concrete category in one minute to improve word fluency and thought organization.     Progressing/ 1/9/2023   Items you can find in a kitchen- x20 given max cues patient did not change accuracy  Items you can find in the bathroom- x16 independently, given minimal cues patient achieved x12     Met x 1    2. Patient will complete tasks requiring divided attention and alternating attention with 90% accuracy given min cues to improve attention skills      Progressing/ 1/9/2023   Patient completed alternating symbols task on Constant Therapy Level 5 with 100% accuracy independently.     3. Pt will complete divided attention tasks in distracting environment with 90% accuracy independently.      Progressing/  1/9/2023   Patient completed alternating symbols task on Constant Therapy Level 5 with 100% accuracy independently.    Higher level of complexity attention  task- Card separation task (6 piles) given verbal distraction- 60% accuracy independently.    When SLP faded out verbal distraction patient was more successful in task. Patient seen with fair-good success in task.        Met x1     4. Patient will immediately recall specific details from information recently seen or heard using memory retrieval strategies with 90% accuracy given min cues to improve auditory and visual recall.      Progressing/  1/9/2023   Patient required review of memory strategies. He was provided with in depth explanation of when and how to use each.  At the conclusion of session patient was lisa to recall 2/4 strategies. Given minimal cues patient achieved 4/4 strategies          Met x 1   5. Patient will complete visual recall tasks with 90% acc indly to improve visual recall      Progressing/ Not Met 1/9/2023   Moderate level Visual scene- 60% independently                   6. Patient will complete reasoning/problem solving tasks with 90% accuracy given min cues      Progressing/ Not Met 1/9/2023   Not addressed in today's session.             7. Patient will complete mental manipulation tasks with 90% accuracy given min cues to improve working memory/mental flexibility.        Progressing/ Not Met 1/9/2023   Patient completed the following mental manipulation tasks (pg 110 walc 2):  Math equations: 100%  Word progression:  100%  Alphabetical order: 80%    Patient requires extended time to process information prior to generating answer         Probed: Patient will rate his communication/explanation in personal/scenario based situations in a scale of 5 (1= not his best, 5= best) with 8/10 trails rated 4 or higher.     Rate:   Summary of reading:   Personal story:    Encouraged patient to write down key points prior to discussing topic.       Patient Education/Response:   Extensive education provided regarding both memory and attention/listening strategies. Discussed progress on tasks today. Patient verbalized understanding.     Home program established: yes-use strategies and see how to apply to daily living.   Exercises were reviewed and Tushar was able to demonstrate them prior to the end of the session.  Tushar demonstrated poor understanding of the education provided.     See Electronic Medical Record under Patient Instructions for exercises provided throughout therapy.  Assessment:   Tushar is progressing well towards his goals. Patient with great participation in ST session. Improvement noted in patients ability to organize and process speed in naming task.  Patient with improvement in mental manipulation tasks but continues to require extended time to process information prior to generating answer.  Patient with difficulties in recalling memory strategies, divided attention task, and recalling details from visual scene. Important to not patient has difficulties in completing tasks when distractions are present, but improves when distractions are minimal/ not present. Current goals remain appropriate. Goals to be updated as necessary.     Patient prognosis is Good. Patient will continue to benefit from skilled outpatient speech and language therapy to address the deficits listed in the problem list on initial evaluation, provide patient/family education and to maximize patient's level of independence in the home and community  environment.   Medical necessity is demonstrated by the following IMPAIRMENTS:  Deficits in executive functioning, attention, and memory prevent the pt from relaying medically and safety relevant information in a timely manner in a state of emergency.   Barriers to Therapy: none  Patient's spiritual, cultural and educational needs considered and patient agreeable to plan of care and goals.  Plan:   Continue Plan of Care with focus on memory, attention, word finding, and problem solving.     CALLY Coulter-SLP  1/9/2023

## 2023-01-09 ENCOUNTER — CLINICAL SUPPORT (OUTPATIENT)
Dept: REHABILITATION | Facility: HOSPITAL | Age: 78
End: 2023-01-09
Attending: PSYCHIATRY & NEUROLOGY
Payer: COMMERCIAL

## 2023-01-09 DIAGNOSIS — Z86.73 HISTORY OF ISCHEMIC LEFT MCA STROKE: ICD-10-CM

## 2023-01-09 DIAGNOSIS — G20.A1 PARKINSON DISEASE: Primary | ICD-10-CM

## 2023-01-09 DIAGNOSIS — G31.84 MILD NEUROCOGNITIVE DISORDER: ICD-10-CM

## 2023-01-09 DIAGNOSIS — R29.898 WEAKNESS OF LEFT UPPER EXTREMITY: ICD-10-CM

## 2023-01-09 DIAGNOSIS — H51.11 CONVERGENCE INSUFFICIENCY: Primary | ICD-10-CM

## 2023-01-09 PROBLEM — I63.9 CEREBRAL ISCHEMIC STROKE DUE TO GLOBAL HYPOPERFUSION WITH WATERSHED INFARCT: Status: RESOLVED | Noted: 2022-10-10 | Resolved: 2023-01-09

## 2023-01-09 PROCEDURE — 97112 NEUROMUSCULAR REEDUCATION: CPT | Mod: PO

## 2023-01-09 PROCEDURE — 97130 THER IVNTJ EA ADDL 15 MIN: CPT | Mod: PO

## 2023-01-09 PROCEDURE — 97129 THER IVNTJ 1ST 15 MIN: CPT | Mod: PO

## 2023-01-09 PROCEDURE — 97530 THERAPEUTIC ACTIVITIES: CPT | Mod: PO

## 2023-01-09 NOTE — PROGRESS NOTES
"  [facility]   Occupational Therapy Neurological Rehabilitation   LSVT BIG- Treatment Note   Name: Patrick Short  MRN: [id number]  Today's Date: 1/10/2023    Therapy Diagnosis:   Encounter Diagnoses   Name Primary?    Convergence insufficiency Yes    Weakness of left upper extremity        Physician: Frantz Bee MD  Physician Orders: Neuro Program      Medical Diagnosis: Z86.73 (ICD-10-CM) - History of ischemic left MCA stroke G20 (ICD-10-CM) - Parkinson disease   Evaluation Date: 12/7/2022; re-assessment 12/27  Plan of Care Expiration Period: 1/31/2023  Insurance Authorization period Expiration: 12/31/2023  Date of Return to MD: 1/31/23: Neurology   FOTO: 2 / 3     Visit # / Visits Authorized: 6 / 20  (+evaluation and 5 visits in 2022)      Time In: 1:00pm  Time Out: 1:45pm  Total Billable Time: 45 minutes    Precautions: Standard and Fall; hearing impaired     Subjective   Pt reports: "sometimes I get moving too quick and can't keep my arms moving"   He was compliant with his HEP  Response to previous treatment: doing exercises at home (BIG and convergence tasks)  Functional change: "every day is different"  Patient's Goals for Occupational Therapy: to increase his quality of life and remain independent     Pain upon arrival: 0/10   Location: chronic right side neck pain/stiffness  Pain at cessation of session: 0/10    Objective     LSVT Protocol    Week: 3/4   LSVT visit # 6/16     Patrick participated in neuro re-education activities to improve Balance, Coordination, Posture, and Movement Amplitude for 35 minutes. The following activities were included:     LSVT Maximal Daily Exercises to promote amplitude:  Sustained Movements (sitting) - 10 reps each side   Daily Exercises   Performance  Comments/Modifications   1  Floor<>ceiling  good Finger flicks   2  Side<>side  good Finger flicks; alternating      Multidirectional Repetitive Movements (standing) - 10 reps each side   Daily " We will call you today with COVID-19 results  "Exercises  Performance Comments/Modifications    3  Step & reach  (forward)   good with upper extremity support at ballet bar   4  Step & reach (sideways)   fair with upper extremity support   5  Step & reach  (backwards)   fair with upper extremity support  ; cues for left upper extremity coordination and full effort    6  Rock & reach  (foward)   fair with upper extremity support  ; cue for rocking over LLE   7  Rock & reach   (sideways twist)   fair with upper extremity support       Convergence and Divergence exercises with 14 font x2 sets of 10 reps   - mack string x10 with 3 beads holding for 3-5 sec    Functional Component Tasks:   Sit to stands: x10 reps from low mat (blue mat)- 1# cuff weights added    Tushar participated in dynamic functional therapeutic activities to improve functional performance for 10 minutes, including:  Hierarchy Task:   -BIG handwriting- not completed today  -standing from low chair- completed x10 reps - not completed   -sit<>stand with initiation of walking  -sit<>stand from chair for 25 ft walk to obtain a book x5 reps - not completed today    Gait Training/ "BIG walking":  down hallways and around obstacles, avoiding various objects with focus on bigger amplitude and reciprocal arm swings x5 minutes - 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 - min cues to sustain large amplitude with arm swing 1# cuff weights added        Carryover assignment tracking:    Date Activity   1 1/3/2023 (week 1) Sit<>Stand low surface x10 reps    2 1/9/2023 (week 2) Convergence/divergence exercises / pencil push up         Home Exercises and Education Provided   Education provided:   - edu on PD symptoms and deficits from brain bleed region   - re calibration of movement with large amplitude   - large  pen to aid in stability; finger flicks   - edu on LSVT BIG and LOUD program; edu on what PD is   - role of Occupational Therapy in care, " goals for Occupational Therapy for this plan of care,  scheduling  - Progress towards goals   - Loop group handwriting HEP/handout    Written Home Exercises Provided: yes. (Folder provided to keep HEP/HAPs from therapies 12/19)  Exercises were reviewed and Tushar was able to demonstrate them prior to the end of the session.  Tushar demonstrated good  understanding of the HEP provided.   -word search    See EMR under Patient Instructions for exercises provided 12/13/2022: Convergence exercises   12/19/2022: Resistance band HEP for bilateral upper extremities   1/3/2023: Modified LSVT BIG     Assessment     Tushar had good tolerance to tx this date and was able to partially lead all exercises after initial cues. Good effort demonstrated throughout session but he does requires cues to slow his movement for increased control. Oculomotor exercises completed without difficulty but continues with convergence insufficiency. He is progressing well and would continue to benefit from skilled OT services.        Tushar is progressing well towards his goals and there are no updates to goals at this time. Pt prognosis is Good.     Pt will continue to benefit from skilled outpatient occupational therapy to address the deficits listed in the problem list on initial evaluation provide pt/family education and to maximize pt's level of independence in the home and community environment.     Anticipated barriers to occupational therapy: cognition; comorbidities      Pt's spiritual, cultural and educational needs considered and pt agreeable to plan of care and goals.    Goals: 4 weeks; long term= short term  Pt will demo understanding for HEP/HAP provided with teach back understanding. Ongoing   Pt will demo 5/5 for proximal stability and strength for left upper extremity. Ongoing   Pt will demo understanding for convergence/divergence sufficiency exercises with teach back understanding. Progressing; improvement noted on 12/19  Pt will demo  understanding for modifications and adaptations for handwriting and fine motor tasks. Ongoing   Pt will reduce limitation score on FOTO by less than or equal to 12% to demonstrate an increase in self-perceived functional performance. Ongoing     Additional goals:   Pt will demo understanding for LSVT BIG protocol with teachback understanding. Ongoing   Pt will verbalize improvement in balance confidence as evident by ABC (on FOTO).  Ongoing      The following goals were discussed with the patient and patient is in agreement with them as to be addressed in the treatment plan.     Plan   Certification Period/Plan of care expiration: 1/31/2023     Outpatient Occupational Therapy 4 times weekly for 4 weeks to include the following interventions: Neuromuscular Re-ed, Patient Education, Self Care, Therapeutic Activities, and Therapeutic Exercise.    Next sessions: left upper extremity reaching for modulation and control ; sit<>stand from low surface with initiation of task   Progression with BIG exercise - stepping over ISAAK Navarrete

## 2023-01-10 ENCOUNTER — TELEPHONE (OUTPATIENT)
Dept: ELECTROPHYSIOLOGY | Facility: CLINIC | Age: 78
End: 2023-01-10
Payer: COMMERCIAL

## 2023-01-10 ENCOUNTER — CLINICAL SUPPORT (OUTPATIENT)
Dept: REHABILITATION | Facility: HOSPITAL | Age: 78
End: 2023-01-10
Attending: PSYCHIATRY & NEUROLOGY
Payer: COMMERCIAL

## 2023-01-10 DIAGNOSIS — R26.89 IMPAIRED GAIT AND MOBILITY: Primary | ICD-10-CM

## 2023-01-10 DIAGNOSIS — R29.898 WEAKNESS OF LEFT UPPER EXTREMITY: ICD-10-CM

## 2023-01-10 DIAGNOSIS — H51.11 CONVERGENCE INSUFFICIENCY: Primary | ICD-10-CM

## 2023-01-10 PROCEDURE — 97112 NEUROMUSCULAR REEDUCATION: CPT | Mod: PO

## 2023-01-10 PROCEDURE — 97112 NEUROMUSCULAR REEDUCATION: CPT | Mod: PO,CQ

## 2023-01-10 PROCEDURE — 97110 THERAPEUTIC EXERCISES: CPT | Mod: PO,CQ

## 2023-01-10 PROCEDURE — 97530 THERAPEUTIC ACTIVITIES: CPT | Mod: PO

## 2023-01-10 NOTE — TELEPHONE ENCOUNTER
Called patient to schedule loop recorder placement. Patient did not have his calendar with him and states he will call back tomorrow to schedule.

## 2023-01-10 NOTE — PROGRESS NOTES
"[facility]   Physical Therapy Treatment Note     Name: Patrick Short  [facility] Number: [id number]    Therapy Diagnosis:   Encounter Diagnosis   Name Primary?    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 Patient verbalizes and demonstrates understanding.   Response to previous treatment: good  Functional change: ongoing     Pain: 0/10  Location: NA    OBJECTIVE     Objective Measures updated at progress report unless specified.       Treatment     Tushar received the treatments listed below:      therapeutic exercises to develop strength, endurance, ROM, flexibility, posture, and core stabilization for 15 minutes including:    10 minutes on the [employer] seated elliptical for CV endurance and LE strength level 5.0    2 x 10 sit to stands from low mat on foam pad, CGA, no UE support    neuromuscular re-education activities to improve: Balance, Coordination, Kinesthetic, Proprioception, and Posture for 30 minutes. The following activities were included:    // bars:    2 balance beams in front of each other: CGA  6 laps tandem ambulation, 1 UE support CGA   6 laps marching with three second holds, CGA    6 laps of side stepping with 1 UE support    2 point wooden fitter board: CGA   X 60 sec of medial/lateral weight shifting, 1 UE support, CGA   X 60 sec of avoiding moving medial/lateral with 1 UE " support   X 60 sec of anterior/posterior weight shifting, 1 UE support   X 60 sec of avoiding moving anterior/posterior weight shifting, 1 UE support        Down the hallway:     2 x 100 feet ambulation with horizontal head turns, SBA.  Focusing on big arm swings  2 x 100 feet ambulation with vertical head turns, SBA., focusing on big arm swing    X 6 laps of weaving around 5 cones focusing on big arm swings  X 6 laps of figure 8s around 5 cones focusing on big arm swings    Patient Education and Home Exercises     Home Exercises Provided and Patient Education Provided     Education provided:   - home exercise program     Written Home Exercises Provided: yes. Exercises were reviewed and Tushar was able to demonstrate them prior to the end of the session.  Tushar demonstrated good  understanding of the education provided. See EMR under Patient Instructions for exercises provided during therapy sessions    ASSESSMENT     Tushar tolerated his tx session well today and did not have any problems noted.  Tushar was able to progress to balance on the balance beams and the 2 point wooden fitter board and required 1 UE support for safety.  Tushar did not have any loss of balance noted.  The patient will benefit from continued physical therapy intervention to address remaining functional mobility deficits      Tushar Is progressing well towards his goals.   Pt prognosis is Good.     Pt will continue to benefit from skilled outpatient physical therapy to address the deficits listed in the problem list box on initial evaluation, provide pt/family education and to maximize pt's level of independence in the home and community environment.     Pt's spiritual, cultural and educational needs considered and pt agreeable to plan of care and goals.     Anticipated barriers to physical therapy: co-morbidities, progressive nature of illness    Goals:  Short Term Goals: 4 weeks   Patient will be independent with established home exercise  program. Ongoing   Patient to improve FGA score to 26/30 for improved stability with functional mobility. Ongoing   Patient to improve GST condition 3 to 12 seconds for improved balance. Ongoing   Patient to improve GST condition 4 to 7 seconds for improved stability with NBOS. Ongoing   5. Patient to improve SLS time to 4 seconds with BLE for improved stability with ambulation. Ongoing         Long Term Goals: 8 weeks   Patient will be independent with advanced home exercise program. Ongoing   Patient to improve FGA score to 27/30 for improved stability with functional mobility. Ongoing   Patient to improve GST condition 3 to 16 seconds for improved balance. Ongoing   Patient to improve GST condition 4 to 14 seconds for improved stability with NBOS. Ongoing   5. Patient to improve SLS time to 10 seconds with BLE for improved stability with ambulation. Ongoing     PLAN       Continue progressing static/ dynamic balance exercises     Sisi Hill, PTA

## 2023-01-11 ENCOUNTER — PATIENT MESSAGE (OUTPATIENT)
Dept: ELECTROPHYSIOLOGY | Facility: CLINIC | Age: 78
End: 2023-01-11
Payer: COMMERCIAL

## 2023-01-11 NOTE — PROGRESS NOTES
"[facility]   Physical Therapy Treatment Note     Name: Patrick Short  [facility] Number: [id number]    Therapy Diagnosis:   Encounter Diagnosis   Name Primary?    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 Patient verbalizes and demonstrates understanding.   Response to previous treatment: good  Functional change: ongoing     Pain: 0/10  Location: NA    OBJECTIVE     Objective Measures updated at progress report unless specified.       Treatment     Tushar received the treatments listed below:      therapeutic exercises to develop strength, endurance, ROM, flexibility, posture, and core stabilization for 15 minutes including:    10 minutes on the [employer] seated elliptical for CV endurance and LE strength level 5.0    2 x 10 sit to stands from low mat on foam pad, CGA, no UE support    neuromuscular re-education activities to improve: Balance, Coordination, Kinesthetic, Proprioception, and Posture for 30 minutes. The following activities were included:    // bars:    2 balance beams in front of each other: CGA  6 laps tandem ambulation, 1 UE support CGA   6 laps marching with three second holds, CGA    6 laps of side stepping with 1 UE support    2 point wooden fitter board: CGA   X 60 sec of anterior/posterior weight shifting, no UE support    X 60 sec of right/ left  weight shifting, no UE support     2 x 10 " reps step up to foam fitter with opposite leg hike with large cone tap , CGA, one UE support         Down the hallway:   2 x 100 feet ambulation with horizontal head turns, SBA.  Focusing on big arm swings  2 x 100 feet ambulation with vertical head turns, SBA., focusing on big arm swing  2 x 100 feet forward/ backwards ball toss to/from tech, CGA       Patient Education and Home Exercises     Home Exercises Provided and Patient Education Provided     Education provided:   - home exercise program     Written Home Exercises Provided: yes. Exercises were reviewed and Tushar was able to demonstrate them prior to the end of the session.  Tushar demonstrated good  understanding of the education provided. See EMR under Patient Instructions for exercises provided during therapy sessions    ASSESSMENT     Tushar tolerated his tx session well today. Decreased UE support required with weight shifting on two point fitter board.   No rest breaks required throughout session. The patient will benefit from continued physical therapy intervention to address remaining functional mobility deficits     Tushar Is progressing well towards his goals.   Pt prognosis is Good.     Pt will continue to benefit from skilled outpatient physical therapy to address the deficits listed in the problem list box on initial evaluation, provide pt/family education and to maximize pt's level of independence in the home and community environment.     Pt's spiritual, cultural and educational needs considered and pt agreeable to plan of care and goals.     Anticipated barriers to physical therapy: co-morbidities, progressive nature of illness    Goals:  Short Term Goals: 4 weeks   Patient will be independent with established home exercise program. Ongoing   Patient to improve FGA score to 26/30 for improved stability with functional mobility. Ongoing   Patient to improve GST condition 3 to 12 seconds for improved balance. Ongoing   Patient to improve GST  condition 4 to 7 seconds for improved stability with NBOS. Ongoing   5. Patient to improve SLS time to 4 seconds with BLE for improved stability with ambulation. Ongoing         Long Term Goals: 8 weeks   Patient will be independent with advanced home exercise program. Ongoing   Patient to improve FGA score to 27/30 for improved stability with functional mobility. Ongoing   Patient to improve GST condition 3 to 16 seconds for improved balance. Ongoing   Patient to improve GST condition 4 to 14 seconds for improved stability with NBOS. Ongoing   5. Patient to improve SLS time to 10 seconds with BLE for improved stability with ambulation. Ongoing     PLAN       Continue progressing static/ dynamic balance exercises     aSlma Phipps, PT

## 2023-01-11 NOTE — PROGRESS NOTES
"  [facility]   Occupational Therapy Neurological Rehabilitation   LSVT BIG- Treatment Note   Name: Patrick Short  MRN: [id number]  Today's Date: 1/12/2023    Therapy Diagnosis:   Encounter Diagnoses   Name Primary?    Convergence insufficiency Yes    Weakness of left upper extremity      Physician: Frantz Bee MD  Physician Orders: Neuro Program      Medical Diagnosis: Z86.73 (ICD-10-CM) - History of ischemic left MCA stroke G20 (ICD-10-CM) - Parkinson disease   Evaluation Date: 12/7/2022; re-assessment 12/27  Plan of Care Expiration Period: 1/31/2023  Insurance Authorization period Expiration: 12/31/2023  Date of Return to MD: 1/31/23: Neurology   FOTO: 2 / 3     Visit # / Visits Authorized: 7 / 20  (+evaluation and 5 visits in 2022)      Time In: 8:30  Time Out: 9:20  Total Billable Time: 55 minutes    Precautions: Standard and Fall; hearing impaired     Subjective   Pt reports: doing all exercises daily   He was compliant with his HEP  Response to previous treatment: doing exercises at home (BIG and convergence tasks)  Functional change: "every day is different"  Patient's Goals for Occupational Therapy: to increase his quality of life and remain independent     Pain upon arrival: 0/10   Location: n/a  Pain at cessation of session: 0/10    Objective     LSVT Protocol    Week: 2/4   LSVT visit # 7/16     Patrick participated in neuro re-education activities to improve Balance, Coordination, Posture, and Movement Amplitude for 25 minutes. The following activities were included:     LSVT Maximal Daily Exercises to promote amplitude: all with 1# wrist weights today  Sustained Movements (sitting) - 10 reps each side   Daily Exercises   Performance  Comments/Modifications   1  Floor<>ceiling  good Finger flicks;    2  Side<>side  good Finger flicks; alternating      Multidirectional Repetitive Movements (standing) - 10 reps each side   Daily Exercises  Performance Comments/Modifications    3  " "Step & reach  (forward)   good with upper extremity support at ballet bar- over bolster    4  Step & reach (sideways)   good with upper extremity support- over bolster    5  Step & reach  (backwards)   fair with upper extremity support    6  Rock & reach  (foward)   fair with upper extremity support  ; cue for rocking over LLE   7  Rock & reach   (sideways twist)   fair with upper extremity support       Convergence and Divergence exercises - 14 font; hold at end ranges x5 sec    Functional Component Tasks:   Sit to stands: x10 reps from low mat (blue mat)- 1# cuff weights added    Tushar participated in dynamic functional therapeutic activities to improve functional performance for 30 minutes, including:  -upper body ergonometer on level 3.0; completed for 8 minutes switching directions mid way. Focus on postural control, large amplitude and consistent movement for bilateral upper extremity ; and breathing techniques with exhale with exertion of force. MET average 2.8     Hierarchy Task:   -BIG handwriting- not completed today  -standing from low chair- completed x10 reps - not completed   -sit<>stand with initiation of walking  -sit<>stand from chair for 25 ft walk to obtain a book x5 reps - not completed today    Gait Training/ "BIG walking":  down hallways and around obstacles, avoiding various objects with focus on bigger amplitude and reciprocal arm swings x5 minutes - letters of the alphabet and food items - min cues to sustain large amplitude with left arm swing 1# cuff weights added    Seated at table top: nuts and bolts (Small)- removal with left hand ; right hand utilized as stabilizer     Carryover assignment tracking:    Date Activity   1 1/3/2023 (week 1) Sit<>Stand low surface x10 reps    2 1/9/2023 (week 2) Convergence/divergence exercises / pencil push up         Home Exercises and Education Provided   Education provided:   - edu on PD symptoms and deficits from brain bleed region   - re calibration " of movement with large amplitude   - large  pen to aid in stability; finger flicks   - edu on LSVT BIG and LOUD program; edu on what PD is   - role of Occupational Therapy in care, goals for Occupational Therapy for this plan of care,  scheduling  - Progress towards goals   - Loop group handwriting HEP/handout    Written Home Exercises Provided: yes. (Folder provided to keep HEP/HAPs from therapies 12/19)  Exercises were reviewed and Tushar was able to demonstrate them prior to the end of the session.  Tushar demonstrated good  understanding of the HEP provided.   -word search    See EMR under Patient Instructions for exercises provided 12/13/2022: Convergence exercises   12/19/2022: Resistance band HEP for bilateral upper extremities   1/3/2023: Modified LSVT BIG     Assessment     Tushar demo good understanding for form for all LSVT BIG modified exercises today. He tolerated addition of 1# wrist weights well. He continues to requiring min cues when multi tasking in busy areas.     At the end of today's session, pt reported he is having more difficulty with focusing on reading. Discussed importance of convergence and divergence exercises. Moreover, discussed need to follow up with optometrist/ ophthalmologist soon. Information passed along to speech language pathology to also address in their tx sessions.      Tushar is progressing well towards his goals and there are no updates to goals at this time. Pt prognosis is Good.     Pt will continue to benefit from skilled outpatient occupational therapy to address the deficits listed in the problem list on initial evaluation provide pt/family education and to maximize pt's level of independence in the home and community environment.     Anticipated barriers to occupational therapy: cognition; comorbidities      Pt's spiritual, cultural and educational needs considered and pt agreeable to plan of care and goals.    Goals: 4 weeks; long term= short term  Pt will demo  understanding for HEP/HAP provided with teach back understanding. Ongoing   Pt will demo 5/5 for proximal stability and strength for left upper extremity. Ongoing   Pt will demo understanding for convergence/divergence sufficiency exercises with teach back understanding. Progressing; improvement noted on 12/19  Pt will demo understanding for modifications and adaptations for handwriting and fine motor tasks. Ongoing   Pt will reduce limitation score on FOTO by less than or equal to 12% to demonstrate an increase in self-perceived functional performance. Ongoing     Additional goals:   Pt will demo understanding for LSVT BIG protocol with teachback understanding. Ongoing   Pt will verbalize improvement in balance confidence as evident by ABC (on FOTO).  Ongoing      The following goals were discussed with the patient and patient is in agreement with them as to be addressed in the treatment plan.     Plan   Certification Period/Plan of care expiration: 1/31/2023     Outpatient Occupational Therapy 4 times weekly for 4 weeks to include the following interventions: Neuromuscular Re-ed, Patient Education, Self Care, Therapeutic Activities, and Therapeutic Exercise.    ISAAK Tatum

## 2023-01-12 ENCOUNTER — CLINICAL SUPPORT (OUTPATIENT)
Dept: REHABILITATION | Facility: HOSPITAL | Age: 78
End: 2023-01-12
Attending: PSYCHIATRY & NEUROLOGY
Payer: COMMERCIAL

## 2023-01-12 DIAGNOSIS — R29.898 WEAKNESS OF LEFT UPPER EXTREMITY: ICD-10-CM

## 2023-01-12 DIAGNOSIS — H51.11 CONVERGENCE INSUFFICIENCY: Primary | ICD-10-CM

## 2023-01-12 DIAGNOSIS — R26.89 IMPAIRED GAIT AND MOBILITY: Primary | ICD-10-CM

## 2023-01-12 PROCEDURE — 97110 THERAPEUTIC EXERCISES: CPT | Mod: PO

## 2023-01-12 PROCEDURE — 97112 NEUROMUSCULAR REEDUCATION: CPT | Mod: PO

## 2023-01-12 PROCEDURE — 97530 THERAPEUTIC ACTIVITIES: CPT | Mod: PO

## 2023-01-12 NOTE — PROGRESS NOTES
"  [facility]   Occupational Therapy Neurological Rehabilitation   LSVT BIG- Treatment Note   Name: Patrick Short  MRN: [id number]  Today's Date: 1/13/2023    Therapy Diagnosis:   Encounter Diagnoses   Name Primary?    Convergence insufficiency Yes    Weakness of left upper extremity      Physician: Frantz Bee MD  Physician Orders: Neuro Program      Medical Diagnosis: Z86.73 (ICD-10-CM) - History of ischemic left MCA stroke G20 (ICD-10-CM) - Parkinson disease   Evaluation Date: 12/7/2022; re-assessment 12/27  Plan of Care Expiration Period: 1/31/2023  Insurance Authorization period Expiration: 12/31/2023  Date of Return to MD: 1/31/23: Neurology   FOTO: 2 / 3     Visit # / Visits Authorized: 8 / 20  (+evaluation and 5 visits in 2022)      Time In: 8:55  Time Out: 9:45  Total Billable Time: 50 minutes    Precautions: Standard and Fall; hearing impaired     Subjective   Pt reports: feels like he is getting up out of chairs better since starting   He was compliant with his HEP  Response to previous treatment: doing exercises at home (BIG and convergence tasks)  Functional change: "every day is different"  Patient's Goals for Occupational Therapy: to increase his quality of life and remain independent     Pain upon arrival: 0/10   Location: n/a  Pain at cessation of session: 0/10    Objective     LSVT Protocol    Week: 2/4   LSVT visit # 8/16     Patrick participated in neuro re-education activities to improve Balance, Coordination, Posture, and Movement Amplitude for 25 minutes. The following activities were included:     LSVT Maximal Daily Exercises to promote amplitude: all with 1# wrist weights today  Sustained Movements (sitting) - 10 reps each side   Daily Exercises   Performance  Comments/Modifications   1  Floor<>ceiling  good Finger flicks;    2  Side<>side  good Finger flicks; alternating      Multidirectional Repetitive Movements (standing) - 10 reps each side   Daily Exercises  " "Performance Comments/Modifications    3  Step & reach  (forward)   good with upper extremity support at ballet bar- over bolster    4  Step & reach (sideways)   good with upper extremity support- over bolster    5  Step & reach  (backwards)   fair with upper extremity support    6  Rock & reach  (foward)   fair with upper extremity support  ; cue for rocking over LLE   7  Rock & reach   (sideways twist)   fair with upper extremity support  -- cues for pivot over LE     Functional Component Tasks:   Sit to stands: x10 reps from low mat (blue mat)- 1# cuff weights added    Tushar participated in dynamic functional therapeutic activities to improve functional performance for 25 minutes, including:  -upper body ergonometer on level 3.4; completed for 10 minutes switching directions mid way. Focus on postural control, large amplitude and consistent movement for bilateral upper extremity ; and breathing techniques with exhale with exertion of force. MET average 3.4    Hierarchy Task:   -standing from low chair- completed x10 reps     Gait Training/ "BIG walking":  down hallways and around obstacles, avoiding various objects with focus on bigger amplitude and reciprocal arm swings x5 minutes - letters of the alphabet and street names - min cues to sustain large amplitude with left arm swing 1# cuff weights added    Oculomotor:   Convergence with Navneet string (x3 beads) with focus x5 sec each  Spot It: saccades with added time    Carryover assignment tracking:    Date Activity   1 1/3/2023 (week 1) Sit<>Stand low surface x10 reps    2 1/9/2023 (week 2) Convergence/divergence exercises / pencil push up         Home Exercises and Education Provided   Education provided:   - edu on PD symptoms and deficits from brain bleed region   - re calibration of movement with large amplitude   - large  pen to aid in stability; finger flicks   - edu on LSVT BIG and LOUD program; edu on what PD is   - role of Occupational Therapy in " care, goals for Occupational Therapy for this plan of care,  scheduling  - Progress towards goals   - Loop group handwriting HEP/handout    Written Home Exercises Provided: yes. (Folder provided to keep HEP/HAPs from therapies 12/19)  Exercises were reviewed and Tushar was able to demonstrate them prior to the end of the session.  Tushar demonstrated good  understanding of the HEP provided.   -word search    See EMR under Patient Instructions for exercises provided   12/13/2022: Convergence exercises   12/19/2022: Resistance band HEP for bilateral upper extremities   1/3/2023: Modified LSVT BIG     Assessment     Tushar demo good form for LSVT BIG exercises with reported notice in improvement with his sit<>stands from lower surfaces and chairs. Today with ocular motor exercises, he demo left exotropia with convergence attempts; however, reported no diplopia.        Tushar is progressing well towards his goals and there are no updates to goals at this time. Pt prognosis is Good.     Pt will continue to benefit from skilled outpatient occupational therapy to address the deficits listed in the problem list on initial evaluation provide pt/family education and to maximize pt's level of independence in the home and community environment.     Anticipated barriers to occupational therapy: cognition; comorbidities      Pt's spiritual, cultural and educational needs considered and pt agreeable to plan of care and goals.    Goals: 4 weeks; long term= short term  Pt will demo understanding for HEP/HAP provided with teach back understanding. Ongoing   Pt will demo 5/5 for proximal stability and strength for left upper extremity. Ongoing   Pt will demo understanding for convergence/divergence sufficiency exercises with teach back understanding. Progressing; improvement noted on 12/19  Pt will demo understanding for modifications and adaptations for handwriting and fine motor tasks. Ongoing   Pt will reduce limitation score on FOTO  by less than or equal to 12% to demonstrate an increase in self-perceived functional performance. Ongoing     Additional goals:   Pt will demo understanding for LSVT BIG protocol with teachback understanding. Progressing   Pt will verbalize improvement in balance confidence as evident by ABC (on FOTO).  Ongoing      The following goals were discussed with the patient and patient is in agreement with them as to be addressed in the treatment plan.     Plan   Certification Period/Plan of care expiration: 1/31/2023     Outpatient Occupational Therapy 4 times weekly for 4 weeks to include the following interventions: Neuromuscular Re-ed, Patient Education, Self Care, Therapeutic Activities, and Therapeutic Exercise.    ISAAK Tatum

## 2023-01-13 ENCOUNTER — CLINICAL SUPPORT (OUTPATIENT)
Dept: REHABILITATION | Facility: HOSPITAL | Age: 78
End: 2023-01-13
Attending: PSYCHIATRY & NEUROLOGY
Payer: COMMERCIAL

## 2023-01-13 DIAGNOSIS — H51.11 CONVERGENCE INSUFFICIENCY: Primary | ICD-10-CM

## 2023-01-13 DIAGNOSIS — R29.898 WEAKNESS OF LEFT UPPER EXTREMITY: ICD-10-CM

## 2023-01-13 DIAGNOSIS — I63.9 CRYPTOGENIC STROKE: Primary | ICD-10-CM

## 2023-01-13 PROCEDURE — 97112 NEUROMUSCULAR REEDUCATION: CPT | Mod: PO

## 2023-01-13 PROCEDURE — 97530 THERAPEUTIC ACTIVITIES: CPT | Mod: PO

## 2023-01-17 ENCOUNTER — CLINICAL SUPPORT (OUTPATIENT)
Dept: REHABILITATION | Facility: HOSPITAL | Age: 78
End: 2023-01-17
Attending: PSYCHIATRY & NEUROLOGY
Payer: COMMERCIAL

## 2023-01-17 DIAGNOSIS — R26.89 IMPAIRED GAIT AND MOBILITY: Primary | ICD-10-CM

## 2023-01-17 DIAGNOSIS — Z86.73 HISTORY OF ISCHEMIC LEFT MCA STROKE: ICD-10-CM

## 2023-01-17 DIAGNOSIS — H51.11 CONVERGENCE INSUFFICIENCY: Primary | ICD-10-CM

## 2023-01-17 DIAGNOSIS — G31.84 MILD NEUROCOGNITIVE DISORDER: ICD-10-CM

## 2023-01-17 DIAGNOSIS — G20.A1 PARKINSON DISEASE: Primary | ICD-10-CM

## 2023-01-17 DIAGNOSIS — R29.898 WEAKNESS OF LEFT UPPER EXTREMITY: ICD-10-CM

## 2023-01-17 PROCEDURE — 97130 THER IVNTJ EA ADDL 15 MIN: CPT | Mod: PO

## 2023-01-17 PROCEDURE — 97112 NEUROMUSCULAR REEDUCATION: CPT | Mod: PO

## 2023-01-17 PROCEDURE — 97129 THER IVNTJ 1ST 15 MIN: CPT | Mod: PO

## 2023-01-17 PROCEDURE — 97110 THERAPEUTIC EXERCISES: CPT | Mod: PO

## 2023-01-17 NOTE — PROGRESS NOTES
[facility]   Occupational Therapy Neurological Rehabilitation   LSVT BIG- Treatment Note   Name: Patrick Short  MRN: [id number]  Today's Date: 1/18/2023    Therapy Diagnosis:   Encounter Diagnoses   Name Primary?    Convergence insufficiency Yes    Weakness of left upper extremity        Physician: Frantz Bee MD  Physician Orders: Neuro Program      Medical Diagnosis: Z86.73 (ICD-10-CM) - History of ischemic left MCA stroke G20 (ICD-10-CM) - Parkinson disease   Evaluation Date: 12/7/2022; re-assessment 12/27  Plan of Care Expiration Period: 1/31/2023  Insurance Authorization period Expiration: 12/31/2023  Date of Return to MD: 1/31/23: Neurology   FOTO: 2 / 3     Visit # / Visits Authorized: 10 / 20  (+evaluation and 5 visits in 2022)      Time In: 8:45  Time Out: 9:25  Total Billable Time: 40 minutes    Precautions: Standard and Fall; hearing impaired     Subjective   Pt reports: no complaints this morning   He was compliant with his HEP  Response to previous treatment: doing exercises at home (BIG and convergence tasks)  Functional change: getting up and out of chairs has gotten easier   Patient's Goals for Occupational Therapy: to increase his quality of life and remain independent     Pain upon arrival: 0/10   Location: n/a  Pain at cessation of session: 0/10    Objective   Received from speech language pathology session     LSVT Protocol    Week: 3/4   LSVT visit # 10/16     Patrick participated in neuro re-education activities to improve Balance, Coordination, Posture, and Movement Amplitude for 30 minutes. The following activities were included:     LSVT Maximal Daily Exercises to promote amplitude: all with 2# wrist weights today  Sustained Movements (sitting) - 10 reps each side   Daily Exercises   Performance  Comments/Modifications   1  Floor<>ceiling  good Finger flicks; seated on Bosu   2  Side<>side  good Finger flicks; alternating ; seated on Bosu      Multidirectional  "Repetitive Movements (standing) - 10 reps each side   Daily Exercises  Performance Comments/Modifications    3  Step & reach  (forward)   good with upper extremity support at ballet bar- over bolster    4  Step & reach (sideways)   good with upper extremity support- over bolster    5  Step & reach  (backwards)   good with upper extremity support    6  Rock & reach  (foward)   good with upper extremity support  ; cue for rocking over LLE   7  Rock & reach   (sideways twist)   good with upper extremity support       Functional Component Tasks:   Sit to stands: x10 reps from low mat (blue mat) from Bosu (convex) 2# cuff weights added  2. Pencil push ups x2 sets of 10 (14 font); convergence<>divergence with 14 font x1 set of 10 reps- completed while seated in chair with minimal background distraction     Hierarchy Task:   Not completed today    Gait Training/ "BIG walking":  down hallways and around obstacles, avoiding various objects with focus on bigger amplitude and reciprocal arm swings x5 minutes - min cues to sustain large amplitude with left arm swing 2# cuff weights added; head turns with locating items with moderate difficulty     Carryover assignment tracking:    Date Activity   1 1/3/2023 (week 1) Sit<>Stand low surface x10 reps    2 1/9/2023 (week 2) Convergence/divergence exercises / pencil push up         Tushar received therapeutic exercises for 10 minutes including:  -upper body ergonometer on level 3.0; completed for 10 minutes switching directions mid way. Focus on postural control, large amplitude movement and breathing techniques with exhale with exertion of force. MET average 3.0       Home Exercises and Education Provided   Education provided:   - edu on PD symptoms and deficits from brain bleed region   - re calibration of movement with large amplitude   - large  pen to aid in stability; finger flicks   - edu on LSVT BIG and LOUD program; edu on what PD is   - role of Occupational Therapy in " care, goals for Occupational Therapy for this plan of care,  scheduling  - Progress towards goals   - Loop group handwriting HEP/handout    Written Home Exercises Provided: yes. (Folder provided to keep HEP/HAPs from therapies 12/19)  Exercises were reviewed and Tushar was able to demonstrate them prior to the end of the session.  Tushar demonstrated good  understanding of the HEP provided.   -word search    See EMR under Patient Instructions for exercises provided   12/13/2022: Convergence exercises   12/19/2022: Resistance band HEP for bilateral upper extremities   1/3/2023: Modified LSVT BIG     Assessment     Tushar continues to demo good form with BIG exercises; he demo 1 minor LOB with stepping over bolster but able to self recover. Moreover, pt with moderate difficulty of multi-tasking with head turns while walking requiring added time.     Tushar is progressing well towards his goals and there are no updates to goals at this time. Pt prognosis is Good.     Pt will continue to benefit from skilled outpatient occupational therapy to address the deficits listed in the problem list on initial evaluation provide pt/family education and to maximize pt's level of independence in the home and community environment.     Anticipated barriers to occupational therapy: cognition; comorbidities      Pt's spiritual, cultural and educational needs considered and pt agreeable to plan of care and goals.    Goals: 4 weeks; long term= short term  Pt will demo understanding for HEP/HAP provided with teach back understanding. Ongoing   Pt will demo 5/5 for proximal stability and strength for left upper extremity. Ongoing   Pt will demo understanding for convergence/divergence sufficiency exercises with teach back understanding. Progressing; improvement noted on 12/19  Pt will demo understanding for modifications and adaptations for handwriting and fine motor tasks. Ongoing   Pt will reduce limitation score on FOTO by less than or  equal to 12% to demonstrate an increase in self-perceived functional performance. Ongoing     Additional goals:   Pt will demo understanding for LSVT BIG protocol with teachback understanding. Progressing   Pt will verbalize improvement in balance confidence as evident by ABC (on FOTO).  Ongoing      The following goals were discussed with the patient and patient is in agreement with them as to be addressed in the treatment plan.     Plan   Certification Period/Plan of care expiration: 1/31/2023     Outpatient Occupational Therapy 4 times weekly for 4 weeks to include the following interventions: Neuromuscular Re-ed, Patient Education, Self Care, Therapeutic Activities, and Therapeutic Exercise.    ISAAK Tatum

## 2023-01-17 NOTE — PROGRESS NOTES
"[facility]   Physical Therapy Treatment Note     Name: Patrick Short  [facility] Number: [id number]    Therapy Diagnosis:   Encounter Diagnosis   Name Primary?    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 Patient verbalizes and demonstrates understanding.   Response to previous treatment: good  Functional change: ongoing     Pain: 0/10  Location: NA    OBJECTIVE     Objective Measures updated at progress report unless specified.     Treatment     Tushar received the treatments listed below:      therapeutic exercises to develop strength, endurance, ROM, flexibility, posture, and core stabilization for 15 minutes including:    10 minutes on the [employer] seated elliptical for CV endurance and LE strength level 5.5    2 x 10 sit to stands from low mat on foam pad, CGA, no UE support    neuromuscular re-education activities to improve: Balance, Coordination, Kinesthetic, Proprioception, and Posture for 25 minutes. The following activities were included:    A few trials to achieve 30 sec:  - 30 sec R foot forward tandem stance  - 30 sec L foot forward tandem stance; occ Renny    30 sec each side modified SLS in tandem position with front foot on foam fitter; CGA with occ Renny    1 x 10 reps step up to foam fitter with opposite double cone tap to large cones, Renny with no UE support      30 sec each foot forward staggered " stance on foam fitter eyes open head turns; CGA with occasional Renny  30 sec each foot forward staggered stance on foam fitter eyes open head nods; CGA     // bars:    2 balance beams in front of each other: CGA  4 laps tandem ambulation, 1 UE touchdown support  4 laps marching with three second holds, CGA    2 laps each way side stepping with very occasional 1UE touchdown support moving to the R     Patient Education and Home Exercises     Home Exercises Provided and Patient Education Provided     Education provided:   - home exercise program     Written Home Exercises Provided: yes. Exercises were reviewed and Tushar was able to demonstrate them prior to the end of the session.  Tushar demonstrated good  understanding of the education provided. See EMR under Patient Instructions for exercises provided during therapy sessions    ASSESSMENT     Tushar tolerated his tx session well today. Decreased UE support with interventions on the balance beans and improved tolerance to endurance training (increased intensity) and no rest breaks required throughout session. Most notable impairment with today's session appears to be weight shifting. Improved tandem balance with increased knee and trunk flexion to lower COM. The patient will benefit from continued physical therapy intervention to address remaining functional mobility deficits     Tushar Is progressing well towards his goals.   Pt prognosis is Good.     Pt will continue to benefit from skilled outpatient physical therapy to address the deficits listed in the problem list box on initial evaluation, provide pt/family education and to maximize pt's level of independence in the home and community environment.     Pt's spiritual, cultural and educational needs considered and pt agreeable to plan of care and goals.     Anticipated barriers to physical therapy: co-morbidities, progressive nature of illness    Goals:  Short Term Goals: 4 weeks   Patient will be independent with  established home exercise program. Ongoing   Patient to improve FGA score to 26/30 for improved stability with functional mobility. Ongoing   Patient to improve GST condition 3 to 12 seconds for improved balance. Ongoing   Patient to improve GST condition 4 to 7 seconds for improved stability with NBOS. Ongoing   5. Patient to improve SLS time to 4 seconds with BLE for improved stability with ambulation. Ongoing         Long Term Goals: 8 weeks   Patient will be independent with advanced home exercise program. Ongoing   Patient to improve FGA score to 27/30 for improved stability with functional mobility. Ongoing   Patient to improve GST condition 3 to 16 seconds for improved balance. Ongoing   Patient to improve GST condition 4 to 14 seconds for improved stability with NBOS. Ongoing   5. Patient to improve SLS time to 10 seconds with BLE for improved stability with ambulation. Ongoing     PLAN       Continue progressing static/ dynamic balance exercises     Giovanni Coe, PT

## 2023-01-17 NOTE — PROGRESS NOTES
[facility]   Occupational Therapy Neurological Rehabilitation   LSVT BIG- Treatment Note   Name: Patrick Short  MRN: [id number]  Today's Date: 1/17/2023    Therapy Diagnosis:   Encounter Diagnoses   Name Primary?    Convergence insufficiency Yes    Weakness of left upper extremity      Physician: Frantz Bee MD  Physician Orders: Neuro Program      Medical Diagnosis: Z86.73 (ICD-10-CM) - History of ischemic left MCA stroke G20 (ICD-10-CM) - Parkinson disease   Evaluation Date: 12/7/2022; re-assessment 12/27  Plan of Care Expiration Period: 1/31/2023  Insurance Authorization period Expiration: 12/31/2023  Date of Return to MD: 1/31/23: Neurology   FOTO: 2 / 3     Visit # / Visits Authorized: 9 / 20  (+evaluation and 5 visits in 2022)      Time In: 1:45  Time Out: 2:30  Total Billable Time: 45 minutes    Precautions: Standard and Fall; hearing impaired     Subjective   Pt reports: he is starting to realize that this is progressive   He was compliant with his HEP  Response to previous treatment: doing exercises at home (BIG and convergence tasks)  Functional change: getting up and out of chairs has gotten easier   Patient's Goals for Occupational Therapy: to increase his quality of life and remain independent     Pain upon arrival: 0/10   Location: n/a  Pain at cessation of session: 0/10    Objective   Received from speech language pathology session     LSVT Protocol    Week: 3/4   LSVT visit # 9/16     Patrick participated in neuro re-education activities to improve Balance, Coordination, Posture, and Movement Amplitude for 25 minutes. The following activities were included:     LSVT Maximal Daily Exercises to promote amplitude: all with 1# wrist weights today  Sustained Movements (sitting) - 10 reps each side   Daily Exercises   Performance  Comments/Modifications   1  Floor<>ceiling  good Finger flicks; seated on Bosu   2  Side<>side  good Finger flicks; alternating ; seated on Bosu   "    Multidirectional Repetitive Movements (standing) - 10 reps each side   Daily Exercises  Performance Comments/Modifications    3  Step & reach  (forward)   good with upper extremity support at ballet bar- over bolster    4  Step & reach (sideways)   good with upper extremity support- over bolster    5  Step & reach  (backwards)   fair with upper extremity support    6  Rock & reach  (foward)   fair with upper extremity support  ; cue for rocking over LLE   7  Rock & reach   (sideways twist)   fair no upper extremity support       Functional Component Tasks:   Sit to stands: x10 reps from low mat (blue mat) with wobble disc- 1# cuff weights added    Tushar received therapeutic exercises for 20 minutes including:  Hierarchy Task:   -standing from low chair- completed x10 reps with 4.4# ball for shoulder press     Gait Training/ "BIG walking":  down hallways and around obstacles, avoiding various objects with focus on bigger amplitude and reciprocal arm swings x5 minutes - min cues to sustain large amplitude with left arm swing 1# cuff weights added (completed at start of session following speech session of prolonged sitting)    Carryover assignment tracking:    Date Activity   1 1/3/2023 (week 1) Sit<>Stand low surface x10 reps    2 1/9/2023 (week 2) Convergence/divergence exercises / pencil push up       Sit<>stand for walking to PVC pipe tree- x13 reps for matching colors with Left upper extremity reaching. 1# wrist weights bilaterally     Supine:  -10# x2 sets of 10: chest press and shoulder press  -3# serratus punches x10 reps   -3# pendulums for right upper extremity     Hand off to PT at cessation of session     Home Exercises and Education Provided   Education provided:   - edu on PD symptoms and deficits from brain bleed region   - re calibration of movement with large amplitude   - large  pen to aid in stability; finger flicks   - edu on LSVT BIG and LOUD program; edu on what PD is   - role of " Occupational Therapy in care, goals for Occupational Therapy for this plan of care,  scheduling  - Progress towards goals   - Loop group handwriting HEP/handout    Written Home Exercises Provided: yes. (Folder provided to keep HEP/HAPs from therapies 12/19)  Exercises were reviewed and Tushar was able to demonstrate them prior to the end of the session.  Tushar demonstrated good  understanding of the HEP provided.   -word search    See EMR under Patient Instructions for exercises provided   12/13/2022: Convergence exercises   12/19/2022: Resistance band HEP for bilateral upper extremities   1/3/2023: Modified LSVT BIG     Assessment     Tushar tolerated session well today. Mild dysmetria noted with left upper extremity reaching<>focused on proximal stability exercises in supine following.     Tushar is progressing well towards his goals and there are no updates to goals at this time. Pt prognosis is Good.     Pt will continue to benefit from skilled outpatient occupational therapy to address the deficits listed in the problem list on initial evaluation provide pt/family education and to maximize pt's level of independence in the home and community environment.     Anticipated barriers to occupational therapy: cognition; comorbidities      Pt's spiritual, cultural and educational needs considered and pt agreeable to plan of care and goals.    Goals: 4 weeks; long term= short term  Pt will demo understanding for HEP/HAP provided with teach back understanding. Ongoing   Pt will demo 5/5 for proximal stability and strength for left upper extremity. Ongoing   Pt will demo understanding for convergence/divergence sufficiency exercises with teach back understanding. Progressing; improvement noted on 12/19  Pt will demo understanding for modifications and adaptations for handwriting and fine motor tasks. Ongoing   Pt will reduce limitation score on FOTO by less than or equal to 12% to demonstrate an increase in self-perceived  functional performance. Ongoing     Additional goals:   Pt will demo understanding for LSVT BIG protocol with teachback understanding. Progressing   Pt will verbalize improvement in balance confidence as evident by ABC (on FOTO).  Ongoing      The following goals were discussed with the patient and patient is in agreement with them as to be addressed in the treatment plan.     Plan   Certification Period/Plan of care expiration: 1/31/2023     Outpatient Occupational Therapy 4 times weekly for 4 weeks to include the following interventions: Neuromuscular Re-ed, Patient Education, Self Care, Therapeutic Activities, and Therapeutic Exercise.    ISAAK Tatum

## 2023-01-17 NOTE — PROGRESS NOTES
[facility]  Speech Therapy Treatment Note- Neurological Rehabilitation  Date: 1/17/2023     Name: Patrick Short   MRN: [id number]   Therapy Diagnosis:   Encounter Diagnoses   Name Primary?    Parkinson disease Yes    Mild neurocognitive disorder     History of ischemic left MCA stroke        Physician: Frantz Bee MD  Physician Orders: [id number] - AMB REFERRAL/CONSULT TO SPEECH THERAPY  Medical Diagnosis: History of ischemic left MCA stroke [Z86.73], Parkinson disease [G20]    Visit #/ Visits Authorized: 7/ 12  Date of Evaluation:  12/8/22  Insurance Authorization Period: 12/8/22-12/31/22  Plan of Care Expiration Date:    12/8/2022 to 1/19/23   Extended Plan of Care:  n/a   Progress Note: 2/8/23   Visits Cancelled: 0  Visits No Show: 0    Time In:  1:00pm   Time Out:  1:45pm   Total Billable Time: 45 minutes      Precautions: Standard, Fall, and Cognition  Subjective:   Patient reports: Feels he has improved and feels better. Can not pinpoint any difficulties at home, however does report confusion and difficulty with attention. Reports he will bring his wife in next session.   He was compliant to home exercise program.   Response to previous treatment: positive    Pain Scale:  0/10 on a Visual Analog Scale currently.   Pain Location: n/a  Objective:   TIMED  Procedure Min.   Cognitive Therapeutic Interventions, first 15 minutes CPT 07568  15   Cognitive Therapeutic Interventions, each additional 15 minutes CPT 93285  30         UNTIMED  Procedure Min.             Total Timed Units: 3  Total Untimed Units: 0  Charges Billed/Number of units: 3    Short Term Goals: (4 weeks) Current Progress:   Patient will name 10-15 items in a concrete category in one minute to improve word fluency and thought organization.     Progressing/ 1/17/2023   Not formally addressed      No word finding difficulties noted today     Met x 1   2. Patient will complete tasks requiring divided attention and alternating  attention with 90% accuracy given min cues to improve attention skills      Progressing/ 1/17/2023   Not formally addressed        3. Pt will complete divided attention tasks in distracting environment with 90% accuracy independently.      Progressing/  1/17/2023   Not formally addressed           Met x1     4. Patient will immediately recall specific details from information recently seen or heard using memory retrieval strategies with 90% accuracy given min cues to improve auditory and visual recall.      Progressing/  1/17/2023   Completed understanding a voicemail with 90% accuracy Independently            Met x 2   5. Patient will complete visual recall tasks with 90% acc indly to improve visual recall      Progressing/ Not Met 1/17/2023   Not formally addressed                      6. Patient will complete reasoning/problem solving tasks with 90% accuracy given min cues      Progressing/ Not Met 1/17/2023   Completed deduction puzzle #2 today with 70% accuracy Independently  and 100% with mod A              7. Patient will complete mental manipulation tasks with 90% accuracy given min cues to improve working memory/mental flexibility.        Progressing/ Not Met 1/17/2023   Not formally addressed          Patient Education/Response:   Extensive education provided regarding both memory and attention/listening strategies. Discussed progress on tasks today. Patient verbalized understanding.     Home program established: yes-use strategies and see how to apply to daily living.   Exercises were reviewed and Tushar was able to demonstrate them prior to the end of the session.  Tushar demonstrated poor understanding of the education provided.     See Electronic Medical Record under Patient Instructions for exercises provided throughout therapy.  Assessment:   Tushar is progressing well towards his goals. Continued limited awareness of deficits today. Completed deduction puzzle and discussed importance and concept behind  task. Additionally discussed difference between PD and stroke and how that can affect the brain. Voicemail task completed with no difficulty. Next session, discussed bringing wife to session to decide next steps. Current goals remain appropriate. Goals to be updated as necessary.     Patient prognosis is Good. Patient will continue to benefit from skilled outpatient speech and language therapy to address the deficits listed in the problem list on initial evaluation, provide patient/family education and to maximize patient's level of independence in the home and community environment.   Medical necessity is demonstrated by the following IMPAIRMENTS:  Deficits in executive functioning, attention, and memory prevent the pt from relaying medically and safety relevant information in a timely manner in a state of emergency.   Barriers to Therapy: none  Patient's spiritual, cultural and educational needs considered and patient agreeable to plan of care and goals.  Plan:   Continue Plan of Care with focus on memory, attention, word finding, and problem solving.     VIDA Sharpe, CCC-SLP  1/17/2023

## 2023-01-18 ENCOUNTER — CLINICAL SUPPORT (OUTPATIENT)
Dept: REHABILITATION | Facility: HOSPITAL | Age: 78
End: 2023-01-18
Attending: INTERNAL MEDICINE
Payer: COMMERCIAL

## 2023-01-18 DIAGNOSIS — H51.11 CONVERGENCE INSUFFICIENCY: Primary | ICD-10-CM

## 2023-01-18 DIAGNOSIS — R29.898 WEAKNESS OF LEFT UPPER EXTREMITY: ICD-10-CM

## 2023-01-18 PROCEDURE — 97112 NEUROMUSCULAR REEDUCATION: CPT | Mod: PO

## 2023-01-18 PROCEDURE — 97110 THERAPEUTIC EXERCISES: CPT | Mod: PO

## 2023-01-19 NOTE — PROGRESS NOTES
[facility]   Occupational Therapy Neurological Rehabilitation   LSVT BIG- Treatment Note   Name: Patrick Short  MRN: [id number]  Today's Date: 1/20/2023    Therapy Diagnosis:   Encounter Diagnoses   Name Primary?    Convergence insufficiency Yes    Weakness of left upper extremity      Physician: Frantz Bee MD  Physician Orders: Neuro Program      Medical Diagnosis: Z86.73 (ICD-10-CM) - History of ischemic left MCA stroke G20 (ICD-10-CM) - Parkinson disease   Evaluation Date: 12/7/2022; re-assessment 12/27  Plan of Care Expiration Period: 1/31/2023  Insurance Authorization period Expiration: 12/31/2023  Date of Return to MD: 1/24/2023: Loop reorder; 1/31/23: Neurology   FOTO: 2 / 3     Visit # / Visits Authorized: 11 / 20  (+evaluation and 5 visits in 2022)      Time In: 9:15  Time Out: 10:08  Total Billable Time: 53 minutes    Precautions: Standard and Fall; hearing impaired     Subjective   Pt reports: had to cancel yesterday because of schedule conflict    He was compliant with his HEP  Response to previous treatment: doing exercises at home (BIG and convergence tasks)  Functional change: getting up and out of chairs has gotten easier   Patient's Goals for Occupational Therapy: to increase his quality of life and remain independent     Pain upon arrival: 0/10   Location: n/a  Pain at cessation of session: 0/10    Objective     LSVT Protocol    Week: 3/4   LSVT visit # 11/16     Patrick participated in neuro re-education activities to improve Balance, Coordination, Posture, and Movement Amplitude for 30 minutes. The following activities were included:     LSVT Maximal Daily Exercises to promote amplitude: all with 2# wrist weights today  Sustained Movements (sitting) - 10 reps each side   Daily Exercises   Performance  Comments/Modifications   1  Floor<>ceiling  good Finger flicks; seated on wobble disc    2  Side<>side  good Finger flicks; alternating ; seated on wobble disc  "    Multidirectional Repetitive Movements (standing) - 10 reps each side   Daily Exercises  Performance Comments/Modifications    3  Step & reach  (forward)   good with upper extremity support at ballet bar- over bolster    4  Step & reach (sideways)   good with upper extremity support- over bolster    5  Step & reach  (backwards)   good with upper extremity support - over bolster    6  Rock & reach  (foward)   good with upper extremity support     7  Rock & reach   (sideways twist)   good with upper extremity support       Functional Component Tasks:   Sit to stands: x10 reps from low mat (blue mat) from Exosectle disc - 2# cuff weights added   convergence<>divergence with 14 font x1 set of 10 reps- completed while seated in chair with busy street back ground     Gait Training/ "BIG walking":  down hallways and around obstacles, avoiding various objects with focus on bigger amplitude and reciprocal arm swings x5 minutes - min cues to sustain large amplitude with left arm swing 2# cuff weights added; head turns with locating items with moderate difficulty     Carryover assignment tracking:    Date Activity   1 1/3/2023 (week 1) Sit<>Stand low surface x10 reps    2 1/9/2023 (week 2) Convergence/divergence exercises / pencil push up       Tushar received therapeutic exercises for 10 minutes including:  -upper body ergonometer on level 3.2; completed for 10 minutes switching directions mid way. Focus on postural control and breathing techniques with exhale with exertion of force. MET average 3.1     Tushar participated in dynamic functional therapeutic activities to improve functional performance for 13 minutes, including:  Seated at table:   -computer word search task with moderate assistance (pt losing words on screen easily)    Home Exercises and Education Provided   Education provided:   - edu on PD symptoms and deficits from brain bleed region   - re calibration of movement with large amplitude   - large  pen to " aid in stability; finger flicks   - edu on LSVT BIG and LOUD program; edu on what PD is   - role of Occupational Therapy in care, goals for Occupational Therapy for this plan of care,  scheduling  - Progress towards goals   - Loop group handwriting HEP/handout    Written Home Exercises Provided: yes. (Folder provided to keep HEP/HAPs from therapies 12/19)  Exercises were reviewed and Tushar was able to demonstrate them prior to the end of the session.  Tushar demonstrated good  understanding of the HEP provided.   -word search    See EMR under Patient Instructions for exercises provided   12/13/2022: Convergence exercises   12/19/2022: Resistance band HEP for bilateral upper extremities   1/3/2023: Modified LSVT BIG     Assessment     Tushar demo diplopia with divergence exercises again on this date- reached out to pass along to neurologist. Pt might benefit from opthalmology consult per neurologist recommendation.   Pt continues to demo improvement with BIG exercises with noted improvement in his endurance for activity. He will d/c from OT at the end of next week-- will be recommending the BIG for LIFE program at [place].     Tushar is progressing well towards his goals and there are no updates to goals at this time. Pt prognosis is Good.     Pt will continue to benefit from skilled outpatient occupational therapy to address the deficits listed in the problem list on initial evaluation provide pt/family education and to maximize pt's level of independence in the home and community environment.     Anticipated barriers to occupational therapy: cognition; comorbidities      Pt's spiritual, cultural and educational needs considered and pt agreeable to plan of care and goals.    Goals: 4 weeks; long term= short term  Pt will demo understanding for HEP/HAP provided with teach back understanding. Ongoing   Pt will demo 5/5 for proximal stability and strength for left upper extremity. Ongoing   Pt will demo understanding for  convergence/divergence sufficiency exercises with teach back understanding. Progressing; improvement noted on 12/19  Pt will demo understanding for modifications and adaptations for handwriting and fine motor tasks. Ongoing   Pt will reduce limitation score on FOTO by less than or equal to 12% to demonstrate an increase in self-perceived functional performance. Ongoing     Additional goals:   Pt will demo understanding for LSVT BIG protocol with teachback understanding. Progressing   Pt will verbalize improvement in balance confidence as evident by ABC (on FOTO).  Ongoing      The following goals were discussed with the patient and patient is in agreement with them as to be addressed in the treatment plan.     Plan   Certification Period/Plan of care expiration: 1/31/2023     Outpatient Occupational Therapy 4 times weekly for 4 weeks to include the following interventions: Neuromuscular Re-ed, Patient Education, Self Care, Therapeutic Activities, and Therapeutic Exercise.    ISAAK Tatum

## 2023-01-20 ENCOUNTER — CLINICAL SUPPORT (OUTPATIENT)
Dept: REHABILITATION | Facility: HOSPITAL | Age: 78
End: 2023-01-20
Attending: PSYCHIATRY & NEUROLOGY
Payer: COMMERCIAL

## 2023-01-20 DIAGNOSIS — H51.11 CONVERGENCE INSUFFICIENCY: Primary | ICD-10-CM

## 2023-01-20 DIAGNOSIS — R29.898 WEAKNESS OF LEFT UPPER EXTREMITY: ICD-10-CM

## 2023-01-20 PROCEDURE — 97530 THERAPEUTIC ACTIVITIES: CPT | Mod: PO

## 2023-01-20 PROCEDURE — 97112 NEUROMUSCULAR REEDUCATION: CPT | Mod: PO

## 2023-01-20 PROCEDURE — 97110 THERAPEUTIC EXERCISES: CPT | Mod: PO

## 2023-01-20 NOTE — PROGRESS NOTES
[facility]   Occupational Therapy Neurological Rehabilitation   LSVT BIG- Treatment Note   Name: Patrick Short  MRN: [id number]  Today's Date: 1/23/2023    Therapy Diagnosis:   Encounter Diagnoses   Name Primary?    Convergence insufficiency Yes    Weakness of left upper extremity      Physician: Frantz Bee MD  Physician Orders: Neuro Program      Medical Diagnosis: Z86.73 (ICD-10-CM) - History of ischemic left MCA stroke G20 (ICD-10-CM) - Parkinson disease   Evaluation Date: 12/7/2022; re-assessment 12/27  Plan of Care Expiration Period: 1/31/2023  Insurance Authorization period Expiration: 12/31/2023  Date of Return to MD: 1/24/2023: Loop reorder; 1/31/23: Neurology   FOTO: 2 / 3     Visit # / Visits Authorized: 12 / 20  (+evaluation and 5 visits in 2022)      Time In: 9:41  Time Out: 10:25  Total Billable Time: 44 minutes    Precautions: Standard and Fall; hearing impaired     Subjective   Pt reports: late this morning because he lost track of time on the phone with his daughter  He reports he is compliant with his HEP  Response to previous treatment: no complaints  Functional change: getting up and out of chairs has gotten easier   Patient's Goals for Occupational Therapy: to increase his quality of life and remain independent ; walk/run a marathon every decade     Pain upon arrival: 0/10   Location: n/a  Pain at cessation of session: 0/10    Objective     LSVT Protocol    Week: 4/4   LSVT visit # 12/16     Patrick participated in neuro re-education activities to improve Balance, Coordination, Posture, and Movement Amplitude for 34 minutes. The following activities were included:     LSVT Maximal Daily Exercises to promote amplitude: all with 2# wrist weights   Sustained Movements (sitting) - 10 reps each side   Daily Exercises   Performance  Comments/Modifications   1  Floor<>ceiling  good Finger flicks; seated on wobble disc    2  Side<>side  good Finger flicks; alternating ;  "seated on wobble disc     Multidirectional Repetitive Movements (standing) - 10 reps each side- chair as support   Daily Exercises  Performance Comments/Modifications    3  Step & reach  (forward)   good with upper extremity support    4  Step & reach (sideways)   fair with upper extremity support   5  Step & reach  (backwards)   good with upper extremity support   6  Rock & reach  (foward)   good with upper extremity support     7  Rock & reach   (sideways twist)   fair with upper extremity support  ; cues for pivot on LE     Functional Component Tasks:   Sit to stands: x10 reps from low mat (blue mat) from wobble disc - 2# cuff weights added   convergence<>divergence with 14 font x1 set of 10 reps- completed while seated in chair with busy street back ground     Gait Training/ "BIG walking":  down hallways and around obstacles, avoiding various objects with focus on bigger amplitude and reciprocal arm swings x5 minutes - left arm swing 2# cuff weights ; head turns with locating items with minimal difficulty       Tushar received therapeutic exercises for 10 minutes including:  -upper body ergonometer on level 3.5; completed for 10 minutes switching directions mid way. Focus on postural control and breathing techniques with exhale with exertion of force. MET average 4.2     Home Exercises and Education Provided   Education provided:   - edu on PD symptoms and deficits from brain bleed region   - re calibration of movement with large amplitude   - large  pen to aid in stability; finger flicks   - edu on LSVT BIG and LOUD program; edu on what PD is   - role of Occupational Therapy in care, goals for Occupational Therapy for this plan of care,  scheduling  - Progress towards goals   - Loop group handwriting HEP/handout    Written Home Exercises Provided: yes. (Folder provided to keep HEP/HAPs from therapies 12/19)  Exercises were reviewed and Tushar was able to demonstrate them prior to the end of the session.  " Tushar demonstrated good  understanding of the HEP provided.   -word search    See EMR under Patient Instructions for exercises provided   12/13/2022: Convergence exercises   12/19/2022: Resistance band HEP for bilateral upper extremities   1/3/2023: Modified LSVT BIG     Assessment     Tushar required moderate cues today for exercises- discussed at this point in the program, pt should be able to complete teach back understanding this week with verbalized understanding.     Carryover assignment tracking:    Date Activity   1 1/3/2023 (week 1) Sit<>Stand low surface x10 reps    2 1/9/2023 (week 2) Convergence/divergence exercises / pencil push up        Tushar is progressing well towards his goals and there are no updates to goals at this time. Pt prognosis is Good.     Pt will continue to benefit from skilled outpatient occupational therapy to address the deficits listed in the problem list on initial evaluation provide pt/family education and to maximize pt's level of independence in the home and community environment.     Anticipated barriers to occupational therapy: cognition; comorbidities      Pt's spiritual, cultural and educational needs considered and pt agreeable to plan of care and goals.    Goals: 4 weeks; long term= short term  Pt will demo understanding for HEP/HAP provided with teach back understanding. Ongoing   Pt will demo 5/5 for proximal stability and strength for left upper extremity. Ongoing   Pt will demo understanding for convergence/divergence sufficiency exercises with teach back understanding. Met for HEP  (pt does not wish to work on handwriting)Pt will reduce limitation score on FOTO by less than or equal to 12% to demonstrate an increase in self-perceived functional performance. Ongoing     Additional goals:   Pt will demo understanding for LSVT BIG protocol with teachback understanding. Progressing   Pt will verbalize improvement in balance confidence as evident by ABC (on FOTO).  Ongoing       The following goals were discussed with the patient and patient is in agreement with them as to be addressed in the treatment plan.     Plan   Certification Period/Plan of care expiration: 1/31/2023     Outpatient Occupational Therapy 4 times weekly for 4 weeks to include the following interventions: Neuromuscular Re-ed, Patient Education, Self Care, Therapeutic Activities, and Therapeutic Exercise.    Discharge at the end of this week- pt with demo understanding for carry over at home. Provide BIG and LOUD for LIFE information for maintenance.     ISAAK Tatum

## 2023-01-23 ENCOUNTER — TELEPHONE (OUTPATIENT)
Dept: ELECTROPHYSIOLOGY | Facility: CLINIC | Age: 78
End: 2023-01-23
Payer: COMMERCIAL

## 2023-01-23 ENCOUNTER — CLINICAL SUPPORT (OUTPATIENT)
Dept: REHABILITATION | Facility: HOSPITAL | Age: 78
End: 2023-01-23
Attending: PSYCHIATRY & NEUROLOGY
Payer: COMMERCIAL

## 2023-01-23 ENCOUNTER — PATIENT MESSAGE (OUTPATIENT)
Dept: NEUROLOGY | Facility: CLINIC | Age: 78
End: 2023-01-23
Payer: COMMERCIAL

## 2023-01-23 DIAGNOSIS — R29.898 WEAKNESS OF LEFT UPPER EXTREMITY: ICD-10-CM

## 2023-01-23 DIAGNOSIS — H51.11 CONVERGENCE INSUFFICIENCY: Primary | ICD-10-CM

## 2023-01-23 PROCEDURE — 97110 THERAPEUTIC EXERCISES: CPT | Mod: PO

## 2023-01-23 PROCEDURE — 97112 NEUROMUSCULAR REEDUCATION: CPT | Mod: PO

## 2023-01-23 NOTE — TELEPHONE ENCOUNTER
Left message for patient to return call to review pre op instructions for ILR implant for tomorrow. Call back number left.

## 2023-01-24 ENCOUNTER — DOCUMENTATION ONLY (OUTPATIENT)
Dept: CARDIOLOGY | Facility: HOSPITAL | Age: 78
End: 2023-01-24
Payer: COMMERCIAL

## 2023-01-24 ENCOUNTER — HOSPITAL ENCOUNTER (OUTPATIENT)
Facility: HOSPITAL | Age: 78
Discharge: HOME | End: 2023-01-24
Attending: STUDENT IN AN ORGANIZED HEALTH CARE EDUCATION/TRAINING PROGRAM | Admitting: STUDENT IN AN ORGANIZED HEALTH CARE EDUCATION/TRAINING PROGRAM
Payer: COMMERCIAL

## 2023-01-24 VITALS
DIASTOLIC BLOOD PRESSURE: 74 MMHG | BODY MASS INDEX: 30.78 KG/M2 | HEART RATE: 70 BPM | TEMPERATURE: 99 F | SYSTOLIC BLOOD PRESSURE: 139 MMHG | RESPIRATION RATE: 20 BRPM | HEIGHT: 70 IN | WEIGHT: 215 LBS | OXYGEN SATURATION: 98 %

## 2023-01-24 DIAGNOSIS — Z95.9 CARDIAC DEVICE IN SITU: ICD-10-CM

## 2023-01-24 DIAGNOSIS — Z86.73 HISTORY OF ISCHEMIC LEFT MCA STROKE: ICD-10-CM

## 2023-01-24 DIAGNOSIS — I48.0 PAROXYSMAL ATRIAL FIBRILLATION: Primary | ICD-10-CM

## 2023-01-24 DIAGNOSIS — I63.9 CRYPTOGENIC STROKE: Primary | ICD-10-CM

## 2023-01-24 PROCEDURE — 25000003 PHARM REV CODE 250: Performed by: STUDENT IN AN ORGANIZED HEALTH CARE EDUCATION/TRAINING PROGRAM

## 2023-01-24 PROCEDURE — 33285 INSJ SUBQ CAR RHYTHM MNTR: CPT | Performed by: STUDENT IN AN ORGANIZED HEALTH CARE EDUCATION/TRAINING PROGRAM

## 2023-01-24 PROCEDURE — 93010 ELECTROCARDIOGRAM REPORT: CPT | Mod: ,,, | Performed by: INTERNAL MEDICINE

## 2023-01-24 PROCEDURE — 93005 ELECTROCARDIOGRAM TRACING: CPT

## 2023-01-24 PROCEDURE — 33285 INSJ SUBQ CAR RHYTHM MNTR: CPT | Mod: ,,, | Performed by: STUDENT IN AN ORGANIZED HEALTH CARE EDUCATION/TRAINING PROGRAM

## 2023-01-24 PROCEDURE — 93010 EKG 12-LEAD: ICD-10-PCS | Mod: ,,, | Performed by: INTERNAL MEDICINE

## 2023-01-24 PROCEDURE — C1764 EVENT RECORDER, CARDIAC: HCPCS | Performed by: STUDENT IN AN ORGANIZED HEALTH CARE EDUCATION/TRAINING PROGRAM

## 2023-01-24 PROCEDURE — 33285 PR INSERTION,SUBQ CARDIAC RHYTHM MONITOR, W/PRGRMG: ICD-10-PCS | Mod: ,,, | Performed by: STUDENT IN AN ORGANIZED HEALTH CARE EDUCATION/TRAINING PROGRAM

## 2023-01-24 PROCEDURE — 63600175 PHARM REV CODE 636 W HCPCS: Performed by: STUDENT IN AN ORGANIZED HEALTH CARE EDUCATION/TRAINING PROGRAM

## 2023-01-24 DEVICE — MON LNQ11 REVEAL LINQ USA
Type: IMPLANTABLE DEVICE | Site: CHEST | Status: FUNCTIONAL
Brand: REVEAL LINQ™

## 2023-01-24 RX ORDER — LIDOCAINE HYDROCHLORIDE AND EPINEPHRINE 20; 10 MG/ML; UG/ML
INJECTION, SOLUTION INFILTRATION; PERINEURAL
Status: DISCONTINUED | OUTPATIENT
Start: 2023-01-24 | End: 2023-01-24 | Stop reason: HOSPADM

## 2023-01-24 RX ORDER — CEFAZOLIN SODIUM 1 G/3ML
INJECTION, POWDER, FOR SOLUTION INTRAMUSCULAR; INTRAVENOUS
Status: DISCONTINUED | OUTPATIENT
Start: 2023-01-24 | End: 2023-01-24 | Stop reason: HOSPADM

## 2023-01-24 NOTE — SUBJECTIVE & OBJECTIVE
Past Medical History:   Diagnosis Date    CAD (coronary artery disease) 7/26/2016    Cerebral ischemic stroke due to global hypoperfusion with watershed infarct 10/10/2022    Heart block     MI (myocardial infarction)     Primary hypertension 11/21/2022    Reflux        Past Surgical History:   Procedure Laterality Date    CARDIAC CATHETERIZATION      EYE SURGERY Left 02/2017    HIP SURGERY Right 12/2021       Review of patient's allergies indicates:  No Known Allergies    No current facility-administered medications on file prior to encounter.     Current Outpatient Medications on File Prior to Encounter   Medication Sig    atorvastatin (LIPITOR) 40 MG tablet TAKE 1 TABLET BY MOUTH EVERY DAY    carbidopa-levodopa  mg (SINEMET)  mg per tablet TAKE 1 TABLET BY MOUTH THREE TIMES A DAY    finasteride (PROSCAR) 5 mg tablet 1 tablet once daily.    levocetirizine (XYZAL) 5 MG tablet Take 5 mg by mouth every evening.    lisinopriL 10 MG tablet Take 1 tablet (10 mg total) by mouth once daily.    pantoprazole (PROTONIX) 40 MG tablet Take 1 tablet (40 mg total) by mouth once daily.    aspirin 81 MG Chew Take 1 tablet (81 mg total) by mouth once daily.    clobetasol (TEMOVATE) 0.05 % cream APPLY TO AFFECTED AREA ON HAND TWICE A DAY AS NEEDED FOR RASH    desonide (DESOWEN) 0.05 % cream APPLY TO AFFECTED AREA ON FACE TWICE A DAY AS NEEDED FOR SCALE    nitroGLYCERIN (NITROSTAT) 0.4 MG SL tablet Place 1 tablet (0.4 mg total) under the tongue every 5 (five) minutes as needed for Chest pain. (Patient not taking: Reported on 11/22/2022)    triamcinolone acetonide 0.1% (KENALOG) 0.1 % ointment APPLY TO AFFECTED AREA ON AXILLAS TWICE A DAY AS NEEDED FOR ITCH/RASH     Family History       Problem Relation (Age of Onset)    Coronary artery disease Father    Emphysema Father    Hypertension Mother          Tobacco Use    Smoking status: Never    Smokeless tobacco: Never   Substance and Sexual Activity    Alcohol use: Yes      "Comment: twice a month/beer wine    Drug use: Never    Sexual activity: Yes     Partners: Female     Comment: Wife     Review of Systems   Constitutional: Negative for fever and malaise/fatigue.   Cardiovascular:  Negative for chest pain, dyspnea on exertion, irregular heartbeat and leg swelling.   Respiratory:  Negative for shortness of breath.    Skin:  Negative for rash.   Neurological:  Negative for light-headedness.   Objective:     Vital Signs (Most Recent):     Vitals:    01/24/23 0852 01/24/23 0853   BP: 139/81 (!) 142/89   BP Location: Left arm Right arm   Patient Position: Sitting Sitting   Pulse: 63    Resp: 20    Temp: 98.8 °F (37.1 °C)    TempSrc: Temporal    SpO2: 98%    Weight: 97.5 kg (215 lb)    Height: 5' 10" (1.778 m)      Vital Signs (24h Range):             Body mass index is 30.85 kg/m².            Physical Exam  Vitals and nursing note reviewed.   Constitutional:       General: He is not in acute distress.     Appearance: He is well-developed. He is not ill-appearing.   Cardiovascular:      Rate and Rhythm: Normal rate and regular rhythm.      Pulses: Normal pulses.           Radial pulses are 2+ on the right side and 2+ on the left side.   Pulmonary:      Effort: Pulmonary effort is normal. No respiratory distress.      Breath sounds: Normal breath sounds.   Musculoskeletal:      Right lower leg: Normal. No swelling. No edema.      Left lower leg: Normal. No swelling. No edema.   Neurological:      Mental Status: He is alert.   Psychiatric:         Behavior: Behavior is cooperative.       Significant Labs: most recent reviewed      Significant Imaging: NSR; 60 bpm  "

## 2023-01-24 NOTE — PROGRESS NOTES
[facility]   Occupational Therapy Neurological Rehabilitation   LSVT BIG- Treatment Note   Name: Patrick Short  MRN: [id number]  Today's Date: 1/25/2023    Therapy Diagnosis:   Encounter Diagnoses   Name Primary?    Convergence insufficiency Yes    Weakness of left upper extremity        Physician: Frantz Bee MD  Physician Orders: Neuro Program      Medical Diagnosis: Z86.73 (ICD-10-CM) - History of ischemic left MCA stroke G20 (ICD-10-CM) - Parkinson disease   Evaluation Date: 12/7/2022; re-assessment 12/27  Plan of Care Expiration Period: 1/31/2023  Insurance Authorization period Expiration: 12/31/2023  Date of Return to MD: 1/24/2023: Loop reorder; 1/31/23: Neurology   FOTO: 3 / 3     Visit # / Visits Authorized: 13 / 20  (+evaluation and 5 visits in 2022)      Time In: 9:35  Time Out: 10:25  Total Billable Time: 50 minutes    Precautions: Standard and Fall; hearing impaired     Subjective   Pt reports: no complaints; had loop recorder placed yesterday and feeling fine this morning   He reports he is compliant with his HEP  Response to previous treatment: no complaints  Functional change: getting up and out of chairs has gotten easier   Patient's Goals for Occupational Therapy: to increase his quality of life and remain independent ; walk/run a marathon every decade     Pain upon arrival: 0/10   Location: n/a  Pain at cessation of session: 0/10    Objective     LSVT Protocol    Week: 4/4   LSVT visit # 13/16     Patrick participated in neuro re-education activities to improve Balance, Coordination, Posture, and Movement Amplitude for 24 minutes. The following activities were included:     LSVT Maximal Daily Exercises to promote amplitude: with 2# wrist weights   Sustained Movements (sitting) - 10 reps each side   Daily Exercises   Performance  Comments/Modifications   1  Floor<>ceiling  good Finger flicks; seated on wobble disc    2  Side<>side  good Finger flicks; alternating ;  "seated on wobble disc     Multidirectional Repetitive Movements (standing) - 10 reps each side- chair as support   Daily Exercises  Performance Comments/Modifications    3  Step & reach  (forward)   good with upper extremity support    4  Step & reach (sideways)   fair with upper extremity support   5  Step & reach  (backwards)   good with upper extremity support   6  Rock & reach  (foward)   good with upper extremity support     7  Rock & reach   (sideways twist)   fair with upper extremity support  ; cues for pivot on LE   RPE: 7-8    Functional Component Tasks:   Sit to stands: x10 reps from low mat (blue mat) from wobble disc   convergence<>divergence with 14 font x1 set of 10 reps- completed while seated in chair with busy street back ground     Gait Training/ "BIG walking":  down hallways and around obstacles, avoiding various objects with focus on bigger amplitude and reciprocal arm swings x5 minutes - head turns and holding conversation; cues for truck extension (min tactile)    Tushar received therapeutic exercises for 10 minutes including:  -upper body ergonometer on level 3.5; completed for 10 minutes switching directions mid way. Focus on postural control and breathing techniques with exhale with exertion of force. MET average 3.2; RPE: 7; completed while holding conversation for multi tasking     Tushar participated in dynamic functional therapeutic activities to improve functional performance for 16  minutes, including:  Seated at table top:  -FOTO completion with iPad; donned reading glasses for completion     Home Exercises and Education Provided   Education provided:   - edu on PD symptoms and deficits from brain bleed region   - re calibration of movement with large amplitude   - large  pen to aid in stability; finger flicks   - edu on LSVT BIG and LOUD program; edu on what PD is   - role of Occupational Therapy in care, goals for Occupational Therapy for this plan of care,  scheduling  - " Progress towards goals   - Loop group handwriting HEP/handout    Written Home Exercises Provided: yes. (Folder provided to keep HEP/HAPs from therapies 12/19)  Exercises were reviewed and Tushar was able to demonstrate them prior to the end of the session.  Tushar demonstrated good  understanding of the HEP provided.   -word search    See EMR under Patient Instructions for exercises provided   12/13/2022: Convergence exercises   12/19/2022: Resistance band HEP for bilateral upper extremities   1/3/2023: Modified LSVT BIG     Assessment     Tushar continues to require min visual cues for BIG exercises. He would greatly benefit from BIG and LOUD for LIFE for maintenance of activity. He verbalized RPE of 7 throughout today's activity demo good effort for best benefits.     With convergence and divergence task, pt continued to demo/ complain of diplopia with divergence. Have reached out to MD in regards to complaints.    Carryover assignment tracking:    Date Activity   1 1/3/2023 (week 1) Sit<>Stand low surface x10 reps    2 1/9/2023 (week 2) Convergence/divergence exercises / pencil push up        Tushar is progressing well towards his goals and there are no updates to goals at this time. Pt prognosis is Good.     Pt will continue to benefit from skilled outpatient occupational therapy to address the deficits listed in the problem list on initial evaluation provide pt/family education and to maximize pt's level of independence in the home and community environment.     Anticipated barriers to occupational therapy: cognition; comorbidities      Pt's spiritual, cultural and educational needs considered and pt agreeable to plan of care and goals.    Goals: 4 weeks; long term= short term  Pt will demo understanding for HEP/HAP provided with teach back understanding. Met  Pt will demo 5/5 for proximal stability and strength for left upper extremity. Ongoing   Pt will demo understanding for convergence/divergence sufficiency  exercises with teach back understanding. Met for HEP  (pt does not wish to work on handwriting)Pt will reduce limitation score on FOTO by less than or equal to 12% to demonstrate an increase in self-perceived functional performance. not met      Additional goals:   Pt will demo understanding for LSVT BIG protocol with teachback understanding. Requiring min cues   Pt will verbalize improvement in balance confidence as evident by ABC (on FOTO).  Met; 88.1%     The following goals were discussed with the patient and patient is in agreement with them as to be addressed in the treatment plan.     Plan   Certification Period/Plan of care expiration: 1/31/2023     Outpatient Occupational Therapy 4 times weekly for 4 weeks to include the following interventions: Neuromuscular Re-ed, Patient Education, Self Care, Therapeutic Activities, and Therapeutic Exercise.    Discharge at the end of this week- pt with demo understanding for carry over at home. Provide BIG and LOUD for LIFE information for maintenance.     ISAAK Tatum

## 2023-01-24 NOTE — PLAN OF CARE
Patient discharged per MD orders. Instructions given on medications, wound care, activity, signs of infection, when to call MD, and follow up appointments. Pt verbalized understanding.  Patient AAOx4, VSS, no c/o pain or discomfort at this time. Left anterior chest  foam dressing is c/d/I. No active bleeding. No hematoma noted. PIV removed with cath intact. Patient declined wheelchair with escort and ambulated off the unit accompanied by his wife.

## 2023-01-24 NOTE — DISCHARGE SUMMARY
[facility] - Short Stay Cardiac Unit  Cardiac Electrophysiology  Discharge Summary      Patient Name: Patrick Short  MRN: [id number]  Admission Date: 1/24/2023  Hospital Length of Stay: 0 days  Discharge Date and Time: 1/24/2023  1:25 PM  Attending Physician: Tami   Discharging Provider: My Walters NP  Primary Care Physician: Nilton Lloyd MD    HPI:   Patrick Short 77 y.o. male presents for ILR implantation for cryptogenic stroke about 3 months ago (10/2022). At that time he had an event monitor placed which was normal.  He has a past medical history significant for a history of prior strokes - a left parietal ischemic stroke 10/5/2022 with a left occipital pole infarct, and a recent right cortical intracerebral hemorrhage, coronary artery disease s/p PCI of the LAD, Parkinsons disease, mild cognitive impairment, hypertension, GERD, and obesity.         Procedure(s) (LRB):  Insertion, Implantable Loop Recorder (N/A)     Indwelling Lines/Drains at time of discharge:  Lines/Drains/Airways     None                 Hospital Course:  S/p ILR insertion.  Tolerated procedure.  Dressing intact. Patient d/c'd home with instructions.        Goals of Care Treatment Preferences:  Code Status: Full Code    Significant Diagnostic Studies: Cardiac Graphics: ECG: pre-procedure: NSR; 60bpm    Pending Diagnostic Studies:     None          Final Active Diagnoses:    Diagnosis Date Noted POA    PRINCIPAL PROBLEM:  Cryptogenic stroke [I63.9] 10/10/2022 Unknown    History of ischemic left MCA stroke [Z86.73] 11/21/2022 Not Applicable      Problems Resolved During this Admission:     * Cryptogenic stroke  S/p ILR insertion for evaluation of Afib    History of ischemic left MCA stroke  s/p ILR implantation       Discharged Condition: stable    Disposition: Home or Self Care    Follow Up:   Follow-up Information     WOUND CHECK, [facility] Follow up in 1 week(s).    Why: Wound check           A Emilee Roth MD Follow up in 3  month(s).    Specialty: Electrophysiology  Why: ILR placement f/u  Contact information:  [address]  [facility] [address]  [phone]                       Patient Instructions:      Diet Cardiac     Ice to affected area     Remove dressing in 48 hours   Order Comments: May shower after dressing removed with shower spray to back.  Do not submerge in pool or tub.     Notify your health care provider if you experience any of the following:  temperature >100.4     Notify your health care provider if you experience any of the following:  persistent nausea and vomiting or diarrhea     Notify your health care provider if you experience any of the following:  severe uncontrolled pain     Notify your health care provider if you experience any of the following:  redness, tenderness, or signs of infection (pain, swelling, redness, odor or green/yellow discharge around incision site)     Notify your health care provider if you experience any of the following:  difficulty breathing or increased cough     Notify your health care provider if you experience any of the following:  severe persistent headache     Notify your health care provider if you experience any of the following:  worsening rash     Notify your health care provider if you experience any of the following:  persistent dizziness, light-headedness, or visual disturbances     Notify your health care provider if you experience any of the following:  increased confusion or weakness     Activity as tolerated     Shower on day dressing removed (No bath)     Medications:  Reconciled Home Medications:      Medication List      CONTINUE taking these medications    desonide 0.05 % cream  Commonly known as: DESOWEN  APPLY TO AFFECTED AREA ON FACE TWICE A DAY AS NEEDED FOR SCALE        ASK your doctor about these medications    aspirin 81 MG Chew  Take 1 tablet (81 mg total) by mouth once daily.     atorvastatin 40 MG tablet  Commonly known as: LIPITOR  TAKE 1 TABLET  BY MOUTH EVERY DAY     carbidopa-levodopa  mg  mg per tablet  Commonly known as: SINEMET  TAKE 1 TABLET BY MOUTH THREE TIMES A DAY     clobetasoL 0.05 % cream  Commonly known as: TEMOVATE  APPLY TO AFFECTED AREA ON HAND TWICE A DAY AS NEEDED FOR RASH     finasteride 5 mg tablet  Commonly known as: PROSCAR  1 tablet once daily.     levocetirizine 5 MG tablet  Commonly known as: XYZAL  Take 5 mg by mouth every evening.     lisinopriL 10 MG tablet  Take 1 tablet (10 mg total) by mouth once daily.     nitroGLYCERIN 0.4 MG SL tablet  Commonly known as: NITROSTAT  Place 1 tablet (0.4 mg total) under the tongue every 5 (five) minutes as needed for Chest pain.     pantoprazole 40 MG tablet  Commonly known as: PROTONIX  Take 1 tablet (40 mg total) by mouth once daily.     triamcinolone acetonide 0.1% 0.1 % ointment  Commonly known as: KENALOG  APPLY TO AFFECTED AREA ON AXILLAS TWICE A DAY AS NEEDED FOR ITCH/RASH            Time spent on the discharge of patient: 20 minutes    My Walters NP  Cardiac Electrophysiology  [facility] - Short Stay Cardiac Unit

## 2023-01-24 NOTE — PLAN OF CARE
Received report from CURLY Beckford. Patient s/p loop recorder placement,  AAOx4. VSS, no c/o pain or discomfort at this time, resp even and unlabored. Foam dressing to left anterior chest c/d/I. No active bleeding. No hematoma noted.  Post procedure protocol reviewed with laztent and patient's wife. Understanding verbalized.  Nurse call bell within reach. Will continue to monitor per post procedure protocol.

## 2023-01-24 NOTE — DISCHARGE INSTRUCTIONS
Post-Procedure Patient Discharge Instructions  Loop Recorders     Wound Care  You are discharged with a standard dressing over the incision, you may remove the dressing after 24 hours.   There is Dermabond (clear glue) over your incision, do not scrub it off. It acts as a barrier and will eventually disappear.  Do not get the incision wet for 48 hours following the procedure. You may sponge bath during this period, working around the incision. After 48 hours, you may shower, but you should still try to keep this area as dry as possible, and avoid direct water contact to the incision (allow the water to hit back of your shoulder rather than directly on the incision). Gently pat the incision dry if it does get wet.  You may take regular showers after 2 weeks, unless otherwise indicated at your follow-up visit.  Do not submerge the incision in a tub, pool, or body of water for 6 weeks.  If you notice unusual swelling, redness, drainage, have more device site pain, chest pain, shortness of breath, or have a fever greater than 100 degrees, call our device clinic immediately: [phone] or [phone] during normal office hours. You may call [phone] after-hours or on weekends and ask for the electrophysiologist on call.    Activity   If you were driving prior to the procedure, you may resume driving right after your procedure (as long you did not receive any sedation). If you have a history of passing out or a history of certain arrhythmias, there may be driving restrictions unrelated to the procedure. Please clarify with your physician if this is the case.  Avoid rough contact at the device site for 6 weeks.  You may participate in sexual activity unless otherwise instructed.  You may return to work the follow day unless told otherwise by your provider.    Long-Term Instructions  Metal Detectors at Airports: Metal detectors at airports do not interfere with you loop recorder.  MRI: You may have an MRI  with an implantable loop recorder. All that is required is that you send a transmission to download your information before and after you have your MRI.    Long-Term Follow-Up  Your device has the ability to transmit device information from home to the doctors office using a home monitoring system.  This remote system takes the place of a doctors visit. Your device will be checked from home every 31 days. Every 12 months, you will be asked to come into the office for an in-office check.  Your device should last in the range of 3 years.         Any need to reschedule or cancel procedures, or any questions regarding your procedures should be addressed directly with the Arrhythmia Department Nurses at the following phone number: [phone].

## 2023-01-24 NOTE — HPI
Patrick Short 77 y.o. male presents for ILR implantation for cryptogenic stroke about 3 months ago (10/2022). At that time he had an event monitor placed which was normal.  He has a past medical history significant for a history of prior strokes - a left parietal ischemic stroke 10/5/2022 with a left occipital pole infarct, and a recent right cortical intracerebral hemorrhage, coronary artery disease s/p PCI of the LAD, Parkinsons disease, mild cognitive impairment, hypertension, GERD, and obesity.

## 2023-01-24 NOTE — H&P
[facility] - Short Stay Cardiac Unit  Cardiac Electrophysiology  History and Physical     Admission Date: 1/24/2023  Code Status: Prior   Attending Provider: ALBERTO Roth MD   Principal Problem:<principal problem not specified>    Subjective:     Chief Complaint:  Presents for ILR implantation     HPI:  Patrick Short 77 y.o. male presents for ILR implantation for cryptogenic stroke about 3 months ago (10/2022). At that time he had an event monitor placed which was normal.  He has a past medical history significant for a history of prior strokes - a left parietal ischemic stroke 10/5/2022 with a left occipital pole infarct, and a recent right cortical intracerebral hemorrhage, coronary artery disease s/p PCI of the LAD, Parkinsons disease, mild cognitive impairment, hypertension, GERD, and obesity.         Past Medical History:   Diagnosis Date    CAD (coronary artery disease) 7/26/2016    Cerebral ischemic stroke due to global hypoperfusion with watershed infarct 10/10/2022    Heart block     MI (myocardial infarction)     Primary hypertension 11/21/2022    Reflux        Past Surgical History:   Procedure Laterality Date    CARDIAC CATHETERIZATION      EYE SURGERY Left 02/2017    HIP SURGERY Right 12/2021       Review of patient's allergies indicates:  No Known Allergies    No current facility-administered medications on file prior to encounter.     Current Outpatient Medications on File Prior to Encounter   Medication Sig    atorvastatin (LIPITOR) 40 MG tablet TAKE 1 TABLET BY MOUTH EVERY DAY    carbidopa-levodopa  mg (SINEMET)  mg per tablet TAKE 1 TABLET BY MOUTH THREE TIMES A DAY    finasteride (PROSCAR) 5 mg tablet 1 tablet once daily.    levocetirizine (XYZAL) 5 MG tablet Take 5 mg by mouth every evening.    lisinopriL 10 MG tablet Take 1 tablet (10 mg total) by mouth once daily.    pantoprazole (PROTONIX) 40 MG tablet Take 1 tablet (40 mg total) by mouth once daily.     "aspirin 81 MG Chew Take 1 tablet (81 mg total) by mouth once daily.    clobetasol (TEMOVATE) 0.05 % cream APPLY TO AFFECTED AREA ON HAND TWICE A DAY AS NEEDED FOR RASH    desonide (DESOWEN) 0.05 % cream APPLY TO AFFECTED AREA ON FACE TWICE A DAY AS NEEDED FOR SCALE    nitroGLYCERIN (NITROSTAT) 0.4 MG SL tablet Place 1 tablet (0.4 mg total) under the tongue every 5 (five) minutes as needed for Chest pain. (Patient not taking: Reported on 11/22/2022)    triamcinolone acetonide 0.1% (KENALOG) 0.1 % ointment APPLY TO AFFECTED AREA ON AXILLAS TWICE A DAY AS NEEDED FOR ITCH/RASH     Family History       Problem Relation (Age of Onset)    Coronary artery disease Father    Emphysema Father    Hypertension Mother          Tobacco Use    Smoking status: Never    Smokeless tobacco: Never   Substance and Sexual Activity    Alcohol use: Yes     Comment: twice a month/beer wine    Drug use: Never    Sexual activity: Yes     Partners: Female     Comment: Wife     Review of Systems   Constitutional: Negative for fever and malaise/fatigue.   Cardiovascular:  Negative for chest pain, dyspnea on exertion, irregular heartbeat and leg swelling.   Respiratory:  Negative for shortness of breath.    Skin:  Negative for rash.   Neurological:  Negative for light-headedness.   Objective:     Vital Signs (Most Recent):     Vitals:    01/24/23 0852 01/24/23 0853   BP: 139/81 (!) 142/89   BP Location: Left arm Right arm   Patient Position: Sitting Sitting   Pulse: 63    Resp: 20    Temp: 98.8 °F (37.1 °C)    TempSrc: Temporal    SpO2: 98%    Weight: 97.5 kg (215 lb)    Height: 5' 10" (1.778 m)      Vital Signs (24h Range):             Body mass index is 30.85 kg/m².            Physical Exam  Vitals and nursing note reviewed.   Constitutional:       General: He is not in acute distress.     Appearance: He is well-developed. He is not ill-appearing.   Cardiovascular:      Rate and Rhythm: Normal rate and regular rhythm.      Pulses: " Normal pulses.           Radial pulses are 2+ on the right side and 2+ on the left side.   Pulmonary:      Effort: Pulmonary effort is normal. No respiratory distress.      Breath sounds: Normal breath sounds.   Musculoskeletal:      Right lower leg: Normal. No swelling. No edema.      Left lower leg: Normal. No swelling. No edema.   Neurological:      Mental Status: He is alert.   Psychiatric:         Behavior: Behavior is cooperative.       Significant Labs: most recent reviewed      Significant Imaging: NSR; 60 bpm    Assessment and Plan:     History of ischemic left MCA stroke  Cryptogenic stroke 10/2022.    Event monitor 10/2022: no arrhythmias  [id number] score: 6; not currently on anticoagulation due to high risk    Plan for ILR implantation today.     Local anesthesia only preferred  Mallampati score: III  ASA: 3    My Walters NP  Cardiac Electrophysiology  [facility] - Short Stay Cardiac Unit

## 2023-01-25 ENCOUNTER — CLINICAL SUPPORT (OUTPATIENT)
Dept: REHABILITATION | Facility: HOSPITAL | Age: 78
End: 2023-01-25
Attending: INTERNAL MEDICINE
Payer: COMMERCIAL

## 2023-01-25 DIAGNOSIS — H51.11 CONVERGENCE INSUFFICIENCY: Primary | ICD-10-CM

## 2023-01-25 DIAGNOSIS — R29.898 WEAKNESS OF LEFT UPPER EXTREMITY: ICD-10-CM

## 2023-01-25 PROCEDURE — 97110 THERAPEUTIC EXERCISES: CPT | Mod: PO

## 2023-01-25 PROCEDURE — 97112 NEUROMUSCULAR REEDUCATION: CPT | Mod: PO

## 2023-01-25 PROCEDURE — 97530 THERAPEUTIC ACTIVITIES: CPT | Mod: PO

## 2023-01-25 NOTE — PROGRESS NOTES
"  [facility]   Occupational Therapy Neurological Rehabilitation   LSVT BIG- Discharge    Name: Patrick Short  MRN: [id number]  Today's Date: 1/26/2023    Therapy Diagnosis:   Encounter Diagnoses   Name Primary?    Convergence insufficiency Yes    Weakness of left upper extremity      Physician: Frantz Bee MD  Physician Orders: Neuro Program      Medical Diagnosis: Z86.73 (ICD-10-CM) - History of ischemic left MCA stroke G20 (ICD-10-CM) - Parkinson disease   Evaluation Date: 12/7/2022; re-assessment 12/27  Plan of Care Expiration Period: 1/31/2023  Insurance Authorization period Expiration: 12/31/2023  Date of Return to MD:  1/31/23: Neurology   FOTO: 3 / 3     Visit # / Visits Authorized: 14 / 20  (+evaluation and 5 visits in 2022)      Time In: 9:15  Time Out: 10:05  Total Billable Time: 50 minutes    Precautions: Standard and Fall; hearing impaired     Subjective   Pt reports: "I find that I just get more frustrated than I use to.. I don't like it"  He reports he is compliant with his HEP  Response to previous treatment: no complaints  Functional change: getting up and out of chairs has gotten easier   Patient's Goals for Occupational Therapy: to increase his quality of life and remain independent ; walk/run a marathon every decade     Pain upon arrival: 0/10   Location: n/a  Pain at cessation of session: 0/10    Objective     LSVT Protocol    Week: 4/4   LSVT visit # 14/16   *2 missed visits: Loop recorder placement and schedule conflict    Patrick participated in neuro re-education activities to improve Balance, Coordination, Posture, and Movement Amplitude for 17 minutes. The following activities were included:     LSVT Maximal Daily Exercises to promote amplitude:   Sustained Movements (sitting) - 5 reps each side; seated on low blue mat   Daily Exercises   Performance  Comments/Modifications   1  Floor<>ceiling  good Finger flicks   2  Side<>side  good Finger flicks " "    Multidirectional Repetitive Movements (standing) - 5 reps each side- ballet bar as support   Daily Exercises  Performance Comments/Modifications    3  Step & reach  (forward)   good with upper extremity support    4  Step & reach (sideways)   fair with upper extremity support   5  Step & reach  (backwards)   good with upper extremity support   6  Rock & reach  (foward)   good with upper extremity support     7  Rock & reach   (sideways twist)   fair with upper extremity support      Functional Component Tasks:   Sit to stands: x5 reps    Gait Training/ "BIG walking":  down hallways and around obstacles, avoiding various objects with focus on bigger amplitude and reciprocal arm swings x5 minutes - head turns and holding conversation; cues for truck extension (min tactile)    Tushar received therapeutic exercises for 10 minutes including:  -upper body ergonometer on level 3.5; completed for 10 minutes switching directions mid way. Focus on postural control and breathing techniques with exhale with exertion of force. MET average 3.7; RPE: 7; completed while holding conversation for multi tasking     Tushar participated in dynamic functional therapeutic activities to improve functional performance for 23  minutes, including:  Visual/Perceptual:  Tracking: intact functionally all directions   Saccades: impaired slowed all directions    Acuity: uses readers  Convergence and divergence: 3 cm   Nystagmus: none noted    R/L discrimination: intact  Comments: history: left cataract sx prior ; torn retina. Awaiting clearance for sx on right cataract     Physical Exam:  Postural examination/scapula alignment: Rounded shoulder and Head forward  Joint integrity: intact  Skin integrity: intact  Edema: none noted       Joint Evaluation: bilateral upper extremity AROM WFL    Pt reported Right RTC tear awaiting repair      Fist: normal; right hand with 4th digit contracture      Strength: 5/5 throughout bilateral upper extremities "       Strength: (GUERLINE Dynamometer in lbs.) Position II:       12/7/2022 12/7/2022 12/27/22 12/27/22 1/26/23 1/26/23     Right Left Right Left Right Left   Rung II 81.6 # 76.0 # 80.4 # 78.7 # 80.4 # 81.2 #   Norms for  Strength (70+)  Male: Right Left   54 lbs 48 lbs   SD+/-19#    Pinch Strength       12/7/2022 12/7/2022 12/27/22 12/27/22 1/26/23 1/26/23     Right Left Right Left Right Left   Key Pinch 22 psi 20 psi 23 16.5 23.4 22.4   3pt Pinch 14 psi 19 psi 17.8 18.2 11.8 13.9   2pt Pinch 11 psi 13 psi 11.8 14.3 11.4 14.7      Fine Motor Coordination: 9 Hole Peg Test     Right   12/7/2022 Left   12/7/2022 Right  12/27/22 Left  12/27/22 Right  1/26/23 Left  1/26/23                27.2 s              29.8 s 33 s 26 s 24.16 s 25.51 s          Fine Motor Coordination: Box and Block     Right   12/7/2022 Left   12/7/2022 Right  12/27/22 Left  12/27/22 Right  1/26/23 Left  1/26/23               51              53 50 53 57 56      Gross motor coordination:   JOLIE (Rapid Alternating Movements): mild delay left   Finger to Nose (3 times): intact; no dysmetria   Finger Flicks (coordination moving from digit flexion to digit extension): slowed on left   Opposition: intact bilateral  ; slowed reaction time on right      Tone:  Modified Ila Scale:   0 - No increase in muscle tone     Sensation:  Tushar reports no deficits   Kinesthesia: bilateralintact  Proprioception: bilateral intact        Balance:   Static Sitting- GOOD+: Takes MAXIMAL challenges from all directions.    Dynamic Sitting- GOOD+: Takes MAXIMAL challenges from all directions.    Static Standing - GOOD: Takes MODERATE challenges from all directions  Dynamic Standing - Fair+     Endurance Deficit: mild    Additional:   Discussed re-assessment results with patient and spouse  Discussed session with speech language pathology  following     Pt to waiting room at cessation of session       Home Exercises and Education Provided   Education provided:   -  edu on PD symptoms and deficits from brain bleed region   - re calibration of movement with large amplitude   - large  pen to aid in stability; finger flicks   - edu on LSVT BIG and LOUD program; edu on what PD is   - role of Occupational Therapy in care, goals for Occupational Therapy for this plan of care,  scheduling  - Progress towards goals   - Loop group handwriting HEP/handout    Written Home Exercises Provided: yes. (Folder provided to keep HEP/HAPs from therapies 12/19)  Exercises were reviewed and Tushar was able to demonstrate them prior to the end of the session.  Tushar demonstrated good  understanding of the HEP provided.   -word search    See EMR under Patient Instructions for exercises provided   12/13/2022: Convergence exercises   12/19/2022: Resistance band HEP for bilateral upper extremities   1/3/2023: Modified LSVT BIG  1/26/2023: maintenance program      Assessment     Tushar demo gross and fine motor improvement this plan of care as evident above. He demo 5# left  strength improvement and significant gains in fine motor coordination. Most notably, pt reports he is getting out of chairs more efficiently and is more confident overall in his balance. Re edu on large amplitude and BIG walking with need for reinforcement.     Information and education provided on BIG for LIFE program (to patient and spouse). Discussed with MD need for neuro-opthalmology consult.     Goals: 4 weeks; long term= short term  Pt will demo understanding for HEP/HAP provided with teach back understanding. Met  Pt will demo 5/5 for proximal stability and strength for left upper extremity. Met  Pt will demo understanding for convergence/divergence sufficiency exercises with teach back understanding. Met for HEP  (pt does not wish to work on handwriting)Pt will reduce limitation score on FOTO by less than or equal to 12% to demonstrate an increase in self-perceived functional performance. not met      Additional goals:    Pt will demo understanding for LSVT BIG protocol with teachback understanding. Requiring min visual cues   Pt will verbalize improvement in balance confidence as evident by ABC (on FOTO).  Met; 88.1%     The following goals were discussed with the patient and patient is in agreement with them as to be addressed in the treatment plan.     Plan   Information provided for BIG for LIFE for 1x/w maintenance.     ISAAK Tatum

## 2023-01-26 ENCOUNTER — CLINICAL SUPPORT (OUTPATIENT)
Dept: REHABILITATION | Facility: HOSPITAL | Age: 78
End: 2023-01-26
Attending: PSYCHIATRY & NEUROLOGY
Payer: COMMERCIAL

## 2023-01-26 DIAGNOSIS — H51.11 CONVERGENCE INSUFFICIENCY: Primary | ICD-10-CM

## 2023-01-26 DIAGNOSIS — G20.A1 PARKINSON DISEASE: Primary | ICD-10-CM

## 2023-01-26 DIAGNOSIS — G31.84 MILD NEUROCOGNITIVE DISORDER: ICD-10-CM

## 2023-01-26 DIAGNOSIS — R29.898 WEAKNESS OF LEFT UPPER EXTREMITY: ICD-10-CM

## 2023-01-26 DIAGNOSIS — R26.89 IMPAIRED GAIT AND MOBILITY: Primary | ICD-10-CM

## 2023-01-26 DIAGNOSIS — Z86.73 HISTORY OF ISCHEMIC LEFT MCA STROKE: ICD-10-CM

## 2023-01-26 PROCEDURE — 97112 NEUROMUSCULAR REEDUCATION: CPT | Mod: PO

## 2023-01-26 PROCEDURE — 97530 THERAPEUTIC ACTIVITIES: CPT | Mod: PO

## 2023-01-26 PROCEDURE — 97130 THER IVNTJ EA ADDL 15 MIN: CPT | Mod: PO

## 2023-01-26 PROCEDURE — 97110 THERAPEUTIC EXERCISES: CPT | Mod: PO

## 2023-01-26 PROCEDURE — 97129 THER IVNTJ 1ST 15 MIN: CPT | Mod: PO

## 2023-01-26 NOTE — PROGRESS NOTES
"[facility]   Physical Therapy Treatment Note     Name: Patrick Short  Clinic Number: [id number]    Therapy Diagnosis:   Encounter Diagnosis   Name Primary?    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 Patient verbalizes and demonstrates understanding.   Response to previous treatment: good  Functional change: ongoing     Pain: 0/10  Location: NA    OBJECTIVE     Objective Measures updated at progress report unless specified.     Treatment     Tushar received the treatments listed below:      therapeutic exercises to develop strength, endurance, ROM, flexibility, posture, and core stabilization for 15 minutes includin minutes was spent educating the patient and his wife today.   They were informed and given handouts to personal training at [facility] and informed of the option to attend personal training sessions in order to become more comfortable with the equipment at the gym.   Patient was informed to continue with home exercise program.   Patient informed to do 20-25 minutes of cardio/ endurance training daily.   The patien yovany multiple questions of the disease process and prognosis of patient's with Parkinson's disease.   Patient was educated on progressive nature of the disease and the importance of maintain activity levels to maintain functional mobility and QOL.     Patient " Education and Home Exercises     Home Exercises Provided and Patient Education Provided     Education provided:   - home exercise program     Written Home Exercises Provided: yes. Exercises were reviewed and Tushar was able to demonstrate them prior to the end of the session.  Tushar demonstrated good  understanding of the education provided. See EMR under Patient Instructions for exercises provided during therapy sessions      PHYSICAL THERAPY DISCHARGE SUMMARY   Total Visits:11  Missed Visits:0  Cancelled Visits: 0      Goals Not achieved and why:   Patient made excellent progress with therapy. Patient with notable improvements noted with LE strength, endurance and overall stability. The patient's home exercise program was reprinted today and the patient was extensively educated on various options after discharge to maintain functional mobility levels. The patient and his wife verbalized understanding.     Discharge reason : Patient self discharge and Patient has maximized benefit from PT at this time    PLAN   This patient is discharged from Outpatient Physical Therapy Services.     Salma Phipps, PT  01/26/2023

## 2023-01-26 NOTE — PROGRESS NOTES
[facility]  Speech Therapy Treatment Note- Neurological Rehabilitation  Date: 1/26/2023     Name: Patrick Short   MRN: [id number]   Therapy Diagnosis:   Encounter Diagnoses   Name Primary?    Parkinson disease Yes    Mild neurocognitive disorder     History of ischemic left MCA stroke          Physician: Frantz Bee MD  Physician Orders: [id number] - AMB REFERRAL/CONSULT TO SPEECH THERAPY  Medical Diagnosis: History of ischemic left MCA stroke [Z86.73], Parkinson disease [G20]    Visit #/ Visits Authorized: 8/ 12  Date of Evaluation:  12/8/22  Insurance Authorization Period: 12/8/22-12/31/22  Plan of Care Expiration Date:    12/8/2022 to 1/19/23   Extended Plan of Care:  n/a   Progress Note: 2/8/23   Visits Cancelled: 0  Visits No Show: 0    Time In:  10:15am   Time Out:  11:00am   Total Billable Time: 45 minutes      Precautions: Standard, Fall, and Cognition  Subjective:   Patient reports: his wife arrived to session today.     He was compliant to home exercise program.   Response to previous treatment: positive    Pain Scale:  0/10 on a Visual Analog Scale currently.   Pain Location: n/a  Objective:   TIMED  Procedure Min.   Cognitive Therapeutic Interventions, first 15 minutes CPT 10087  15   Cognitive Therapeutic Interventions, each additional 15 minutes CPT 06034  30         UNTIMED  Procedure Min.             Total Timed Units: 3  Total Untimed Units: 0  Charges Billed/Number of units: 3    Short Term Goals: (4 weeks) Current Progress:   Patient will name 10-15 items in a concrete category in one minute to improve word fluency and thought organization.     Progressing/ 1/26/2023   Goal Met / Discontinue      2. Patient will complete tasks requiring divided attention and alternating attention with 90% accuracy given min cues to improve attention skills      Progressing/ 1/26/2023   Not formally addressed      However patient reports pre-existing attention difficulties his entire life.  "    Noted 4 episodes of "loosing his point" however pt. Was able to return to topic Independently  2/4 times        3. Pt will complete divided attention tasks in distracting environment with 90% accuracy independently.      Progressing/  1/26/2023   Goal Met / Discontinue        4. Patient will immediately recall specific details from information recently seen or heard using memory retrieval strategies with 90% accuracy given min cues to improve auditory and visual recall.      Progressing/  1/26/2023   Goal Met / Discontinue      5. Patient will complete visual recall tasks with 90% acc indly to improve visual recall      Progressing/ Not Met 1/26/2023   Not formally addressed                      6. Patient will complete reasoning/problem solving tasks with 90% accuracy given min cues      Progressing/ Not Met 1/26/2023   Completed within conversation of current skills and future steps; patient created many options for problem solving compared to his current level. He additionally stated many reasons that he may be not interested and/or have more difficulties in certain areas due to personality and previous event in his life.              7. Patient will complete mental manipulation tasks with 90% accuracy given min cues to improve working memory/mental flexibility.        Progressing/ Not Met 1/26/2023   Not formally addressed     Patient reported being aware of this difficulty in his life, however requested to work on this Independently  prior to returning to therapy      Patient Education/Response:   Extensive education provided regarding both memory and attention/listening strategies. Discussed progress on tasks today. Patient verbalized understanding.     Home program established: yes-use strategies and see how to apply to daily living.   Exercises were reviewed and Tushar was able to demonstrate them prior to the end of the session.  Tushar demonstrated poor understanding of the education provided.     See " "Electronic Medical Record under Patient Instructions for exercises provided throughout therapy.  Assessment:   Tushar is progressing well towards his goals. Majority of session was spent discussing with patient and wife next steps regarding speech and therapy and cognitve functioning. He reports majority of his other "difficulties" reside in lack of interest in task and reduced motivation. During final session, patient reported he would prefer to find solutions to identified difficulties at home prior to continuing speech therapy. Patient was encouraged to return to speech therapy if/when he would need additional help to increase cognitve skills. Patient will be discharged.     Patient prognosis is Good. Patient will continue to benefit from skilled outpatient speech and language therapy to address the deficits listed in the problem list on initial evaluation, provide patient/family education and to maximize patient's level of independence in the home and community environment.   Medical necessity is demonstrated by the following IMPAIRMENTS:  Deficits in executive functioning, attention, and memory prevent the pt from relaying medically and safety relevant information in a timely manner in a state of emergency.   Barriers to Therapy: none  Patient's spiritual, cultural and educational needs considered and patient agreeable to plan of care and goals.  Plan:   Continue Plan of Care with focus on memory, attention, word finding, and problem solving.     VIDA Sharpe, CCC-SLP  1/26/2023                   "

## 2023-01-26 NOTE — PLAN OF CARE
"  Outpatient Therapy Discharge Summary   Discharge Date: 1/26/2023   Name: Patrick Short  [facility] Number: [id number]  Therapy Diagnosis:   Encounter Diagnoses   Name Primary?    Parkinson disease Yes    Mild neurocognitive disorder     History of ischemic left MCA stroke      Physician: Frantz Bee MD  Physician Orders: [id number] - AMB REFERRAL/CONSULT TO SPEECH THERAPY  Medical Diagnosis: History of ischemic left MCA stroke [Z86.73], Parkinson disease [G20]  Evaluation Date: 12/8/22    Date of Last visit: 1/26/23  Total Visits Received: 9  Cancelled Visits: 0  No Show Visits: 0    Assessment    Assessment of Current Status: Mr. Finley made good progress within speech therapy. Biggest improvement noted in awareness of deficits. At this time, he continues to demonstrate and report deficits in attention (partially pre-existing) and mental manipulation relating to problem solving. He reports majority of his other "difficulties" reside in lack of interest in task and reduced motivation. During final session, patient reported he would prefer to find solutions to identified difficulties at home prior to continuing speech therapy. Patient was encouraged to return to speech therapy if/when he would need additional help to increase cognitve skills.     Short Term Goals (4 weeks):   Patient will name 10-15 items in a concrete category in one minute to improve word fluency and thought organization.  Goal Met / Discontinue   2. Patient will complete tasks requiring divided attention and alternating attention with 90% accuracy given min cues to improve attention skills Goal Not Met / Discontinue   3. Pt will complete divided attention tasks in distracting environment with 90% accuracy independently.   Goal Met / Discontinue            4. Patient will immediately recall specific details from information recently seen or heard using memory retrieval strategies with 90% accuracy given min cues to improve auditory and visual " recall. Goal Met / Discontinue    5. Patient will complete visual recall tasks with 90% acc indly to improve visual recall. Goal Not Met / Discontinue   6. Patient will complete reasoning/problem solving tasks with 90% accuracy given min cues Goal Not Met / Discontinue   7. Patient will complete mental manipulation tasks with 90% accuracy given min cues to improve working memory/mental flexibility.    Goal Not Met / Discontinue      Long Term Goals (6 weeks):   He will use appropriate memory strategies to schedule and recall weekly activities, express needs and recall names to maintain safety and participate socially in functional living environment.  Goal Met / Discontinue    He  will demonstrate improved problem solving skills and provide appropriate solutions to problems in order to improve safety and awareness in functional living environment.  Goal Not Met / Discontinue    Pt will improve attention skills to effectively attend to and communicate in complex daily living tasks in functional living environment.  Goal Not Met / Discontinue      Discharge reason: Patient has reached the maximum rehab potential for the present time and Patient requested discharge    Plan   This patient is discharged from Speech Therapy    VIDA Sharpe, CCC-SLP   1/26/2023

## 2023-01-31 ENCOUNTER — CLINICAL SUPPORT (OUTPATIENT)
Dept: CARDIOLOGY | Facility: HOSPITAL | Age: 78
End: 2023-01-31
Attending: STUDENT IN AN ORGANIZED HEALTH CARE EDUCATION/TRAINING PROGRAM
Payer: COMMERCIAL

## 2023-01-31 ENCOUNTER — OFFICE VISIT (OUTPATIENT)
Dept: NEUROLOGY | Facility: CLINIC | Age: 78
End: 2023-01-31
Payer: COMMERCIAL

## 2023-01-31 VITALS
HEIGHT: 70 IN | SYSTOLIC BLOOD PRESSURE: 133 MMHG | HEART RATE: 62 BPM | BODY MASS INDEX: 33.87 KG/M2 | DIASTOLIC BLOOD PRESSURE: 84 MMHG | WEIGHT: 236.56 LBS

## 2023-01-31 DIAGNOSIS — Z86.73 HISTORY OF ISCHEMIC LEFT MCA STROKE: ICD-10-CM

## 2023-01-31 DIAGNOSIS — I63.9 CRYPTOGENIC STROKE: ICD-10-CM

## 2023-01-31 DIAGNOSIS — H53.2 DIPLOPIA: Primary | ICD-10-CM

## 2023-01-31 DIAGNOSIS — I61.1 NONTRAUMATIC CORTICAL HEMORRHAGE OF RIGHT CEREBRAL HEMISPHERE: ICD-10-CM

## 2023-01-31 DIAGNOSIS — G20.A1 PARKINSON DISEASE: ICD-10-CM

## 2023-01-31 PROCEDURE — 1126F AMNT PAIN NOTED NONE PRSNT: CPT | Mod: CPTII,S$GLB,, | Performed by: PSYCHIATRY & NEUROLOGY

## 2023-01-31 PROCEDURE — 3079F DIAST BP 80-89 MM HG: CPT | Mod: CPTII,S$GLB,, | Performed by: PSYCHIATRY & NEUROLOGY

## 2023-01-31 PROCEDURE — 93291 INTERROG DEV EVAL SCRMS IP: CPT

## 2023-01-31 PROCEDURE — 1159F MED LIST DOCD IN RCRD: CPT | Mod: CPTII,S$GLB,, | Performed by: PSYCHIATRY & NEUROLOGY

## 2023-01-31 PROCEDURE — 3288F PR FALLS RISK ASSESSMENT DOCUMENTED: ICD-10-PCS | Mod: CPTII,S$GLB,, | Performed by: PSYCHIATRY & NEUROLOGY

## 2023-01-31 PROCEDURE — 1159F PR MEDICATION LIST DOCUMENTED IN MEDICAL RECORD: ICD-10-PCS | Mod: CPTII,S$GLB,, | Performed by: PSYCHIATRY & NEUROLOGY

## 2023-01-31 PROCEDURE — 99215 OFFICE O/P EST HI 40 MIN: CPT | Mod: S$GLB,,, | Performed by: PSYCHIATRY & NEUROLOGY

## 2023-01-31 PROCEDURE — 3079F PR MOST RECENT DIASTOLIC BLOOD PRESSURE 80-89 MM HG: ICD-10-PCS | Mod: CPTII,S$GLB,, | Performed by: PSYCHIATRY & NEUROLOGY

## 2023-01-31 PROCEDURE — 1100F PR PT FALLS ASSESS DOC 2+ FALLS/FALL W/INJURY/YR: ICD-10-PCS | Mod: CPTII,S$GLB,, | Performed by: PSYCHIATRY & NEUROLOGY

## 2023-01-31 PROCEDURE — 99215 PR OFFICE/OUTPT VISIT, EST, LEVL V, 40-54 MIN: ICD-10-PCS | Mod: S$GLB,,, | Performed by: PSYCHIATRY & NEUROLOGY

## 2023-01-31 PROCEDURE — 1100F PTFALLS ASSESS-DOCD GE2>/YR: CPT | Mod: CPTII,S$GLB,, | Performed by: PSYCHIATRY & NEUROLOGY

## 2023-01-31 PROCEDURE — 99999 PR PBB SHADOW E&M-EST. PATIENT-LVL IV: CPT | Mod: PBBFAC,,, | Performed by: PSYCHIATRY & NEUROLOGY

## 2023-01-31 PROCEDURE — 99999 PR PBB SHADOW E&M-EST. PATIENT-LVL IV: ICD-10-PCS | Mod: PBBFAC,,, | Performed by: PSYCHIATRY & NEUROLOGY

## 2023-01-31 PROCEDURE — 93291 CARDIAC DEVICE CHECK - IN CLINIC & HOSPITAL: ICD-10-PCS | Mod: 26,,, | Performed by: STUDENT IN AN ORGANIZED HEALTH CARE EDUCATION/TRAINING PROGRAM

## 2023-01-31 PROCEDURE — 3288F FALL RISK ASSESSMENT DOCD: CPT | Mod: CPTII,S$GLB,, | Performed by: PSYCHIATRY & NEUROLOGY

## 2023-01-31 PROCEDURE — 3075F SYST BP GE 130 - 139MM HG: CPT | Mod: CPTII,S$GLB,, | Performed by: PSYCHIATRY & NEUROLOGY

## 2023-01-31 PROCEDURE — 93291 INTERROG DEV EVAL SCRMS IP: CPT | Mod: 26,,, | Performed by: STUDENT IN AN ORGANIZED HEALTH CARE EDUCATION/TRAINING PROGRAM

## 2023-01-31 PROCEDURE — 1126F PR PAIN SEVERITY QUANTIFIED, NO PAIN PRESENT: ICD-10-PCS | Mod: CPTII,S$GLB,, | Performed by: PSYCHIATRY & NEUROLOGY

## 2023-01-31 PROCEDURE — 3075F PR MOST RECENT SYSTOLIC BLOOD PRESS GE 130-139MM HG: ICD-10-PCS | Mod: CPTII,S$GLB,, | Performed by: PSYCHIATRY & NEUROLOGY

## 2023-01-31 NOTE — PROGRESS NOTES
Neurology Clinic Follow-Up Note    Impression:  Intermittent diplopia: intermittent exo?  No evidence of focal brainstem lesion, cranial neuropathy, MG, thyroid disorder etc  R frontal, cortical ICH, onset 2 weeks ago:  etiology = likely amyloid angiopathy  L parietal ischemic stroke 10/5/22: ESUS  Likely small, silent L occipital pole infarct: ESUS  ILR in place  MCI  Mild parkinsonism  HTN: newly diagnosed    Plan:  Refer to Dr. García for evaluation of diplopia  Continue ASA 81mg/d  Physical activity encouraged  Continue atorvastatin 40mg/d; target LDL <70mg/dL  Continue lisinopril 10mg/d and f/u with Dr. Lloyd  Continue Sinemet 25/100 tid and f/u with Dr. Freedman  Defer 2nd cataract surgery until June '23  F/U with me in 4 months or sooner, prn      Problem List Items Addressed This Visit          1 - High    History of ischemic left MCA stroke    Overview     Cryptogenic stroke 10/2022.    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    He presents with his wife.   He is doing a bit better with Sinemet 25/100 tid.  He wife reports an easier time getting in and out of the car.  No recurrent stroke symptoms.  PT has been helpful.   He describes chronic, intermittent diplopia.  Symptoms are rare, brief and horizontally fused.  He noticed this once while fishing and, on occasion, while driving.     He and his wife report 2 weeks of headache.  Today, he endorses there is mild associated LUE numbness  CT this AM shows small R frontal ICH.  No recurrent aphasia.  Over the last few months, there has been increased gait shuffling and a few falls.     Past Medical History:   Diagnosis Date  "   CAD (coronary artery disease) 7/26/2016    Cerebral ischemic stroke due to global hypoperfusion with watershed infarct 10/10/2022    Heart block     MI (myocardial infarction)     Primary hypertension 11/21/2022    Reflux          Outpatient Medications Marked as Taking for the 1/31/23 encounter (Office Visit) with Frantz Bee MD   Medication Sig Dispense Refill    atorvastatin (LIPITOR) 40 MG tablet TAKE 1 TABLET BY MOUTH EVERY DAY 90 tablet 3    carbidopa-levodopa  mg (SINEMET)  mg per tablet TAKE 1 TABLET BY MOUTH THREE TIMES A DAY 90 tablet 1    clobetasol (TEMOVATE) 0.05 % cream APPLY TO AFFECTED AREA ON HAND TWICE A DAY AS NEEDED FOR RASH  1    desonide (DESOWEN) 0.05 % cream APPLY TO AFFECTED AREA ON FACE TWICE A DAY AS NEEDED FOR SCALE  1    finasteride (PROSCAR) 5 mg tablet 1 tablet once daily.      levocetirizine (XYZAL) 5 MG tablet Take 5 mg by mouth every evening.      lisinopriL 10 MG tablet Take 1 tablet (10 mg total) by mouth once daily. 90 tablet 1    pantoprazole (PROTONIX) 40 MG tablet Take 1 tablet (40 mg total) by mouth once daily. 90 tablet 3    triamcinolone acetonide 0.1% (KENALOG) 0.1 % ointment APPLY TO AFFECTED AREA ON AXILLAS TWICE A DAY AS NEEDED FOR ITCH/RASH  1       /84   Pulse 62   Ht 5' 10" (1.778 m)   Wt 107.3 kg (236 lb 8.9 oz)   BMI 33.94 kg/m²   Well-developed, well nourished.  Awake, alert and oriented.  Language - mild word-finding trouble.  EOMF without nystagmus; with first attempt of cover/uncover there was small correction medially, bilaterally but this extinguished.  VFF, Mild L lower facial weakness.  Orbicularis power nl bilaterally. LUE drift. Mild L hip flexor weakness. Neck flexors/extensors nl. Sensation intact. No extinction to double simultaneous tactile stimulation.  Coordination intact.  Mild leno with no cogwheeling today. Facial expression improved.  Gait steady with good pivot.     Lab Results   Component Value Date    " [id number] 401 11/11/2022     Lab Results   Component Value Date    HGBA1C 5.1 11/11/2022     Lab Results   Component Value Date    TSH 1.632 11/11/2022     59  mins chart review, face to face, documentation    Frantz Bee MD

## 2023-02-01 ENCOUNTER — TELEPHONE (OUTPATIENT)
Dept: OPHTHALMOLOGY | Facility: CLINIC | Age: 78
End: 2023-02-01
Payer: COMMERCIAL

## 2023-02-01 NOTE — TELEPHONE ENCOUNTER
----- Message from Lisa Sabillon sent at 1/31/2023  5:06 PM CST -----  Regarding: FW: Scheduling    ----- Message -----  From: Randy Wyman MA  Sent: 1/31/2023   5:01 PM CST  To: [facility] Staff  Subject: Scheduling                                       Dr. Rohit Rosario has put in a referral for this pt to see a neuro-oncologist. Please assist in scheduling.    Thanks, Randy

## 2023-02-23 ENCOUNTER — CLINICAL SUPPORT (OUTPATIENT)
Dept: CARDIOLOGY | Facility: HOSPITAL | Age: 78
End: 2023-02-23
Payer: COMMERCIAL

## 2023-02-23 DIAGNOSIS — Z95.818 PRESENCE OF OTHER CARDIAC IMPLANTS AND GRAFTS: ICD-10-CM

## 2023-02-23 PROCEDURE — 93298 CARDIAC DEVICE CHECK - REMOTE: ICD-10-PCS | Mod: ,,, | Performed by: STUDENT IN AN ORGANIZED HEALTH CARE EDUCATION/TRAINING PROGRAM

## 2023-02-23 PROCEDURE — 93298 REM INTERROG DEV EVAL SCRMS: CPT | Mod: ,,, | Performed by: STUDENT IN AN ORGANIZED HEALTH CARE EDUCATION/TRAINING PROGRAM

## 2023-02-23 PROCEDURE — G2066 INTER DEVC REMOTE 30D: HCPCS | Performed by: STUDENT IN AN ORGANIZED HEALTH CARE EDUCATION/TRAINING PROGRAM

## 2023-03-17 ENCOUNTER — PATIENT MESSAGE (OUTPATIENT)
Dept: RESEARCH | Facility: HOSPITAL | Age: 78
End: 2023-03-17
Payer: COMMERCIAL

## 2023-03-17 DIAGNOSIS — R06.00 DYSPNEA, UNSPECIFIED TYPE: Primary | ICD-10-CM

## 2023-03-23 ENCOUNTER — HOSPITAL ENCOUNTER (OUTPATIENT)
Dept: CARDIOLOGY | Facility: CLINIC | Age: 78
Discharge: HOME | End: 2023-03-23
Payer: COMMERCIAL

## 2023-03-23 ENCOUNTER — OFFICE VISIT (OUTPATIENT)
Dept: PULMONOLOGY | Facility: CLINIC | Age: 78
End: 2023-03-23
Payer: COMMERCIAL

## 2023-03-23 ENCOUNTER — CLINICAL SUPPORT (OUTPATIENT)
Dept: INTERNAL MEDICINE | Facility: CLINIC | Age: 78
End: 2023-03-23
Payer: COMMERCIAL

## 2023-03-23 VITALS
RESPIRATION RATE: 12 BRPM | SYSTOLIC BLOOD PRESSURE: 157 MMHG | HEART RATE: 66 BPM | WEIGHT: 230 LBS | HEIGHT: 71 IN | BODY MASS INDEX: 32.2 KG/M2 | DIASTOLIC BLOOD PRESSURE: 80 MMHG

## 2023-03-23 DIAGNOSIS — Z00.00 ANNUAL PHYSICAL EXAM: Primary | ICD-10-CM

## 2023-03-23 DIAGNOSIS — R06.00 DYSPNEA, UNSPECIFIED TYPE: ICD-10-CM

## 2023-03-23 LAB
ALBUMIN SERPL BCP-MCNC: 4 G/DL (ref 3.5–5.2)
ALP SERPL-CCNC: 67 U/L (ref 55–135)
ALT SERPL W/O P-5'-P-CCNC: 8 U/L (ref 10–44)
ANION GAP SERPL CALC-SCNC: 5 MMOL/L (ref 8–16)
AST SERPL-CCNC: 26 U/L (ref 10–40)
BILIRUB SERPL-MCNC: 1.2 MG/DL (ref 0.1–1)
BUN SERPL-MCNC: 14 MG/DL (ref 8–23)
CALCIUM SERPL-MCNC: 9.3 MG/DL (ref 8.7–10.5)
CHLORIDE SERPL-SCNC: 105 MMOL/L (ref 95–110)
CHOLEST SERPL-MCNC: 117 MG/DL (ref 120–199)
CHOLEST/HDLC SERPL: 3 {RATIO} (ref 2–5)
CO2 SERPL-SCNC: 29 MMOL/L (ref 23–29)
COMPLEXED PSA SERPL-MCNC: 0.37 NG/ML (ref 0–4)
CREAT SERPL-MCNC: 0.9 MG/DL (ref 0.5–1.4)
ERYTHROCYTE [DISTWIDTH] IN BLOOD BY AUTOMATED COUNT: 12.6 % (ref 11.5–14.5)
EST. GFR  (NO RACE VARIABLE): >60 ML/MIN/1.73 M^2
ESTIMATED AVG GLUCOSE: 103 MG/DL (ref 68–131)
GLUCOSE SERPL-MCNC: 97 MG/DL (ref 70–110)
HBA1C MFR BLD: 5.2 % (ref 4–5.6)
HCT VFR BLD AUTO: 43.1 % (ref 40–54)
HDLC SERPL-MCNC: 39 MG/DL (ref 40–75)
HDLC SERPL: 33.3 % (ref 20–50)
HGB BLD-MCNC: 14.8 G/DL (ref 14–18)
LDLC SERPL CALC-MCNC: 61.8 MG/DL (ref 63–159)
MCH RBC QN AUTO: 30.3 PG (ref 27–31)
MCHC RBC AUTO-ENTMCNC: 34.3 G/DL (ref 32–36)
MCV RBC AUTO: 88 FL (ref 82–98)
NONHDLC SERPL-MCNC: 78 MG/DL
PLATELET # BLD AUTO: 154 K/UL (ref 150–450)
PMV BLD AUTO: 11.5 FL (ref 9.2–12.9)
POTASSIUM SERPL-SCNC: 4.3 MMOL/L (ref 3.5–5.1)
PROT SERPL-MCNC: 6.7 G/DL (ref 6–8.4)
RBC # BLD AUTO: 4.88 M/UL (ref 4.6–6.2)
SODIUM SERPL-SCNC: 139 MMOL/L (ref 136–145)
TRIGL SERPL-MCNC: 81 MG/DL (ref 30–150)
TSH SERPL DL<=0.005 MIU/L-ACNC: 1.93 UIU/ML (ref 0.4–4)
WBC # BLD AUTO: 5.69 K/UL (ref 3.9–12.7)

## 2023-03-23 PROCEDURE — 1101F PR PT FALLS ASSESS DOC 0-1 FALLS W/OUT INJ PAST YR: ICD-10-PCS | Mod: CPTII,S$GLB,, | Performed by: INTERNAL MEDICINE

## 2023-03-23 PROCEDURE — 1101F PT FALLS ASSESS-DOCD LE1/YR: CPT | Mod: CPTII,S$GLB,, | Performed by: INTERNAL MEDICINE

## 2023-03-23 PROCEDURE — 1126F AMNT PAIN NOTED NONE PRSNT: CPT | Mod: CPTII,S$GLB,, | Performed by: INTERNAL MEDICINE

## 2023-03-23 PROCEDURE — 3077F SYST BP >= 140 MM HG: CPT | Mod: CPTII,S$GLB,, | Performed by: INTERNAL MEDICINE

## 2023-03-23 PROCEDURE — 83036 HEMOGLOBIN GLYCOSYLATED A1C: CPT | Performed by: INTERNAL MEDICINE

## 2023-03-23 PROCEDURE — 1159F MED LIST DOCD IN RCRD: CPT | Mod: CPTII,S$GLB,, | Performed by: INTERNAL MEDICINE

## 2023-03-23 PROCEDURE — 93005 EKG 12-LEAD: ICD-10-PCS | Mod: S$GLB,,, | Performed by: INTERNAL MEDICINE

## 2023-03-23 PROCEDURE — 3079F DIAST BP 80-89 MM HG: CPT | Mod: CPTII,S$GLB,, | Performed by: INTERNAL MEDICINE

## 2023-03-23 PROCEDURE — 84153 ASSAY OF PSA TOTAL: CPT | Performed by: INTERNAL MEDICINE

## 2023-03-23 PROCEDURE — 3044F PR MOST RECENT HEMOGLOBIN A1C LEVEL <7.0%: ICD-10-PCS | Mod: CPTII,S$GLB,, | Performed by: INTERNAL MEDICINE

## 2023-03-23 PROCEDURE — 99999 PR PBB SHADOW E&M-EST. PATIENT-LVL III: ICD-10-PCS | Mod: PBBFAC,,, | Performed by: INTERNAL MEDICINE

## 2023-03-23 PROCEDURE — 99397 PR PREVENTIVE VISIT,EST,65 & OVER: ICD-10-PCS | Mod: S$GLB,,, | Performed by: INTERNAL MEDICINE

## 2023-03-23 PROCEDURE — 3044F HG A1C LEVEL LT 7.0%: CPT | Mod: CPTII,S$GLB,, | Performed by: INTERNAL MEDICINE

## 2023-03-23 PROCEDURE — 3288F PR FALLS RISK ASSESSMENT DOCUMENTED: ICD-10-PCS | Mod: CPTII,S$GLB,, | Performed by: INTERNAL MEDICINE

## 2023-03-23 PROCEDURE — 93005 ELECTROCARDIOGRAM TRACING: CPT | Mod: S$GLB,,, | Performed by: INTERNAL MEDICINE

## 2023-03-23 PROCEDURE — 1126F PR PAIN SEVERITY QUANTIFIED, NO PAIN PRESENT: ICD-10-PCS | Mod: CPTII,S$GLB,, | Performed by: INTERNAL MEDICINE

## 2023-03-23 PROCEDURE — 99397 PER PM REEVAL EST PAT 65+ YR: CPT | Mod: S$GLB,,, | Performed by: INTERNAL MEDICINE

## 2023-03-23 PROCEDURE — 3079F PR MOST RECENT DIASTOLIC BLOOD PRESSURE 80-89 MM HG: ICD-10-PCS | Mod: CPTII,S$GLB,, | Performed by: INTERNAL MEDICINE

## 2023-03-23 PROCEDURE — 85027 COMPLETE CBC AUTOMATED: CPT | Performed by: INTERNAL MEDICINE

## 2023-03-23 PROCEDURE — 93010 ELECTROCARDIOGRAM REPORT: CPT | Mod: S$GLB,,, | Performed by: INTERNAL MEDICINE

## 2023-03-23 PROCEDURE — 80053 COMPREHEN METABOLIC PANEL: CPT | Performed by: INTERNAL MEDICINE

## 2023-03-23 PROCEDURE — 93010 EKG 12-LEAD: ICD-10-PCS | Mod: S$GLB,,, | Performed by: INTERNAL MEDICINE

## 2023-03-23 PROCEDURE — 3077F PR MOST RECENT SYSTOLIC BLOOD PRESSURE >= 140 MM HG: ICD-10-PCS | Mod: CPTII,S$GLB,, | Performed by: INTERNAL MEDICINE

## 2023-03-23 PROCEDURE — 1159F PR MEDICATION LIST DOCUMENTED IN MEDICAL RECORD: ICD-10-PCS | Mod: CPTII,S$GLB,, | Performed by: INTERNAL MEDICINE

## 2023-03-23 PROCEDURE — 99999 PR PBB SHADOW E&M-EST. PATIENT-LVL III: CPT | Mod: PBBFAC,,, | Performed by: INTERNAL MEDICINE

## 2023-03-23 PROCEDURE — 84443 ASSAY THYROID STIM HORMONE: CPT | Performed by: INTERNAL MEDICINE

## 2023-03-23 PROCEDURE — 3288F FALL RISK ASSESSMENT DOCD: CPT | Mod: CPTII,S$GLB,, | Performed by: INTERNAL MEDICINE

## 2023-03-23 PROCEDURE — 80061 LIPID PANEL: CPT | Performed by: INTERNAL MEDICINE

## 2023-03-23 NOTE — LETTER
March 28, 2023    Patrick KRIS Short  [address] [place] [address]             [facility] [address]  [address]  [facility] [address]  Phone: [phone] Dear Tushar,      Thank you for allowing me to serve you and perform your Executive Health exam on 3/23/2023. This letter will serve as a brief summary of the physical findings and laboratory/studies performed and recommendations at this time.  The lab work at this visit is normal in all respects. I have completed the forms for the cataract surgery and to participate in the boxing class.  Remember you are a heavy weight so take it easy of those little old ladies.     Call me if you have any needs that I can help you with, we have been friends for years and I am always available for you and your family.            If you have any questions or concerns, please don't hesitate to call.    Sincerely,        Darian Lloyd MD

## 2023-03-23 NOTE — PROGRESS NOTES
Subjective:       Patient ID: Patrick Short is a 77 y.o. male.    Chief Complaint: Annual Exam    HPI  78 yo long time friend and patient comes for his periodic health exam. He had a mild CVA several months ago and has recovered from that.. At the time of his last visit he had early signs of Parkinson's Disese and I referred him to Dr. Suárez who is managing  that problem  He will start boxing classes in the near future to improve balance and stamina   He is going to get his second cataract procedure in two weeks   He plans to fully retire from the [employer] business by the end of the month.  He is having trouble with memory and sometimes has a mild expressive aphasia. 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 No further cardiac issues.   Review of Systems   Constitutional: Negative.    HENT: Negative.     Eyes: Negative.         Getting second cataract ;procedure in tow  weeks   Respiratory: Negative.     Cardiovascular: Negative.         S/P <Mulitple stents for STEMI    Gastrointestinal: Negative.    Genitourinary: Negative.    Musculoskeletal: Negative.    Integumentary:  Negative.   Neurological: Negative.         Parkinson's Disease     Memory loss    Psychiatric/Behavioral: Negative.     All other systems reviewed and are negative.      Objective:      Physical Exam  Constitutional:       Appearance: He is well-developed.      Comments: Moderately overweight  but he is a big muscular man  Plans to walk run the [place] [employer] at 82!!   HENT:      Head: Normocephalic and atraumatic.      Right Ear: External ear normal.      Left Ear: External ear normal.   Eyes:      Conjunctiva/sclera: Conjunctivae normal.      Pupils: Pupils are equal, round, and reactive to light.   Cardiovascular:      Rate and Rhythm: Normal rate and regular rhythm.      Heart sounds: Normal heart sounds.   Pulmonary:      Effort: Pulmonary effort is normal.      Breath sounds: Normal  breath sounds.   Abdominal:      General: Bowel sounds are normal.      Palpations: Abdomen is soft.   Musculoskeletal:         General: Normal range of motion.      Cervical back: Normal range of motion and neck supple.   Skin:     General: Skin is warm and dry.   Neurological:      Mental Status: He is alert and oriented to person, place, and time.      Deep Tendon Reflexes: Reflexes are normal and symmetric.   Psychiatric:         Behavior: Behavior normal.         Thought Content: Thought content normal.         Judgment: Judgment normal.       Assessment:       Problem List Items Addressed This Visit    None  Visit Diagnoses       Annual physical exam    -  Primary              Plan:               Labs:All parameters are normal         EKG: Non  specific  t wa\ve changes    Stable and unchanged      IMP: Parkinson's Disease,  Hx of recent CVA    S/P Stents for CAD    Completed forms for cataract surgery and a form to participate in boxing class.

## 2023-03-25 ENCOUNTER — CLINICAL SUPPORT (OUTPATIENT)
Dept: CARDIOLOGY | Facility: HOSPITAL | Age: 78
End: 2023-03-25
Payer: COMMERCIAL

## 2023-03-25 DIAGNOSIS — Z95.818 PRESENCE OF OTHER CARDIAC IMPLANTS AND GRAFTS: ICD-10-CM

## 2023-03-25 PROCEDURE — G2066 INTER DEVC REMOTE 30D: HCPCS | Performed by: STUDENT IN AN ORGANIZED HEALTH CARE EDUCATION/TRAINING PROGRAM

## 2023-04-24 ENCOUNTER — CLINICAL SUPPORT (OUTPATIENT)
Dept: CARDIOLOGY | Facility: HOSPITAL | Age: 78
End: 2023-04-24
Payer: COMMERCIAL

## 2023-04-24 DIAGNOSIS — Z95.818 PRESENCE OF OTHER CARDIAC IMPLANTS AND GRAFTS: ICD-10-CM

## 2023-04-24 PROCEDURE — G2066 INTER DEVC REMOTE 30D: HCPCS | Performed by: STUDENT IN AN ORGANIZED HEALTH CARE EDUCATION/TRAINING PROGRAM

## 2023-04-24 PROCEDURE — 93298 REM INTERROG DEV EVAL SCRMS: CPT | Mod: ,,, | Performed by: STUDENT IN AN ORGANIZED HEALTH CARE EDUCATION/TRAINING PROGRAM

## 2023-04-24 PROCEDURE — 93298 CARDIAC DEVICE CHECK - REMOTE: ICD-10-PCS | Mod: ,,, | Performed by: STUDENT IN AN ORGANIZED HEALTH CARE EDUCATION/TRAINING PROGRAM

## 2023-04-26 ENCOUNTER — PATIENT MESSAGE (OUTPATIENT)
Dept: ELECTROPHYSIOLOGY | Facility: CLINIC | Age: 78
End: 2023-04-26
Payer: COMMERCIAL

## 2023-04-28 NOTE — PROGRESS NOTES
"Date:  5/2/2023    ?  Referring Provider:   Frantz Bee MD    Copies of Letters to the Following:   Frantz Bee MD    Chief Complaint:  I saw Patrick Short at the [facility] for neuro-ophthalmic evaluation.   He is a 77 y.o. male with a history of HTN, HLD, CAD, NSTEMI, PVD, Parkinsonism, convergence insufficiency, who presents for evaluation of diplopia.    History:     HPI    Referred: Dr. Bee    76 y/o male present to clinic with wife for Neuro-evaluation for diplopia.   H/o of parietal ischemic stroke and parkinson diease. Pt states when he   had his first cataract extraction of LT eye 8/2022 he noticed diplopia.   Shortly, after he had a TIA. Pt recently had RT eye Cataract extraction X   2 weeks ago. He stated that diplopia resolved but diplopia occurred again.   He not sure if diplopia is TIA vs. Parkinsonism. He states diplopia are   horizontal, resolves when covering one eye. He has numbness with arms.   Occasional floaters, No eye allergies, flashes of light, or tinnitus   reported.     Diplopia occurring less frequently since his cataract extraction. Noticed at Ed [employer]eran's performance at [employer] at a great distance but it resolved with blinking.     Eyemeds  Tobramycin QID OD   Last edited by Cookie García MD on 5/2/2023 10:25 AM.          1/31/2023 Rohit:  "Impression:  Intermittent diplopia: intermittent exo?  No evidence of focal brainstem lesion, cranial neuropathy, MG, thyroid disorder etc  R frontal, cortical ICH, onset 2 weeks ago:  etiology = likely amyloid angiopathy  L parietal ischemic stroke 10/5/22: ESUS  Likely small, silent L occipital pole infarct: ESUS  ILR in place  MCI  Mild parkinsonism  HTN: newly diagnosed     Plan:  Refer to Dr. García for evaluation of diplopia  Continue ASA 81mg/d  Physical activity encouraged  Continue atorvastatin 40mg/d; target LDL <70mg/dL  Continue lisinopril 10mg/d and f/u with Dr. Lloyd  Continue Sinemet " "25/100 tid and f/u with Dr. Freedman  Defer 2nd cataract surgery until June '23  F/U with me in 4 months or sooner, prn"  ?  Current Outpatient Medications   Medication Sig Dispense Refill    atorvastatin (LIPITOR) 40 MG tablet TAKE 1 TABLET BY MOUTH EVERY DAY 90 tablet 3    carbidopa-levodopa  mg (SINEMET)  mg per tablet TAKE 1 TABLET BY MOUTH THREE TIMES A DAY 90 tablet 1    clobetasol (TEMOVATE) 0.05 % cream APPLY TO AFFECTED AREA ON HAND TWICE A DAY AS NEEDED FOR RASH  1    desonide (DESOWEN) 0.05 % cream APPLY TO AFFECTED AREA ON FACE TWICE A DAY AS NEEDED FOR SCALE  1    finasteride (PROSCAR) 5 mg tablet 1 tablet once daily.      levocetirizine (XYZAL) 5 MG tablet Take 5 mg by mouth every evening.      lisinopriL 10 MG tablet Take 1 tablet (10 mg total) by mouth once daily. 90 tablet 1    pantoprazole (PROTONIX) 40 MG tablet TAKE 1 TABLET BY MOUTH EVERY DAY 90 tablet 3    tobramycin-dexAMETHasone 0.3-0.1% (TOBRADEX) 0.3-0.1 % DrpS 1 drop every 4 (four) hours while awake.      triamcinolone acetonide 0.1% (KENALOG) 0.1 % ointment APPLY TO AFFECTED AREA ON AXILLAS TWICE A DAY AS NEEDED FOR ITCH/RASH  1    aspirin 81 MG Chew Take 1 tablet (81 mg total) by mouth once daily.  0     No current facility-administered medications for this visit.     Facility-Administered Medications Ordered in Other Visits   Medication Dose Route Frequency Provider Last Rate Last Admin    ceFAZolin 2 g in dextrose 5 % in water (D5W) 5 % 50 mL IVPB (MB+)  2 g Intravenous On Call Procedure Rhonda Mark NP         Review of patient's allergies indicates:  No Known Allergies  Past Medical History:   Diagnosis Date    CAD (coronary artery disease) 7/26/2016    Cerebral ischemic stroke due to global hypoperfusion with watershed infarct 10/10/2022    Heart block     MI (myocardial infarction)     Primary hypertension 11/21/2022    Reflux      Past Surgical History:   Procedure Laterality Date    CARDIAC CATHETERIZATION   "    EYE SURGERY Left 02/2017    HIP SURGERY Right 12/2021    INSERTION OF IMPLANTABLE LOOP RECORDER N/A 1/24/2023    Procedure: Insertion, Implantable Loop Recorder;  Surgeon: ALBERTO Roth MD;  Location: [facility];  Service: Cardiology;  Laterality: N/A;  CHERIE LOPEZ, MDT, Local, , 3 Prep     Family History   Problem Relation Age of Onset    Hypertension Mother     Emphysema Father     Coronary artery disease Father      Social History     Socioeconomic History    Marital status:    Tobacco Use    Smoking status: Never    Smokeless tobacco: Never   Substance and Sexual Activity    Alcohol use: Yes     Comment: twice a month/beer wine    Drug use: Never    Sexual activity: Yes     Partners: Female     Comment: Wife       Examination:  He was well-appearing. He was alert and oriented. Attention span and concentration were normal. Speech, language, memory, and general knowledge were intact.      His distance visual acuity without correction was 20/40  in the right eye and 20/50  in the left eye. His near visual acuity without correction was J2 in the right eye and J5 PH J2 in the left eye.     Visual fields were intact to confrontation. He perceived 8/8 OD and 8/8 OS Ishihara color plates correctly. Pupils were brisk to light with a trace left apd. Ocular ductions were full. There was no nystagmus. Saccades and pursuits were normal. Lids were symmetric.     Sensorimotor Examination    Stereopsis  200 seconds of arc    Cross cover at distance  Primary 4XT  Left 2XT  Right 2XT    Cross cover at near  6XT    NPC  2 cm    Optic discs appeared normal OD and with mild temporal pallor OS Pupillary dilation was not necessary for visualization of the optic disc today.     On the remainder of the neurologic examination, facial sensation was intact. Face was symmetric. Tongue was midline.     Laboratories Reviewed:     N/a  ?  Neuroimaging Reviewed:     10/2022 MRI brain wo contrast  Intracranial Compartment:      Ventricles are normal in size for age without evidence of hydrocephalus.     Subcentimeter focus of diffusion restriction in the subcortical white matter of the left parietal lobe.  Findings in keeping with an acute infarct.  No corresponding mass effect or hemorrhage.  No additional areas of acute infarction elsewhere.  Mild patchy T2/FLAIR hyperintensity in the supratentorial white matter, nonspecific but most likely reflecting chronic small vessel ischemic changes. No remote major vascular distribution infarct.     Multifocal regions of curvilinear susceptibility artifact lining the cerebral sulci particularly in the frontal and parietal lobes bilaterally.  There is also multiple small scattered punctate foci prior parenchymal hemorrhage.  No acute hematoma.     No intracranial mass effect or midline shift.     No extra-axial blood or fluid collections.     Normal vascular flow voids are preserved.     Skull/Extracranial Contents (limited evaluation):     Bone marrow signal intensity is normal.     Impression:     Subcentimeter acute left parietal subcortical infarct.     Mild chronic small vessel ischemic change.     Multiple parenchymal microhemorrhages with multifocal areas of superficial siderosis affecting both cerebral hemispheres.  The appearance is not entirely specific but suggestive of cerebral amyloid angiopathy in a patient of this age.  For clinical correlation.     This report was flagged in Epic as abnormal.    ?  Ocular Imaging, Photos, Records Reviewed:     N/a  ?  Impression:  Patrick Short has history of HTN, HLD, CAD, NSTEMI, PVD, Parkinsonism, convergence insufficiency, who presents for evaluation of diplopia. Diplopia occurring less frequently since his cataract extraction. Noticed at Ed Sheeran's performance at [employer] at a great distance but it resolved with blinking. Neuro-ophthalmologic examination was notable for reduced visual acuities, full color vision, normal ocular motility  and small comitant exophoria and good convergence. He likely has dry eye contributing to monocular diplopia which resolves with blinking.   ?  Plan:  1. AT BID  2. Follow up with Dr. Bee  3. Follow up with Dr. Ríos as planned    Follow-up:  I will see him in follow-up on an as needed basis.      Visit Checklist (as applicable):  1. Status of new and prior symptoms discussed? yes  2. Neuroimaging reviewed/ ordered as appropriate? yes  3. Ocular imaging and photos reviewed/ ordered as appropriate? N/a  4. Plan for work-up and treatment discussed with patient? yes  5. Potential medication side-effects and monitoring plan discussed? N/a  6. Review of outside medical records was performed and pertinent details are summarized in the HPI above? N/a    Time spent on this encounter: 60 minutes. This includes face to face time and non-face to face time preparing to see the patient (eg, review of tests), obtaining and/or reviewing separately obtained history, documenting clinical information in the electronic or other health record, independently interpreting results and communicating results to the patient/family/caregiver, or care coordinator.      CHASIDY Perez  Neuro-Ophthalmology Consultant

## 2023-05-01 ENCOUNTER — HOSPITAL ENCOUNTER (OUTPATIENT)
Dept: CARDIOLOGY | Facility: CLINIC | Age: 78
Discharge: HOME | End: 2023-05-01
Payer: COMMERCIAL

## 2023-05-01 ENCOUNTER — OFFICE VISIT (OUTPATIENT)
Dept: ELECTROPHYSIOLOGY | Facility: CLINIC | Age: 78
End: 2023-05-01
Payer: COMMERCIAL

## 2023-05-01 VITALS
DIASTOLIC BLOOD PRESSURE: 91 MMHG | WEIGHT: 237 LBS | BODY MASS INDEX: 33.18 KG/M2 | HEART RATE: 65 BPM | HEIGHT: 71 IN | SYSTOLIC BLOOD PRESSURE: 156 MMHG

## 2023-05-01 DIAGNOSIS — I62.9 INTRACRANIAL HEMORRHAGE: ICD-10-CM

## 2023-05-01 DIAGNOSIS — E66.9 CLASS 1 OBESITY WITH BODY MASS INDEX (BMI) OF 33.0 TO 33.9 IN ADULT, UNSPECIFIED OBESITY TYPE, UNSPECIFIED WHETHER SERIOUS COMORBIDITY PRESENT: ICD-10-CM

## 2023-05-01 DIAGNOSIS — Z95.818 STATUS POST PLACEMENT OF IMPLANTABLE LOOP RECORDER: ICD-10-CM

## 2023-05-01 DIAGNOSIS — I61.1 NONTRAUMATIC CORTICAL HEMORRHAGE OF RIGHT CEREBRAL HEMISPHERE: ICD-10-CM

## 2023-05-01 DIAGNOSIS — E78.2 MIXED HYPERLIPIDEMIA: ICD-10-CM

## 2023-05-01 DIAGNOSIS — G20.A1 PARKINSON DISEASE: ICD-10-CM

## 2023-05-01 DIAGNOSIS — I63.9 CEREBRAL ISCHEMIC STROKE DUE TO GLOBAL HYPOPERFUSION WITH WATERSHED INFARCT: ICD-10-CM

## 2023-05-01 DIAGNOSIS — I74.9 TIA DUE TO EMBOLISM: ICD-10-CM

## 2023-05-01 DIAGNOSIS — Z95.5 H/O HEART ARTERY STENT: ICD-10-CM

## 2023-05-01 DIAGNOSIS — I44.2 COMPLETE HEART BLOCK: ICD-10-CM

## 2023-05-01 DIAGNOSIS — M54.16 LUMBAR RADICULOPATHY: ICD-10-CM

## 2023-05-01 DIAGNOSIS — Z86.73 HISTORY OF ISCHEMIC LEFT MCA STROKE: ICD-10-CM

## 2023-05-01 DIAGNOSIS — I25.10 CORONARY ARTERY DISEASE INVOLVING NATIVE CORONARY ARTERY OF NATIVE HEART WITHOUT ANGINA PECTORIS: ICD-10-CM

## 2023-05-01 DIAGNOSIS — K21.9 GASTROESOPHAGEAL REFLUX DISEASE, UNSPECIFIED WHETHER ESOPHAGITIS PRESENT: ICD-10-CM

## 2023-05-01 DIAGNOSIS — I63.9 CRYPTOGENIC STROKE: Primary | ICD-10-CM

## 2023-05-01 DIAGNOSIS — I10 PRIMARY HYPERTENSION: ICD-10-CM

## 2023-05-01 DIAGNOSIS — R40.0 DAYTIME SLEEPINESS: ICD-10-CM

## 2023-05-01 DIAGNOSIS — I48.0 PAROXYSMAL ATRIAL FIBRILLATION: ICD-10-CM

## 2023-05-01 DIAGNOSIS — G31.84 MILD NEUROCOGNITIVE DISORDER: ICD-10-CM

## 2023-05-01 DIAGNOSIS — G45.9 TIA DUE TO EMBOLISM: ICD-10-CM

## 2023-05-01 PROCEDURE — 99999 PR PBB SHADOW E&M-EST. PATIENT-LVL III: ICD-10-PCS | Mod: PBBFAC,,, | Performed by: STUDENT IN AN ORGANIZED HEALTH CARE EDUCATION/TRAINING PROGRAM

## 2023-05-01 PROCEDURE — 99999 PR PBB SHADOW E&M-EST. PATIENT-LVL III: CPT | Mod: PBBFAC,,, | Performed by: STUDENT IN AN ORGANIZED HEALTH CARE EDUCATION/TRAINING PROGRAM

## 2023-05-01 PROCEDURE — 3080F PR MOST RECENT DIASTOLIC BLOOD PRESSURE >= 90 MM HG: ICD-10-PCS | Mod: CPTII,S$GLB,, | Performed by: STUDENT IN AN ORGANIZED HEALTH CARE EDUCATION/TRAINING PROGRAM

## 2023-05-01 PROCEDURE — 1126F AMNT PAIN NOTED NONE PRSNT: CPT | Mod: CPTII,S$GLB,, | Performed by: STUDENT IN AN ORGANIZED HEALTH CARE EDUCATION/TRAINING PROGRAM

## 2023-05-01 PROCEDURE — 93010 RHYTHM STRIP: ICD-10-PCS | Mod: S$GLB,,, | Performed by: INTERNAL MEDICINE

## 2023-05-01 PROCEDURE — 93010 ELECTROCARDIOGRAM REPORT: CPT | Mod: S$GLB,,, | Performed by: INTERNAL MEDICINE

## 2023-05-01 PROCEDURE — 3077F SYST BP >= 140 MM HG: CPT | Mod: CPTII,S$GLB,, | Performed by: STUDENT IN AN ORGANIZED HEALTH CARE EDUCATION/TRAINING PROGRAM

## 2023-05-01 PROCEDURE — 99214 OFFICE O/P EST MOD 30 MIN: CPT | Mod: S$GLB,,, | Performed by: STUDENT IN AN ORGANIZED HEALTH CARE EDUCATION/TRAINING PROGRAM

## 2023-05-01 PROCEDURE — 3044F PR MOST RECENT HEMOGLOBIN A1C LEVEL <7.0%: ICD-10-PCS | Mod: CPTII,S$GLB,, | Performed by: STUDENT IN AN ORGANIZED HEALTH CARE EDUCATION/TRAINING PROGRAM

## 2023-05-01 PROCEDURE — 3077F PR MOST RECENT SYSTOLIC BLOOD PRESSURE >= 140 MM HG: ICD-10-PCS | Mod: CPTII,S$GLB,, | Performed by: STUDENT IN AN ORGANIZED HEALTH CARE EDUCATION/TRAINING PROGRAM

## 2023-05-01 PROCEDURE — 1126F PR PAIN SEVERITY QUANTIFIED, NO PAIN PRESENT: ICD-10-PCS | Mod: CPTII,S$GLB,, | Performed by: STUDENT IN AN ORGANIZED HEALTH CARE EDUCATION/TRAINING PROGRAM

## 2023-05-01 PROCEDURE — 93005 ELECTROCARDIOGRAM TRACING: CPT | Mod: S$GLB,,, | Performed by: STUDENT IN AN ORGANIZED HEALTH CARE EDUCATION/TRAINING PROGRAM

## 2023-05-01 PROCEDURE — 3044F HG A1C LEVEL LT 7.0%: CPT | Mod: CPTII,S$GLB,, | Performed by: STUDENT IN AN ORGANIZED HEALTH CARE EDUCATION/TRAINING PROGRAM

## 2023-05-01 PROCEDURE — 93005 RHYTHM STRIP: ICD-10-PCS | Mod: S$GLB,,, | Performed by: STUDENT IN AN ORGANIZED HEALTH CARE EDUCATION/TRAINING PROGRAM

## 2023-05-01 PROCEDURE — 3080F DIAST BP >= 90 MM HG: CPT | Mod: CPTII,S$GLB,, | Performed by: STUDENT IN AN ORGANIZED HEALTH CARE EDUCATION/TRAINING PROGRAM

## 2023-05-01 PROCEDURE — 99214 PR OFFICE/OUTPT VISIT, EST, LEVL IV, 30-39 MIN: ICD-10-PCS | Mod: S$GLB,,, | Performed by: STUDENT IN AN ORGANIZED HEALTH CARE EDUCATION/TRAINING PROGRAM

## 2023-05-01 RX ORDER — TOBRAMYCIN AND DEXAMETHASONE 3; 1 MG/ML; MG/ML
1 SUSPENSION/ DROPS OPHTHALMIC
COMMUNITY

## 2023-05-01 NOTE — PROGRESS NOTES
Electrophysiology Clinic Note    Reason for follow-up patient visit: Ongoing evaluation and recommendations regarding cryptogenic stroke, s/p implantation of a Medtronic ILR implanted on 1/24/2023.     PRESENTING HISTORY:     History of Present Illness:  Mr. Patrick Short is a suki 77-year-old gentleman who returns to clinic today for ongoing evaluation and recommendations regarding cryptogenic stroke, s/p implantation of a Medtronic ILR implanted on 1/24/2023. He has a past medical history significant for a history of prior strokes - a left parietal ischemic stroke 10/5/2022 with a left occipital pole infarct, a right cortical intracerebral hemorrhage, coronary artery disease s/p PCI of the LAD, Parkinsons disease, mild cognitive impairment, hypertension, GERD, and obesity.     He is followed in general cardiology with Dr. Mendoza and was previously seen in clinic on 11/22/2022. He is followed in neurology with Dr. Bee, where they discussed the possibility of an ischemic disease, and discussed the topic of an ILR. He has been resumed on his aspirin therapy following his intracerebral hemorrhage with no adverse bleeding events. He sustained a fall around the time of his hemorrhage, and it is unclear if this was mediated by his traumatic fall. He continues to have mild cognitive impairment, although he denies any residual deficits of which he is aware. His wife reports that he often has word-searching issues and increased forgetfulness. He has been participating with PT/OT and has resumed physical exercise with walking without limitation. He has never had any evidence of atrial fibrillation on serial ECGs, nor on a prior 30-day ambulatory event monitor. His systolic function remains preserved, with LVEF 65%. On device interrogation over the lifetime of his ILR he has not had any recorded events of atrial fibrillation or atrial flutter.     Mr. Short presents to clinic today with his wife. In discussion with  Mr. Short today, he tells me that he is feeling overall quite well. He denies any episodes of dizziness, lightheadedness, syncope/presyncope, chest pain or chest discomfort, palpitations, nausea or vomiting, orthopnea, lower extremity edema, or PND. He has healed well following his ILR implantation. He reports mild baseline shortness of breath and dyspnea with exertion that he feels has remained stable. He is hard of hearing, and requires his hearing aides. He can climb one flight of stairs prior to needing to take a break, and is limited by bilateral knee osteoarthritis.     Review of Systems:  Review of Systems   Constitutional:  Negative for activity change.   HENT:  Positive for hearing loss. Negative for nasal congestion, nosebleeds, postnasal drip, rhinorrhea, sinus pressure/congestion, sneezing and sore throat.    Respiratory:  Positive for shortness of breath. Negative for apnea, cough, chest tightness and wheezing.    Cardiovascular:  Negative for chest pain, palpitations and leg swelling.   Gastrointestinal:  Negative for abdominal distention, abdominal pain, blood in stool, change in bowel habit, constipation, diarrhea, nausea, vomiting and change in bowel habit.   Genitourinary:  Negative for dysuria and hematuria.   Musculoskeletal:  Positive for arthralgias and back pain. Negative for gait problem.   Neurological:  Positive for memory loss. Negative for dizziness, seizures, syncope, weakness, light-headedness, headaches, coordination difficulties and coordination difficulties.        Mild cognitive impairment with word-searching and increased memory loss.       PAST HISTORY:     Past Medical History:   Diagnosis Date    CAD (coronary artery disease) 7/26/2016    Cerebral ischemic stroke due to global hypoperfusion with watershed infarct 10/10/2022    Heart block     MI (myocardial infarction)     Primary hypertension 11/21/2022    Reflux        Past Surgical History:   Procedure Laterality Date     CARDIAC CATHETERIZATION      EYE SURGERY Left 02/2017    HIP SURGERY Right 12/2021    INSERTION OF IMPLANTABLE LOOP RECORDER N/A 1/24/2023    Procedure: Insertion, Implantable Loop Recorder;  Surgeon: ALBERTO Roth MD;  Location: [facility];  Service: Cardiology;  Laterality: N/A;  CVA, CHERIE, MDT, [facility] , 3 Prep       Family History:  Family History   Problem Relation Age of Onset    Hypertension Mother     Emphysema Father     Coronary artery disease Father        Social History:  He  reports that he has never smoked. He has never used smokeless tobacco. He reports current alcohol use. He reports that he does not use drugs.      MEDICATIONS & ALLERGIES:     Review of patient's allergies indicates:  No Known Allergies    Current Outpatient Medications on File Prior to Visit   Medication Sig Dispense Refill    aspirin 81 MG Chew Take 1 tablet (81 mg total) by mouth once daily.  0    atorvastatin (LIPITOR) 40 MG tablet TAKE 1 TABLET BY MOUTH EVERY DAY 90 tablet 3    carbidopa-levodopa  mg (SINEMET)  mg per tablet TAKE 1 TABLET BY MOUTH THREE TIMES A DAY 90 tablet 1    clobetasol (TEMOVATE) 0.05 % cream APPLY TO AFFECTED AREA ON HAND TWICE A DAY AS NEEDED FOR RASH  1    desonide (DESOWEN) 0.05 % cream APPLY TO AFFECTED AREA ON FACE TWICE A DAY AS NEEDED FOR SCALE  1    finasteride (PROSCAR) 5 mg tablet 1 tablet once daily.      levocetirizine (XYZAL) 5 MG tablet Take 5 mg by mouth every evening.      lisinopriL 10 MG tablet Take 1 tablet (10 mg total) by mouth once daily. 90 tablet 1    pantoprazole (PROTONIX) 40 MG tablet TAKE 1 TABLET BY MOUTH EVERY DAY 90 tablet 3    triamcinolone acetonide 0.1% (KENALOG) 0.1 % ointment APPLY TO AFFECTED AREA ON AXILLAS TWICE A DAY AS NEEDED FOR ITCH/RASH  1     Current Facility-Administered Medications on File Prior to Visit   Medication Dose Route Frequency Provider Last Rate Last Admin    ceFAZolin 2 g in dextrose 5 % in water (D5W) 5 % 50 mL IVPB (MB+)  2 g  "Intravenous On Call Procedure Rhonda Mark NP            OBJECTIVE:     Vital Signs:  BP (!) 156/91   Pulse 65   Ht 5' 11" (1.803 m)   Wt 107.5 kg (236 lb 15.9 oz)   BMI 33.05 kg/m²     Physical Exam:  Physical Exam  Constitutional:       General: He is not in acute distress.     Appearance: Normal appearance. He is obese. He is not ill-appearing or diaphoretic.      Comments: Well-appearing man in NAD.   HENT:      Head: Normocephalic and atraumatic.      Nose: Nose normal.      Mouth/Throat:      Mouth: Mucous membranes are moist.      Pharynx: Oropharynx is clear.   Eyes:      Pupils: Pupils are equal, round, and reactive to light.   Cardiovascular:      Rate and Rhythm: Normal rate and regular rhythm.      Pulses: Normal pulses.      Heart sounds: Normal heart sounds. No murmur heard.    No friction rub. No gallop.      Comments: Left anterior chest wall ILR incision site is in excellent repair.   Pulmonary:      Effort: Pulmonary effort is normal. No respiratory distress.      Breath sounds: Normal breath sounds. No wheezing, rhonchi or rales.   Chest:      Chest wall: No tenderness.   Abdominal:      General: There is no distension.      Palpations: Abdomen is soft.      Tenderness: There is no abdominal tenderness.   Musculoskeletal:         General: No swelling or tenderness.      Cervical back: Normal range of motion.      Right lower leg: No edema.      Left lower leg: No edema.   Skin:     General: Skin is warm and dry.      Findings: No erythema, lesion or rash.   Neurological:      General: No focal deficit present.      Mental Status: He is alert and oriented to person, place, and time. Mental status is at baseline.      Motor: No weakness.      Gait: Gait normal.   Psychiatric:         Mood and Affect: Mood normal.         Behavior: Behavior normal.        Laboratory Data:  Lab Results   Component Value Date    WBC 5.69 03/23/2023    HGB 14.8 03/23/2023    HCT 43.1 03/23/2023    MCV 88 " 03/23/2023     03/23/2023     Lab Results   Component Value Date    GLU 97 03/23/2023     03/23/2023    K 4.3 03/23/2023     03/23/2023    CO2 29 03/23/2023    BUN 14 03/23/2023    CREATININE 0.9 03/23/2023    CALCIUM 9.3 03/23/2023    MG 1.6 10/10/2022     Lab Results   Component Value Date    INR 1.0 07/18/2016       Pertinent Cardiac Data:  ECG: Normal sinus rhythm with rate of 65 bpm,  ms, QRS 88 ms, QT/QTc 347/388 ms, inferior Q waves, nonspecific STT changes.     Coronary Angiogram - 8/26/2016:    Successful PCI.     LM FFR=0.92.     Treadmill Stress Echocardiogram - 11/16/2021:  The stress echo portion of this study is negative for myocardial ischemia. Target heart rate was achieved.  The ECG portion of this study is negative for myocardial ischemia.  During stress, the following significant arrhythmias were observed: rare PACs, rare PVCs.  The patient's exercise capacity was below average. Achieved 9 METs.  The test was stopped because the patient experienced fatigue.  The left ventricle is normal in size with normal systolic function.  The estimated ejection fraction is 65%.  Indeterminate left ventricular diastolic function.  Normal right ventricular size with normal right ventricular systolic function.  The estimated PA systolic pressure is 25 mmHg.  Normal central venous pressure (3 mmHg).  Mild left atrial enlargement.    Resting 2D Transthoracic Echocardiogram - 7/26/2022:  The left ventricle is normal in size with normal systolic function. The estimated ejection fraction is 65%.  Normal right ventricular size with normal right ventricular systolic function.  Normal left ventricular diastolic function.  The estimated PA systolic pressure is 22 mmHg.  Normal central venous pressure (3 mmHg).  The ascending aorta is mildly dilated.    30-Day Ambulatory Event Monitor - 10/14/2022:  Three symptom episodes correlating with normal sinus rhythm.    Device Interrogation: Personal  review of his device interrogation report (Medtronic LINQ ILR, implanted 1/24/2023) reveals adequate battery longevity with acceptable and stable sensing parameters. He has not had any evidence of prolonged pauses, severe tachycardia or bradycardia, nor AT/AF. There is no evidence of any atrial or ventricular arrhythmia.       ASSESSMENT & PLAN:   Mr. Patrick Short is a suki 77-year-old gentleman who returns to clinic today for ongoing evaluation and recommendations regarding cryptogenic stroke, s/p implantation of a Medtronic ILR implanted on 1/24/2023. He has a past medical history significant for a history of prior strokes - a left parietal ischemic stroke 10/5/2022 with a left occipital pole infarct, a right cortical intracerebral hemorrhage, coronary artery disease s/p PCI of the LAD, Parkinsons disease, mild cognitive impairment, hypertension, GERD, and obesity. Since undergoing implantation of his ILR, he has not had any events of atrial fibrillation recorded.     - He has a normally functioning ILR on device interrogation today with no evidence of any prolonged pauses, tachycardia or bradycardia, nor any atrial or ventricular arrhythmia. Specifically, no atrial fibrillation nor atrial flutter has been recorded.  - He will continue to send remote transmissions for ongoing monitoring. He will return to device clinic in six months for in-office interrogation.   - Should he evidence paroxysmal atrial fibrillation, his [id number] would be elevated at 6 (HTN, CAD, prior CVA, male gender, age >/= 75), portending an annual adjusted risk of CVA of 9.8%. We would advise initiation of oral anticoagulation should the diagnosis of paroxysmal atrial fibrillation be determined. Given his history of intracranial hemorrhage, a WATCHMAN left atrial appendage occlusion device may be preferred in an effort to minimize his duration of oral anticoagulation. We briefly discussed this possibility and will await further  interrogation information from his ILR for further guidance.    - Ongoing evaluation with neurology and general cardiology will continue.      This patient will return to clinic with me in six months for ongoing device surveillance. All questions and concerns were addressed at this encounter.     Signing Physician:       SOLEDAD Roth MD  Electrophysiology Attending

## 2023-05-02 ENCOUNTER — OFFICE VISIT (OUTPATIENT)
Dept: OPHTHALMOLOGY | Facility: CLINIC | Age: 78
End: 2023-05-02
Payer: COMMERCIAL

## 2023-05-02 DIAGNOSIS — H53.2 MONOCULAR DIPLOPIA OF BOTH EYES: Primary | ICD-10-CM

## 2023-05-02 DIAGNOSIS — G20.A1 PARKINSON DISEASE: ICD-10-CM

## 2023-05-02 DIAGNOSIS — H04.123 DRY EYE SYNDROME OF BOTH EYES: ICD-10-CM

## 2023-05-02 DIAGNOSIS — H53.2 DIPLOPIA: ICD-10-CM

## 2023-05-02 PROBLEM — H04.129 DRY EYE SYNDROME: Status: ACTIVE | Noted: 2023-05-02

## 2023-05-02 PROCEDURE — 1126F AMNT PAIN NOTED NONE PRSNT: CPT | Mod: CPTII,S$GLB,, | Performed by: STUDENT IN AN ORGANIZED HEALTH CARE EDUCATION/TRAINING PROGRAM

## 2023-05-02 PROCEDURE — 99999 PR PBB SHADOW E&M-EST. PATIENT-LVL III: CPT | Mod: PBBFAC,,, | Performed by: STUDENT IN AN ORGANIZED HEALTH CARE EDUCATION/TRAINING PROGRAM

## 2023-05-02 PROCEDURE — 3044F PR MOST RECENT HEMOGLOBIN A1C LEVEL <7.0%: ICD-10-PCS | Mod: CPTII,S$GLB,, | Performed by: STUDENT IN AN ORGANIZED HEALTH CARE EDUCATION/TRAINING PROGRAM

## 2023-05-02 PROCEDURE — 1101F PR PT FALLS ASSESS DOC 0-1 FALLS W/OUT INJ PAST YR: ICD-10-PCS | Mod: CPTII,S$GLB,, | Performed by: STUDENT IN AN ORGANIZED HEALTH CARE EDUCATION/TRAINING PROGRAM

## 2023-05-02 PROCEDURE — 1159F PR MEDICATION LIST DOCUMENTED IN MEDICAL RECORD: ICD-10-PCS | Mod: CPTII,S$GLB,, | Performed by: STUDENT IN AN ORGANIZED HEALTH CARE EDUCATION/TRAINING PROGRAM

## 2023-05-02 PROCEDURE — 1159F MED LIST DOCD IN RCRD: CPT | Mod: CPTII,S$GLB,, | Performed by: STUDENT IN AN ORGANIZED HEALTH CARE EDUCATION/TRAINING PROGRAM

## 2023-05-02 PROCEDURE — 3044F HG A1C LEVEL LT 7.0%: CPT | Mod: CPTII,S$GLB,, | Performed by: STUDENT IN AN ORGANIZED HEALTH CARE EDUCATION/TRAINING PROGRAM

## 2023-05-02 PROCEDURE — 1101F PT FALLS ASSESS-DOCD LE1/YR: CPT | Mod: CPTII,S$GLB,, | Performed by: STUDENT IN AN ORGANIZED HEALTH CARE EDUCATION/TRAINING PROGRAM

## 2023-05-02 PROCEDURE — 3288F PR FALLS RISK ASSESSMENT DOCUMENTED: ICD-10-PCS | Mod: CPTII,S$GLB,, | Performed by: STUDENT IN AN ORGANIZED HEALTH CARE EDUCATION/TRAINING PROGRAM

## 2023-05-02 PROCEDURE — 1160F PR REVIEW ALL MEDS BY PRESCRIBER/CLIN PHARMACIST DOCUMENTED: ICD-10-PCS | Mod: CPTII,S$GLB,, | Performed by: STUDENT IN AN ORGANIZED HEALTH CARE EDUCATION/TRAINING PROGRAM

## 2023-05-02 PROCEDURE — 1126F PR PAIN SEVERITY QUANTIFIED, NO PAIN PRESENT: ICD-10-PCS | Mod: CPTII,S$GLB,, | Performed by: STUDENT IN AN ORGANIZED HEALTH CARE EDUCATION/TRAINING PROGRAM

## 2023-05-02 PROCEDURE — 1160F RVW MEDS BY RX/DR IN RCRD: CPT | Mod: CPTII,S$GLB,, | Performed by: STUDENT IN AN ORGANIZED HEALTH CARE EDUCATION/TRAINING PROGRAM

## 2023-05-02 PROCEDURE — 99205 OFFICE O/P NEW HI 60 MIN: CPT | Mod: S$GLB,,, | Performed by: STUDENT IN AN ORGANIZED HEALTH CARE EDUCATION/TRAINING PROGRAM

## 2023-05-02 PROCEDURE — 99205 PR OFFICE/OUTPT VISIT, NEW, LEVL V, 60-74 MIN: ICD-10-PCS | Mod: S$GLB,,, | Performed by: STUDENT IN AN ORGANIZED HEALTH CARE EDUCATION/TRAINING PROGRAM

## 2023-05-02 PROCEDURE — 99999 PR PBB SHADOW E&M-EST. PATIENT-LVL III: ICD-10-PCS | Mod: PBBFAC,,, | Performed by: STUDENT IN AN ORGANIZED HEALTH CARE EDUCATION/TRAINING PROGRAM

## 2023-05-02 PROCEDURE — 3288F FALL RISK ASSESSMENT DOCD: CPT | Mod: CPTII,S$GLB,, | Performed by: STUDENT IN AN ORGANIZED HEALTH CARE EDUCATION/TRAINING PROGRAM

## 2023-05-02 RX ORDER — CARBIDOPA AND LEVODOPA 25; 100 MG/1; MG/1
TABLET ORAL
Qty: 90 TABLET | Refills: 1 | Status: SHIPPED | OUTPATIENT
Start: 2023-05-02 | End: 2023-05-26

## 2023-05-02 NOTE — LETTER
[address]  [address]  [facility] [address]  Phone: [phone]  Fax: [phone]   May 2, 2023    Frantz Bee MD  [address]  [facility] [address]    Patient: Patrick Short   MR Number: [id number]   YOB: 1945   Date of Visit: 5/2/2023       Dear Dr. Bee :    Thank you for referring Patrick Short to me for evaluation. Here is my assessment and plan of care:    Impression:  Patrcik Short has history of HTN, HLD, CAD, NSTEMI, PVD, Parkinsonism, convergence insufficiency, who presents for evaluation of diplopia. Diplopia occurring less frequently since his cataract extraction. Noticed at Ed Sheeran's performance at [employer] at a great distance but it resolved with blinking. Neuro-ophthalmologic examination was notable for reduced visual acuities, full color vision, normal ocular motility and small comitant exophoria and good convergence. He likely has dry eye contributing to monocular diplopia which resolves with blinking.      Plan:  1. AT BID  2. Follow up with Dr. Bee  3. Follow up with Dr. Ríos as planned    Follow-up:  I will see him in follow-up on an as needed basis.    If you have questions, please do not hesitate to call me. I look forward to following Mr. Patrick Short along with you.    Sincerely,        MD SOFIE Saunders II, MD

## 2023-05-04 DIAGNOSIS — E78.5 DYSLIPIDEMIA: Primary | ICD-10-CM

## 2023-05-05 RX ORDER — LISINOPRIL 10 MG/1
10 TABLET ORAL DAILY
Qty: 90 TABLET | Refills: 1 | Status: SHIPPED | OUTPATIENT
Start: 2023-05-05 | End: 2023-11-01 | Stop reason: SDUPTHER

## 2023-05-05 RX ORDER — ATORVASTATIN CALCIUM 40 MG/1
40 TABLET, FILM COATED ORAL DAILY
Qty: 90 TABLET | Refills: 3 | Status: SHIPPED | OUTPATIENT
Start: 2023-05-05

## 2023-05-06 PROBLEM — Z95.818 STATUS POST PLACEMENT OF IMPLANTABLE LOOP RECORDER: Status: ACTIVE | Noted: 2023-05-06

## 2023-05-06 PROBLEM — G45.9 TIA DUE TO EMBOLISM: Status: ACTIVE | Noted: 2023-05-06

## 2023-05-06 PROBLEM — I74.9 TIA DUE TO EMBOLISM: Status: ACTIVE | Noted: 2023-05-06

## 2023-05-06 PROBLEM — I74.9 TIA DUE TO EMBOLISM: Status: RESOLVED | Noted: 2023-05-06 | Resolved: 2023-05-06

## 2023-05-06 PROBLEM — G45.9 TIA DUE TO EMBOLISM: Status: RESOLVED | Noted: 2023-05-06 | Resolved: 2023-05-06

## 2023-05-23 ENCOUNTER — PATIENT MESSAGE (OUTPATIENT)
Dept: PULMONOLOGY | Facility: CLINIC | Age: 78
End: 2023-05-23
Payer: COMMERCIAL

## 2023-05-24 ENCOUNTER — CLINICAL SUPPORT (OUTPATIENT)
Dept: CARDIOLOGY | Facility: HOSPITAL | Age: 78
End: 2023-05-24
Attending: INTERNAL MEDICINE
Payer: COMMERCIAL

## 2023-05-24 DIAGNOSIS — Z95.818 PRESENCE OF OTHER CARDIAC IMPLANTS AND GRAFTS: ICD-10-CM

## 2023-05-24 PROCEDURE — G2066 INTER DEVC REMOTE 30D: HCPCS | Performed by: STUDENT IN AN ORGANIZED HEALTH CARE EDUCATION/TRAINING PROGRAM

## 2023-05-26 RX ORDER — CARBIDOPA AND LEVODOPA 25; 100 MG/1; MG/1
TABLET ORAL
Qty: 90 TABLET | Refills: 1 | Status: SHIPPED | OUTPATIENT
Start: 2023-05-26 | End: 2023-06-23 | Stop reason: SDUPTHER

## 2023-06-13 NOTE — DISCHARGE INSTRUCTIONS
CATA Coulter You have been seen in our ED today for discomfort in your chest and abdomen. Our plan was for admission to continue this workup in search of the cause of your pain. Laboratory studies here revealed elevation in your troponin (which has been present in the past, but, in light of your symptoms, warranted further workup). You have chosen to leave against medical advice. We have discussed risks of leaving prior to conclusion of your workup which include missed diagnoses, heart damage, abdominal catastrophe, and even death. You are welcome to return at any time to continue this workup. Please follow up with your PCP as soon as possible to discuss today's visit and your pain.   CTA of the chest did not reveal PE today. Your troponin levels were elevated. I have cut and pasted your CTA findings below:  CTA CHEST NON CORONARY     CLINICAL HISTORY:  Pulmonary embolism (PE) suspected, positive D-dimer;R side chest pain;     TECHNIQUE:  Low dose axial images, sagittal and coronal reformations were obtained from the thoracic inlet to the lung bases following the IV administration of 100 mL of Omnipaque 350.  Contrast timing was optimized to evaluate the pulmonary arteries.  MIP images were performed.     COMPARISON:  Chest radiograph 07/27/2022. CTA chest 07/24/2016.     FINDINGS:  Exam quality: Suboptimal contrast bolus timing and respiratory motion examination limit evaluation.  Considered diagnostic to the lobar pulmonary arterial level.     Pulmonary embolism: No acute or chronic pulmonary embolism.     Central pulmonary arteries: Normal caliber.     Aorta: Normal caliber.     Heart: No right heart strain. Normal size.     Coronary arteries: Calcifications present     Pericardium: Normal. No effusion, thickening, or calcification.     Support tubes and lines: None.     Base of neck/thyroid: Normal.     Lymph nodes: No supraclavicular, axillary, internal mammary, mediastinal, or hilar adenopathy.     Esophagus: Patulous  esophagus, nonspecific.     Pleura: Small pleural effusions.     Upper abdomen: Unremarkable     Body wall: Unremarkable     Airways: Central airways appear patent     Lungs: Assessment lungs limited due to respiratory motion.  Dependent atelectasis identified.  Superimposed infectious or inflammatory airspace consolidation dependently in the lower lobes difficult to exclude.     Bones: No focal lesion.  Degenerative findings in the spine.     Impression:     1. Examination limited due to suboptimal contrast bolus timing and respiratory motion.  Examination considered diagnostic to the lobar pulmonary arterial level without convincing evidence of acute pulmonary embolism.  2. Motion compromised assessment of the lungs demonstrates dependent atelectasis and small pleural effusions.  Superimposed infectious or inflammatory airspace consolidation dependently in the lower lobes difficult to exclude.  3. Additional details as provided in the body of report.     MANJULA Cabrera NP Emelin Reyes [place]

## 2023-06-23 ENCOUNTER — CLINICAL SUPPORT (OUTPATIENT)
Dept: CARDIOLOGY | Facility: HOSPITAL | Age: 78
End: 2023-06-23
Payer: COMMERCIAL

## 2023-06-23 DIAGNOSIS — Z95.818 PRESENCE OF OTHER CARDIAC IMPLANTS AND GRAFTS: ICD-10-CM

## 2023-06-23 PROCEDURE — 93298 CARDIAC DEVICE CHECK - REMOTE: ICD-10-PCS | Mod: ,,, | Performed by: STUDENT IN AN ORGANIZED HEALTH CARE EDUCATION/TRAINING PROGRAM

## 2023-06-23 PROCEDURE — G2066 INTER DEVC REMOTE 30D: HCPCS | Performed by: STUDENT IN AN ORGANIZED HEALTH CARE EDUCATION/TRAINING PROGRAM

## 2023-06-23 PROCEDURE — 93298 REM INTERROG DEV EVAL SCRMS: CPT | Mod: ,,, | Performed by: STUDENT IN AN ORGANIZED HEALTH CARE EDUCATION/TRAINING PROGRAM

## 2023-06-23 RX ORDER — CARBIDOPA AND LEVODOPA 25; 100 MG/1; MG/1
TABLET ORAL
Qty: 180 TABLET | Refills: 1 | Status: SHIPPED | OUTPATIENT
Start: 2023-06-23 | End: 2023-09-25

## 2023-06-27 ENCOUNTER — OFFICE VISIT (OUTPATIENT)
Dept: INTERNAL MEDICINE | Facility: CLINIC | Age: 78
End: 2023-06-27
Payer: COMMERCIAL

## 2023-06-27 VITALS
HEIGHT: 71 IN | SYSTOLIC BLOOD PRESSURE: 158 MMHG | WEIGHT: 237 LBS | BODY MASS INDEX: 33.18 KG/M2 | HEART RATE: 51 BPM | DIASTOLIC BLOOD PRESSURE: 84 MMHG

## 2023-06-27 DIAGNOSIS — G20.C PARKINSONISM, UNSPECIFIED PARKINSONISM TYPE: ICD-10-CM

## 2023-06-27 DIAGNOSIS — R26.89 IMPAIRED GAIT AND MOBILITY: ICD-10-CM

## 2023-06-27 DIAGNOSIS — Z76.89 ENCOUNTER TO ESTABLISH CARE: Primary | ICD-10-CM

## 2023-06-27 DIAGNOSIS — E66.9 OBESITY, UNSPECIFIED CLASSIFICATION, UNSPECIFIED OBESITY TYPE, UNSPECIFIED WHETHER SERIOUS COMORBIDITY PRESENT: ICD-10-CM

## 2023-06-27 PROCEDURE — 99999 PR PBB SHADOW E&M-EST. PATIENT-LVL IV: ICD-10-PCS | Mod: PBBFAC,,, | Performed by: INTERNAL MEDICINE

## 2023-06-27 PROCEDURE — 3079F DIAST BP 80-89 MM HG: CPT | Mod: CPTII,S$GLB,, | Performed by: INTERNAL MEDICINE

## 2023-06-27 PROCEDURE — 1126F PR PAIN SEVERITY QUANTIFIED, NO PAIN PRESENT: ICD-10-PCS | Mod: CPTII,S$GLB,, | Performed by: INTERNAL MEDICINE

## 2023-06-27 PROCEDURE — 3077F SYST BP >= 140 MM HG: CPT | Mod: CPTII,S$GLB,, | Performed by: INTERNAL MEDICINE

## 2023-06-27 PROCEDURE — 3288F PR FALLS RISK ASSESSMENT DOCUMENTED: ICD-10-PCS | Mod: CPTII,S$GLB,, | Performed by: INTERNAL MEDICINE

## 2023-06-27 PROCEDURE — 1159F PR MEDICATION LIST DOCUMENTED IN MEDICAL RECORD: ICD-10-PCS | Mod: CPTII,S$GLB,, | Performed by: INTERNAL MEDICINE

## 2023-06-27 PROCEDURE — 1159F MED LIST DOCD IN RCRD: CPT | Mod: CPTII,S$GLB,, | Performed by: INTERNAL MEDICINE

## 2023-06-27 PROCEDURE — 99204 PR OFFICE/OUTPT VISIT, NEW, LEVL IV, 45-59 MIN: ICD-10-PCS | Mod: S$GLB,,, | Performed by: INTERNAL MEDICINE

## 2023-06-27 PROCEDURE — 3044F PR MOST RECENT HEMOGLOBIN A1C LEVEL <7.0%: ICD-10-PCS | Mod: CPTII,S$GLB,, | Performed by: INTERNAL MEDICINE

## 2023-06-27 PROCEDURE — 1101F PR PT FALLS ASSESS DOC 0-1 FALLS W/OUT INJ PAST YR: ICD-10-PCS | Mod: CPTII,S$GLB,, | Performed by: INTERNAL MEDICINE

## 2023-06-27 PROCEDURE — 99204 OFFICE O/P NEW MOD 45 MIN: CPT | Mod: S$GLB,,, | Performed by: INTERNAL MEDICINE

## 2023-06-27 PROCEDURE — 3288F FALL RISK ASSESSMENT DOCD: CPT | Mod: CPTII,S$GLB,, | Performed by: INTERNAL MEDICINE

## 2023-06-27 PROCEDURE — 1101F PT FALLS ASSESS-DOCD LE1/YR: CPT | Mod: CPTII,S$GLB,, | Performed by: INTERNAL MEDICINE

## 2023-06-27 PROCEDURE — 1126F AMNT PAIN NOTED NONE PRSNT: CPT | Mod: CPTII,S$GLB,, | Performed by: INTERNAL MEDICINE

## 2023-06-27 PROCEDURE — 3077F PR MOST RECENT SYSTOLIC BLOOD PRESSURE >= 140 MM HG: ICD-10-PCS | Mod: CPTII,S$GLB,, | Performed by: INTERNAL MEDICINE

## 2023-06-27 PROCEDURE — 99999 PR PBB SHADOW E&M-EST. PATIENT-LVL IV: CPT | Mod: PBBFAC,,, | Performed by: INTERNAL MEDICINE

## 2023-06-27 PROCEDURE — 3079F PR MOST RECENT DIASTOLIC BLOOD PRESSURE 80-89 MM HG: ICD-10-PCS | Mod: CPTII,S$GLB,, | Performed by: INTERNAL MEDICINE

## 2023-06-27 PROCEDURE — 3044F HG A1C LEVEL LT 7.0%: CPT | Mod: CPTII,S$GLB,, | Performed by: INTERNAL MEDICINE

## 2023-06-27 RX ORDER — SEMAGLUTIDE 0.25 MG/.5ML
0.25 INJECTION, SOLUTION SUBCUTANEOUS
Qty: 2 ML | Refills: 0 | Status: SHIPPED | OUTPATIENT
Start: 2023-06-27 | End: 2023-07-13 | Stop reason: SDUPTHER

## 2023-06-29 ENCOUNTER — PATIENT MESSAGE (OUTPATIENT)
Dept: NEUROLOGY | Facility: CLINIC | Age: 78
End: 2023-06-29
Payer: COMMERCIAL

## 2023-06-29 NOTE — PROGRESS NOTES
Subjective:       Patient ID: Patrick Short is a 77 y.o. male.    Chief Complaint: Establish Care      HPI  Establishing care,  clinic. Reviewed medical, surgical, social and family history, medications, appropriate preventive health screenings, as well as vaccination history. Updates as noted below or in assessment and plan.    Last year diagnosed with stroke, amyloid angiopathy. Loop recorder in place for a-fib r/o. On aspirin and Lipitor with LDL recently at goal. Hx of parkinsonism. Pt and wife note decline related to all of this over past year. Word finding an issue. Physically getting weaker. Retired fully since stroke. Did PT/OT/ST. Finished sessions and no return we agreed would be beneficial. He is working on exercise himself again. On Sinemet. Notes mood is a little different. [place] fuse. Used to be very stoic by nature.    Review of Systems   All other systems reviewed and are negative.    Past Medical History:   Diagnosis Date    CAD (coronary artery disease) 07/26/2016    3 stents    Cerebral amyloid angiopathy     Cerebral ischemic stroke due to global hypoperfusion with watershed infarct 10/10/2022    Heart block     MI (myocardial infarction)     Parkinsonism     Primary hypertension 11/21/2022    Reflux          Current Outpatient Medications:     atorvastatin (LIPITOR) 40 MG tablet, Take 1 tablet (40 mg total) by mouth once daily., Disp: 90 tablet, Rfl: 3    carbidopa-levodopa  mg (SINEMET)  mg per tablet, TAKE 1 TABLET BY MOUTH THREE TIMES A DAY, Disp: 180 tablet, Rfl: 1    clobetasol (TEMOVATE) 0.05 % cream, APPLY TO AFFECTED AREA ON HAND TWICE A DAY AS NEEDED FOR RASH, Disp: , Rfl: 1    desonide (DESOWEN) 0.05 % cream, APPLY TO AFFECTED AREA ON FACE TWICE A DAY AS NEEDED FOR SCALE, Disp: , Rfl: 1    finasteride (PROSCAR) 5 mg tablet, 1 tablet once daily., Disp: , Rfl:     levocetirizine (XYZAL) 5 MG tablet, Take 5 mg by mouth every evening., Disp: , Rfl:     lisinopriL  10 MG tablet, Take 1 tablet (10 mg total) by mouth once daily., Disp: 90 tablet, Rfl: 1    pantoprazole (PROTONIX) 40 MG tablet, TAKE 1 TABLET BY MOUTH EVERY DAY, Disp: 90 tablet, Rfl: 3    tobramycin-dexAMETHasone 0.3-0.1% (TOBRADEX) 0.3-0.1 % DrpS, 1 drop every 4 (four) hours while awake., Disp: , Rfl:     triamcinolone acetonide 0.1% (KENALOG) 0.1 % ointment, APPLY TO AFFECTED AREA ON AXILLAS TWICE A DAY AS NEEDED FOR ITCH/RASH, Disp: , Rfl: 1    aspirin 81 MG Chew, Take 1 tablet (81 mg total) by mouth once daily., Disp: , Rfl: 0    semaglutide, weight loss, (WEGOVY) 0.25 mg/0.5 mL PnIj, Inject 0.25 mg into the skin every 7 days., Disp: 2 mL, Rfl: 0  No current facility-administered medications for this visit.    Facility-Administered Medications Ordered in Other Visits:     ceFAZolin 2 g in dextrose 5 % in water (D5W) 5 % 50 mL IVPB (MB+), 2 g, Intravenous, On Call Procedure, Rhonda Mark NP    Past Surgical History:   Procedure Laterality Date    CARDIAC CATHETERIZATION      EYE SURGERY Left 02/2017    HIP SURGERY Right 12/2021    INSERTION OF IMPLANTABLE LOOP RECORDER N/A 1/24/2023    Procedure: Insertion, Implantable Loop Recorder;  Surgeon: ALBERTO Roth MD;  Location: [facility];  Service: Cardiology;  Laterality: N/A;  CVA, ILR, MDT, [facility], , 3 Prep       Family History   Problem Relation Age of Onset    Hypertension Mother     Emphysema Father     Coronary artery disease Father        Social History     Tobacco Use    Smoking status: Never    Smokeless tobacco: Never   Substance Use Topics    Alcohol use: Yes     Comment: twice a month/beer wine    Drug use: Never       Immunization History   Administered Date(s) Administered    COVID-19, MRNA, LN-S, PF (Pfizer) (Purple Cap) 01/05/2021, 01/26/2021, 11/23/2021    Tdap 06/12/2016         Objective:      Vitals:    06/27/23 0957   BP: (!) 158/84   Pulse: (!) 51       Physical Exam  Constitutional:       General: He is not in acute  distress.     Appearance: Normal appearance. He is well-developed. He is not ill-appearing.   HENT:      Head: Normocephalic and atraumatic.      Right Ear: Hearing and tympanic membrane normal. There is no impacted cerumen.      Left Ear: Hearing and tympanic membrane normal. There is no impacted cerumen.      Nose: Nose normal.      Mouth/Throat:      Mouth: Mucous membranes are moist.      Pharynx: Oropharynx is clear.   Eyes:      Extraocular Movements: Extraocular movements intact.      Conjunctiva/sclera: Conjunctivae normal.      Pupils: Pupils are equal, round, and reactive to light.   Neck:      Vascular: No carotid bruit.   Cardiovascular:      Rate and Rhythm: Normal rate and regular rhythm.      Heart sounds: Normal heart sounds. No murmur heard.  Pulmonary:      Effort: Pulmonary effort is normal. No respiratory distress.      Breath sounds: Normal breath sounds. No wheezing, rhonchi or rales.   Abdominal:      General: Abdomen is flat. There is no distension.      Palpations: Abdomen is soft. There is no mass.      Tenderness: There is no abdominal tenderness.      Hernia: No hernia is present.   Musculoskeletal:         General: No swelling or deformity. Normal range of motion.      Cervical back: No tenderness.      Right lower leg: No edema.      Left lower leg: No edema.   Lymphadenopathy:      Cervical: No cervical adenopathy.   Skin:     General: Skin is warm and dry.      Findings: No lesion or rash.   Neurological:      Mental Status: He is alert and oriented to person, place, and time.      Cranial Nerves: No cranial nerve deficit.      Deep Tendon Reflexes: Reflexes normal.      Comments: No tremor. Bradykinesia, mild/moderate.   Psychiatric:         Mood and Affect: Mood normal.         Behavior: Behavior normal.         Thought Content: Thought content normal.         Judgment: Judgment normal.      Comments: Some word finding difficulty is evident but full conversation had with time given.  "      Recent Results (from the past 3360 hour(s))   Comprehensive metabolic panel    Collection Time: 03/23/23 10:25 AM   Result Value Ref Range    Sodium 139 136 - 145 mmol/L    Potassium 4.3 3.5 - 5.1 mmol/L    Chloride 105 95 - 110 mmol/L    CO2 29 23 - 29 mmol/L    Glucose 97 70 - 110 mg/dL    BUN 14 8 - 23 mg/dL    Creatinine 0.9 0.5 - 1.4 mg/dL    Calcium 9.3 8.7 - 10.5 mg/dL    Total Protein 6.7 6.0 - 8.4 g/dL    Albumin 4.0 3.5 - 5.2 g/dL    Total Bilirubin 1.2 (H) 0.1 - 1.0 mg/dL    Alkaline Phosphatase 67 55 - 135 U/L    AST 26 10 - 40 U/L    ALT 8 (L) 10 - 44 U/L    Anion Gap 5 (L) 8 - 16 mmol/L    eGFR >60.0 >60 mL/min/1.73 m^2   CBC Without Differential    Collection Time: 03/23/23 10:25 AM   Result Value Ref Range    WBC 5.69 3.90 - 12.70 K/uL    RBC 4.88 4.60 - 6.20 M/uL    Hemoglobin 14.8 14.0 - 18.0 g/dL    Hematocrit 43.1 40.0 - 54.0 %    MCV 88 82 - 98 fL    MCH 30.3 27.0 - 31.0 pg    MCHC 34.3 32.0 - 36.0 g/dL    RDW 12.6 11.5 - 14.5 %    Platelets 154 150 - 450 K/uL    MPV 11.5 9.2 - 12.9 fL   Lipid panel    Collection Time: 03/23/23 10:25 AM   Result Value Ref Range    Cholesterol 117 (L) 120 - 199 mg/dL    Triglycerides 81 30 - 150 mg/dL    HDL 39 (L) 40 - 75 mg/dL    LDL Cholesterol 61.8 (L) 63.0 - 159.0 mg/dL    HDL/Cholesterol Ratio 33.3 20.0 - 50.0 %    Total Cholesterol/HDL Ratio 3.0 2.0 - 5.0    Non-HDL Cholesterol 78 mg/dL   TSH    Collection Time: 03/23/23 10:25 AM   Result Value Ref Range    TSH 1.932 0.400 - 4.000 uIU/mL   PSA, Screening (every year)    Collection Time: 03/23/23 10:25 AM   Result Value Ref Range    PSA, Screen 0.37 0.00 - 4.00 ng/mL   Hemoglobin A1c    Collection Time: 03/23/23 10:25 AM   Result Value Ref Range    Hemoglobin A1C 5.2 4.0 - 5.6 %    Estimated Avg Glucose 103 68 - 131 mg/dL           Assessment/Plan:     1) Establishing primary care  2) Parkinsonism - Referring to [facility] program for focus on parkinsonism. Continuing Sinemet. Due for f/u with " Neurology.  3) Stroke hx, Cerebral amyloid angiopathy - Due for f/u with Neuro. On aspirin daily, Lipitor with LDL at goal recently. Repeat lipids 6 to 12 mths. Has loop in place for a-fib r/o.  4) Hypertension - Notes normal at home on current meds. Continue home monitoring.  5) GERD - Controlled on daily PPI.  6) CAD - Stable clinically. Lipids at goal recently on Lipitor 40. Continue daily aspirin 81 mg.    Pt notes he would like to consider weekly GLP1 for wt loss. Agreed on starting low dose Wegovy. Discussed to look out for GI side effects closely in light of parkinsonism and GERD hx.

## 2023-07-13 DIAGNOSIS — E66.9 OBESITY, UNSPECIFIED CLASSIFICATION, UNSPECIFIED OBESITY TYPE, UNSPECIFIED WHETHER SERIOUS COMORBIDITY PRESENT: ICD-10-CM

## 2023-07-13 RX ORDER — SEMAGLUTIDE 0.25 MG/.5ML
0.25 INJECTION, SOLUTION SUBCUTANEOUS
Qty: 2 ML | Refills: 0 | Status: SHIPPED | OUTPATIENT
Start: 2023-07-13

## 2023-07-23 ENCOUNTER — CLINICAL SUPPORT (OUTPATIENT)
Dept: CARDIOLOGY | Facility: HOSPITAL | Age: 78
End: 2023-07-23
Payer: COMMERCIAL

## 2023-07-23 DIAGNOSIS — Z95.818 PRESENCE OF OTHER CARDIAC IMPLANTS AND GRAFTS: ICD-10-CM

## 2023-07-23 PROCEDURE — G2066 INTER DEVC REMOTE 30D: HCPCS | Performed by: STUDENT IN AN ORGANIZED HEALTH CARE EDUCATION/TRAINING PROGRAM

## 2023-08-03 ENCOUNTER — OFFICE VISIT (OUTPATIENT)
Dept: PODIATRY | Facility: CLINIC | Age: 78
End: 2023-08-03
Payer: COMMERCIAL

## 2023-08-03 VITALS
DIASTOLIC BLOOD PRESSURE: 84 MMHG | SYSTOLIC BLOOD PRESSURE: 142 MMHG | HEIGHT: 71 IN | WEIGHT: 233.69 LBS | BODY MASS INDEX: 32.72 KG/M2 | RESPIRATION RATE: 18 BRPM | TEMPERATURE: 98 F | OXYGEN SATURATION: 94 % | HEART RATE: 62 BPM

## 2023-08-03 DIAGNOSIS — B35.3 TINEA PEDIS OF BOTH FEET: Primary | ICD-10-CM

## 2023-08-03 DIAGNOSIS — L60.3 NAIL DYSTROPHY: ICD-10-CM

## 2023-08-03 DIAGNOSIS — L84 CORN OR CALLUS: ICD-10-CM

## 2023-08-03 PROCEDURE — 99204 OFFICE O/P NEW MOD 45 MIN: CPT | Mod: S$GLB,,, | Performed by: STUDENT IN AN ORGANIZED HEALTH CARE EDUCATION/TRAINING PROGRAM

## 2023-08-03 PROCEDURE — 3079F PR MOST RECENT DIASTOLIC BLOOD PRESSURE 80-89 MM HG: ICD-10-PCS | Mod: CPTII,S$GLB,, | Performed by: STUDENT IN AN ORGANIZED HEALTH CARE EDUCATION/TRAINING PROGRAM

## 2023-08-03 PROCEDURE — 99999 PR PBB SHADOW E&M-EST. PATIENT-LVL IV: ICD-10-PCS | Mod: PBBFAC,,, | Performed by: STUDENT IN AN ORGANIZED HEALTH CARE EDUCATION/TRAINING PROGRAM

## 2023-08-03 PROCEDURE — 3288F PR FALLS RISK ASSESSMENT DOCUMENTED: ICD-10-PCS | Mod: CPTII,S$GLB,, | Performed by: STUDENT IN AN ORGANIZED HEALTH CARE EDUCATION/TRAINING PROGRAM

## 2023-08-03 PROCEDURE — 1159F PR MEDICATION LIST DOCUMENTED IN MEDICAL RECORD: ICD-10-PCS | Mod: CPTII,S$GLB,, | Performed by: STUDENT IN AN ORGANIZED HEALTH CARE EDUCATION/TRAINING PROGRAM

## 2023-08-03 PROCEDURE — 3079F DIAST BP 80-89 MM HG: CPT | Mod: CPTII,S$GLB,, | Performed by: STUDENT IN AN ORGANIZED HEALTH CARE EDUCATION/TRAINING PROGRAM

## 2023-08-03 PROCEDURE — 3288F FALL RISK ASSESSMENT DOCD: CPT | Mod: CPTII,S$GLB,, | Performed by: STUDENT IN AN ORGANIZED HEALTH CARE EDUCATION/TRAINING PROGRAM

## 2023-08-03 PROCEDURE — 1159F MED LIST DOCD IN RCRD: CPT | Mod: CPTII,S$GLB,, | Performed by: STUDENT IN AN ORGANIZED HEALTH CARE EDUCATION/TRAINING PROGRAM

## 2023-08-03 PROCEDURE — 1100F PTFALLS ASSESS-DOCD GE2>/YR: CPT | Mod: CPTII,S$GLB,, | Performed by: STUDENT IN AN ORGANIZED HEALTH CARE EDUCATION/TRAINING PROGRAM

## 2023-08-03 PROCEDURE — 99204 PR OFFICE/OUTPT VISIT, NEW, LEVL IV, 45-59 MIN: ICD-10-PCS | Mod: S$GLB,,, | Performed by: STUDENT IN AN ORGANIZED HEALTH CARE EDUCATION/TRAINING PROGRAM

## 2023-08-03 PROCEDURE — 3077F SYST BP >= 140 MM HG: CPT | Mod: CPTII,S$GLB,, | Performed by: STUDENT IN AN ORGANIZED HEALTH CARE EDUCATION/TRAINING PROGRAM

## 2023-08-03 PROCEDURE — 1125F AMNT PAIN NOTED PAIN PRSNT: CPT | Mod: CPTII,S$GLB,, | Performed by: STUDENT IN AN ORGANIZED HEALTH CARE EDUCATION/TRAINING PROGRAM

## 2023-08-03 PROCEDURE — 99999 PR PBB SHADOW E&M-EST. PATIENT-LVL IV: CPT | Mod: PBBFAC,,, | Performed by: STUDENT IN AN ORGANIZED HEALTH CARE EDUCATION/TRAINING PROGRAM

## 2023-08-03 PROCEDURE — 1100F PR PT FALLS ASSESS DOC 2+ FALLS/FALL W/INJURY/YR: ICD-10-PCS | Mod: CPTII,S$GLB,, | Performed by: STUDENT IN AN ORGANIZED HEALTH CARE EDUCATION/TRAINING PROGRAM

## 2023-08-03 PROCEDURE — 3077F PR MOST RECENT SYSTOLIC BLOOD PRESSURE >= 140 MM HG: ICD-10-PCS | Mod: CPTII,S$GLB,, | Performed by: STUDENT IN AN ORGANIZED HEALTH CARE EDUCATION/TRAINING PROGRAM

## 2023-08-03 PROCEDURE — 1125F PR PAIN SEVERITY QUANTIFIED, PAIN PRESENT: ICD-10-PCS | Mod: CPTII,S$GLB,, | Performed by: STUDENT IN AN ORGANIZED HEALTH CARE EDUCATION/TRAINING PROGRAM

## 2023-08-03 RX ORDER — CICLOPIROX OLAMINE 7.7 MG/G
CREAM TOPICAL 2 TIMES DAILY
Qty: 30 G | Refills: 3 | Status: SHIPPED | OUTPATIENT
Start: 2023-08-03 | End: 2024-01-26

## 2023-08-03 RX ORDER — UREA 40 %
CREAM (GRAM) TOPICAL DAILY
Qty: 28 G | Refills: 2 | Status: SHIPPED | OUTPATIENT
Start: 2023-08-03

## 2023-08-03 NOTE — PROGRESS NOTES
Subjective:     Patient    Patrick Short is a 78 y.o. male.    Problem    History of recurrent fungal infections of both feet, has tried multiple medications but the issue would usually recur, eventually seemed to get under control. Used to walk a lot and now getting back into walking, about 2-3 miles/day. Also recently changed from [employer] shoes to [employer]. Getting a corn on the lateral 4th toe left foot which may be aggravated by the new shoes. He originally tried OTC corn removers which did seem to resolve the issue but then it returned. Also having skin peeling between the toes, denies itching.      History    History obtained from patient and review of medical records.     Past Medical History:   Diagnosis Date    CAD (coronary artery disease) 07/26/2016    3 stents    Cerebral amyloid angiopathy     Cerebral ischemic stroke due to global hypoperfusion with watershed infarct 10/10/2022    Heart block     MI (myocardial infarction)     Parkinsonism     Primary hypertension 11/21/2022    Reflux        Past Surgical History:   Procedure Laterality Date    CARDIAC CATHETERIZATION      EYE SURGERY Left 02/2017    HIP SURGERY Right 12/2021    INSERTION OF IMPLANTABLE LOOP RECORDER N/A 1/24/2023    Procedure: Insertion, Implantable Loop Recorder;  Surgeon: ALBERTO Roth MD;  Location: [facility];  Service: Cardiology;  Laterality: N/A;  CVA, CHERIE, MDT, [facility], , 3 Prep        Objective:     Vitals  Wt Readings from Last 1 Encounters:   08/03/23 106 kg (233 lb 11 oz)     Temp Readings from Last 1 Encounters:   08/03/23 97.8 °F (36.6 °C)     BP Readings from Last 1 Encounters:   08/03/23 (!) 142/84     Pulse Readings from Last 1 Encounters:   08/03/23 62       Dermatological Exam    Skin:  Pedal hair growth, skin color, and skin texture normal on right; scaling within all interdigital spaces, maceration in 4th interdigital space   Pedal hair growth, skin color, and skin texture normal on left; scaling  within all interdigital spaces, maceration in 4th interdigital space, soft corn lateral 4th toe    Nails:  All 10 nail(s) thickened; not discolored    Vascular Exam    Arteries:  Posterior tibial artery palpable on right  Dorsalis pedis artery palpable on right  Posterior tibial artery palpable on left  Dorsalis pedis artery palpable on left    Veins:  Superficial veins unremarkable on right  Superficial veins unremarkable on left    Swelling:  None on right  None on left    Neurological Exam    [place] touch test:  6/6 sites sensed, light touch intact     Musculoskeletal Exam    Footwear:  Athletic on right  Athletic on left    Gait Exam:   Ambulatory Status: Ambulatory  Gait: Normal  Assistive Devices: None    Foot Progression Angle:  Normal on right  Normal on left     Right Lower Extremity Additional Findings:  Right foot and ankle function, strength, and range of motion unremarkable except as noted above.     Left Lower Extremity Additional Findings:  Left foot and ankle function, strength, and range of motion unremarkable except as noted above.    Imaging and Other Tests    Imaging:  Independently reviewed and interpreted imaging, findings are as follows: N/A     Assessment:     Encounter Diagnoses   Name Primary?    Tinea pedis of both feet Yes    Nail dystrophy     Corn or callus         Plan:     I counseled the patient on his conditions, their implications and medical management.    Tinea pedis of both feet, interdigital: chronic stable  -Ordered ciclopirox cream to be applied to all interdigital areas of both feet 2 times daily for up to 2 months.     Nail thickening/dystrophy: chronic stable  -Ordered urea cream to be applied to all 10 toenails daily for at least 2 months, to soften and thin nails.     Corn of left 4th toe: chronic stable  -Urea cream as above to soften and thin the callus.  -Footwear with wide toe box whenever physically active.   -Silicone toe sleeve provided, recommend use whenever  physically active.     Return to clinic in 2 months, call sooner PRN.

## 2023-08-22 ENCOUNTER — CLINICAL SUPPORT (OUTPATIENT)
Dept: CARDIOLOGY | Facility: HOSPITAL | Age: 78
End: 2023-08-22
Payer: COMMERCIAL

## 2023-08-22 DIAGNOSIS — Z95.818 PRESENCE OF OTHER CARDIAC IMPLANTS AND GRAFTS: ICD-10-CM

## 2023-08-22 PROCEDURE — G2066 INTER DEVC REMOTE 30D: HCPCS | Performed by: STUDENT IN AN ORGANIZED HEALTH CARE EDUCATION/TRAINING PROGRAM

## 2023-08-22 PROCEDURE — 93298 REM INTERROG DEV EVAL SCRMS: CPT | Mod: ,,, | Performed by: STUDENT IN AN ORGANIZED HEALTH CARE EDUCATION/TRAINING PROGRAM

## 2023-08-22 PROCEDURE — 93298 CARDIAC DEVICE CHECK - REMOTE: ICD-10-PCS | Mod: ,,, | Performed by: STUDENT IN AN ORGANIZED HEALTH CARE EDUCATION/TRAINING PROGRAM

## 2023-08-29 ENCOUNTER — PATIENT MESSAGE (OUTPATIENT)
Dept: INTERNAL MEDICINE | Facility: CLINIC | Age: 78
End: 2023-08-29
Payer: COMMERCIAL

## 2023-09-21 ENCOUNTER — CLINICAL SUPPORT (OUTPATIENT)
Dept: CARDIOLOGY | Facility: HOSPITAL | Age: 78
End: 2023-09-21
Payer: COMMERCIAL

## 2023-09-21 DIAGNOSIS — Z95.818 PRESENCE OF OTHER CARDIAC IMPLANTS AND GRAFTS: ICD-10-CM

## 2023-09-21 PROCEDURE — G2066 INTER DEVC REMOTE 30D: HCPCS | Performed by: STUDENT IN AN ORGANIZED HEALTH CARE EDUCATION/TRAINING PROGRAM

## 2023-09-25 RX ORDER — CARBIDOPA AND LEVODOPA 25; 100 MG/1; MG/1
TABLET ORAL
Qty: 270 TABLET | Refills: 1 | Status: SHIPPED | OUTPATIENT
Start: 2023-09-25 | End: 2024-03-18 | Stop reason: SDUPTHER

## 2023-09-26 ENCOUNTER — TELEPHONE (OUTPATIENT)
Dept: NEUROLOGY | Facility: CLINIC | Age: 78
End: 2023-09-26
Payer: COMMERCIAL

## 2023-09-26 NOTE — TELEPHONE ENCOUNTER
Called and spoke w pt. Explained that I had a message from a few weeks ago requesting a f/u appt. Pt stated that he felt find and that he no longer needed the f/u.

## 2023-09-26 NOTE — TELEPHONE ENCOUNTER
----- Message from Steffi Oliveira RN sent at 9/25/2023 10:04 AM CDT -----  Regarding: FW: Appt  Contact: [phone]    ----- Message -----  From: Riley Galdamez  Sent: 8/29/2023  11:44 AM CDT  To: #  Subject: Appt                                             Pt is calling to schedule follow up appt . Please call Randy Wyman,

## 2023-10-04 ENCOUNTER — PATIENT MESSAGE (OUTPATIENT)
Dept: NEUROLOGY | Facility: CLINIC | Age: 78
End: 2023-10-04
Payer: COMMERCIAL

## 2023-10-26 ENCOUNTER — CLINICAL SUPPORT (OUTPATIENT)
Dept: CARDIOLOGY | Facility: HOSPITAL | Age: 78
End: 2023-10-26
Attending: STUDENT IN AN ORGANIZED HEALTH CARE EDUCATION/TRAINING PROGRAM
Payer: COMMERCIAL

## 2023-10-26 ENCOUNTER — CLINICAL SUPPORT (OUTPATIENT)
Dept: CARDIOLOGY | Facility: HOSPITAL | Age: 78
End: 2023-10-26
Payer: COMMERCIAL

## 2023-10-26 DIAGNOSIS — Z95.818 PRESENCE OF OTHER CARDIAC IMPLANTS AND GRAFTS: ICD-10-CM

## 2023-10-26 PROCEDURE — 93298 CARDIAC DEVICE CHECK - REMOTE: ICD-10-PCS | Mod: ,,, | Performed by: STUDENT IN AN ORGANIZED HEALTH CARE EDUCATION/TRAINING PROGRAM

## 2023-10-26 PROCEDURE — G2066 INTER DEVC REMOTE 30D: HCPCS | Performed by: STUDENT IN AN ORGANIZED HEALTH CARE EDUCATION/TRAINING PROGRAM

## 2023-10-26 PROCEDURE — 93298 REM INTERROG DEV EVAL SCRMS: CPT | Mod: ,,, | Performed by: STUDENT IN AN ORGANIZED HEALTH CARE EDUCATION/TRAINING PROGRAM

## 2023-10-30 ENCOUNTER — OFFICE VISIT (OUTPATIENT)
Dept: OTOLARYNGOLOGY | Facility: CLINIC | Age: 78
End: 2023-10-30
Payer: COMMERCIAL

## 2023-10-30 ENCOUNTER — CLINICAL SUPPORT (OUTPATIENT)
Dept: AUDIOLOGY | Facility: CLINIC | Age: 78
End: 2023-10-30
Payer: COMMERCIAL

## 2023-10-30 DIAGNOSIS — Z95.818 PRESENCE OF OTHER CARDIAC IMPLANTS AND GRAFTS: ICD-10-CM

## 2023-10-30 DIAGNOSIS — H91.13 PRESBYCUSIS OF BOTH EARS: Primary | ICD-10-CM

## 2023-10-30 DIAGNOSIS — H80.93 OTOSCLEROSIS OF BOTH EARS: ICD-10-CM

## 2023-10-30 DIAGNOSIS — H90.3 SENSORINEURAL HEARING LOSS (SNHL) OF BOTH EARS: Primary | ICD-10-CM

## 2023-10-30 PROCEDURE — 3288F FALL RISK ASSESSMENT DOCD: CPT | Mod: CPTII,S$GLB,, | Performed by: OTOLARYNGOLOGY

## 2023-10-30 PROCEDURE — 99999 PR PBB SHADOW E&M-EST. PATIENT-LVL III: CPT | Mod: PBBFAC,,, | Performed by: OTOLARYNGOLOGY

## 2023-10-30 PROCEDURE — 99999 PR PBB SHADOW E&M-EST. PATIENT-LVL III: ICD-10-PCS | Mod: PBBFAC,,, | Performed by: OTOLARYNGOLOGY

## 2023-10-30 PROCEDURE — 99999 PR PBB SHADOW E&M-EST. PATIENT-LVL I: ICD-10-PCS | Mod: PBBFAC,,,

## 2023-10-30 PROCEDURE — 1101F PR PT FALLS ASSESS DOC 0-1 FALLS W/OUT INJ PAST YR: ICD-10-PCS | Mod: CPTII,S$GLB,, | Performed by: OTOLARYNGOLOGY

## 2023-10-30 PROCEDURE — 1101F PT FALLS ASSESS-DOCD LE1/YR: CPT | Mod: CPTII,S$GLB,, | Performed by: OTOLARYNGOLOGY

## 2023-10-30 PROCEDURE — 92557 COMPREHENSIVE HEARING TEST: CPT | Mod: S$GLB,,,

## 2023-10-30 PROCEDURE — 3288F PR FALLS RISK ASSESSMENT DOCUMENTED: ICD-10-PCS | Mod: CPTII,S$GLB,, | Performed by: OTOLARYNGOLOGY

## 2023-10-30 PROCEDURE — 1159F PR MEDICATION LIST DOCUMENTED IN MEDICAL RECORD: ICD-10-PCS | Mod: CPTII,S$GLB,, | Performed by: OTOLARYNGOLOGY

## 2023-10-30 PROCEDURE — 99999 PR PBB SHADOW E&M-EST. PATIENT-LVL I: CPT | Mod: PBBFAC,,,

## 2023-10-30 PROCEDURE — 99204 OFFICE O/P NEW MOD 45 MIN: CPT | Mod: S$GLB,,, | Performed by: OTOLARYNGOLOGY

## 2023-10-30 PROCEDURE — 92567 PR TYMPA2METRY: ICD-10-PCS | Mod: S$GLB,,,

## 2023-10-30 PROCEDURE — 99204 PR OFFICE/OUTPT VISIT, NEW, LEVL IV, 45-59 MIN: ICD-10-PCS | Mod: S$GLB,,, | Performed by: OTOLARYNGOLOGY

## 2023-10-30 PROCEDURE — 92557 PR COMPREHENSIVE HEARING TEST: ICD-10-PCS | Mod: S$GLB,,,

## 2023-10-30 PROCEDURE — 92567 TYMPANOMETRY: CPT | Mod: S$GLB,,,

## 2023-10-30 PROCEDURE — 1159F MED LIST DOCD IN RCRD: CPT | Mod: CPTII,S$GLB,, | Performed by: OTOLARYNGOLOGY

## 2023-10-30 NOTE — PROGRESS NOTES
Patrick Short was seen today in the clinic for an audiologic evaluation. Mr. Short wears binaural amplification that were purchased elsewhere. He mentioned that he'd like to figure out if any form of surgical intervention would be appropriate based on his hearing loss. Mr. Short denied otalgia, aural fullness, tinnitus and vertigo.     Tympanometry revealed Type A in the right ear and could not maintain a hermetic seal in the left ear.     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      Speech reception thresholds were noted at 35 dB in the right ear and 30 dB in the left ear.    Speech discrimination scores were 88% in the right ear and 88% in the left ear.    Recommendations:  Otologic evaluation  Follow-up with an audiologist for hearing aid adjustments as needed  Annual audiogram, or sooner if any changes in hearing are noted  Hearing protection when in noise

## 2023-10-30 NOTE — PROGRESS NOTES
Subjective     Patient ID: Patrick Short is a 78 y.o. male.    Chief Complaint: Follow-up    HPI: Hx of Prog HL.    Pos fam Hx of joe.    Here to see if he can have surg.    Has HA's.    Past Medical History: Patient has a past medical history of CAD (coronary artery disease) (07/26/2016), Cerebral amyloid angiopathy, Cerebral ischemic stroke due to global hypoperfusion with watershed infarct (10/10/2022), Heart block, MI (myocardial infarction), Parkinsonism, Primary hypertension (11/21/2022), and Reflux.    Past Surgical History: Patient has a past surgical history that includes Cardiac catheterization; Eye surgery (Left, 02/2017); Hip surgery (Right, 12/2021); and Insertion of implantable loop recorder (N/A, 1/24/2023).    Social History: Patient reports that he has never smoked. He has never used smokeless tobacco. He reports current alcohol use. He reports that he does not use drugs.    Family History: family history includes Coronary artery disease in his father; Emphysema in his father; Hypertension in his mother.    Medications:   Current Outpatient Medications   Medication Sig    atorvastatin (LIPITOR) 40 MG tablet Take 1 tablet (40 mg total) by mouth once daily.    carbidopa-levodopa  mg (SINEMET)  mg per tablet TAKE 1 TABLET BY MOUTH THREE TIMES A DAY    clobetasol (TEMOVATE) 0.05 % cream APPLY TO AFFECTED AREA ON HAND TWICE A DAY AS NEEDED FOR RASH    desonide (DESOWEN) 0.05 % cream APPLY TO AFFECTED AREA ON FACE TWICE A DAY AS NEEDED FOR SCALE    finasteride (PROSCAR) 5 mg tablet 1 tablet once daily.    levocetirizine (XYZAL) 5 MG tablet Take 5 mg by mouth every evening.    lisinopriL 10 MG tablet Take 1 tablet (10 mg total) by mouth once daily.    pantoprazole (PROTONIX) 40 MG tablet TAKE 1 TABLET BY MOUTH EVERY DAY    semaglutide, weight loss, (WEGOVY) 0.25 mg/0.5 mL PnIj Inject 0.25 mg into the skin every 7 days.    tobramycin-dexAMETHasone 0.3-0.1% (TOBRADEX) 0.3-0.1 % DrpS 1 drop  every 4 (four) hours while awake.    triamcinolone acetonide 0.1% (KENALOG) 0.1 % ointment APPLY TO AFFECTED AREA ON AXILLAS TWICE A DAY AS NEEDED FOR ITCH/RASH    urea (CARMOL) 40 % Crea Apply topically once daily.    aspirin 81 MG Chew Take 1 tablet (81 mg total) by mouth once daily.    ciclopirox (LOPROX) 0.77 % Crea Apply topically 2 (two) times daily. Apply to affected foot/feet 2x/day     No current facility-administered medications for this visit.     Facility-Administered Medications Ordered in Other Visits   Medication    ceFAZolin 2 g in dextrose 5 % in water (D5W) 5 % 50 mL IVPB (MB+)       Allergies: Patient has No Known Allergies.    Review of Systems   Constitutional:  Negative for activity change, appetite change, chills, diaphoresis, fatigue, fever and unexpected weight change.   HENT:  Positive for hearing loss. Negative for nasal congestion, ear discharge, ear pain, facial swelling, nosebleeds, postnasal drip, rhinorrhea, sinus pressure/congestion, sneezing, sore throat, tinnitus, trouble swallowing and voice change.    Eyes:  Negative for photophobia, pain, discharge, redness and visual disturbance.   Respiratory:  Negative for cough, chest tightness, shortness of breath and wheezing.    Cardiovascular:  Negative for chest pain and palpitations.   Gastrointestinal:  Negative for abdominal pain, constipation, diarrhea and nausea.   Genitourinary:  Negative for dysuria and frequency.   Musculoskeletal:  Negative for arthralgias, back pain, gait problem, joint swelling, myalgias, neck pain and neck stiffness.   Integumentary:  Negative for color change, pallor and rash.   Neurological:  Negative for dizziness, tremors, seizures, syncope, facial asymmetry, speech difficulty, weakness, light-headedness, numbness and headaches.   Hematological:  Negative for adenopathy. Does not bruise/bleed easily.   Psychiatric/Behavioral:  Negative for agitation, confusion, decreased concentration, dysphoric mood and  sleep disturbance. The patient is not nervous/anxious and is not hyperactive.           Objective     Physical Exam  Constitutional:       General: He is not in acute distress.     Appearance: Normal appearance. He is well-developed. He is not ill-appearing, toxic-appearing or diaphoretic.   HENT:      Head: Not macrocephalic and not microcephalic. No raccoon eyes, Reid's sign, abrasion, contusion, right periorbital erythema, left periorbital erythema or laceration. Hair is normal.      Jaw: No trismus.      Right Ear: Decreased hearing noted. No laceration, drainage, swelling or tenderness. No middle ear effusion. No foreign body. No mastoid tenderness. No hemotympanum. Tympanic membrane is not injected, scarred, perforated, erythematous, retracted or bulging. Tympanic membrane has normal mobility.      Left Ear: Decreased hearing noted. No laceration, drainage, swelling or tenderness.  No middle ear effusion. No foreign body. No mastoid tenderness. No hemotympanum. Tympanic membrane is not injected, scarred, perforated, erythematous, retracted or bulging. Tympanic membrane has normal mobility.      Nose: No nasal deformity, septal deviation, laceration, mucosal edema or rhinorrhea.      Right Sinus: No maxillary sinus tenderness.      Left Sinus: No maxillary sinus tenderness.      Mouth/Throat:      Mouth: Mucous membranes are not pale, not dry and not cyanotic. No lacerations or oral lesions.      Dentition: Normal dentition. No dental caries or dental abscesses.      Pharynx: Uvula midline. No oropharyngeal exudate or uvula swelling.      Tonsils: No tonsillar abscesses.   Eyes:      General: No scleral icterus.        Right eye: No foreign body or discharge.         Left eye: No foreign body or discharge.      Extraocular Movements:      Right eye: Normal extraocular motion and no nystagmus.      Left eye: Normal extraocular motion and no nystagmus.      Conjunctiva/sclera:      Right eye: Right conjunctiva  is not injected. No chemosis, exudate or hemorrhage.     Left eye: Left conjunctiva is not injected. No chemosis, exudate or hemorrhage.     Pupils: Pupils are equal.      Right eye: Pupil is round and reactive.      Left eye: Pupil is round and reactive.   Neck:      Thyroid: No thyroid mass or thyromegaly.      Vascular: No JVD.      Trachea: No tracheal tenderness or tracheal deviation.   Cardiovascular:      Rate and Rhythm: Normal rate and regular rhythm.   Pulmonary:      Effort: No tachypnea, bradypnea, accessory muscle usage or respiratory distress.      Breath sounds: Normal breath sounds. No stridor.   Abdominal:      Palpations: Abdomen is soft.   Musculoskeletal:         General: Normal range of motion.      Cervical back: No edema, erythema or rigidity. No muscular tenderness. Normal range of motion.   Lymphadenopathy:      Head:      Right side of head: No submental, submandibular, tonsillar, preauricular, posterior auricular or occipital adenopathy.      Left side of head: No submental, submandibular, tonsillar, preauricular, posterior auricular or occipital adenopathy.      Cervical: No cervical adenopathy.      Right cervical: No superficial, deep or posterior cervical adenopathy.     Left cervical: No superficial, deep or posterior cervical adenopathy.   Skin:     Coloration: Skin is not pale.      Findings: No abrasion, bruising, burn, ecchymosis, erythema, laceration, lesion or rash.   Neurological:      Mental Status: He is alert and oriented to person, place, and time. He is not disoriented.      Cranial Nerves: No cranial nerve deficit.      Sensory: No sensory deficit.      Motor: No tremor, atrophy, abnormal muscle tone or seizure activity.   Psychiatric:         Mood and Affect: Mood is not anxious or depressed. Affect is not labile, blunt, angry or inappropriate.         Speech: He is communicative. Speech is not rapid and pressured, delayed, slurred or tangential.         Behavior:  Behavior normal. Behavior is not agitated, aggressive, withdrawn, hyperactive or combative.         Thought Content: Thought content normal.         Cognition and Memory: Memory is not impaired. He does not exhibit impaired recent memory or impaired remote memory.              Assessment and Plan     1. Presbycusis of both ears    2. Otosclerosis of both ears         Con't with HA's.    F/U 2-3 yrs.         No follow-ups on file.

## 2023-11-01 RX ORDER — LISINOPRIL 10 MG/1
10 TABLET ORAL
Qty: 90 TABLET | Refills: 1 | Status: SHIPPED | OUTPATIENT
Start: 2023-11-01

## 2023-11-02 LAB
OHS CV AF BURDEN PERCENT: < 1
OHS CV DC REMOTE DEVICE TYPE: NORMAL

## 2023-11-06 ENCOUNTER — TELEPHONE (OUTPATIENT)
Dept: NEUROLOGY | Facility: CLINIC | Age: 78
End: 2023-11-06
Payer: COMMERCIAL

## 2023-11-07 NOTE — TELEPHONE ENCOUNTER
----- Message from Miguelina Hightower sent at 11/6/2023  1:57 PM CST -----  Regarding: Appt    Good afternoon,    Mr Short would like to schedule an appt with Dr Freedman.  If possible, can you please assist with scheduling this patient? He can be reached [phone]      Thank you,   Miguelina Cordero, [facility]   Phone: [phone]   Email: [email or web address]

## 2023-11-28 ENCOUNTER — CLINICAL SUPPORT (OUTPATIENT)
Dept: CARDIOLOGY | Facility: HOSPITAL | Age: 78
End: 2023-11-28
Attending: STUDENT IN AN ORGANIZED HEALTH CARE EDUCATION/TRAINING PROGRAM
Payer: COMMERCIAL

## 2023-11-28 DIAGNOSIS — Z95.818 PRESENCE OF OTHER CARDIAC IMPLANTS AND GRAFTS: ICD-10-CM

## 2023-11-28 PROCEDURE — G2066 INTER DEVC REMOTE 30D: HCPCS | Performed by: STUDENT IN AN ORGANIZED HEALTH CARE EDUCATION/TRAINING PROGRAM

## 2023-11-28 PROCEDURE — 93298 CARDIAC DEVICE CHECK - REMOTE: ICD-10-PCS | Mod: ,,, | Performed by: STUDENT IN AN ORGANIZED HEALTH CARE EDUCATION/TRAINING PROGRAM

## 2023-11-28 PROCEDURE — 93298 REM INTERROG DEV EVAL SCRMS: CPT | Mod: ,,, | Performed by: STUDENT IN AN ORGANIZED HEALTH CARE EDUCATION/TRAINING PROGRAM

## 2023-12-05 LAB
OHS CV AF BURDEN PERCENT: < 1
OHS CV DC REMOTE DEVICE TYPE: NORMAL

## 2023-12-31 ENCOUNTER — CLINICAL SUPPORT (OUTPATIENT)
Dept: CARDIOLOGY | Facility: HOSPITAL | Age: 78
End: 2023-12-31
Attending: STUDENT IN AN ORGANIZED HEALTH CARE EDUCATION/TRAINING PROGRAM
Payer: COMMERCIAL

## 2023-12-31 ENCOUNTER — CLINICAL SUPPORT (OUTPATIENT)
Dept: CARDIOLOGY | Facility: HOSPITAL | Age: 78
End: 2023-12-31

## 2023-12-31 DIAGNOSIS — Z95.818 PRESENCE OF OTHER CARDIAC IMPLANTS AND GRAFTS: ICD-10-CM

## 2023-12-31 PROCEDURE — G2066 INTER DEVC REMOTE 30D: HCPCS | Performed by: STUDENT IN AN ORGANIZED HEALTH CARE EDUCATION/TRAINING PROGRAM

## 2023-12-31 PROCEDURE — 93298 REM INTERROG DEV EVAL SCRMS: CPT | Mod: ,,, | Performed by: STUDENT IN AN ORGANIZED HEALTH CARE EDUCATION/TRAINING PROGRAM

## 2024-01-04 DIAGNOSIS — I25.10 CORONARY ARTERY DISEASE INVOLVING NATIVE CORONARY ARTERY OF NATIVE HEART WITHOUT ANGINA PECTORIS: Primary | ICD-10-CM

## 2024-01-24 ENCOUNTER — HOSPITAL ENCOUNTER (OUTPATIENT)
Dept: CARDIOLOGY | Facility: HOSPITAL | Age: 79
Discharge: HOME | End: 2024-01-24
Attending: INTERNAL MEDICINE
Payer: COMMERCIAL

## 2024-01-24 VITALS — WEIGHT: 213 LBS | HEIGHT: 70 IN | BODY MASS INDEX: 30.49 KG/M2

## 2024-01-24 DIAGNOSIS — I25.10 CORONARY ARTERY DISEASE INVOLVING NATIVE CORONARY ARTERY OF NATIVE HEART WITHOUT ANGINA PECTORIS: ICD-10-CM

## 2024-01-24 LAB
ASCENDING AORTA: 3.65 CM
BSA FOR ECHO PROCEDURE: 2.18 M2
CV ECHO LV RWT: 0.45 CM
CV STRESS BASE HR: 63 BPM
DIASTOLIC BLOOD PRESSURE: 67 MMHG
DOP CALC LVOT AREA: 4.5 CM2
DOP CALC LVOT DIAMETER: 2.39 CM
DOP CALC LVOT PEAK VEL: 1.37 M/S
DOP CALC LVOT STROKE VOLUME: 114.48 CM3
DOP CALCLVOT PEAK VEL VTI: 25.53 CM
E WAVE DECELERATION TIME: 175.94 MSEC
E/A RATIO: 0.92
E/E' RATIO: 8.47 M/S
ECHO LV POSTERIOR WALL: 1.02 CM (ref 0.6–1.1)
FRACTIONAL SHORTENING: 27 % (ref 28–44)
INTERVENTRICULAR SEPTUM: 1.2 CM (ref 0.6–1.1)
IVRT: 156.99 MSEC
LA MAJOR: 5.43 CM
LA MINOR: 5.53 CM
LA WIDTH: 3.89 CM
LEFT ATRIUM SIZE: 4.2 CM
LEFT ATRIUM VOLUME INDEX: 35.6 ML/M2
LEFT ATRIUM VOLUME: 76.1 CM3
LEFT INTERNAL DIMENSION IN SYSTOLE: 3.27 CM (ref 2.1–4)
LEFT VENTRICLE DIASTOLIC VOLUME INDEX: 43.22 ML/M2
LEFT VENTRICLE DIASTOLIC VOLUME: 92.5 ML
LEFT VENTRICLE MASS INDEX: 83 G/M2
LEFT VENTRICLE SYSTOLIC VOLUME INDEX: 20.2 ML/M2
LEFT VENTRICLE SYSTOLIC VOLUME: 43.28 ML
LEFT VENTRICULAR INTERNAL DIMENSION IN DIASTOLE: 4.5 CM (ref 3.5–6)
LEFT VENTRICULAR MASS: 177.27 G
LV LATERAL E/E' RATIO: 8 M/S
LV SEPTAL E/E' RATIO: 9 M/S
MV A" WAVE DURATION": 12.56 MSEC
MV PEAK A VEL: 0.78 M/S
MV PEAK E VEL: 0.72 M/S
MV STENOSIS PRESSURE HALF TIME: 51.02 MS
MV VALVE AREA P 1/2 METHOD: 4.31 CM2
OHS CV CPX 1 MINUTE RECOVERY HEART RATE: 115 BPM
OHS CV CPX 85 PERCENT MAX PREDICTED HEART RATE MALE: 122
OHS CV CPX ESTIMATED METS: 9
OHS CV CPX MAX PREDICTED HEART RATE: 144
OHS CV CPX PATIENT IS FEMALE: 0
OHS CV CPX PATIENT IS MALE: 1
OHS CV CPX PEAK DIASTOLIC BLOOD PRESSURE: 50 MMHG
OHS CV CPX PEAK HEAR RATE: 153 BPM
OHS CV CPX PEAK RATE PRESSURE PRODUCT: ABNORMAL
OHS CV CPX PEAK SYSTOLIC BLOOD PRESSURE: 146 MMHG
OHS CV CPX PERCENT MAX PREDICTED HEART RATE ACHIEVED: 106
OHS CV CPX RATE PRESSURE PRODUCT PRESENTING: 7686
PISA TR MAX VEL: 2.24 M/S
PULM VEIN S/D RATIO: 1.95
PV PEAK D VEL: 0.4 M/S
PV PEAK S VEL: 0.78 M/S
RA MAJOR: 5.68 CM
RA PRESSURE ESTIMATED: 3 MMHG
RA WIDTH: 2.94 CM
RIGHT VENTRICULAR END-DIASTOLIC DIMENSION: 4.04 CM
RV TB RVSP: 5 MMHG
SINUS: 3.71 CM
STJ: 3.38 CM
STRESS ECHO POST EXERCISE DUR MIN: 6 MINUTES
STRESS ECHO POST EXERCISE DUR SEC: 6 SECONDS
SYSTOLIC BLOOD PRESSURE: 122 MMHG
TDI LATERAL: 0.09 M/S
TDI SEPTAL: 0.08 M/S
TDI: 0.09 M/S
TR MAX PG: 20 MMHG
TRICUSPID ANNULAR PLANE SYSTOLIC EXCURSION: 3.8 CM
TV REST PULMONARY ARTERY PRESSURE: 23 MMHG
Z-SCORE OF LEFT VENTRICULAR DIMENSION IN END DIASTOLE: -4.25
Z-SCORE OF LEFT VENTRICULAR DIMENSION IN END SYSTOLE: -1.97

## 2024-01-24 PROCEDURE — 93351 STRESS TTE COMPLETE: CPT

## 2024-01-24 PROCEDURE — 93351 STRESS TTE COMPLETE: CPT | Mod: 26,,, | Performed by: INTERNAL MEDICINE

## 2024-01-26 ENCOUNTER — OFFICE VISIT (OUTPATIENT)
Dept: CARDIOLOGY | Facility: CLINIC | Age: 79
End: 2024-01-26
Payer: COMMERCIAL

## 2024-01-26 ENCOUNTER — CLINICAL SUPPORT (OUTPATIENT)
Dept: INTERNAL MEDICINE | Facility: CLINIC | Age: 79
End: 2024-01-26
Attending: INTERNAL MEDICINE
Payer: MEDICARE

## 2024-01-26 VITALS
HEART RATE: 58 BPM | SYSTOLIC BLOOD PRESSURE: 121 MMHG | OXYGEN SATURATION: 98 % | DIASTOLIC BLOOD PRESSURE: 74 MMHG | HEIGHT: 70 IN | WEIGHT: 215.63 LBS | BODY MASS INDEX: 30.87 KG/M2

## 2024-01-26 DIAGNOSIS — I25.10 CORONARY ARTERY DISEASE INVOLVING NATIVE CORONARY ARTERY OF NATIVE HEART WITHOUT ANGINA PECTORIS: ICD-10-CM

## 2024-01-26 DIAGNOSIS — I25.10 CORONARY ARTERY DISEASE INVOLVING NATIVE CORONARY ARTERY OF NATIVE HEART WITHOUT ANGINA PECTORIS: Primary | ICD-10-CM

## 2024-01-26 DIAGNOSIS — I62.9 INTRACRANIAL HEMORRHAGE: ICD-10-CM

## 2024-01-26 LAB
ALBUMIN SERPL BCP-MCNC: 3.9 G/DL (ref 3.5–5.2)
ALP SERPL-CCNC: 70 U/L (ref 55–135)
ALT SERPL W/O P-5'-P-CCNC: 5 U/L (ref 10–44)
ANION GAP SERPL CALC-SCNC: 4 MMOL/L (ref 8–16)
AST SERPL-CCNC: 24 U/L (ref 10–40)
BASOPHILS # BLD AUTO: 0.03 K/UL (ref 0–0.2)
BASOPHILS NFR BLD: 0.6 % (ref 0–1.9)
BILIRUB SERPL-MCNC: 1 MG/DL (ref 0.1–1)
BUN SERPL-MCNC: 26 MG/DL (ref 8–23)
CALCIUM SERPL-MCNC: 9.5 MG/DL (ref 8.7–10.5)
CHLORIDE SERPL-SCNC: 107 MMOL/L (ref 95–110)
CHOLEST SERPL-MCNC: 102 MG/DL (ref 120–199)
CHOLEST/HDLC SERPL: 2.9 {RATIO} (ref 2–5)
CO2 SERPL-SCNC: 30 MMOL/L (ref 23–29)
COMPLEXED PSA SERPL-MCNC: 0.38 NG/ML (ref 0–4)
CREAT SERPL-MCNC: 0.9 MG/DL (ref 0.5–1.4)
DIFFERENTIAL METHOD BLD: ABNORMAL
EOSINOPHIL # BLD AUTO: 0.2 K/UL (ref 0–0.5)
EOSINOPHIL NFR BLD: 3.8 % (ref 0–8)
ERYTHROCYTE [DISTWIDTH] IN BLOOD BY AUTOMATED COUNT: 13.1 % (ref 11.5–14.5)
EST. GFR  (NO RACE VARIABLE): >60 ML/MIN/1.73 M^2
GLUCOSE SERPL-MCNC: 92 MG/DL (ref 70–110)
HCT VFR BLD AUTO: 43 % (ref 40–54)
HDLC SERPL-MCNC: 35 MG/DL (ref 40–75)
HDLC SERPL: 34.3 % (ref 20–50)
HGB BLD-MCNC: 14.6 G/DL (ref 14–18)
IMM GRANULOCYTES # BLD AUTO: 0.01 K/UL (ref 0–0.04)
IMM GRANULOCYTES NFR BLD AUTO: 0.2 % (ref 0–0.5)
INR PPP: 1 (ref 0.8–1.2)
LDLC SERPL CALC-MCNC: 53 MG/DL (ref 63–159)
LYMPHOCYTES # BLD AUTO: 1.6 K/UL (ref 1–4.8)
LYMPHOCYTES NFR BLD: 33 % (ref 18–48)
MCH RBC QN AUTO: 31.5 PG (ref 27–31)
MCHC RBC AUTO-ENTMCNC: 34 G/DL (ref 32–36)
MCV RBC AUTO: 93 FL (ref 82–98)
MONOCYTES # BLD AUTO: 0.4 K/UL (ref 0.3–1)
MONOCYTES NFR BLD: 7.7 % (ref 4–15)
NEUTROPHILS # BLD AUTO: 2.6 K/UL (ref 1.8–7.7)
NEUTROPHILS NFR BLD: 54.7 % (ref 38–73)
NONHDLC SERPL-MCNC: 67 MG/DL
NRBC BLD-RTO: 0 /100 WBC
PLATELET # BLD AUTO: 141 K/UL (ref 150–450)
PMV BLD AUTO: 11.8 FL (ref 9.2–12.9)
POTASSIUM SERPL-SCNC: 4.5 MMOL/L (ref 3.5–5.1)
PROT SERPL-MCNC: 6.8 G/DL (ref 6–8.4)
PROTHROMBIN TIME: 10.9 SEC (ref 9–12.5)
RBC # BLD AUTO: 4.64 M/UL (ref 4.6–6.2)
SODIUM SERPL-SCNC: 141 MMOL/L (ref 136–145)
TRIGL SERPL-MCNC: 70 MG/DL (ref 30–150)
WBC # BLD AUTO: 4.69 K/UL (ref 3.9–12.7)

## 2024-01-26 PROCEDURE — 3074F SYST BP LT 130 MM HG: CPT | Mod: CPTII,S$GLB,, | Performed by: INTERNAL MEDICINE

## 2024-01-26 PROCEDURE — 80061 LIPID PANEL: CPT | Performed by: INTERNAL MEDICINE

## 2024-01-26 PROCEDURE — 1101F PT FALLS ASSESS-DOCD LE1/YR: CPT | Mod: CPTII,S$GLB,, | Performed by: INTERNAL MEDICINE

## 2024-01-26 PROCEDURE — 1160F RVW MEDS BY RX/DR IN RCRD: CPT | Mod: CPTII,S$GLB,, | Performed by: INTERNAL MEDICINE

## 2024-01-26 PROCEDURE — 3288F FALL RISK ASSESSMENT DOCD: CPT | Mod: CPTII,S$GLB,, | Performed by: INTERNAL MEDICINE

## 2024-01-26 PROCEDURE — 99999 PR PBB SHADOW E&M-EST. PATIENT-LVL V: CPT | Mod: PBBFAC,,, | Performed by: INTERNAL MEDICINE

## 2024-01-26 PROCEDURE — 99213 OFFICE O/P EST LOW 20 MIN: CPT | Mod: S$GLB,,, | Performed by: INTERNAL MEDICINE

## 2024-01-26 PROCEDURE — 85025 COMPLETE CBC W/AUTO DIFF WBC: CPT | Performed by: INTERNAL MEDICINE

## 2024-01-26 PROCEDURE — 85610 PROTHROMBIN TIME: CPT | Performed by: INTERNAL MEDICINE

## 2024-01-26 PROCEDURE — 80053 COMPREHEN METABOLIC PANEL: CPT | Performed by: INTERNAL MEDICINE

## 2024-01-26 PROCEDURE — 3078F DIAST BP <80 MM HG: CPT | Mod: CPTII,S$GLB,, | Performed by: INTERNAL MEDICINE

## 2024-01-26 PROCEDURE — 1126F AMNT PAIN NOTED NONE PRSNT: CPT | Mod: CPTII,S$GLB,, | Performed by: INTERNAL MEDICINE

## 2024-01-26 PROCEDURE — 84153 ASSAY OF PSA TOTAL: CPT | Performed by: INTERNAL MEDICINE

## 2024-01-26 PROCEDURE — 36415 COLL VENOUS BLD VENIPUNCTURE: CPT | Performed by: INTERNAL MEDICINE

## 2024-01-26 PROCEDURE — 1159F MED LIST DOCD IN RCRD: CPT | Mod: CPTII,S$GLB,, | Performed by: INTERNAL MEDICINE

## 2024-01-26 NOTE — PROGRESS NOTES
PCP - Misbah Cordero MD  Subjective:   Patient ID:  Patrick Short is a 78 y.o. male who presents for a follow up visit.    HPI:   78 year old male with Hx of CAD ( PCI to LAD ), TIA, Parkinson's disease who presents for a follow up visit. Patient denies any fever, chills, nausea, vomiting, hematemesis, cough, chest pain, palpitations, shortness of breath, abdominal pain/distension, changes in bowel or urinary habits, no hematochezia or melena. Overall has been doing well. ILR with no evidence of any arrhythmia. Labs this morning were reviewed and were unremarkable. Stress TTE with no evidence of ischemia.     History:     Past Medical History:   Diagnosis Date    CAD (coronary artery disease) 07/26/2016    3 stents    Cerebral amyloid angiopathy     Cerebral ischemic stroke due to global hypoperfusion with watershed infarct 10/10/2022    Heart block     MI (myocardial infarction)     Parkinsonism     Primary hypertension 11/21/2022    Reflux      Past Surgical History:   Procedure Laterality Date    CARDIAC CATHETERIZATION      EYE SURGERY Left 02/2017    HIP SURGERY Right 12/2021    INSERTION OF IMPLANTABLE LOOP RECORDER N/A 1/24/2023    Procedure: Insertion, Implantable Loop Recorder;  Surgeon: ALBERTO Roth MD;  Location: [facility] LAB;  Service: Cardiology;  Laterality: N/A;  CVA, CHERIE, MDT, [facility], , 3 Prep     Social History     Tobacco Use    Smoking status: Never    Smokeless tobacco: Never   Substance Use Topics    Alcohol use: Yes     Comment: twice a month/beer wine     Family History   Problem Relation Age of Onset    Hypertension Mother     Emphysema Father     Coronary artery disease Father        Meds:   Review of patient's allergies indicates:  No Known Allergies    Current Outpatient Medications:     aspirin 81 MG Chew, Take 1 tablet (81 mg total) by mouth once daily., Disp: , Rfl: 0    atorvastatin (LIPITOR) 40 MG tablet, Take 1 tablet (40 mg total) by mouth once daily., Disp: 90  "tablet, Rfl: 3    carbidopa-levodopa  mg (SINEMET)  mg per tablet, TAKE 1 TABLET BY MOUTH THREE TIMES A DAY, Disp: 270 tablet, Rfl: 1    ciclopirox (LOPROX) 0.77 % Crea, Apply topically 2 (two) times daily. Apply to affected foot/feet 2x/day, Disp: 30 g, Rfl: 3    clobetasol (TEMOVATE) 0.05 % cream, APPLY TO AFFECTED AREA ON HAND TWICE A DAY AS NEEDED FOR RASH, Disp: , Rfl: 1    desonide (DESOWEN) 0.05 % cream, APPLY TO AFFECTED AREA ON FACE TWICE A DAY AS NEEDED FOR SCALE, Disp: , Rfl: 1    finasteride (PROSCAR) 5 mg tablet, 1 tablet once daily., Disp: , Rfl:     levocetirizine (XYZAL) 5 MG tablet, Take 5 mg by mouth every evening., Disp: , Rfl:     lisinopriL 10 MG tablet, TAKE 1 TABLET BY MOUTH EVERY DAY, Disp: 90 tablet, Rfl: 1    pantoprazole (PROTONIX) 40 MG tablet, TAKE 1 TABLET BY MOUTH EVERY DAY, Disp: 90 tablet, Rfl: 3    semaglutide, weight loss, (WEGOVY) 0.25 mg/0.5 mL PnIj, Inject 0.25 mg into the skin every 7 days., Disp: 2 mL, Rfl: 0    tobramycin-dexAMETHasone 0.3-0.1% (TOBRADEX) 0.3-0.1 % DrpS, 1 drop every 4 (four) hours while awake., Disp: , Rfl:     triamcinolone acetonide 0.1% (KENALOG) 0.1 % ointment, APPLY TO AFFECTED AREA ON AXILLAS TWICE A DAY AS NEEDED FOR ITCH/RASH, Disp: , Rfl: 1    urea (CARMOL) 40 % Crea, Apply topically once daily., Disp: 28 g, Rfl: 2  No current facility-administered medications for this visit.    Facility-Administered Medications Ordered in Other Visits:     ceFAZolin 2 g in dextrose 5 % in water (D5W) 5 % 50 mL IVPB (MB+), 2 g, Intravenous, On Call Procedure, Rhonda Mark NP          Objective:   /74 (BP Location: Right arm, Patient Position: Sitting, BP Method: Large (Automatic))   Pulse (!) 58   Ht 5' 10" (1.778 m)   Wt 97.8 kg (215 lb 9.8 oz)   SpO2 98%   BMI 30.94 kg/m²   Physical Exam  Vitals reviewed.   Constitutional:       Appearance: Normal appearance.   HENT:      Head: Atraumatic.   Eyes:      Conjunctiva/sclera: " Conjunctivae normal.   Cardiovascular:      Rate and Rhythm: Normal rate and regular rhythm.      Heart sounds: No murmur heard.  Pulmonary:      Effort: Pulmonary effort is normal.      Breath sounds: Normal breath sounds. No wheezing or rales.   Abdominal:      General: There is no distension.      Palpations: Abdomen is soft.      Tenderness: There is no abdominal tenderness.   Musculoskeletal:      Right lower leg: No edema.      Left lower leg: No edema.   Skin:     General: Skin is warm.   Neurological:      General: No focal deficit present.      Mental Status: He is alert.   Psychiatric:         Mood and Affect: Mood normal.       Labs:     Lab Results   Component Value Date     01/26/2024    K 4.5 01/26/2024     01/26/2024    CO2 30 (H) 01/26/2024    BUN 26 (H) 01/26/2024    CREATININE 0.9 01/26/2024    ANIONGAP 4 (L) 01/26/2024     Lab Results   Component Value Date    HGBA1C 5.2 03/23/2023     Lab Results   Component Value Date    BNP 83 10/10/2022    BNP 61 07/27/2022     (H) 07/24/2016       Lab Results   Component Value Date    WBC 4.69 01/26/2024    HGB 14.6 01/26/2024    HCT 43.0 01/26/2024    HCT 41 07/28/2022     (L) 01/26/2024    GRAN 2.6 01/26/2024    GRAN 54.7 01/26/2024     Lab Results   Component Value Date    CHOL 102 (L) 01/26/2024    HDL 35 (L) 01/26/2024    LDLCALC 53.0 (L) 01/26/2024    TRIG 70 01/26/2024       Lab Results   Component Value Date     01/26/2024    K 4.5 01/26/2024     01/26/2024    CO2 30 (H) 01/26/2024    BUN 26 (H) 01/26/2024    CREATININE 0.9 01/26/2024    ANIONGAP 4 (L) 01/26/2024     Lab Results   Component Value Date    HGBA1C 5.2 03/23/2023     Lab Results   Component Value Date    BNP 83 10/10/2022    BNP 61 07/27/2022     (H) 07/24/2016    Lab Results   Component Value Date    WBC 4.69 01/26/2024    HGB 14.6 01/26/2024    HCT 43.0 01/26/2024    HCT 41 07/28/2022     (L) 01/26/2024    GRAN 2.6 01/26/2024    GRAN  54.7 01/26/2024     Lab Results   Component Value Date    CHOL 102 (L) 01/26/2024    HDL 35 (L) 01/26/2024    LDLCALC 53.0 (L) 01/26/2024    TRIG 70 01/26/2024                Cardiovascular Imaging:     Echo:   EF   Date Value Ref Range Status   07/26/2022 65 % Final   11/16/2021 65 % Final       Stress Echocardiogram (1/24/2024)    Left Ventricle: The left ventricle is normal in size. Mild septal thickening. There is concentric remodeling. Normal wall motion. There is normal systolic function with a visually estimated ejection fraction of 60 - 65%. There is normal diastolic function.    Right Ventricle: Normal right ventricular cavity size. Wall thickness is normal. Right ventricle wall motion  is normal. Systolic function is normal.    Left Atrium: Left atrium is mildly dilated.    Pulmonary Artery: The estimated pulmonary artery systolic pressure is 23 mmHg.    IVC/SVC: Normal venous pressure at 3 mmHg.    Stress Protocol: The patient exercised for 6 minutes 6 seconds on a high ramp protocol, corresponding to a functional capacity of 9 METS, achieving a peak heart rate of 153 bpm, which is 106 % of the age predicted maximum heart rate. Their exercise capacity was normal. The patient reported no symptoms during the stress test. The test was stopped because the patient experienced leg fatigue and shortness of breath.    Baseline ECG: The Baseline ECG reveals sinus rhythm. The axis is normal. The baseline ECG has non-specific ST-T wave changes.    Stress ECG: There are no ST segment deviation identified during the protocol. There are no arrhythmias during stress. There is normal blood pressure response with stress.    ECG Conclusion: The ECG portion of the study is negative for ischemia.    Post-stress Impression: The study is negative with no echocardiographic evidence of stress induced ischemia.    Overall, this study is negative for myocardial ischemia.       Assessment & Plan:     CAD (coronary artery  disease)  Stable  - Stress TTE (1/24): Negative for stress induced ischemia  - Continue current regiment   - Continue Aspirin  - Continue Statin   - Continue with Lisinopril     Shante Malhotra MD  Cardiovascular Disease, PGY IV  [facility]    I have seen the patient, reviewed the Fellow's history and physical, assessment and plan. I have personally interviewed and examined the patient and agree with the findings. Stress ECHO negative for ischemia at 9 mets, patient asymptomatic, lab reviewed with patient. Continue same, follow up in 1 yr with same.     LAURA Mendoza MD

## 2024-01-30 ENCOUNTER — CLINICAL SUPPORT (OUTPATIENT)
Dept: CARDIOLOGY | Facility: HOSPITAL | Age: 79
End: 2024-01-30
Payer: MEDICARE

## 2024-01-30 DIAGNOSIS — Z95.818 PRESENCE OF OTHER CARDIAC IMPLANTS AND GRAFTS: ICD-10-CM

## 2024-01-31 ENCOUNTER — CLINICAL SUPPORT (OUTPATIENT)
Dept: CARDIOLOGY | Facility: HOSPITAL | Age: 79
End: 2024-01-31
Attending: STUDENT IN AN ORGANIZED HEALTH CARE EDUCATION/TRAINING PROGRAM
Payer: COMMERCIAL

## 2024-01-31 DIAGNOSIS — Z95.818 PRESENCE OF OTHER CARDIAC IMPLANTS AND GRAFTS: ICD-10-CM

## 2024-01-31 LAB
OHS CV AF BURDEN PERCENT: < 1
OHS CV DC REMOTE DEVICE TYPE: NORMAL

## 2024-01-31 PROCEDURE — 93298 REM INTERROG DEV EVAL SCRMS: CPT | Mod: 26,ICN,, | Performed by: STUDENT IN AN ORGANIZED HEALTH CARE EDUCATION/TRAINING PROGRAM

## 2024-02-07 ENCOUNTER — PATIENT MESSAGE (OUTPATIENT)
Dept: RESEARCH | Facility: HOSPITAL | Age: 79
End: 2024-02-07
Payer: MEDICARE

## 2024-02-10 ENCOUNTER — OFFICE VISIT (OUTPATIENT)
Dept: URGENT CARE | Facility: CLINIC | Age: 79
End: 2024-02-10
Payer: COMMERCIAL

## 2024-02-10 VITALS
DIASTOLIC BLOOD PRESSURE: 88 MMHG | RESPIRATION RATE: 16 BRPM | BODY MASS INDEX: 30.78 KG/M2 | SYSTOLIC BLOOD PRESSURE: 140 MMHG | WEIGHT: 215 LBS | HEART RATE: 59 BPM | HEIGHT: 70 IN | TEMPERATURE: 97 F | OXYGEN SATURATION: 98 %

## 2024-02-10 DIAGNOSIS — B00.1 COLD SORE: ICD-10-CM

## 2024-02-10 DIAGNOSIS — J34.0 CELLULITIS OF MUCOUS MEMBRANE OF NOSE: Primary | ICD-10-CM

## 2024-02-10 PROCEDURE — 99213 OFFICE O/P EST LOW 20 MIN: CPT | Mod: S$GLB,,, | Performed by: NURSE PRACTITIONER

## 2024-02-10 RX ORDER — MUPIROCIN 20 MG/G
OINTMENT TOPICAL 3 TIMES DAILY
Qty: 30 G | Refills: 0 | Status: SHIPPED | OUTPATIENT
Start: 2024-02-10 | End: 2024-02-10

## 2024-02-10 RX ORDER — ACYCLOVIR 50 MG/G
OINTMENT TOPICAL
Qty: 15 G | Refills: 0 | Status: SHIPPED | OUTPATIENT
Start: 2024-02-10

## 2024-02-10 RX ORDER — SULFAMETHOXAZOLE AND TRIMETHOPRIM 800; 160 MG/1; MG/1
1 TABLET ORAL 2 TIMES DAILY
Qty: 14 TABLET | Refills: 0 | Status: SHIPPED | OUTPATIENT
Start: 2024-02-10 | End: 2024-02-17

## 2024-02-10 RX ORDER — MUPIROCIN 20 MG/G
OINTMENT TOPICAL
Qty: 30 G | Refills: 0 | Status: SHIPPED | OUTPATIENT
Start: 2024-02-10

## 2024-02-10 NOTE — PROGRESS NOTES
"Subjective:      Patient ID: Patrick Short is a 78 y.o. male.    Vitals:  height is 5' 10" (1.778 m) and weight is 97.5 kg (215 lb). His temperature is 97.4 °F (36.3 °C). His blood pressure is 140/88 (abnormal) and his pulse is 59 (abnormal). His respiration is 16 and oxygen saturation is 98%.     Chief Complaint: Abscess    This is a 78 y.o. male who presents today with a chief complaint of sore in the left nostril with surrounding redness and inflammation; and sore to lip.  Symptoms started about 3 days ago. Pt states he has had this before. Applying triple antibiotic ointment      Abscess  Chronicity:  RecurrentProgression Since Onset: gradually worsening  Location:  Face (nose)  Associated Symptoms: no fever  Characteristics: draining, itching, painful, redness, swelling and blistering    Pain Scale:  3/10  Treatments Tried:  Topical antibiotics  Relieved by:  Nothing  Worsened by:  Nothing      Constitution: Negative for fever.   HENT:  Positive for mouth sores.    Skin:  Positive for lesion (pustule), skin thickening/induration and erythema. Negative for abscess.      Objective:     Physical Exam   Constitutional: He is oriented to person, place, and time.   HENT:   Head: Normocephalic and atraumatic.   Nose:      Comments: Pustule with surrounding cellulitis noted to at left opening of nasal cavity, at nasal column.   Mouth/Throat: Mucous membranes are moist. Oropharynx is clear.      Comments: Cold sore noted to outer lip  Eyes: Right eye exhibits no discharge. Left eye exhibits no discharge.   Cardiovascular: Normal rate.   Pulmonary/Chest: Effort normal.   Abdominal: Normal appearance.   Musculoskeletal: Normal range of motion.         General: Normal range of motion.   Neurological: He is alert and oriented to person, place, and time.   Skin: Skin is warm and dry. erythema   Psychiatric: His behavior is normal. Mood normal.   Nursing note and vitals reviewed.      Assessment:     1. Cellulitis of " mucous membrane of nose    2. Cold sore        Plan:   22 gauge needle used to scrape superficial pustule to left nare. Scant purulent drainage noted.     Cellulitis of mucous membrane of nose  -     sulfamethoxazole-trimethoprim 800-160mg (BACTRIM DS) 800-160 mg Tab; Take 1 tablet by mouth 2 (two) times daily. for 7 days  Dispense: 14 tablet; Refill: 0  -     mupirocin (BACTROBAN) 2 % ointment; Apply topically to nostril, three times daily for one week  Dispense: 30 g; Refill: 0    Cold sore  -     acyclovir 5% (ZOVIRAX) 5 % ointment; Apply topically to lip sore, 6 times daily, for one week  Dispense: 15 g; Refill: 0    Other orders  -     Discontinue: mupirocin (BACTROBAN) 2 % ointment; Apply topically 3 (three) times daily. for 7 days  Dispense: 30 g; Refill: 0      Patient Instructions   Keep area clean and dry  Follow up with any worsening symptoms

## 2024-02-27 LAB
OHS CV AF BURDEN PERCENT: < 1
OHS CV DC REMOTE DEVICE TYPE: NORMAL

## 2024-02-29 ENCOUNTER — CLINICAL SUPPORT (OUTPATIENT)
Dept: CARDIOLOGY | Facility: HOSPITAL | Age: 79
End: 2024-02-29
Payer: MEDICARE

## 2024-02-29 DIAGNOSIS — Z95.818 PRESENCE OF OTHER CARDIAC IMPLANTS AND GRAFTS: ICD-10-CM

## 2024-03-02 ENCOUNTER — CLINICAL SUPPORT (OUTPATIENT)
Dept: CARDIOLOGY | Facility: HOSPITAL | Age: 79
End: 2024-03-02
Attending: STUDENT IN AN ORGANIZED HEALTH CARE EDUCATION/TRAINING PROGRAM
Payer: COMMERCIAL

## 2024-03-02 DIAGNOSIS — Z95.818 PRESENCE OF OTHER CARDIAC IMPLANTS AND GRAFTS: ICD-10-CM

## 2024-03-02 PROCEDURE — 93298 REM INTERROG DEV EVAL SCRMS: CPT | Mod: 26,,, | Performed by: STUDENT IN AN ORGANIZED HEALTH CARE EDUCATION/TRAINING PROGRAM

## 2024-03-18 RX ORDER — CARBIDOPA AND LEVODOPA 25; 100 MG/1; MG/1
TABLET ORAL
Qty: 270 TABLET | Refills: 1 | Status: SHIPPED | OUTPATIENT
Start: 2024-03-18

## 2024-03-20 LAB
OHS CV AF BURDEN PERCENT: < 1
OHS CV DC REMOTE DEVICE TYPE: NORMAL

## 2024-04-02 ENCOUNTER — CLINICAL SUPPORT (OUTPATIENT)
Dept: CARDIOLOGY | Facility: HOSPITAL | Age: 79
End: 2024-04-02
Attending: STUDENT IN AN ORGANIZED HEALTH CARE EDUCATION/TRAINING PROGRAM
Payer: MEDICARE

## 2024-04-02 DIAGNOSIS — Z95.818 PRESENCE OF OTHER CARDIAC IMPLANTS AND GRAFTS: ICD-10-CM

## 2024-04-02 PROCEDURE — 93298 REM INTERROG DEV EVAL SCRMS: CPT | Mod: 26,,, | Performed by: STUDENT IN AN ORGANIZED HEALTH CARE EDUCATION/TRAINING PROGRAM

## 2024-04-03 NOTE — PROGRESS NOTES
Neurology Clinic Follow-Up Note    Impression:  [facility]: he continues to test well in the office although there is some evidence of an attention/concentration deficit.  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    Tourette's  HTN: above target but reports controlled at home    Plan:  TSH, B12/MMA  Repeat NP testing with Dr. Hill  Continue ASA 81mg/d  Continued physical activity encouraged  Continue atorvastatin 40mg/d; target LDL <70mg/dL  Continue home BPs  Continue Sinemet 25/100 tid   Refer to amyloid clinic when it is up and running  We will arrange f/u after review of repeat neuropsych testing      Problem List Items Addressed This Visit          1 - High    History of ischemic left MCA stroke    Overview     Cryptogenic stroke 10/2022.    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    He presents with his wife.  He is noticing continuing cognitive decline.  He denies focal symptoms suggestive of stroke.  Gait is good and only minimal, intermittent tremor of L index finger.  His wife notices mobility changes if he misses LD/CD.  He denies hayde depression but is a little more negative than usual and affect is a little labile.  Home BPs 120s/130s      Running history:  He is doing a bit better with Sinemet 25/100 tid.  He wife reports an easier time  getting in and out of the car.  No recurrent stroke symptoms.  PT has been helpful.   He describes chronic, intermittent diplopia.  Symptoms are rare, brief and horizontally fused.  He noticed this once while fishing and, on occasion, while driving.     He and his wife report 2 weeks of headache.  Today, he endorses there is mild associated LUE numbness  CT this AM shows small R frontal ICH.  No recurrent aphasia.  Over the last few months, there has been increased gait shuffling and a few falls.     Past Medical History:   Diagnosis Date    CAD (coronary artery disease) 07/26/2016    3 stents    Cerebral amyloid angiopathy     Cerebral ischemic stroke due to global hypoperfusion with watershed infarct 10/10/2022    Heart block     MI (myocardial infarction)     Parkinsonism     Primary hypertension 11/21/2022    Reflux          Outpatient Medications Marked as Taking for the 4/4/24 encounter (Office Visit) with Frantz Bee MD   Medication Sig Dispense Refill    acyclovir 5% (ZOVIRAX) 5 % ointment Apply topically to lip sore, 6 times daily, for one week 15 g 0    atorvastatin (LIPITOR) 40 MG tablet Take 1 tablet (40 mg total) by mouth once daily. 90 tablet 3    carbidopa-levodopa  mg (SINEMET)  mg per tablet TAKE 1 TABLET BY MOUTH THREE TIMES A  tablet 1    clobetasol (TEMOVATE) 0.05 % cream APPLY TO AFFECTED AREA ON HAND TWICE A DAY AS NEEDED FOR RASH  1    desonide (DESOWEN) 0.05 % cream APPLY TO AFFECTED AREA ON FACE TWICE A DAY AS NEEDED FOR SCALE  1    finasteride (PROSCAR) 5 mg tablet 1 tablet once daily.      levocetirizine (XYZAL) 5 MG tablet Take 5 mg by mouth every evening.      lisinopriL 10 MG tablet TAKE 1 TABLET BY MOUTH EVERY DAY 90 tablet 1    mupirocin (BACTROBAN) 2 % ointment Apply topically to nostril, three times daily for one week 30 g 0    pantoprazole (PROTONIX) 40 MG tablet TAKE 1 TABLET BY MOUTH EVERY DAY 90 tablet 3    semaglutide, weight loss, (WEGOVY) 0.25  "mg/0.5 mL PnIj Inject 0.25 mg into the skin every 7 days. 2 mL 0    tobramycin-dexAMETHasone 0.3-0.1% (TOBRADEX) 0.3-0.1 % DrpS 1 drop every 4 (four) hours while awake.      triamcinolone acetonide 0.1% (KENALOG) 0.1 % ointment APPLY TO AFFECTED AREA ON AXILLAS TWICE A DAY AS NEEDED FOR ITCH/RASH  1    urea (CARMOL) 40 % Crea Apply topically once daily. 28 g 2       BP (!) 157/88   Pulse 62   Well-developed, well nourished.  Awake, alert and oriented.  He recalls 5/5 of name/address at 5 mins.  Nl calc ($5.73=23 quarters).  Misses WORLD backwards ("WROLD").  Language - nl.  EOMF without nystagmus. Mild L lower facial weakness.  Mild L hip flexor weakness.  Sensation intact. No extinction to double simultaneous tactile stimulation.  Coordination intact.  Mild leno toe tap L>R with no cogwheeling today. Facial expression improved.  Gait steady with good pivot.     Lab Results   Component Value Date    [id number] 401 11/11/2022     Lab Results   Component Value Date    HGBA1C 5.2 03/23/2023     Lab Results   Component Value Date    TSH 1.932 03/23/2023     45  mins chart review, face to face, documentation    Frantz Bee MD       "

## 2024-04-04 ENCOUNTER — OFFICE VISIT (OUTPATIENT)
Dept: NEUROLOGY | Facility: CLINIC | Age: 79
End: 2024-04-04
Payer: COMMERCIAL

## 2024-04-04 VITALS — SYSTOLIC BLOOD PRESSURE: 157 MMHG | HEART RATE: 62 BPM | DIASTOLIC BLOOD PRESSURE: 88 MMHG

## 2024-04-04 DIAGNOSIS — I61.1 NONTRAUMATIC CORTICAL HEMORRHAGE OF RIGHT CEREBRAL HEMISPHERE: ICD-10-CM

## 2024-04-04 DIAGNOSIS — G20.C PARKINSONISM, UNSPECIFIED PARKINSONISM TYPE: ICD-10-CM

## 2024-04-04 DIAGNOSIS — G31.84 MILD NEUROCOGNITIVE DISORDER: Primary | ICD-10-CM

## 2024-04-04 DIAGNOSIS — Z86.73 HISTORY OF ISCHEMIC LEFT MCA STROKE: ICD-10-CM

## 2024-04-04 PROCEDURE — 1159F MED LIST DOCD IN RCRD: CPT | Mod: CPTII,S$GLB,, | Performed by: PSYCHIATRY & NEUROLOGY

## 2024-04-04 PROCEDURE — 99215 OFFICE O/P EST HI 40 MIN: CPT | Mod: S$GLB,,, | Performed by: PSYCHIATRY & NEUROLOGY

## 2024-04-04 PROCEDURE — 1126F AMNT PAIN NOTED NONE PRSNT: CPT | Mod: CPTII,S$GLB,, | Performed by: PSYCHIATRY & NEUROLOGY

## 2024-04-04 PROCEDURE — 1101F PT FALLS ASSESS-DOCD LE1/YR: CPT | Mod: CPTII,S$GLB,, | Performed by: PSYCHIATRY & NEUROLOGY

## 2024-04-04 PROCEDURE — 3077F SYST BP >= 140 MM HG: CPT | Mod: CPTII,S$GLB,, | Performed by: PSYCHIATRY & NEUROLOGY

## 2024-04-04 PROCEDURE — 1160F RVW MEDS BY RX/DR IN RCRD: CPT | Mod: CPTII,S$GLB,, | Performed by: PSYCHIATRY & NEUROLOGY

## 2024-04-04 PROCEDURE — 99999 PR PBB SHADOW E&M-EST. PATIENT-LVL III: CPT | Mod: PBBFAC,,, | Performed by: PSYCHIATRY & NEUROLOGY

## 2024-04-04 PROCEDURE — 3079F DIAST BP 80-89 MM HG: CPT | Mod: CPTII,S$GLB,, | Performed by: PSYCHIATRY & NEUROLOGY

## 2024-04-04 PROCEDURE — 3288F FALL RISK ASSESSMENT DOCD: CPT | Mod: CPTII,S$GLB,, | Performed by: PSYCHIATRY & NEUROLOGY

## 2024-04-09 ENCOUNTER — TELEPHONE (OUTPATIENT)
Dept: NEUROLOGY | Facility: CLINIC | Age: 79
End: 2024-04-09

## 2024-04-09 LAB
OHS CV AF BURDEN PERCENT: < 1
OHS CV DC REMOTE DEVICE TYPE: NORMAL

## 2024-04-09 NOTE — TELEPHONE ENCOUNTER
----- Message from Frantz Bee MD sent at 4/5/2024  9:09 AM CDT -----  Regarding: f/u with Dr. Sergio Mcnamara,  I need Berny Short to get in for f/u testing with Dr. Hill.  Can you help?  Rich

## 2024-04-10 PROBLEM — E85.4 CEREBRAL AMYLOID ANGIOPATHY: Status: ACTIVE | Noted: 2024-04-10

## 2024-04-10 PROBLEM — I68.0 CEREBRAL AMYLOID ANGIOPATHY: Status: ACTIVE | Noted: 2024-04-10

## 2024-04-23 DIAGNOSIS — E78.5 DYSLIPIDEMIA: ICD-10-CM

## 2024-04-24 ENCOUNTER — OFFICE VISIT (OUTPATIENT)
Dept: URGENT CARE | Facility: CLINIC | Age: 79
End: 2024-04-24
Payer: COMMERCIAL

## 2024-04-24 VITALS
DIASTOLIC BLOOD PRESSURE: 82 MMHG | OXYGEN SATURATION: 98 % | HEIGHT: 70 IN | RESPIRATION RATE: 18 BRPM | TEMPERATURE: 98 F | HEART RATE: 75 BPM | SYSTOLIC BLOOD PRESSURE: 119 MMHG | WEIGHT: 215 LBS | BODY MASS INDEX: 30.78 KG/M2

## 2024-04-24 DIAGNOSIS — R07.81 RIB PAIN ON LEFT SIDE: Primary | ICD-10-CM
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
## 2024-04-24 PROCEDURE — 99213 OFFICE O/P EST LOW 20 MIN: CPT | Mod: S$GLB,,, | Performed by: NURSE PRACTITIONER

## 2024-04-24 NOTE — PROGRESS NOTES
"Subjective:      Patient ID: Patrick Short is a 78 y.o. male.    Vitals:  height is 5' 10" (1.778 m) and weight is 97.5 kg (215 lb). His oral temperature is 98.2 °F (36.8 °C). His blood pressure is 119/82 and his pulse is 75. His respiration is 18 and oxygen saturation is 98%.     Chief Complaint: Fall    This is a 78 y.o. male who presents today with a chief complaint of fall with left rib pain.  Occurred today at 11:30 AM. Also has abrasion with small skin tear to left elbow. Pt has Parkinson's disease, and can not remember how the fall occurred, or which body part hit the ground first. He states he was doing a 3-mile walk outdoors, and was at the end of his walk when the fall occurred, onto cement. Mild pain with deep breathing. Denies respiratory distress.     Fall  The accident occurred 6 to 12 hours ago. The fall occurred while walking. He landed on [place]. There was no blood loss. The pain is at a severity of 3/10. Pertinent negatives include no abdominal pain, bowel incontinence, fever, headaches, hearing loss, hematuria, loss of consciousness, nausea, numbness, tingling, visual change or vomiting. He has tried nothing for the symptoms.       Constitution: Negative for fever.   Neck: Negative for neck stiffness.   Respiratory:  Negative for chest tightness, cough, shortness of breath, stridor and wheezing.    Gastrointestinal:  Negative for abdominal pain, nausea, vomiting and bowel incontinence.   Genitourinary:  Negative for hematuria.   Musculoskeletal:  Positive for pain and trauma.   Skin:  Positive for abrasion. Negative for erythema and bruising.   Neurological:  Negative for headaches, loss of consciousness and numbness.      Objective:     Physical Exam   Constitutional: He is oriented to person, place, and time.  Non-toxic appearance. He does not appear ill. No distress.   HENT:   Head: Normocephalic.   Nose: Nose normal.   Mouth/Throat: Mucous membranes are moist.   Cardiovascular: Normal " rate and regular rhythm.   Pulmonary/Chest: Effort normal and breath sounds normal. No stridor. No respiratory distress. He has no wheezes. He has no rhonchi. He has no rales. He exhibits no tenderness (none to palpation).       Abdominal: Normal appearance.   Musculoskeletal: Normal range of motion.         General: Normal range of motion.   Neurological: He is alert and oriented to person, place, and time.   Skin: Skin is warm and dry. No bruising and No erythema         Comments: Abrasion with small, superficial skin tear to left elbow. No active bleeding.    Psychiatric: His behavior is normal. Mood normal.   Nursing note and vitals reviewed.      Assessment:     1. Rib pain on left side    2. Fall, initial encounter        Plan:   Pt declines rib xray at this time. Will follow up if pain continues     Rib pain on left side    Fall, initial encounter        Patient Instructions   Please follow up with any worsening symptoms  Splint your chest with a pillow, when moving or coughing to help reduce pain  Tylenol for pain   Ice packs as needed   Rest

## 2024-04-24 NOTE — PATIENT INSTRUCTIONS
Please follow up with any worsening symptoms  Splint your chest with a pillow, when moving or coughing to help reduce pain  Tylenol for pain   Ice packs as needed   Rest

## 2024-05-01 DIAGNOSIS — K21.00 GASTROESOPHAGEAL REFLUX DISEASE WITH ESOPHAGITIS, UNSPECIFIED WHETHER HEMORRHAGE: ICD-10-CM

## 2024-05-01 DIAGNOSIS — I10 HYPERTENSION, UNSPECIFIED TYPE: Primary | ICD-10-CM

## 2024-05-01 DIAGNOSIS — N40.0 BENIGN PROSTATIC HYPERPLASIA, UNSPECIFIED WHETHER LOWER URINARY TRACT SYMPTOMS PRESENT: ICD-10-CM

## 2024-05-01 RX ORDER — FINASTERIDE 5 MG/1
5 TABLET, FILM COATED ORAL DAILY
Qty: 90 TABLET | Refills: 3 | Status: SHIPPED | OUTPATIENT
Start: 2024-05-01

## 2024-05-01 RX ORDER — LISINOPRIL 10 MG/1
10 TABLET ORAL DAILY
Qty: 90 TABLET | Refills: 3 | Status: SHIPPED | OUTPATIENT
Start: 2024-05-01

## 2024-05-01 RX ORDER — PANTOPRAZOLE SODIUM 40 MG/1
40 TABLET, DELAYED RELEASE ORAL DAILY
Qty: 90 TABLET | Refills: 3 | Status: SHIPPED | OUTPATIENT
Start: 2024-05-01

## 2024-05-01 RX ORDER — ATORVASTATIN CALCIUM 40 MG/1
40 TABLET, FILM COATED ORAL
Qty: 90 TABLET | Refills: 3 | Status: SHIPPED | OUTPATIENT
Start: 2024-05-01

## 2024-05-03 ENCOUNTER — CLINICAL SUPPORT (OUTPATIENT)
Dept: CARDIOLOGY | Facility: HOSPITAL | Age: 79
End: 2024-05-03
Attending: STUDENT IN AN ORGANIZED HEALTH CARE EDUCATION/TRAINING PROGRAM
Payer: MEDICARE

## 2024-05-03 DIAGNOSIS — Z95.818 PRESENCE OF OTHER CARDIAC IMPLANTS AND GRAFTS: ICD-10-CM

## 2024-05-03 PROCEDURE — 93298 REM INTERROG DEV EVAL SCRMS: CPT | Mod: 26,,, | Performed by: STUDENT IN AN ORGANIZED HEALTH CARE EDUCATION/TRAINING PROGRAM

## 2024-05-06 LAB
OHS CV AF BURDEN PERCENT: < 1
OHS CV DC REMOTE DEVICE TYPE: NORMAL

## 2024-05-08 ENCOUNTER — OFFICE VISIT (OUTPATIENT)
Dept: NEUROLOGY | Facility: CLINIC | Age: 79
End: 2024-05-08
Payer: MEDICARE

## 2024-05-08 DIAGNOSIS — I68.0 CEREBRAL AMYLOID ANGIOPATHY: ICD-10-CM

## 2024-05-08 DIAGNOSIS — F06.70 MILD NEUROCOGNITIVE DISORDER DUE TO MULTIPLE ETIOLOGIES: Primary | ICD-10-CM

## 2024-05-08 DIAGNOSIS — I62.9 INTRACRANIAL HEMORRHAGE: ICD-10-CM

## 2024-05-08 DIAGNOSIS — E85.4 CEREBRAL AMYLOID ANGIOPATHY: ICD-10-CM

## 2024-05-08 DIAGNOSIS — F95.2 TOURETTES SYNDROME: ICD-10-CM

## 2024-05-08 PROCEDURE — 99499 UNLISTED E&M SERVICE: CPT | Mod: FQ,,, | Performed by: PSYCHIATRY & NEUROLOGY

## 2024-05-08 PROCEDURE — 96116 NUBHVL XM PHYS/QHP 1ST HR: CPT | Mod: FQ,,, | Performed by: PSYCHIATRY & NEUROLOGY
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
## 2024-05-21 PROBLEM — F06.70 MILD NEUROCOGNITIVE DISORDER DUE TO MULTIPLE ETIOLOGIES: Status: ACTIVE | Noted: 2022-08-11

## 2024-05-21 NOTE — ASSESSMENT & PLAN NOTE
Movement Neurology: He may benefit from follow-up and/or clarification regarding parkinsonism. He reported feeling unsure about his diagnosis and prognosis in relation to this diagnosis.     Follow-up: Testing on 6/6.

## 2024-06-03 ENCOUNTER — CLINICAL SUPPORT (OUTPATIENT)
Dept: CARDIOLOGY | Facility: HOSPITAL | Age: 79
End: 2024-06-03
Attending: STUDENT IN AN ORGANIZED HEALTH CARE EDUCATION/TRAINING PROGRAM
Payer: MEDICARE

## 2024-06-03 DIAGNOSIS — Z95.818 PRESENCE OF OTHER CARDIAC IMPLANTS AND GRAFTS: ICD-10-CM

## 2024-06-03 PROCEDURE — 93298 REM INTERROG DEV EVAL SCRMS: CPT | Mod: 26,,, | Performed by: STUDENT IN AN ORGANIZED HEALTH CARE EDUCATION/TRAINING PROGRAM

## 2024-06-06 ENCOUNTER — OFFICE VISIT (OUTPATIENT)
Dept: NEUROLOGY | Facility: CLINIC | Age: 79
End: 2024-06-06
Payer: MEDICARE

## 2024-06-06 DIAGNOSIS — F06.70 MILD NEUROCOGNITIVE DISORDER DUE TO MULTIPLE ETIOLOGIES: Primary | ICD-10-CM

## 2024-06-06 DIAGNOSIS — F95.2 TOURETTES SYNDROME: ICD-10-CM

## 2024-06-06 DIAGNOSIS — I68.0 CEREBRAL AMYLOID ANGIOPATHY: ICD-10-CM

## 2024-06-06 DIAGNOSIS — I62.9 INTRACRANIAL HEMORRHAGE: ICD-10-CM

## 2024-06-06 DIAGNOSIS — E85.4 CEREBRAL AMYLOID ANGIOPATHY: ICD-10-CM

## 2024-06-06 PROCEDURE — 96132 NRPSYC TST EVAL PHYS/QHP 1ST: CPT | Mod: S$PBB,,, | Performed by: PSYCHIATRY & NEUROLOGY

## 2024-06-06 PROCEDURE — 99499 UNLISTED E&M SERVICE: CPT | Mod: S$PBB,,, | Performed by: PSYCHIATRY & NEUROLOGY

## 2024-06-06 PROCEDURE — 96133 NRPSYC TST EVAL PHYS/QHP EA: CPT | Mod: S$PBB,,, | Performed by: PSYCHIATRY & NEUROLOGY

## 2024-06-06 PROCEDURE — 96138 PSYCL/NRPSYC TECH 1ST: CPT | Mod: S$PBB,,, | Performed by: PSYCHIATRY & NEUROLOGY

## 2024-06-06 PROCEDURE — 96139 PSYCL/NRPSYC TST TECH EA: CPT | Mod: S$PBB,,, | Performed by: PSYCHIATRY & NEUROLOGY

## 2024-06-07 NOTE — PROGRESS NOTES
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 Score Percentile/CP   HVLT-R       Total Immediate 13 Tscore 31 3   Delayed Recall 3 Tscore 31 3   Retention % 60 Tscore 37 9   Hits 12 - - -   False Positives 1 - - -   Discrimination  11 Tscore 56 73   WMS-IV Subtests       LM I 38 T 59    LM II 12 T 44    LM Recognition 22 - - >75   ATTENTION/WORKING MEMORY Raw Score Type of Standardized Score Standardized Score Percentile/CP   WAIS-IV WMI -  63   WAIS-IV Digit Span 23 ss 10 50         DS Forward 9 ss 9 37         DS Backward 7 ss 9 37         DS Sequence 7 ss 9 37         Longest Digit Forward 6 - - -         Longest Digit Backward 4 - - -         Longest Digit Sequence 5 - - -   WAIS-IV Arithmetic 15 ss 12 75   MENTAL PROCESSING SPEED Raw Score Type of Standardized Score Standardized Score Percentile/CP   WAIS-IV PSI - SS 94 34   WAIS-IV Symbol Search 18 ss 9 37   WAIS-IV Coding 39 ss 9 37   TMT A  33 Tscore 53 62   TMT A errors 0 - - -   EXECUTIVE FUNCTIONING Raw Score Type of Standardized Score Standardized Score Percentile/CP   TMT B 94 Tscore 49 46   TMT B errors 0 - - -   WAIS-IV Similarities 29 ss 14 91   FRONTOMOTOR  Raw Score Type of Standardized Score Standardized Score Percentile/CP   GPT  Tscore 41 18   GPD NDH  [facility] 41 18   MOOD & PERSONALITY Raw Score Type of Standardized Score Standardized Score Percentile/CP   BDI-2 6 - - -   MARTIN-7 2 - - -

## 2024-06-10 LAB
OHS CV AF BURDEN PERCENT: < 1
OHS CV DC REMOTE DEVICE TYPE: NORMAL

## 2024-06-12 ENCOUNTER — PATIENT MESSAGE (OUTPATIENT)
Dept: NEUROLOGY | Facility: CLINIC | Age: 79
End: 2024-06-12
Payer: MEDICARE

## 2024-06-20 ENCOUNTER — OFFICE VISIT (OUTPATIENT)
Dept: NEUROLOGY | Facility: CLINIC | Age: 79
End: 2024-06-20
Payer: MEDICARE

## 2024-06-20 DIAGNOSIS — F06.70 MILD NEUROCOGNITIVE DISORDER DUE TO MULTIPLE ETIOLOGIES: Primary | ICD-10-CM

## 2024-06-20 DIAGNOSIS — Z86.73 HISTORY OF ISCHEMIC LEFT MCA STROKE: ICD-10-CM

## 2024-06-20 DIAGNOSIS — F95.2 TOURETTES SYNDROME: ICD-10-CM

## 2024-06-20 DIAGNOSIS — I68.0 CEREBRAL AMYLOID ANGIOPATHY: ICD-10-CM

## 2024-06-20 DIAGNOSIS — E85.4 CEREBRAL AMYLOID ANGIOPATHY: ICD-10-CM

## 2024-06-20 PROCEDURE — 99499 UNLISTED E&M SERVICE: CPT | Mod: S$PBB,,, | Performed by: PSYCHIATRY & NEUROLOGY

## 2024-06-20 NOTE — PROGRESS NOTES
NEUROPSYCHOLOGICAL EVALUATION - CONFIDENTIAL  FEEDBACK NOTE    On 6/20/2024, I provided Mr. Patrick Short and his wife the neuropsychological evaluation results. Please see the full report for a comprehensive overview of the findings. Mr. Short was provided a copy of the report and invited to call with additional questions.      Jose Hill Psy.D., ABPP  Board Certified in Clinical Neuropsychology  [facility] - Department of Neurology
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
## 2024-06-21 ENCOUNTER — PATIENT MESSAGE (OUTPATIENT)
Dept: NEUROLOGY | Facility: CLINIC | Age: 79
End: 2024-06-21
Payer: MEDICARE

## 2024-06-27 ENCOUNTER — TELEPHONE (OUTPATIENT)
Dept: NEUROLOGY | Facility: CLINIC | Age: 79
End: 2024-06-27
Payer: MEDICARE

## 2024-06-27 NOTE — TELEPHONE ENCOUNTER
----- Message from Brent Paige MA sent at 6/27/2024 10:53 AM CDT -----  Regarding: FW: f/u with BEBA Mora, can you get this pt scheduled please, thanks.  ----- Message -----  From: Frantz Bee MD  Sent: 6/27/2024  10:48 AM CDT  To: Brent Paige MA  Subject: f/u with BEBA Mejia,  Pls schedule a f/u for Mr. Short with Cookie whom he has seen in the past.  Thanks,  MANDI

## 2024-06-27 NOTE — TELEPHONE ENCOUNTER
I spoke with pt and his wife via phone today.  They are noticing R>L hand tremor over the last month or two and he had a fall.      We discussed PD and typical course.    Will arrange f/u appt with DALE Kuo.    Frantz Bee

## 2024-07-05 ENCOUNTER — CLINICAL SUPPORT (OUTPATIENT)
Dept: CARDIOLOGY | Facility: HOSPITAL | Age: 79
End: 2024-07-05
Attending: STUDENT IN AN ORGANIZED HEALTH CARE EDUCATION/TRAINING PROGRAM
Payer: MEDICARE

## 2024-07-05 DIAGNOSIS — I63.9 CEREBRAL INFARCTION, UNSPECIFIED: ICD-10-CM

## 2024-07-10 LAB
OHS CV AF BURDEN PERCENT: < 1
OHS CV DC REMOTE DEVICE TYPE: NORMAL

## 2024-08-01 ENCOUNTER — OFFICE VISIT (OUTPATIENT)
Dept: URGENT CARE | Facility: CLINIC | Age: 79
End: 2024-08-01
Payer: MEDICARE

## 2024-08-01 VITALS
OXYGEN SATURATION: 95 % | BODY MASS INDEX: 30.78 KG/M2 | DIASTOLIC BLOOD PRESSURE: 83 MMHG | WEIGHT: 215 LBS | HEART RATE: 63 BPM | SYSTOLIC BLOOD PRESSURE: 133 MMHG | RESPIRATION RATE: 18 BRPM | TEMPERATURE: 98 F | HEIGHT: 70 IN

## 2024-08-01 DIAGNOSIS — J06.9 VIRAL URI WITH COUGH: Primary | ICD-10-CM

## 2024-08-01 LAB
CTP QC/QA: YES
SARS-COV-2 AG RESP QL IA.RAPID: NEGATIVE

## 2024-08-01 RX ORDER — BENZONATATE 100 MG/1
100 CAPSULE ORAL EVERY 8 HOURS PRN
Qty: 30 CAPSULE | Refills: 0 | Status: SHIPPED | OUTPATIENT
Start: 2024-08-01

## 2024-08-01 NOTE — PROGRESS NOTES
"Subjective:      Patient ID: Patrick Short is a 79 y.o. male.    Vitals:  height is 5' 10" (1.778 m) and weight is 97.5 kg (215 lb). His temperature is 98 °F (36.7 °C). His blood pressure is 133/83 and his pulse is 63. His respiration is 18 and oxygen saturation is 95%.     Chief Complaint: Sore Throat    This is a 79 y.o. male who presents today with a chief complaint of sore throat, congestion, and cough. Symptoms began two days ago and have progressively worsened. Pt has taken Nyquil and has noticed moderate relief. Pt has not recorded or felt feverish.     Sore Throat   This is a new problem. The current episode started yesterday. The problem has been gradually worsening. Neither side of throat is experiencing more pain than the other. There has been no fever. The fever has been present for Less than 1 day. The pain is at a severity of 5/10. The pain is moderate. Associated symptoms include congestion and coughing. Pertinent negatives include no abdominal pain, diarrhea, headaches, shortness of breath or vomiting. Exposure to: not noted. He has tried NSAIDs (nyquil) for the symptoms. The treatment provided moderate relief.       Constitution: Negative for fever.   HENT:  Positive for congestion, postnasal drip and sore throat.    Respiratory:  Positive for cough. Negative for shortness of breath and wheezing.    Gastrointestinal:  Negative for abdominal pain, vomiting and diarrhea.   Neurological:  Negative for headaches.      Objective:     Physical Exam   Constitutional: He is oriented to person, place, and time. He appears well-developed. He is cooperative.  Non-toxic appearance. He does not appear ill. No distress.   HENT:   Head: Normocephalic.   Ears:   Right Ear: Hearing, tympanic membrane, external ear and ear canal normal.   Left Ear: Hearing, tympanic membrane, external ear and ear canal normal.   Nose: Congestion present. No mucosal edema, rhinorrhea or nasal deformity. No epistaxis. Right sinus " exhibits no maxillary sinus tenderness and no frontal sinus tenderness. Left sinus exhibits no maxillary sinus tenderness and no frontal sinus tenderness.   Mouth/Throat: Uvula is midline and mucous membranes are normal. Mucous membranes are moist. No trismus in the jaw. Normal dentition. No uvula swelling. Posterior oropharyngeal erythema present. No oropharyngeal exudate or posterior oropharyngeal edema. Oropharynx is clear.   Eyes: Conjunctivae and lids are normal. No scleral icterus.   Neck: Trachea normal and phonation normal. Neck supple. No edema present. No erythema present. No neck rigidity present.   Cardiovascular: Normal rate and regular rhythm.   Pulmonary/Chest: Effort normal and breath sounds normal. No respiratory distress. He has no decreased breath sounds. He has no wheezes. He has no rhonchi.   Abdominal: Normal appearance.   Musculoskeletal: Normal range of motion.         General: No deformity. Normal range of motion.   Neurological: He is alert and oriented to person, place, and time. He exhibits normal muscle tone. Coordination normal.   Skin: Skin is warm, dry, intact, not diaphoretic and not pale.   Psychiatric: His speech is normal and behavior is normal. Judgment and thought content normal.   Nursing note and vitals reviewed.    Results for orders placed or performed in visit on 08/01/24   SARS Coronavirus 2 Antigen, POCT Manual Read   Result Value Ref Range    SARS Coronavirus 2 Antigen Negative Negative     Acceptable Yes         Assessment:     1. Viral URI with cough        Plan:       Viral URI with cough  -     SARS Coronavirus 2 Antigen, POCT Manual Read  -     benzonatate (TESSALON) 100 MG capsule; Take 1 capsule (100 mg total) by mouth every 8 (eight) hours as needed for Cough.  Dispense: 30 capsule; Refill: 0      Patient Instructions   Oral fluids  Rest  Steam (hot showers, hot tea)  Blow nose often  Avoid circulating air (such as ceiling fans) dries your  airway  Avoid drinking cold drinks (worsens cough)  Avoid strong smells which could worsen cough (perfume, lotions, smoke...)  Therapeutic coughing to expel mucous  Sit in upright position often

## 2024-08-04 ENCOUNTER — CLINICAL SUPPORT (OUTPATIENT)
Dept: CARDIOLOGY | Facility: HOSPITAL | Age: 79
End: 2024-08-04
Attending: STUDENT IN AN ORGANIZED HEALTH CARE EDUCATION/TRAINING PROGRAM
Payer: MEDICARE

## 2024-08-04 DIAGNOSIS — I63.9 CEREBRAL INFARCTION, UNSPECIFIED: ICD-10-CM

## 2024-08-07 LAB
OHS CV AF BURDEN PERCENT: < 1
OHS CV DC REMOTE DEVICE TYPE: NORMAL

## 2024-09-04 ENCOUNTER — CLINICAL SUPPORT (OUTPATIENT)
Dept: CARDIOLOGY | Facility: HOSPITAL | Age: 79
End: 2024-09-04
Attending: STUDENT IN AN ORGANIZED HEALTH CARE EDUCATION/TRAINING PROGRAM
Payer: MEDICARE

## 2024-09-04 DIAGNOSIS — I63.9 CEREBRAL INFARCTION, UNSPECIFIED: ICD-10-CM

## 2024-09-04 PROCEDURE — 93298 REM INTERROG DEV EVAL SCRMS: CPT | Mod: 26,,, | Performed by: STUDENT IN AN ORGANIZED HEALTH CARE EDUCATION/TRAINING PROGRAM

## 2024-09-10 LAB
OHS CV AF BURDEN PERCENT: < 1
OHS CV DC REMOTE DEVICE TYPE: NORMAL

## 2024-09-18 RX ORDER — CARBIDOPA AND LEVODOPA 25; 100 MG/1; MG/1
TABLET ORAL
Qty: 270 TABLET | Refills: 1 | Status: SHIPPED | OUTPATIENT
Start: 2024-09-18
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
## 2024-10-03 ENCOUNTER — OFFICE VISIT (OUTPATIENT)
Dept: NEUROLOGY | Facility: CLINIC | Age: 79
End: 2024-10-03
Payer: MEDICARE

## 2024-10-03 VITALS
HEIGHT: 70 IN | BODY MASS INDEX: 29.92 KG/M2 | DIASTOLIC BLOOD PRESSURE: 82 MMHG | WEIGHT: 209 LBS | SYSTOLIC BLOOD PRESSURE: 137 MMHG | HEART RATE: 64 BPM

## 2024-10-03 DIAGNOSIS — I61.1 NONTRAUMATIC CORTICAL HEMORRHAGE OF RIGHT CEREBRAL HEMISPHERE: ICD-10-CM

## 2024-10-03 DIAGNOSIS — R41.3 MEMORY CHANGE: ICD-10-CM

## 2024-10-03 DIAGNOSIS — Z86.73 HISTORY OF ISCHEMIC LEFT MCA STROKE: ICD-10-CM

## 2024-10-03 DIAGNOSIS — G20.C PARKINSONISM, UNSPECIFIED PARKINSONISM TYPE: Primary | ICD-10-CM

## 2024-10-03 DIAGNOSIS — G31.84 MILD NEUROCOGNITIVE DISORDER: ICD-10-CM

## 2024-10-03 PROCEDURE — G2211 COMPLEX E/M VISIT ADD ON: HCPCS | Mod: S$PBB,,, | Performed by: PHYSICIAN ASSISTANT

## 2024-10-03 PROCEDURE — 99213 OFFICE O/P EST LOW 20 MIN: CPT | Mod: PBBFAC | Performed by: PHYSICIAN ASSISTANT

## 2024-10-03 PROCEDURE — 99999 PR PBB SHADOW E&M-EST. PATIENT-LVL III: CPT | Mod: PBBFAC,,, | Performed by: PHYSICIAN ASSISTANT

## 2024-10-03 PROCEDURE — 99215 OFFICE O/P EST HI 40 MIN: CPT | Mod: S$PBB,,, | Performed by: PHYSICIAN ASSISTANT

## 2024-10-03 RX ORDER — CARBIDOPA AND LEVODOPA 25; 100 MG/1; MG/1
TABLET ORAL
Qty: 405 TABLET | Refills: 3 | Status: SHIPPED | OUTPATIENT
Start: 2024-10-03 | End: 2025-10-31

## 2024-10-05 ENCOUNTER — CLINICAL SUPPORT (OUTPATIENT)
Dept: CARDIOLOGY | Facility: HOSPITAL | Age: 79
End: 2024-10-05
Attending: STUDENT IN AN ORGANIZED HEALTH CARE EDUCATION/TRAINING PROGRAM
Payer: MEDICARE

## 2024-10-05 DIAGNOSIS — I63.9 CEREBRAL INFARCTION, UNSPECIFIED: ICD-10-CM

## 2024-10-09 LAB
OHS CV AF BURDEN PERCENT: < 1
OHS CV DC REMOTE DEVICE TYPE: NORMAL

## 2024-11-05 ENCOUNTER — CLINICAL SUPPORT (OUTPATIENT)
Dept: CARDIOLOGY | Facility: HOSPITAL | Age: 79
End: 2024-11-05
Attending: STUDENT IN AN ORGANIZED HEALTH CARE EDUCATION/TRAINING PROGRAM
Payer: MEDICARE

## 2024-11-05 DIAGNOSIS — I63.9 CEREBRAL INFARCTION, UNSPECIFIED: ICD-10-CM

## 2024-11-05 PROCEDURE — 93298 REM INTERROG DEV EVAL SCRMS: CPT | Mod: 26,GZ,, | Performed by: STUDENT IN AN ORGANIZED HEALTH CARE EDUCATION/TRAINING PROGRAM

## 2024-11-15 LAB
OHS CV AF BURDEN PERCENT: < 1
OHS CV DC REMOTE DEVICE TYPE: NORMAL

## 2024-12-02 NOTE — PLAN OF CARE
Patient arrived to room. PIV placed. Admit assessment completed. Plan of care discussed with patient. Wife at bedside. Nurse call bell within reach. Will monitor    No

## 2024-12-06 ENCOUNTER — CLINICAL SUPPORT (OUTPATIENT)
Dept: CARDIOLOGY | Facility: HOSPITAL | Age: 79
End: 2024-12-06
Attending: STUDENT IN AN ORGANIZED HEALTH CARE EDUCATION/TRAINING PROGRAM
Payer: MEDICARE

## 2024-12-06 DIAGNOSIS — I63.9 CEREBRAL INFARCTION, UNSPECIFIED: ICD-10-CM

## 2024-12-06 PROCEDURE — 93298 REM INTERROG DEV EVAL SCRMS: CPT | Mod: 26,GZ,, | Performed by: STUDENT IN AN ORGANIZED HEALTH CARE EDUCATION/TRAINING PROGRAM

## 2024-12-11 LAB
OHS CV AF BURDEN PERCENT: < 1
OHS CV DC REMOTE DEVICE TYPE: NORMAL

## 2024-12-22 ENCOUNTER — HOSPITAL ENCOUNTER (EMERGENCY)
Facility: HOSPITAL | Age: 79
Discharge: HOME | End: 2024-12-22
Attending: STUDENT IN AN ORGANIZED HEALTH CARE EDUCATION/TRAINING PROGRAM
Payer: MEDICARE

## 2024-12-22 VITALS
SYSTOLIC BLOOD PRESSURE: 136 MMHG | HEART RATE: 75 BPM | TEMPERATURE: 98 F | BODY MASS INDEX: 28.63 KG/M2 | OXYGEN SATURATION: 97 % | DIASTOLIC BLOOD PRESSURE: 84 MMHG | WEIGHT: 200 LBS | RESPIRATION RATE: 14 BRPM | HEIGHT: 70 IN

## 2024-12-22 DIAGNOSIS — R07.9 CHEST PAIN: ICD-10-CM

## 2024-12-22 DIAGNOSIS — R55 SYNCOPE AND COLLAPSE: Primary | ICD-10-CM

## 2024-12-22 LAB
ALBUMIN SERPL-MCNC: 4 G/DL (ref 3.4–4.8)
ALBUMIN/GLOB SERPL: 1.3 RATIO (ref 1.1–2)
ALP SERPL-CCNC: 69 UNIT/L (ref 40–150)
ALT SERPL-CCNC: 8 UNIT/L (ref 0–55)
ANION GAP SERPL CALC-SCNC: 10 MEQ/L
AST SERPL-CCNC: 29 UNIT/L (ref 5–34)
BACTERIA #/AREA URNS AUTO: ABNORMAL /HPF
BASOPHILS # BLD AUTO: 0.04 X10(3)/MCL
BASOPHILS NFR BLD AUTO: 0.3 %
BILIRUB SERPL-MCNC: 1.3 MG/DL
BILIRUB UR QL STRIP.AUTO: NEGATIVE
BNP BLD-MCNC: 42.7 PG/ML
BUN SERPL-MCNC: 16.8 MG/DL (ref 8.4–25.7)
CALCIUM SERPL-MCNC: 9.8 MG/DL (ref 8.8–10)
CHLORIDE SERPL-SCNC: 107 MMOL/L (ref 98–107)
CLARITY UR: CLEAR
CO2 SERPL-SCNC: 24 MMOL/L (ref 23–31)
COLOR UR AUTO: ABNORMAL
CREAT SERPL-MCNC: 0.94 MG/DL (ref 0.72–1.25)
CREAT/UREA NIT SERPL: 18
EOSINOPHIL # BLD AUTO: 0.05 X10(3)/MCL (ref 0–0.9)
EOSINOPHIL NFR BLD AUTO: 0.3 %
ERYTHROCYTE [DISTWIDTH] IN BLOOD BY AUTOMATED COUNT: 12.6 % (ref 11.5–17)
FLUAV AG UPPER RESP QL IA.RAPID: NOT DETECTED
FLUBV AG UPPER RESP QL IA.RAPID: NOT DETECTED
GFR SERPLBLD CREATININE-BSD FMLA CKD-EPI: >60 ML/MIN/1.73/M2
GLOBULIN SER-MCNC: 3.1 GM/DL (ref 2.4–3.5)
GLUCOSE SERPL-MCNC: 100 MG/DL (ref 82–115)
GLUCOSE UR QL STRIP: NORMAL
HCT VFR BLD AUTO: 43.7 % (ref 42–52)
HGB BLD-MCNC: 14.8 G/DL (ref 14–18)
HGB UR QL STRIP: NEGATIVE
IMM GRANULOCYTES # BLD AUTO: 0.04 X10(3)/MCL (ref 0–0.04)
IMM GRANULOCYTES NFR BLD AUTO: 0.3 %
KETONES UR QL STRIP: NEGATIVE
LEUKOCYTE ESTERASE UR QL STRIP: NEGATIVE
LYMPHOCYTES # BLD AUTO: 0.78 X10(3)/MCL (ref 0.6–4.6)
LYMPHOCYTES NFR BLD AUTO: 5.1 %
MCH RBC QN AUTO: 31.4 PG (ref 27–31)
MCHC RBC AUTO-ENTMCNC: 33.9 G/DL (ref 33–36)
MCV RBC AUTO: 92.6 FL (ref 80–94)
MONOCYTES # BLD AUTO: 0.95 X10(3)/MCL (ref 0.1–1.3)
MONOCYTES NFR BLD AUTO: 6.2 %
MUCOUS THREADS URNS QL MICRO: ABNORMAL /LPF
NEUTROPHILS # BLD AUTO: 13.57 X10(3)/MCL (ref 2.1–9.2)
NEUTROPHILS NFR BLD AUTO: 87.8 %
NITRITE UR QL STRIP: NEGATIVE
NRBC BLD AUTO-RTO: 0 %
OHS QRS DURATION: 94 MS
OHS QTC CALCULATION: 395 MS
PH UR STRIP: 6.5 [PH]
PLATELET # BLD AUTO: 147 X10(3)/MCL (ref 130–400)
PMV BLD AUTO: 11.4 FL (ref 7.4–10.4)
POCT GLUCOSE: 99 MG/DL (ref 70–110)
POTASSIUM SERPL-SCNC: 4.3 MMOL/L (ref 3.5–5.1)
PROT SERPL-MCNC: 7.1 GM/DL (ref 5.8–7.6)
PROT UR QL STRIP: NEGATIVE
RBC # BLD AUTO: 4.72 X10(6)/MCL (ref 4.7–6.1)
RBC #/AREA URNS AUTO: ABNORMAL /HPF
RSV A 5' UTR RNA NPH QL NAA+PROBE: NOT DETECTED
SARS-COV-2 RNA RESP QL NAA+PROBE: NOT DETECTED
SODIUM SERPL-SCNC: 141 MMOL/L (ref 136–145)
SP GR UR STRIP.AUTO: 1.02 (ref 1–1.03)
SQUAMOUS #/AREA URNS LPF: ABNORMAL /HPF
TROPONIN I SERPL-MCNC: 0.02 NG/ML (ref 0–0.04)
TROPONIN I SERPL-MCNC: 0.04 NG/ML (ref 0–0.04)
UROBILINOGEN UR STRIP-ACNC: NORMAL
WBC # BLD AUTO: 15.43 X10(3)/MCL (ref 4.5–11.5)
WBC #/AREA URNS AUTO: ABNORMAL /HPF

## 2024-12-22 PROCEDURE — 25000003 PHARM REV CODE 250: Performed by: STUDENT IN AN ORGANIZED HEALTH CARE EDUCATION/TRAINING PROGRAM

## 2024-12-22 PROCEDURE — 85025 COMPLETE CBC W/AUTO DIFF WBC: CPT | Performed by: STUDENT IN AN ORGANIZED HEALTH CARE EDUCATION/TRAINING PROGRAM

## 2024-12-22 PROCEDURE — 0241U COVID/RSV/FLU A&B PCR: CPT | Performed by: STUDENT IN AN ORGANIZED HEALTH CARE EDUCATION/TRAINING PROGRAM

## 2024-12-22 PROCEDURE — 83880 ASSAY OF NATRIURETIC PEPTIDE: CPT | Performed by: STUDENT IN AN ORGANIZED HEALTH CARE EDUCATION/TRAINING PROGRAM

## 2024-12-22 PROCEDURE — 93010 ELECTROCARDIOGRAM REPORT: CPT | Mod: ,,, | Performed by: INTERNAL MEDICINE

## 2024-12-22 PROCEDURE — 82962 GLUCOSE BLOOD TEST: CPT

## 2024-12-22 PROCEDURE — 99285 EMERGENCY DEPT VISIT HI MDM: CPT | Mod: 25

## 2024-12-22 PROCEDURE — 81001 URINALYSIS AUTO W/SCOPE: CPT | Performed by: STUDENT IN AN ORGANIZED HEALTH CARE EDUCATION/TRAINING PROGRAM

## 2024-12-22 PROCEDURE — 93005 ELECTROCARDIOGRAM TRACING: CPT

## 2024-12-22 PROCEDURE — 80053 COMPREHEN METABOLIC PANEL: CPT | Performed by: STUDENT IN AN ORGANIZED HEALTH CARE EDUCATION/TRAINING PROGRAM

## 2024-12-22 PROCEDURE — 84484 ASSAY OF TROPONIN QUANT: CPT | Performed by: STUDENT IN AN ORGANIZED HEALTH CARE EDUCATION/TRAINING PROGRAM

## 2024-12-22 RX ORDER — PANTOPRAZOLE SODIUM 40 MG/1
40 TABLET, DELAYED RELEASE ORAL
Status: COMPLETED | OUTPATIENT
Start: 2024-12-22 | End: 2024-12-22

## 2024-12-22 RX ADMIN — PANTOPRAZOLE SODIUM 40 MG: 40 TABLET, DELAYED RELEASE ORAL at 12:12

## 2024-12-22 NOTE — PROGRESS NOTES
79-year-old gentleman with a past medical history of coronary artery disease, status post placement, see amyloidosis, CVA, ICH calls from the [facility] while hes at camp. He and his friends report that shortly before calling patient passed out. Patient states that he feels relatively normal currently but was nauseous immediately prior to him Passing out. Friends described that he was postictal and it appeared that he had some urinary incontinence. Patient reports that he has had a heart attack and a stroke before and this does not feel like that.     A/P    Advise to seek immediate medical attention for full evaluation. Will likely need labs and imaging studies. They are at a camp that will require medevac evacuation with an estimated time of arrival of 30 minutes. Paramedics to decide to go to the nearest hospital versus return returning to the [facility]. Patient advised to call when medical team is present or if have additional questions or concerns. With patients permission reported to friends similar disposition and treatment plan. All demonstrated understanding.

## 2024-12-22 NOTE — ED NOTES
Bed: 27  Expected date: 12/22/24  Expected time:   Means of arrival:   Comments:  79 M near syncope

## 2024-12-22 NOTE — ED PROVIDER NOTES
Encounter Date: 12/22/2024    SCRIBE #1 NOTE: I, Yudith Coe, am scribing for, and in the presence of,  Stuart Jensen MD. I have scribed the following portions of the note - the EKG reading. Other sections scribed: HPI, ROS, PE.       History     Chief Complaint   Patient presents with    Loss of Consciousness     Syncopal episode while hunting this morning. Denies fall. Pt began feeling nauseous and became pale prior to syncope. +bladder incontinence. GCS 15 on arrival to ED. Hx of parkinsons, CVA, and cardiac stents. Denies chest pain, sob.     The patient is a 79 year old male with hx of CAD, MI, Heart block, Parkinson's, HTN, Cerebral ischemic stroke, and cerebral amyloid angiopathy presenting to the ED due to LOC. The patient complains of generalized weakness. The patient states that he did wake up this morning not feeling well but went darkening with friends and while he was sitting on the blind the patient states that he became fatigued. Per the patients friend the patient yawned a couple of times and then the patient had the syncopal episode. The friend states that the patient went pale after syncopal episode and the friend also stated that the patient urinated during LOC. The patient states that after the syncopal episode the patient was cold, generally weak, and had trouble walking to get out of the blind and doug. The patient reports of having lower abdominal pain before the syncopal episode but states that it has resolved.     Cardiologist: ALBERTO Roth MD  PCP: Misbah Cordero MD        The history is provided by the patient, medical records and a friend. No  was used.     Review of patient's allergies indicates:  No Known Allergies  Past Medical History:   Diagnosis Date    CAD (coronary artery disease) 07/26/2016    3 stents    Cerebral amyloid angiopathy     Cerebral ischemic stroke due to global hypoperfusion with watershed infarct 10/10/2022    Heart block     MI  (myocardial infarction)     Parkinsonism     Primary hypertension 11/21/2022    Reflux      Past Surgical History:   Procedure Laterality Date    CARDIAC CATHETERIZATION      EYE SURGERY Left 02/2017    HIP SURGERY Right 12/2021    INSERTION OF IMPLANTABLE LOOP RECORDER N/A 1/24/2023    Procedure: Insertion, Implantable Loop Recorder;  Surgeon: ALBERTO Roth MD;  Location: [facility] EP LAB;  Service: Cardiology;  Laterality: N/A;  CVA, ILR, MDT, Local, , 3 Prep     Family History   Problem Relation Name Age of Onset    Hypertension Mother      Emphysema Father      Coronary artery disease Father       Social History     Tobacco Use    Smoking status: Never    Smokeless tobacco: Never   Substance Use Topics    Alcohol use: Yes     Comment: twice a month/beer wine    Drug use: Never     Review of Systems   Neurological:  Positive for syncope and weakness (Generalized weakness.).       Physical Exam     Initial Vitals [12/22/24 0906]   BP Pulse Resp Temp SpO2   (!) 164/94 80 18 97.9 °F (36.6 °C) 99 %      MAP       --         Physical Exam    Constitutional: He appears well-developed and well-nourished. He is not diaphoretic. No distress.   HENT:   Head: Normocephalic and atraumatic.   Right Ear: External ear normal.   Left Ear: External ear normal.   Nose: Nose normal.   Eyes: EOM are normal. Pupils are equal, round, and reactive to light. Right eye exhibits no discharge. Left eye exhibits no discharge.   Cardiovascular:  Normal rate, regular rhythm and normal heart sounds.     Exam reveals no gallop and no friction rub.       No murmur heard.  Pulmonary/Chest: Effort normal and breath sounds normal. No respiratory distress. He has no wheezes. He has no rhonchi. He has no rales. He exhibits no tenderness.   Abdominal: Abdomen is soft. Bowel sounds are normal. He exhibits no distension and no mass. There is no abdominal tenderness. There is no rebound and no guarding.   Musculoskeletal:         General: No edema.  Normal range of motion.     Neurological: He is alert and oriented to person, place, and time. No cranial nerve deficit or sensory deficit.   No focal neurological deficits present.    Skin: Skin is warm and dry. Capillary refill takes less than 2 seconds.         ED Course   Procedures  Labs Reviewed   CBC WITH DIFFERENTIAL - Abnormal       Result Value    WBC 15.43 (*)     RBC 4.72      Hgb 14.8      Hct 43.7      MCV 92.6      MCH 31.4 (*)     MCHC 33.9      RDW 12.6      Platelet 147      MPV 11.4 (*)     Neut % 87.8      Lymph % 5.1      Mono % 6.2      Eos % 0.3      Basophil % 0.3      Lymph # 0.78      Neut # 13.57 (*)     Mono # 0.95      Eos # 0.05      Baso # 0.04      IG# 0.04      IG% 0.3      NRBC% 0.0     URINALYSIS, REFLEX TO URINE CULTURE - Abnormal    Color, UA Light-Yellow      Appearance, UA Clear      Specific Gravity, UA 1.022      pH, UA 6.5      Protein, UA Negative      Glucose, UA Normal      Ketones, UA Negative      Blood, UA Negative      Bilirubin, UA Negative      Urobilinogen, UA Normal      Nitrites, UA Negative      Leukocyte Esterase, UA Negative      RBC, UA 0-5      WBC, UA 0-5      Bacteria, UA None Seen      Squamous Epithelial Cells, UA None Seen      Mucous, UA Trace (*)    TROPONIN I - Normal    Troponin-I 0.045     B-TYPE NATRIURETIC PEPTIDE - Normal    Natriuretic Peptide 42.7     COVID/RSV/FLU A&B PCR - Normal    Influenza A PCR Not Detected      Influenza B PCR Not Detected      Respiratory Syncytial Virus PCR Not Detected      SARS-CoV-2 PCR Not Detected      Narrative:     The Xpert Xpress SARS-CoV-2/FLU/RSV plus is a rapid, multiplexed real-time PCR test intended for the simultaneous qualitative detection and differentiation of SARS-CoV-2, Influenza A, Influenza B, and respiratory syncytial virus (RSV) viral RNA in either nasopharyngeal swab or nasal swab specimens.         TROPONIN I - Normal    Troponin-I 0.016     CBC W/ AUTO DIFFERENTIAL    Narrative:     The  following orders were created for panel order CBC auto differential.  Procedure                               Abnormality         Status                     ---------                               -----------         ------                     CBC with Differential[[id number]]       Abnormal            Final result                 Please view results for these tests on the individual orders.   COMPREHENSIVE METABOLIC PANEL    Sodium 141      Potassium 4.3      Chloride 107      CO2 24      Glucose 100      Blood Urea Nitrogen 16.8      Creatinine 0.94      Calcium 9.8      Protein Total 7.1      Albumin 4.0      Globulin 3.1      Albumin/Globulin Ratio 1.3      Bilirubin Total 1.3      ALP 69      ALT 8      AST 29      eGFR >60      Anion Gap 10.0      BUN/Creatinine Ratio 18     POCT GLUCOSE    POCT Glucose 99       EKG Readings: (Independently Interpreted)   Initial Reading: No STEMI. Heart Rate: 71. Ectopy: No Ectopy. Conduction: Normal. ST Segments: Normal ST Segments. T Waves Flipped: III and AVF. Axis: Normal.   0918. Sinus rhythm. . No ST elevation or depression.      ECG Results              EKG 12-lead (Final result)        Collection Time Result Time QRS Duration OHS QTC Calculation    12/22/24 09:18:50 12/22/24 10:23:41 94 395                     Final result by Interface, Lab In [facility] (12/22/24 10:23:50)                   Narrative:    Test Reason : R07.9,    Vent. Rate :  71 BPM     Atrial Rate :  71 BPM     P-R Int : 144 ms          QRS Dur :  94 ms      QT Int : 364 ms       P-R-T Axes :     49 -11 degrees    QTcB Int : 395 ms    Normal sinus rhythm  Inferior infarct ,age undetermined  Abnormal ECG  No previous ECGs available  Confirmed by Darian Luevano ([id number]) on 12/22/2024 10:23:40 AM    Referred By: AAAREFERRAL SELF           Confirmed By: Darian Luevano                                  Imaging Results              CT Head Without Contrast (Final result)  Result time 12/22/24 09:49:59       Final result by Jackie Colbert MD (12/22/24 09:49:59)                   Impression:      No appreciable acute intracranial abnormality.    Periventricular and subcortical white matter changes most compatible with chronic small vessel ischemic disease.      Electronically signed by: Jackie Colbert  Date:    12/22/2024  Time:    09:49               Narrative:    EXAMINATION:  CT HEAD WITHOUT CONTRAST    CLINICAL HISTORY:  Mental status change, unknown cause;    TECHNIQUE:  Low dose axial CT images obtained throughout the head without intravenous contrast.  Axial, sagittal and coronal reconstructions were performed and interpreted.    DLP: 1207 mGycm    All CT scans at this location are performed using dose optimization techniques as appropriate to a performed exam including the following automated exposure control, adjustment of the mA and/or kV according to patient size and/or use of iterative reconstruction technique    COMPARISON:  CT head 11/28/2022    FINDINGS:  BRAIN: Gray white differentiation is maintained. Periventricular and subcortical white matter changes most compatible with chronic small vessel ischemic disease.  No hemorrhage. No edema. No mass effect or midline shift.  The posterior fossa and midline structures are unremarkable.    VENTRICLES: Ex vacuo dilatation of the ventricles.    EXTRA-AXIAL: No abnormal extra-axial collections.    BONES: Calvarium is intact.    SINUSES AND MASTOIDS: Visualized paranasal sinuses and mastoid air cells are clear.                                       X-Ray Chest AP Portable (Final result)  Result time 12/22/24 09:45:57      Final result by Sung Traore MD (12/22/24 09:45:57)                   Impression:      No acute findings.      Electronically signed by: Sung Traore  Date:    12/22/2024  Time:    09:45               Narrative:    EXAMINATION:  XR CHEST AP PORTABLE    CLINICAL HISTORY:  Chest Pain;    COMPARISON:  10 October  2022    FINDINGS:  Frontal view of the chest was obtained. Recording device noted.  Heart is mildly enlarged.  There is aortic atherosclerosis.  Grossly clear lungs.  No pneumothorax.                                       Medications   pantoprazole EC tablet 40 mg (40 mg Oral Given 12/22/24 1202)     Medical Decision Making  Differential diagnosis includes but is not limited to, Metabolic abnormality, intoxication, toxic ingestion, CVA, infection, structural (SAH, ICH, trauma, neoplastic), seizure/postictal, polypharmacy, seizure, CVA, arrhythmia    Patient presents with a possible syncopal of the seizure-like episode.  Lab workup largely benign here decreasing troponin.  GCS 15.  Offered admission but declined.  Ambulatory here in the emergency department.  We would like to be discharged back to [place] so he may follow up with the his doctors, cardiologist.  Patient is comfortable with the plan. Return precautions given.  Questions invited, questions answered to the best my ability.  Patient discharged home condition stable.        Amount and/or Complexity of Data Reviewed  Independent Historian: friend  External Data Reviewed: notes.     Details: See ED course  Labs: ordered. Decision-making details documented in ED Course.  Radiology: ordered. Decision-making details documented in ED Course.  ECG/medicine tests: ordered. Decision-making details documented in ED Course.    Risk  Prescription drug management.            Scribe Attestation:   Scribe #1: I performed the above scribed service and the documentation accurately describes the services I performed. I attest to the accuracy of the note.    Attending Attestation:           Physician Attestation for Scribe:  Physician Attestation Statement for Scribe #1: I, Stuart Jensen MD, reviewed documentation, as scribed by Yudith Coe in my presence, and it is both accurate and complete.             ED Course as of 12/22/24 1931   Sun Dec 22, 2024   0910 EKG  done at 9:18 a.m. shows sinus rhythm rate of 71 .  Normal axis no ST elevation or depression.  Inverted T-waves in lead 3 and AVF [MM]   0937 Chart review reveals patient with a history of CVA, Parkinson's follow up by Cardiology and Neurology.  Evidently most of his care is in [place]. [MM]   0937 POCT Glucose: 99 [MM]   1002 POCT Glucose: 99 [MM]   1002 Troponin I: 0.045 [MM]   1002 BNP: 42.7 [MM]   1002 Comprehensive metabolic panel  No significant electrolyte abnormality or renal dysfunction [MM]   1003 CBC auto differential(!)  Leukocytosis.  No significant anemia [MM]   1003 CT Head Without Contrast  Negative for acute [MM]   1003 X-Ray Chest AP Portable  No new infiltrates when compared to prior.  Loop recorder noted to left chest [MM]   1224 Urinalysis, Reflex to Urine Culture(!)  Neg   [MM]   1332 Troponin I: 0.016  Delta troponin downtrending [MM]   1354 On re-evaluation patient reports he feels much better.  Discussed findings with both wife, patient.  Minimally elevated troponin that has decreasing.  Offered admission however they declined.  They we would like to see if patient can ambulate around the department if he can he would like to be discharged, they do live in [place] this does seem reasonable.  Wife reports patient has a long history of syncope.  They will follow up with his pulmonologist, cardiologist in [place]. [MM]      ED Course User Index  [MM] Stuart Jensen MD                           Clinical Impression:  Final diagnoses:  [R07.9] Chest pain  [R55] Syncope and collapse (Primary)          ED Disposition Condition    Discharge Stable          ED Prescriptions    None       Follow-up Information       Follow up With Specialties Details Why Contact Info    Misbah Cordero MD Internal Medicine Call   [address] [facility] [id number] [phone]      [facility] - Emergency Dept Emergency Medicine Go to  If symptoms worsen [address]  [facility] [address]  [phone]             Stuart Jensen MD  12/22/24 1931

## 2024-12-23 ENCOUNTER — HOSPITAL ENCOUNTER (OUTPATIENT)
Facility: HOSPITAL | Age: 79
Discharge: HOME | End: 2024-12-23
Attending: INTERNAL MEDICINE | Admitting: INTERNAL MEDICINE
Payer: MEDICARE

## 2024-12-23 ENCOUNTER — DOCUMENTATION ONLY (OUTPATIENT)
Dept: CARDIOLOGY | Facility: HOSPITAL | Age: 79
End: 2024-12-23
Payer: MEDICARE

## 2024-12-23 ENCOUNTER — DOCUMENTATION ONLY (OUTPATIENT)
Dept: ELECTROPHYSIOLOGY | Facility: CLINIC | Age: 79
End: 2024-12-23
Payer: MEDICARE

## 2024-12-23 VITALS
WEIGHT: 208 LBS | OXYGEN SATURATION: 97 % | RESPIRATION RATE: 17 BRPM | DIASTOLIC BLOOD PRESSURE: 74 MMHG | HEIGHT: 70 IN | BODY MASS INDEX: 29.78 KG/M2 | SYSTOLIC BLOOD PRESSURE: 127 MMHG | TEMPERATURE: 98 F | HEART RATE: 61 BPM

## 2024-12-23 DIAGNOSIS — Z98.890 STATUS POST LEFT HEART CATHETERIZATION: ICD-10-CM

## 2024-12-23 DIAGNOSIS — I21.4 NSTEMI (NON-ST ELEVATED MYOCARDIAL INFARCTION): Primary | ICD-10-CM

## 2024-12-23 DIAGNOSIS — E85.4 CEREBRAL AMYLOID ANGIOPATHY: ICD-10-CM

## 2024-12-23 DIAGNOSIS — I25.10 CORONARY ARTERY DISEASE INVOLVING NATIVE CORONARY ARTERY OF NATIVE HEART WITHOUT ANGINA PECTORIS: ICD-10-CM

## 2024-12-23 DIAGNOSIS — R55 SYNCOPE: ICD-10-CM

## 2024-12-23 DIAGNOSIS — I68.0 CEREBRAL AMYLOID ANGIOPATHY: ICD-10-CM

## 2024-12-23 DIAGNOSIS — I25.10 CORONARY ARTERY DISEASE INVOLVING NATIVE CORONARY ARTERY OF NATIVE HEART WITHOUT ANGINA PECTORIS: Primary | ICD-10-CM

## 2024-12-23 LAB — CATH EF ESTIMATED: 60 %

## 2024-12-23 PROCEDURE — C1894 INTRO/SHEATH, NON-LASER: HCPCS | Performed by: INTERNAL MEDICINE

## 2024-12-23 PROCEDURE — 99152 MOD SED SAME PHYS/QHP 5/>YRS: CPT | Performed by: INTERNAL MEDICINE

## 2024-12-23 PROCEDURE — 93458 L HRT ARTERY/VENTRICLE ANGIO: CPT | Mod: 26,GC,, | Performed by: INTERNAL MEDICINE

## 2024-12-23 PROCEDURE — 93005 ELECTROCARDIOGRAM TRACING: CPT

## 2024-12-23 PROCEDURE — 25500020 PHARM REV CODE 255: Performed by: INTERNAL MEDICINE

## 2024-12-23 PROCEDURE — 63600175 PHARM REV CODE 636 W HCPCS: Performed by: INTERNAL MEDICINE

## 2024-12-23 PROCEDURE — C1769 GUIDE WIRE: HCPCS | Performed by: INTERNAL MEDICINE

## 2024-12-23 PROCEDURE — 25000003 PHARM REV CODE 250: Performed by: INTERNAL MEDICINE

## 2024-12-23 PROCEDURE — 93458 L HRT ARTERY/VENTRICLE ANGIO: CPT | Performed by: INTERNAL MEDICINE

## 2024-12-23 PROCEDURE — 99152 MOD SED SAME PHYS/QHP 5/>YRS: CPT | Mod: GC,,, | Performed by: INTERNAL MEDICINE

## 2024-12-23 PROCEDURE — 93010 ELECTROCARDIOGRAM REPORT: CPT | Mod: ,,, | Performed by: INTERNAL MEDICINE

## 2024-12-23 RX ORDER — SODIUM CHLORIDE 9 MG/ML
INJECTION, SOLUTION INTRAVENOUS CONTINUOUS
Status: DISCONTINUED | OUTPATIENT
Start: 2024-12-23 | End: 2024-12-23 | Stop reason: HOSPADM

## 2024-12-23 RX ORDER — HEPARIN SOD,PORCINE/0.9 % NACL 1000/500ML
INTRAVENOUS SOLUTION INTRAVENOUS
Status: DISCONTINUED | OUTPATIENT
Start: 2024-12-23 | End: 2024-12-23 | Stop reason: HOSPADM

## 2024-12-23 RX ORDER — SODIUM CHLORIDE 0.9 % (FLUSH) 0.9 %
10 SYRINGE (ML) INJECTION
Status: DISCONTINUED | OUTPATIENT
Start: 2024-12-23 | End: 2024-12-23 | Stop reason: HOSPADM

## 2024-12-23 RX ORDER — MIDAZOLAM HYDROCHLORIDE 1 MG/ML
INJECTION INTRAMUSCULAR; INTRAVENOUS
Status: DISCONTINUED | OUTPATIENT
Start: 2024-12-23 | End: 2024-12-23 | Stop reason: HOSPADM

## 2024-12-23 RX ORDER — SODIUM CHLORIDE 9 MG/ML
INJECTION, SOLUTION INTRAVENOUS ONCE
Status: DISCONTINUED | OUTPATIENT
Start: 2024-12-23 | End: 2024-12-23

## 2024-12-23 RX ORDER — DIPHENHYDRAMINE HCL 50 MG
50 CAPSULE ORAL ONCE
Status: DISCONTINUED | OUTPATIENT
Start: 2024-12-23 | End: 2024-12-23

## 2024-12-23 RX ORDER — SODIUM CHLORIDE 9 MG/ML
INJECTION, SOLUTION INTRAVENOUS CONTINUOUS
Status: ACTIVE | OUTPATIENT
Start: 2024-12-23 | End: 2024-12-23

## 2024-12-23 RX ORDER — FENTANYL CITRATE 50 UG/ML
INJECTION, SOLUTION INTRAMUSCULAR; INTRAVENOUS
Status: DISCONTINUED | OUTPATIENT
Start: 2024-12-23 | End: 2024-12-23 | Stop reason: HOSPADM

## 2024-12-23 RX ORDER — DIPHENHYDRAMINE HCL 50 MG
50 CAPSULE ORAL ONCE
Status: COMPLETED | OUTPATIENT
Start: 2024-12-23 | End: 2024-12-23

## 2024-12-23 RX ORDER — HEPARIN SODIUM 1000 [USP'U]/ML
INJECTION, SOLUTION INTRAVENOUS; SUBCUTANEOUS
Status: DISCONTINUED | OUTPATIENT
Start: 2024-12-23 | End: 2024-12-23 | Stop reason: HOSPADM

## 2024-12-23 RX ORDER — LIDOCAINE HYDROCHLORIDE 20 MG/ML
INJECTION, SOLUTION EPIDURAL; INFILTRATION; INTRACAUDAL; PERINEURAL
Status: DISCONTINUED | OUTPATIENT
Start: 2024-12-23 | End: 2024-12-23 | Stop reason: HOSPADM

## 2024-12-23 RX ADMIN — DIPHENHYDRAMINE HYDROCHLORIDE 50 MG: 50 CAPSULE ORAL at 12:12

## 2024-12-23 RX ADMIN — SODIUM CHLORIDE: 9 INJECTION, SOLUTION INTRAVENOUS at 12:12

## 2024-12-23 NOTE — ASSESSMENT & PLAN NOTE
Patient with history of CAD s/p GUS to LAD on 2016. Presenting for coronary angiogram after sustaining syncopal episode.     - Proceed with coronary angiogram +/- intervention  - Anti-platelet Therapy: ASA + Plavix   - Access: Right radial  - Catheter: [place]  - Creatinine/CrCl: 0.94 on 12/22/2024  - Allergies: No shellfish/Iodine allergy  - Pre-Hydration: 3 cc/kg/hr IV, continuous, for 1 hour, Pre-Procedure  - Pre-Op Med: Diphenhydramine (Benadryl) 50 mg, Oral, Once, Pre-Procedure   - The risks, benefits & alternatives of the procedure were explained to the patient.  The risks of coronary angiography include but are not limited to: Bleeding, infection, heart rhythm abnormalities, allergic reactions, kidney injury requiring dilaysis, limb loss, stroke and death. Should stenting be indicated, the patient has agreed to dual anti-platelet therapy for 1-consecutive year with a drug-eluting stent and a minimum of 1-month with the use of a bare metal stent. Additionally, patient is aware that non-compliance is likely to result in stent clotting with heart attack, heart failure, and/or death. The risks of moderate sedation include hypotension, respiratory depression, arrhythmias, bronchospasm, & death. Informed consent was obtained & the patient is agreeable to proceed with the procedure. All patient's questions were answered.     The risks (including death, heart attack, limb loss, paralysis, emergency surgery, bleeding, renal failure, and stroke), the potential benefits of the procedure, and the alternatives to the procedure, were discussed in detail and accepted by the patient. All questions were answered. Patient agrees to proceed.

## 2024-12-23 NOTE — BRIEF OP NOTE
Brief Operative Note:    : Sung Mendoza MD     Referring Physician: Sung Mendoza     All Operators: Surgeon(s):  Sung Mendoza MD     Preoperative Diagnosis: Coronary artery disease involving native coronary artery of native heart without angina pectoris [I25.10]     Postop Diagnosis: Coronary artery disease involving native coronary artery of native heart without angina pectoris [I25.10]    Treatments/Procedures: Procedure(s) (LRB):  Angiogram, Coronary, with Left Heart Cath (N/A)  Left heart cath (Left)    Access: Right radial artery    Findings:Non obstructive CAD. Patent stents     See catheterization report for full details.      Closure device:  Radial band         Plan:  - Post cath protocol   - IVF @ 150 cc/hr x 2 hours  - Bed rest x 2 hours   - Continue aspirin 81 mg daily indefinitely  - EP consults (outpatient)     Estimated Blood loss: 20 cc    Specimens removed: Roxi Andrade MD  Interventional Cardiology PGY-4

## 2024-12-23 NOTE — Clinical Note
The catheter was inserted into the ostium   circumflex. An angiography was performed of the left coronary arteries. Multiple views were taken.

## 2024-12-23 NOTE — PLAN OF CARE
Bedrest completed. Patient has ambulated and voided. Tolerated fluids and solids without any problems. No complaints. Procedure site with dry gauze/tegaderm dressing intact. No bleeding/no hematoma noted. PIV removed. Telemetry monitor removed. AVS printed and reviewed. Family at bedside. Wheelchair provided for discharge.

## 2024-12-23 NOTE — Clinical Note
The catheter was repositioned into the ostium   right coronary artery. An angiography was performed Multiple views were taken.

## 2024-12-23 NOTE — H&P
[facility] - Short Stay Cardiac Unit  Interventional Cardiology  H&P    Patient Name: Patrick Short  MRN: [id number]  Admission Date: 12/23/2024  Code Status: Prior   Attending Provider: Sung Mendoza MD   Primary Care Physician: Misbah Cordero MD  Principal Problem:<principal problem not specified>    Patient information was obtained from patient, past medical records, and ER records.     Subjective:     Chief Complaint:  Syncope      HPI:  Mr. Short is a 79 year-old-man with hx of CAD s/p PCI to LAD and RCA on 2016, Parkinson's, HTN, Cerebral ischemic stroke, and cerebral amyloid angiopathy who was referred to [facility] for an elective coronary angiogram after having a syncopal episode while hunting. Episode is described as follows:    Patient states that he did wake up last morning not feeling well but went darkening with friends and while he was sitting on the blind the patient states that he became fatigued. Per the patients friend the patient yawned a couple of times and then the patient had the syncopal episode. The friend states that the patient went pale after syncopal episode and the friend also stated that the patient urinated during LOC. The patient states that after the syncopal episode the patient was cold, generally weak, and had trouble walking to get out of the blind and doug. The patient reports of having lower abdominal pain before the syncopal episode but states that it has resolved.     Past Medical History:   Diagnosis Date    CAD (coronary artery disease) 07/26/2016    3 stents    Cerebral amyloid angiopathy     Cerebral ischemic stroke due to global hypoperfusion with watershed infarct 10/10/2022    Heart block     MI (myocardial infarction)     Parkinsonism     Primary hypertension 11/21/2022    Reflux        Past Surgical History:   Procedure Laterality Date    CARDIAC CATHETERIZATION      EYE SURGERY Left 02/2017    HIP SURGERY Right 12/2021    INSERTION OF IMPLANTABLE LOOP RECORDER N/A  1/24/2023    Procedure: Insertion, Implantable Loop Recorder;  Surgeon: ALBERTO Roth MD;  Location: [facility];  Service: Cardiology;  Laterality: N/A;  CVA, ILBEBA, MDT, [facility], , 3 Prep       Review of patient's allergies indicates:  No Known Allergies    PTA Medications   Medication Sig    acyclovir 5% (ZOVIRAX) 5 % ointment Apply topically to lip sore, 6 times daily, for one week (Patient not taking: Reported on 8/1/2024)    aspirin 81 MG Chew Take 1 tablet (81 mg total) by mouth once daily.    atorvastatin (LIPITOR) 40 MG tablet TAKE 1 TABLET BY MOUTH EVERY DAY    benzonatate (TESSALON) 100 MG capsule Take 1 capsule (100 mg total) by mouth every 8 (eight) hours as needed for Cough.    carbidopa-levodopa  mg (SINEMET)  mg per tablet Take 1 tablet by mouth 3 (three) times daily for 28 days, THEN 1.5 tablets 3 (three) times daily.    ciclopirox (LOPROX) 0.77 % Crea Apply topically 2 (two) times daily. Apply to affected foot/feet 2x/day    clobetasol (TEMOVATE) 0.05 % cream APPLY TO AFFECTED AREA ON HAND TWICE A DAY AS NEEDED FOR RASH (Patient not taking: Reported on 8/1/2024)    desonide (DESOWEN) 0.05 % cream APPLY TO AFFECTED AREA ON FACE TWICE A DAY AS NEEDED FOR SCALE (Patient not taking: Reported on 8/1/2024)    finasteride (PROSCAR) 5 mg tablet Take 1 tablet (5 mg total) by mouth once daily.    levocetirizine (XYZAL) 5 MG tablet Take 5 mg by mouth every evening. (Patient not taking: Reported on 8/1/2024)    lisinopriL 10 MG tablet Take 1 tablet (10 mg total) by mouth once daily.    mupirocin (BACTROBAN) 2 % ointment Apply topically to nostril, three times daily for one week (Patient not taking: Reported on 8/1/2024)    pantoprazole (PROTONIX) 40 MG tablet Take 1 tablet (40 mg total) by mouth once daily.    semaglutide, weight loss, (WEGOVY) 0.25 mg/0.5 mL PnIj Inject 0.25 mg into the skin every 7 days. (Patient not taking: Reported on 8/1/2024)    tobramycin-dexAMETHasone 0.3-0.1% (TOBRADEX)  0.3-0.1 % DrpS 1 drop every 4 (four) hours while awake. (Patient not taking: Reported on 8/1/2024)    triamcinolone acetonide 0.1% (KENALOG) 0.1 % ointment APPLY TO AFFECTED AREA ON AXILLAS TWICE A DAY AS NEEDED FOR ITCH/RASH (Patient not taking: Reported on 8/1/2024)    urea (CARMOL) 40 % Crea Apply topically once daily. (Patient not taking: Reported on 8/1/2024)     Family History       Problem Relation (Age of Onset)    Coronary artery disease Father    Emphysema Father    Hypertension Mother          Tobacco Use    Smoking status: Never    Smokeless tobacco: Never   Substance and Sexual Activity    Alcohol use: Yes     Comment: twice a month/beer wine    Drug use: Never    Sexual activity: Yes     Partners: Female     Comment: Wife     Review of Systems   Constitutional: Negative.   HENT: Negative.     Eyes: Negative.    Cardiovascular: Negative.    Respiratory: Negative.     Endocrine: Negative.    Skin: Negative.    Musculoskeletal: Negative.    Gastrointestinal: Negative.    Genitourinary: Negative.    Neurological: Negative.    Psychiatric/Behavioral: Negative.       Objective:     Vital Signs (Most Recent):    Vital Signs (24h Range):  Pulse:  [75-81] 75  Resp:  [14-22] 14  BP: (127-136)/(84-86) 136/84        There is no height or weight on file to calculate BMI.            No intake or output data in the 24 hours ending 12/23/24 1136    Lines/Drains/Airways       None                    Physical Exam  HENT:      Head: Normocephalic.      Mouth/Throat:      Mouth: Mucous membranes are moist.   Eyes:      Pupils: Pupils are equal, round, and reactive to light.   Cardiovascular:      Rate and Rhythm: Normal rate and regular rhythm.      Pulses:           Radial pulses are 2+ on the right side and 2+ on the left side.        Dorsalis pedis pulses are 2+ on the right side and 2+ on the left side.        Posterior tibial pulses are 2+ on the right side and 2+ on the left side.      Heart sounds: Normal heart  "sounds, S1 normal and S2 normal.   Pulmonary:      Effort: Pulmonary effort is normal.   Abdominal:      General: Abdomen is flat.   Musculoskeletal:         General: Normal range of motion.      Right lower leg: No edema.      Left lower leg: No edema.   Skin:     General: Skin is warm.      Capillary Refill: Capillary refill takes less than 2 seconds.   Neurological:      General: No focal deficit present.      Mental Status: He is alert.   Psychiatric:         Mood and Affect: Mood normal.            Significant Labs:   Recent Labs   Lab 12/22/24 0917   WBC 15.43*   HGB 14.8   HCT 43.7          Recent Labs   Lab 12/22/24 0917      K 4.3      CO2 24   BUN 16.8   CREATININE 0.94   GLUCOSE 100   CALCIUM 9.8       Recent Labs   Lab 12/22/24 0917   ALKPHOS 69   BILITOT 1.3   ALT 8   AST 29       No results for input(s): "CHOL", "TRIG", "HDL", "LDL" in the last 168 hours.  Lab Results   Component Value Date    CHOL 102 (L) 01/26/2024    HDL 35 (L) 01/26/2024    LDLCALC 53.0 (L) 01/26/2024    TRIG 70 01/26/2024       Recent Labs   Lab 12/22/24  0917 12/22/24  1155   TROPONINI 0.045 0.016       Lab Results   Component Value Date    HGBA1C 5.2 03/23/2023       Lab Results   Component Value Date    BNP 42.7 12/22/2024    BNP 83 10/10/2022    BNP 61 07/27/2022     (H) 07/24/2016              Assessment and Plan:     Cardiac/Vascular  CAD (coronary artery disease)  Patient with history of CAD s/p GUS to LAD and RCA on 2016. Presenting for coronary angiogram after sustaining syncopal episode.     - Proceed with coronary angiogram +/- intervention  - Anti-platelet Therapy: ASA + Plavix   - Access: Right radial and right groin   - Catheter: Lawrence  - Creatinine/CrCl: 0.94 on 12/22/2024  - Allergies: No shellfish/Iodine allergy  - Pre-Hydration: 3 cc/kg/hr IV, continuous, for 1 hour, Pre-Procedure  - Pre-Op Med: Diphenhydramine (Benadryl) 50 mg, Oral, Once, Pre-Procedure   - The risks, benefits & " alternatives of the procedure were explained to the patient.  The risks of coronary angiography include but are not limited to: Bleeding, infection, heart rhythm abnormalities, allergic reactions, kidney injury requiring dilaysis, limb loss, stroke and death. Should stenting be indicated, the patient has agreed to dual anti-platelet therapy for 1-consecutive year with a drug-eluting stent and a minimum of 1-month with the use of a bare metal stent. Additionally, patient is aware that non-compliance is likely to result in stent clotting with heart attack, heart failure, and/or death. The risks of moderate sedation include hypotension, respiratory depression, arrhythmias, bronchospasm, & death. Informed consent was obtained & the patient is agreeable to proceed with the procedure. All patient's questions were answered.     The risks (including death, heart attack, limb loss, paralysis, emergency surgery, bleeding, renal failure, and stroke), the potential benefits of the procedure, and the alternatives to the procedure, were discussed in detail and accepted by the patient. All questions were answered. Patient agrees to proceed.          VTE Risk Mitigation (From admission, onward)      None              Amol Andrade MD  Interventional Cardiology   [facility] - Short Stay Card[facility] Unit

## 2024-12-23 NOTE — DISCHARGE SUMMARY
[facility] - Cath Lab  Discharge Note  Short Stay    Procedure(s) (LRB):  Angiogram, Coronary, with Left Heart Cath (N/A)  Left heart cath (Left)    Pt brought to the cath lab.  RRA access obtained.  Angiogram showed patent LAD stents and stable CAD of the RCA And anom Lcx.  No intervention performed.  Radial band placed.  Pt subsequently discharged home.  Outpt neurology referral placed    OUTCOME: Patient tolerated treatment/procedure well without complication and is now ready for discharge.    DISPOSITION: Home or Self Care    FINAL DIAGNOSIS:  <principal problem not specified>    FOLLOWUP: In clinic    DISCHARGE INSTRUCTIONS:    Discharge Procedure Orders   Ambulatory referral/consult to Neurology   Standing Status: Future   Referral Priority: Routine Referral Type: Consultation   Referral Reason: Specialty Services Required   Requested Specialty: Neurology   Number of Visits Requested: 1        TIME SPENT ON DISCHARGE: 31 minutes

## 2024-12-23 NOTE — Clinical Note
The catheter was inserted into the left ventricle. An angiography was performed of the left ventricle. Multiple views were taken. The angiography was performed via power injection. The injected amount was 20 mL contrast at 15 mL/s. The PSI from the power injection was 1000.

## 2024-12-23 NOTE — HPI
Mr. Short is a 79 year-old-man with hx of CAD s/p PCI to LAD on 2016, Parkinson's, HTN, Cerebral ischemic stroke, and cerebral amyloid angiopathy who was [place] [facility] [place] angi[place] after having a syncopal episode while hunting. Episode is described as follows:    Patient states that he did wake up last morning not feeling well but went darkening with friends and while he was sitting on the blind the patient states that he became fatigued. Per the patients friend the patient yawned a couple of times and then the patient had the syncopal episode. The friend states that the patient went pale after syncopal episode and the friend also stated that the patient urinated during LOC. The patient states that after the syncopal episode the patient was cold, generally weak, and had trouble walking to get out of the blind and doug. The patient reports of having lower abdominal pain before the syncopal episode but states that it has resolved.

## 2024-12-23 NOTE — PLAN OF CARE
Patient received back to [facility] post procedure. Report received from CURLY Dela Cruz. Patient is AAOx3. VSS. Bedrest instructions reviewed with patient. All questions answered. Right radial vasc band intact. .no bleeding noted. No hematoma. + radial pulses palpable. Spouse notified. Call light placed within reach.

## 2024-12-23 NOTE — SUBJECTIVE & OBJECTIVE
Past Medical History:   Diagnosis Date    CAD (coronary artery disease) 07/26/2016    3 stents    Cerebral amyloid angiopathy     Cerebral ischemic stroke due to global hypoperfusion with watershed infarct 10/10/2022    Heart block     MI (myocardial infarction)     Parkinsonism     Primary hypertension 11/21/2022    Reflux        Past Surgical History:   Procedure Laterality Date    CARDIAC CATHETERIZATION      EYE SURGERY Left 02/2017    HIP SURGERY Right 12/2021    INSERTION OF IMPLANTABLE LOOP RECORDER N/A 1/24/2023    Procedure: Insertion, Implantable Loop Recorder;  Surgeon: ALBERTO Roth MD;  Location: [facility];  Service: Cardiology;  Laterality: N/A;  CVA, ILR, MDT, [facility], , 3 Prep       Review of patient's allergies indicates:  No Known Allergies    PTA Medications   Medication Sig    acyclovir 5% (ZOVIRAX) 5 % ointment Apply topically to lip sore, 6 times daily, for one week (Patient not taking: Reported on 8/1/2024)    aspirin 81 MG Chew Take 1 tablet (81 mg total) by mouth once daily.    atorvastatin (LIPITOR) 40 MG tablet TAKE 1 TABLET BY MOUTH EVERY DAY    benzonatate (TESSALON) 100 MG capsule Take 1 capsule (100 mg total) by mouth every 8 (eight) hours as needed for Cough.    carbidopa-levodopa  mg (SINEMET)  mg per tablet Take 1 tablet by mouth 3 (three) times daily for 28 days, THEN 1.5 tablets 3 (three) times daily.    ciclopirox (LOPROX) 0.77 % Crea Apply topically 2 (two) times daily. Apply to affected foot/feet 2x/day    clobetasol (TEMOVATE) 0.05 % cream APPLY TO AFFECTED AREA ON HAND TWICE A DAY AS NEEDED FOR RASH (Patient not taking: Reported on 8/1/2024)    desonide (DESOWEN) 0.05 % cream APPLY TO AFFECTED AREA ON FACE TWICE A DAY AS NEEDED FOR SCALE (Patient not taking: Reported on 8/1/2024)    finasteride (PROSCAR) 5 mg tablet Take 1 tablet (5 mg total) by mouth once daily.    levocetirizine (XYZAL) 5 MG tablet Take 5 mg by mouth every evening. (Patient not taking:  Reported on 8/1/2024)    lisinopriL 10 MG tablet Take 1 tablet (10 mg total) by mouth once daily.    mupirocin (BACTROBAN) 2 % ointment Apply topically to nostril, three times daily for one week (Patient not taking: Reported on 8/1/2024)    pantoprazole (PROTONIX) 40 MG tablet Take 1 tablet (40 mg total) by mouth once daily.    semaglutide, weight loss, (WEGOVY) 0.25 mg/0.5 mL PnIj Inject 0.25 mg into the skin every 7 days. (Patient not taking: Reported on 8/1/2024)    tobramycin-dexAMETHasone 0.3-0.1% (TOBRADEX) 0.3-0.1 % DrpS 1 drop every 4 (four) hours while awake. (Patient not taking: Reported on 8/1/2024)    triamcinolone acetonide 0.1% (KENALOG) 0.1 % ointment APPLY TO AFFECTED AREA ON AXILLAS TWICE A DAY AS NEEDED FOR ITCH/RASH (Patient not taking: Reported on 8/1/2024)    urea (CARMOL) 40 % Crea Apply topically once daily. (Patient not taking: Reported on 8/1/2024)     Family History       Problem Relation (Age of Onset)    Coronary artery disease Father    Emphysema Father    Hypertension Mother          Tobacco Use    Smoking status: Never    Smokeless tobacco: Never   Substance and Sexual Activity    Alcohol use: Yes     Comment: twice a month/beer wine    Drug use: Never    Sexual activity: Yes     Partners: Female     Comment: Wife     Review of Systems   Constitutional: Negative.   HENT: Negative.     Eyes: Negative.    Cardiovascular: Negative.    Respiratory: Negative.     Endocrine: Negative.    Skin: Negative.    Musculoskeletal: Negative.    Gastrointestinal: Negative.    Genitourinary: Negative.    Neurological: Negative.    Psychiatric/Behavioral: Negative.       Objective:     Vital Signs (Most Recent):    Vital Signs (24h Range):  Pulse:  [75-81] 75  Resp:  [14-22] 14  BP: (127-136)/(84-86) 136/84        There is no height or weight on file to calculate BMI.            No intake or output data in the 24 hours ending 12/23/24 1136    Lines/Drains/Airways       None                    Physical  "Exam  HENT:      Head: Normocephalic.      Mouth/Throat:      Mouth: Mucous membranes are moist.   Eyes:      Pupils: Pupils are equal, round, and reactive to light.   Cardiovascular:      Rate and Rhythm: Normal rate and regular rhythm.      Pulses:           Radial pulses are 2+ on the right side and 2+ on the left side.        Dorsalis pedis pulses are 2+ on the right side and 2+ on the left side.        Posterior tibial pulses are 2+ on the right side and 2+ on the left side.      Heart sounds: Normal heart sounds, S1 normal and S2 normal.   Pulmonary:      Effort: Pulmonary effort is normal.   Abdominal:      General: Abdomen is flat.   Musculoskeletal:         General: Normal range of motion.      Right lower leg: No edema.      Left lower leg: No edema.   Skin:     General: Skin is warm.      Capillary Refill: Capillary refill takes less than 2 seconds.   Neurological:      General: No focal deficit present.      Mental Status: He is alert.   Psychiatric:         Mood and Affect: Mood normal.            Significant Labs:   Recent Labs   Lab 12/22/24  0917   WBC 15.43*   HGB 14.8   HCT 43.7          Recent Labs   Lab 12/22/24  0917      K 4.3      CO2 24   BUN 16.8   CREATININE 0.94   GLUCOSE 100   CALCIUM 9.8       Recent Labs   Lab 12/22/24  0917   ALKPHOS 69   BILITOT 1.3   ALT 8   AST 29       No results for input(s): "CHOL", "TRIG", "HDL", "LDL" in the last 168 hours.  Lab Results   Component Value Date    CHOL 102 (L) 01/26/2024    HDL 35 (L) 01/26/2024    LDLCALC 53.0 (L) 01/26/2024    TRIG 70 01/26/2024       Recent Labs   Lab 12/22/24  0917 12/22/24  1155   TROPONINI 0.045 0.016       Lab Results   Component Value Date    HGBA1C 5.2 03/23/2023       Lab Results   Component Value Date    BNP 42.7 12/22/2024    BNP 83 10/10/2022    BNP 61 07/27/2022     (H) 07/24/2016              "

## 2024-12-23 NOTE — PROGRESS NOTES
Bedside ILR interrogation performed, at request of Dr. Mendoza.  ILR shows no recorded arrhythmias or pauses.  Verbal report given to Quinn Irving and Dr. Mendoza.

## 2025-01-06 ENCOUNTER — CLINICAL SUPPORT (OUTPATIENT)
Dept: CARDIOLOGY | Facility: HOSPITAL | Age: 80
End: 2025-01-06
Attending: STUDENT IN AN ORGANIZED HEALTH CARE EDUCATION/TRAINING PROGRAM
Payer: MEDICARE

## 2025-01-06 DIAGNOSIS — I63.9 CEREBRAL INFARCTION, UNSPECIFIED: ICD-10-CM

## 2025-01-06 PROCEDURE — 93298 REM INTERROG DEV EVAL SCRMS: CPT | Mod: 26,GZ,, | Performed by: STUDENT IN AN ORGANIZED HEALTH CARE EDUCATION/TRAINING PROGRAM

## 2025-01-06 PROCEDURE — 93298 REM INTERROG DEV EVAL SCRMS: CPT | Performed by: STUDENT IN AN ORGANIZED HEALTH CARE EDUCATION/TRAINING PROGRAM

## 2025-01-09 NOTE — PROGRESS NOTES
Name: Patrick Short  MRN: [id number]   CSN: [id number]      Date: 01/10/2025    Referring physician:  Quinn Saez MD  [address] [place] [address]    Chief Complaint / Interval History:   - here with spouse   - had syncopal episode before [place] while hunting   - woke up on that Sunday and didn't feel his normal self   - hard to describe what he was feeling, felt fatigued   - friend told him that he started yawning frequently -- he does not remember this    - felt nauseous but did not vomit   - urinated on himself   - couldn't walk, friends had to help him   - went to [facility] in [place]    - had an angiogram   - has a loop recorder -- no arrhythmias    - not sure what his BP was in EMS, got an IV   - they werent able to determine what happened   - feels no different from 1 tab to 1.5 tabs TID   - wife can tell whenever he is due for a dose, he is slower, stiffer   - not as sharp   - several falls, tripping on things -- missed a step   - sometimes whenever he is rushing           ED visit  The patient is a 79 year old male with hx of CAD, MI, Heart block, Parkinson's, HTN, Cerebral ischemic stroke, and cerebral amyloid angiopathy presenting to the ED due to LOC. The patient complains of generalized weakness. The patient states that he did wake up this morning not feeling well but went darkening with friends and while he was sitting on the blind the patient states that he became fatigued. Per the patients friend the patient yawned a couple of times and then the patient had the syncopal episode. The friend states that the patient went pale after syncopal episode and the friend also stated that the patient urinated during LOC. The patient states that after the syncopal episode the patient was cold, generally weak, and had trouble walking to get out of the blind and doug. The patient reports of having lower abdominal pain before the syncopal episode but states that it has resolved.             Oct 2024  - has been following with Dr. Bee   - here with spouse   - carbidopa/levodopa 1 tab TID -- not sure if this has been helpful   - not sure if he takes mid day, skips evening dose sometimes   - if he misses morning dose, and wife will notice a difference -- slower   - moving and thinking   - rarely notices tremor, maybe while holding phone or paper   - wiggly feet at times   - does feel the need to move his legs at times   - follows with Dr. Hill   - one fall in the street while walking with friend   - denies hallucinations or paranoia   - last dose of cd/ld was at 6 am   - not supplementing with b12       June 2024 with Dr. Hill  Mr. Short is a 78 year old male with history of Tourette's syndrome, mild parkinsonism, and multiple strokes in late 2022. He underwent neuropsychological assessment in 8/2022, prior to the strokes, which revealed mild executive dysfunction possibly related to several etiologies. Both he and his wife report further cognitive decline since the strokes (R frontal, cortical ICH Nov '22, L parietal ischemic stroke). His wife was providing oversight in most complex ADLs in 2022 and now provides support/oversight in all complex ADLs.      Neuropsychological testing was largely consistent with the 2022 evaluation, again revealing a pattern of executive dysfunction. The only noteworthy change was reduced fine motor abilities. It is still possible (if not likely) that he has experienced further cognitive decline since the 2022 strokes, but those deficits may be difficult to detect on testing. Specifically, his wife finds that he has trouble organizing his thoughts in conversation and in writing, noting that it can take him an hour to write a simple letter. He continues to meet criteria for a diagnosis of mild cognitive impairment, likely related to several etiologies including cerebrovascular disease and baseline weaknesses in the setting of advancing age. Furthermore, he  has a history of parkinsonism with no clear etiology that may benefit from further work-up, particularly as Mr. Short expressed lack of clarity regarding this diagnosis and prognosis.       2021 with [facility]   - he had episodes of listing to the side while walking, thinks it is usually to the left  - also reports some tripping and his wife says he doesn't lift his feet up, and will actually fall down, no really injuries  - not sure if he trips more on the R or L foot  - planning on getting R hip replaced on Dec 15  - thinks he might shuffle  - some history of R leg sciatica, gone noew (MRI with Grade 1 at L5/S1 on R)    From March 2019  - busy month, was [place]  - wife not here, made mention of a couple of things  - some forgetfulness, mostly with names and some attention issues  - rarely the left arm may fall asleep, perhaps more at night  - more nighttime pain in the legs and back - more aching - ice helps at night - wakes up and he is feeling better  - has a deposit on the retina, causing a domed appearance (presumed cause of some optical distortion)  -       History of Present Illness (HPI):  72 yo retired  of [employer]  Here for evaluation of gait changes. Referred by Dr. Lloyd.  Usual state of health until July 2016.  Was starting to run and train, had midline chest discomfort, had EKG/stress test, cardiac arrest in ER, treated with stent in July, then another on 8/29/16 radial access and a GUS was placed in mid LAD stent.      He participated in the [place] this past fall. His Stress 2-D ECHO today is normal, has been reviewed by Dr. Mendoza. He exercise strenously 3-4 X a week.    In July, he fell in the shower while in [place], running with bulls!  Hit the back of his head, immediate stiff neck, and now has loss of mobility in the christina and reduced range of motion.3 months ago, he started to notice stiffness in his R leg predominantly.  Still exercising, but feels  like he is walking like a penguin.  Using arms to get up.  Trouble getting off the floor.  Has not fallen to the ground, jason much more staggering.  Not feeling weak in arms.  Some tingling in his hands B after sleeping, but otherwise no description of Lhermitte's.      Vision is OK generally, but he rarely sees straight lines looking like a kink in them. Had torn R retina in the past.    7 years ago, was hang gliding in [place] and had a rough landing, pulled his legs up and hit his butt.  Got better with time and gabapentin.    Nonmotor/Premotor ROS:  Hyposmia (HENT)?Yes - terrible for years, but wife notices more now  RBD/sleep issues (Constitutional)?No  Depression/anxiety (Psychiatric)?No  Fatigue (Constitutional)?Yes - a little more than in the past, daytime naps  Constipation (GI)?No  Urinary issues ()?No  Sexual dysfunction ()?No  Orthostasis (Cardiovascular)?No  Leg swelling (Cardiovascular)? No  Falls (Musculoskeletal)?Yes  Cognitive impairment (Neurologic)?No  Psychoses (Psychiatric)?No  Pain/Paresthesia (Neurologic)?Yes  Visual changes (Eyes)?Yes  Moles / skin changes (Skin)?No  Stridor / SOB (Pulm)?No  Bruising (Heme)?No    Past Medical History: The patient  has a past medical history of CAD (coronary artery disease) (07/26/2016), Cerebral amyloid angiopathy, Cerebral ischemic stroke due to global hypoperfusion with watershed infarct (10/10/2022), Heart block, MI (myocardial infarction), Parkinsonism, Primary hypertension (11/21/2022), and Reflux.    Social History: The patient  reports that he has never smoked. He has never used smokeless tobacco. He reports current alcohol use of about 1.0 standard drink of alcohol per week. He reports that he does not use drugs.    Family History: Their family history includes Coronary artery disease in his father; Emphysema in his father; Hypertension in his mother.    Allergies: Patient has no known allergies.     Meds:   Current Outpatient Medications on File  Prior to Visit   Medication Sig Dispense Refill    acyclovir 5% (ZOVIRAX) 5 % ointment Apply topically to lip sore, 6 times daily, for one week (Patient not taking: Reported on 8/1/2024) 15 g 0    aspirin 81 MG Chew Take 1 tablet (81 mg total) by mouth once daily.  0    atorvastatin (LIPITOR) 40 MG tablet TAKE 1 TABLET BY MOUTH EVERY DAY 90 tablet 3    benzonatate (TESSALON) 100 MG capsule Take 1 capsule (100 mg total) by mouth every 8 (eight) hours as needed for Cough. 30 capsule 0    carbidopa-levodopa  mg (SINEMET)  mg per tablet Take 1 tablet by mouth 3 (three) times daily for 28 days, THEN 1.5 tablets 3 (three) times daily. 405 tablet 3    ciclopirox (LOPROX) 0.77 % Crea Apply topically 2 (two) times daily. Apply to affected foot/feet 2x/day 30 g 3    clobetasol (TEMOVATE) 0.05 % cream APPLY TO AFFECTED AREA ON HAND TWICE A DAY AS NEEDED FOR RASH (Patient not taking: Reported on 8/1/2024)  1    desonide (DESOWEN) 0.05 % cream APPLY TO AFFECTED AREA ON FACE TWICE A DAY AS NEEDED FOR SCALE (Patient not taking: Reported on 8/1/2024)  1    finasteride (PROSCAR) 5 mg tablet Take 1 tablet (5 mg total) by mouth once daily. 90 tablet 3    levocetirizine (XYZAL) 5 MG tablet Take 5 mg by mouth every evening. (Patient not taking: Reported on 8/1/2024)      lisinopriL 10 MG tablet Take 1 tablet (10 mg total) by mouth once daily. 90 tablet 3    mupirocin (BACTROBAN) 2 % ointment Apply topically to nostril, three times daily for one week (Patient not taking: Reported on 8/1/2024) 30 g 0    pantoprazole (PROTONIX) 40 MG tablet Take 1 tablet (40 mg total) by mouth once daily. 90 tablet 3    semaglutide, weight loss, (WEGOVY) 0.25 mg/0.5 mL PnIj Inject 0.25 mg into the skin every 7 days. (Patient not taking: Reported on 8/1/2024) 2 mL 0    tobramycin-dexAMETHasone 0.3-0.1% (TOBRADEX) 0.3-0.1 % DrpS 1 drop every 4 (four) hours while awake. (Patient not taking: Reported on 8/1/2024)      triamcinolone acetonide 0.1%  "(KENALOG) 0.1 % ointment APPLY TO AFFECTED AREA ON AXILLAS TWICE A DAY AS NEEDED FOR ITCH/RASH (Patient not taking: Reported on 8/1/2024)  1    urea (CARMOL) 40 % Crea Apply topically once daily. (Patient not taking: Reported on 8/1/2024) 28 g 2     Current Facility-Administered Medications on File Prior to Visit   Medication Dose Route Frequency Provider Last Rate Last Admin    ceFAZolin 2 g in dextrose 5 % in water (D5W) 5 % 50 mL IVPB (MB+)  2 g Intravenous On Call Procedure Rhonda Mark, NP           Exam:  BP (!) 160/84 (Patient Position: Sitting)   Pulse (!) 55   Ht 5' 10" (1.778 m)   Wt 95.3 kg (210 lb)   BMI 30.13 kg/m²       Constitutional  Well-developed, well-nourished, appears stated age   Ophthalmoscopic  No papilledema with no hemorrhages or exudates bilaterally   Cardiovascular  Radial pulses 2+ and symmetric, no LE edema bilaterally   Neurological    * Mental status  MOCA deferred     - Orientation  Oriented to person, place, time, and situation     - Memory   Intact recent and remote     - Attention/concentration  Attentive, vigilant during exam     - Language  Naming & repetition intact, +2-step commands     - Fund of knowledge  Aware of current events     - Executive  Well-organized thoughts     - Other     * Cranial nerves       - CN II  PERRL, visual fields full to confrontation     - CN III, IV, VI  Extraocular movements full, normal pursuits and saccades     - CN V  Sensation V1 - V3 intact     - CN VII  Face strong and symmetric bilaterally     - CN VIII  Hearing intact bilaterally     - CN IX, X  Palate raises midline and symmetric     - CN XI  SCM and trapezius 5/5 bilaterally     - CN XII  Tongue midline   * Motor  Muscle bulk normal, strength 5/5 throughout except R hip flexion 4+/5   * Sensory   MIld dim sensation legs length dependent, no level   * Coordination  No dysmetria with finger-to-nose or heel-to-shin   * Gait  See below.   * Deep tendon reflexes  1+ and symmetric " throughout except 2+ left leg   Babinski downgoing bilaterally   * Specialized movement exam  No hypophonic speech.    No facial masking.  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   Mld B cogwheel rigidity.     Mild L bradykinesia of hand. Quite animated    No tremor with rest, posture, kinesis, or intention.    No other dystonia, chorea, athetosis, myoclonus, or tics.   No motor impersistence.   Normal-based gait.   No shortened stride length.   decreased arm swing bilaterally    mild anterior stoop    normal stride length      Medical Record Review:  - Lab Results:  Admission on 12/23/2024, Discharged on 12/23/2024   Component Date Value Ref Range Status    Cath EF Estimated 12/23/2024 60  % Final   Lab Visit on 12/23/2024   Component Date Value Ref Range Status    Sodium 12/23/2024 140  136 - 145 mmol/L Final    Potassium 12/23/2024 4.0  3.5 - 5.1 mmol/L Final    Chloride 12/23/2024 106  95 - 110 mmol/L Final    CO2 12/23/2024 27  23 - 29 mmol/L Final    Glucose 12/23/2024 89  70 - 110 mg/dL Final    BUN 12/23/2024 16  8 - 23 mg/dL Final    Creatinine 12/23/2024 0.9  0.5 - 1.4 mg/dL Final    Calcium 12/23/2024 9.4  8.7 - 10.5 mg/dL Final    Total Protein 12/23/2024 6.6  6.0 - 8.4 g/dL Final    Albumin 12/23/2024 3.7  3.5 - 5.2 g/dL Final    Total Bilirubin 12/23/2024 1.1 (H)  0.1 - 1.0 mg/dL Final    Alkaline Phosphatase 12/23/2024 64  40 - 150 U/L Final    AST 12/23/2024 22  10 - 40 U/L Final    ALT 12/23/2024 <5 (L)  10 - 44 U/L Final    eGFR 12/23/2024 >60.0  >60 mL/min/1.73 m^2 Final    Anion Gap 12/23/2024 7 (L)  8 - 16 mmol/L Final    WBC 12/23/2024 7.36  3.90 - 12.70 K/uL Final    RBC 12/23/2024 4.50 (L)  4.60 - 6.20 M/uL Final    Hemoglobin 12/23/2024 14.3  14.0 - 18.0 g/dL Final    Hematocrit 12/23/2024 42.2  40.0 - 54.0 % Final    MCV 12/23/2024 94  82 - 98 fL Final    MCH 12/23/2024 31.8 (H)  27.0 - 31.0 pg Final    MCHC 12/23/2024 33.9  32.0 - 36.0 g/dL Final    RDW 12/23/2024 12.8  11.5 - 14.5  % Final    Platelets 12/23/2024 154  150 - 450 K/uL Final    MPV 12/23/2024 12.0  9.2 - 12.9 fL Final    Immature Granulocytes 12/23/2024 0.3  0.0 - 0.5 % Final    Gran # (ANC) 12/23/2024 4.4  1.8 - 7.7 K/uL Final    Immature Grans (Abs) 12/23/2024 0.02  0.00 - 0.04 K/uL Final    Lymph # 12/23/2024 1.9  1.0 - 4.8 K/uL Final    Mono # 12/23/2024 0.5  0.3 - 1.0 K/uL Final    Eos # 12/23/2024 0.5  0.0 - 0.5 K/uL Final    Baso # 12/23/2024 0.02  0.00 - 0.20 K/uL Final    nRBC 12/23/2024 0  0 /100 WBC Final    Gran % 12/23/2024 60.0  38.0 - 73.0 % Final    Lymph % 12/23/2024 25.4  18.0 - 48.0 % Final    Mono % 12/23/2024 6.8  4.0 - 15.0 % Final    Eosinophil % 12/23/2024 7.2  0.0 - 8.0 % Final    Basophil % 12/23/2024 0.3  0.0 - 1.9 % Final    Differential Method 12/23/2024 Automated   Final    Prothrombin Time 12/23/2024 11.0  9.0 - 12.5 sec Final    INR 12/23/2024 1.0  0.8 - 1.2 Final    Group & Rh 12/23/2024 O POS   Final    Indirect New 12/23/2024 NEG   Final   Admission on 12/22/2024, Discharged on 12/22/2024   Component Date Value Ref Range Status    QRS Duration 12/22/2024 94  ms Final    [facility] QTC Calculation 12/22/2024 395  ms Final    Sodium 12/22/2024 141  136 - 145 mmol/L Final    Potassium 12/22/2024 4.3  3.5 - 5.1 mmol/L Final    Chloride 12/22/2024 107  98 - 107 mmol/L Final    CO2 12/22/2024 24  23 - 31 mmol/L Final    Glucose 12/22/2024 100  82 - 115 mg/dL Final    Blood Urea Nitrogen 12/22/2024 16.8  8.4 - 25.7 mg/dL Final    Creatinine 12/22/2024 0.94  0.72 - 1.25 mg/dL Final    Calcium 12/22/2024 9.8  8.8 - 10.0 mg/dL Final    Protein Total 12/22/2024 7.1  5.8 - 7.6 gm/dL Final    Albumin 12/22/2024 4.0  3.4 - 4.8 g/dL Final    Globulin 12/22/2024 3.1  2.4 - 3.5 gm/dL Final    Albumin/Globulin Ratio 12/22/2024 1.3  1.1 - 2.0 ratio Final    Bilirubin Total 12/22/2024 1.3  <=1.5 mg/dL Final    ALP 12/22/2024 69  40 - 150 unit/L Final    ALT 12/22/2024 8  0 - 55 unit/L Final    AST 12/22/2024 29  5 - 34  unit/L Final    eGFR 12/22/2024 >60  mL/min/1.73/m2 Final    Anion Gap 12/22/2024 10.0  mEq/L Final    BUN/Creatinine Ratio 12/22/2024 18   Final    Troponin-I 12/22/2024 0.045  0.000 - 0.045 ng/mL Final    Natriuretic Peptide 12/22/2024 42.7  <=100.0 pg/mL Final    WBC 12/22/2024 15.43 (H)  4.50 - 11.50 x10(3)/mcL Final    RBC 12/22/2024 4.72  4.70 - 6.10 x10(6)/mcL Final    Hgb 12/22/2024 14.8  14.0 - 18.0 g/dL Final    Hct 12/22/2024 43.7  42.0 - 52.0 % Final    MCV 12/22/2024 92.6  80.0 - 94.0 fL Final    MCH 12/22/2024 31.4 (H)  27.0 - 31.0 pg Final    MCHC 12/22/2024 33.9  33.0 - 36.0 g/dL Final    RDW 12/22/2024 12.6  11.5 - 17.0 % Final    Platelet 12/22/2024 147  130 - 400 x10(3)/mcL Final    MPV 12/22/2024 11.4 (H)  7.4 - 10.4 fL Final    Neut % 12/22/2024 87.8  % Final    Lymph % 12/22/2024 5.1  % Final    Mono % 12/22/2024 6.2  % Final    Eos % 12/22/2024 0.3  % Final    Basophil % 12/22/2024 0.3  % Final    Lymph # 12/22/2024 0.78  0.6 - 4.6 x10(3)/mcL Final    Neut # 12/22/2024 13.57 (H)  2.1 - 9.2 x10(3)/mcL Final    Mono # 12/22/2024 0.95  0.1 - 1.3 x10(3)/mcL Final    Eos # 12/22/2024 0.05  0 - 0.9 x10(3)/mcL Final    Baso # 12/22/2024 0.04  <=0.2 x10(3)/mcL Final    Imm Gran # 12/22/2024 0.04  0 - 0.04 x10(3)/mcL Final    Imm Grans % 12/22/2024 0.3  % Final    NRBC% 12/22/2024 0.0  % Final    Influenza A PCR 12/22/2024 Not Detected  Not Detected Final    Influenza B PCR 12/22/2024 Not Detected  Not Detected Final    Respiratory Syncytial Virus PCR 12/22/2024 Not Detected  Not Detected Final    SARS-CoV-2 PCR 12/22/2024 Not Detected  Not Detected, Negative Final    POCT Glucose 12/22/2024 99  70 - 110 mg/dL Final    Troponin-I 12/22/2024 0.016  0.000 - 0.045 ng/mL Final    Color, UA 12/22/2024 Light-Yellow  Yellow, Light-Yellow, Colorless, Straw, Dark-Yellow Final    Appearance, UA 12/22/2024 Clear  Clear Final    Specific Gravity, UA 12/22/2024 1.022  1.005 - 1.030 Final    pH, UA 12/22/2024 6.5   5.0 - 8.5 Final    Protein, UA 12/22/2024 Negative  Negative Final    Glucose, UA 12/22/2024 Normal  Negative, Normal Final    Ketones, UA 12/22/2024 Negative  Negative Final    Blood, UA 12/22/2024 Negative  Negative Final    Bilirubin, UA 12/22/2024 Negative  Negative Final    Urobilinogen, UA 12/22/2024 Normal  0.2, 1.0, Normal Final    Nitrites, UA 12/22/2024 Negative  Negative Final    Leukocyte Esterase, UA 12/22/2024 Negative  Negative Final    RBC, UA 12/22/2024 0-5  None Seen, 0-2, 3-5, 0-5 /HPF Final    WBC, UA 12/22/2024 0-5  None Seen, 0-2, 3-5, 0-5 /HPF Final    Bacteria, UA 12/22/2024 None Seen  None Seen, Trace /HPF Final    Squamous Epithelial Cells, UA 12/22/2024 None Seen  None Seen, Trace, Rare /HPF Final    Mucous, UA 12/22/2024 Trace (A)  None Seen /LPF Final   Clinical Support on 12/06/2024   Component Date Value Ref Range Status    Device Type 12/04/2024 Loop   Final    AF [place] % 12/04/2024 < 1   Final   Clinical Support on 11/05/2024   Component Date Value Ref Range Status    Device Type 11/03/2024 Loop   Final    AF [place] % 11/03/2024 < 1   Final       - Independent visualization and interpretation of radiological images:  * Modest hypoattenuation in supratentorial white matter, likely chronic microvascular ischemic change.  This is not unusually advanced for age.  No parenchymal mass, hemorrhage, edema or major vascular distribution infarct.            - Independent review of consultant's notes: [place]      Diagnoses:    PDism --  MCI    Medical Decision Making:  - continue cd/ld 1.5 tabs TID, wife reports that he clearly improves and can tell whenever he is due for a dose   - monitoring cognitive changes which are a bit more prominent than movement -- very mild bradykinesia and minimal-mild cogwheel on exam (may be because he took his levodopa this morning)   - briefly discussed syn one biopsy   - consider trial of donepezil for MCI + gait benefit   - past MRI suggestive of cerebral  amyloid angiopathy   - repeat brain mri, amyloid pet and labs   - [facility]         This is a patient with a chronic and complex neurologic diagnosis whose overall, ongoing care is being managed and monitored by me and our Neurology clinic.   As such, since 2024,  is the appropriate add-on code to accompany the other E/M billing for this visit.        Collaborating Physician, Dr. Freedman, was available during today's encounter. Any change to plan along with cosign to appear in the EMR.       I spent 42 minutes with the patient, reviewing past encounters, labs and imaging.        Cookie Kuo PA-C   [facility]  Department of Neurology  Movement Disorders

## 2025-01-10 ENCOUNTER — TELEPHONE (OUTPATIENT)
Dept: INTERNAL MEDICINE | Facility: CLINIC | Age: 80
End: 2025-01-10
Payer: MEDICARE

## 2025-01-10 ENCOUNTER — TELEPHONE (OUTPATIENT)
Dept: NEUROLOGY | Facility: CLINIC | Age: 80
End: 2025-01-10
Payer: MEDICARE

## 2025-01-10 ENCOUNTER — OFFICE VISIT (OUTPATIENT)
Facility: CLINIC | Age: 80
End: 2025-01-10
Payer: MEDICARE

## 2025-01-10 ENCOUNTER — CLINICAL SUPPORT (OUTPATIENT)
Dept: INTERNAL MEDICINE | Facility: CLINIC | Age: 80
End: 2025-01-10
Payer: MEDICARE

## 2025-01-10 VITALS
DIASTOLIC BLOOD PRESSURE: 84 MMHG | SYSTOLIC BLOOD PRESSURE: 160 MMHG | HEIGHT: 70 IN | HEART RATE: 55 BPM | BODY MASS INDEX: 30.06 KG/M2 | WEIGHT: 210 LBS

## 2025-01-10 DIAGNOSIS — R53.82 CHRONIC FATIGUE, UNSPECIFIED: ICD-10-CM

## 2025-01-10 DIAGNOSIS — Z12.5 ENCOUNTER FOR SCREENING FOR MALIGNANT NEOPLASM OF PROSTATE: ICD-10-CM

## 2025-01-10 DIAGNOSIS — R73.09 OTHER ABNORMAL GLUCOSE: ICD-10-CM

## 2025-01-10 DIAGNOSIS — I25.10 CORONARY ARTERY DISEASE INVOLVING NATIVE CORONARY ARTERY OF NATIVE HEART WITHOUT ANGINA PECTORIS: Primary | ICD-10-CM

## 2025-01-10 DIAGNOSIS — G20.C PARKINSONISM, UNSPECIFIED PARKINSONISM TYPE: ICD-10-CM

## 2025-01-10 DIAGNOSIS — Z71.89 COUNSELING REGARDING GOALS OF CARE: ICD-10-CM

## 2025-01-10 DIAGNOSIS — I68.0 CEREBRAL AMYLOID ANGIOPATHY: Primary | ICD-10-CM

## 2025-01-10 DIAGNOSIS — R41.3 OTHER AMNESIA: ICD-10-CM

## 2025-01-10 DIAGNOSIS — Z00.00 ANNUAL PHYSICAL EXAM: ICD-10-CM

## 2025-01-10 DIAGNOSIS — G31.84 MILD NEUROCOGNITIVE DISORDER: ICD-10-CM

## 2025-01-10 DIAGNOSIS — R41.3 MEMORY CHANGE: ICD-10-CM

## 2025-01-10 DIAGNOSIS — E85.4 CEREBRAL AMYLOID ANGIOPATHY: Primary | ICD-10-CM

## 2025-01-10 DIAGNOSIS — R53.82 CHRONIC FATIGUE: Primary | ICD-10-CM

## 2025-01-10 LAB
OHS CV AF BURDEN PERCENT: < 1
OHS CV DC REMOTE DEVICE TYPE: NORMAL

## 2025-01-10 PROCEDURE — 99214 OFFICE O/P EST MOD 30 MIN: CPT | Mod: PBBFAC | Performed by: PHYSICIAN ASSISTANT

## 2025-01-10 PROCEDURE — 99999 PR PBB SHADOW E&M-EST. PATIENT-LVL IV: CPT | Mod: PBBFAC,,, | Performed by: PHYSICIAN ASSISTANT

## 2025-01-10 NOTE — Clinical Note
Hey! Could you review his past MRI and see if he may be a good candidate for the [facility] clinic? I am repeating it and getting other labs/amyloid PET in the meantime.   Thanks!  Cookie

## 2025-01-10 NOTE — TELEPHONE ENCOUNTER
----- Message from CURLY Bermudez sent at 1/10/2025 11:17 AM CST -----  Regarding: annual labs  Patient is scheduled for Annual 1/23 but will get labs done on 2nd floor that day because [facility] ordered speciality labs. Can you put in his Annual lab orders so I can attach to that appointment & he only gets stuck once.  Meme

## 2025-01-13 DIAGNOSIS — I63.9 CRYPTOGENIC STROKE: Primary | ICD-10-CM

## 2025-01-16 ENCOUNTER — PATIENT MESSAGE (OUTPATIENT)
Dept: INTERNAL MEDICINE | Facility: CLINIC | Age: 80
End: 2025-01-16
Payer: MEDICARE

## 2025-01-25 ENCOUNTER — LAB VISIT (OUTPATIENT)
Dept: LAB | Facility: HOSPITAL | Age: 80
End: 2025-01-25
Attending: INTERNAL MEDICINE
Payer: MEDICARE

## 2025-01-25 DIAGNOSIS — Z12.5 ENCOUNTER FOR SCREENING FOR MALIGNANT NEOPLASM OF PROSTATE: ICD-10-CM

## 2025-01-25 DIAGNOSIS — E85.4 CEREBRAL AMYLOID ANGIOPATHY: ICD-10-CM

## 2025-01-25 DIAGNOSIS — Z00.00 ANNUAL PHYSICAL EXAM: ICD-10-CM

## 2025-01-25 DIAGNOSIS — R73.09 OTHER ABNORMAL GLUCOSE: ICD-10-CM

## 2025-01-25 DIAGNOSIS — I25.10 CORONARY ARTERY DISEASE INVOLVING NATIVE CORONARY ARTERY OF NATIVE HEART WITHOUT ANGINA PECTORIS: ICD-10-CM

## 2025-01-25 DIAGNOSIS — I68.0 CEREBRAL AMYLOID ANGIOPATHY: ICD-10-CM

## 2025-01-25 DIAGNOSIS — R53.82 CHRONIC FATIGUE, UNSPECIFIED: ICD-10-CM

## 2025-01-25 DIAGNOSIS — R41.3 MEMORY CHANGE: ICD-10-CM

## 2025-01-25 LAB
CHOLEST SERPL-MCNC: 121 MG/DL (ref 120–199)
CHOLEST/HDLC SERPL: 3 {RATIO} (ref 2–5)
COMPLEXED PSA SERPL-MCNC: 0.52 NG/ML (ref 0–4)
ESTIMATED AVG GLUCOSE: 97 MG/DL (ref 68–131)
HBA1C MFR BLD: 5 % (ref 4–5.6)
HDLC SERPL-MCNC: 41 MG/DL (ref 40–75)
HDLC SERPL: 33.9 % (ref 20–50)
LDLC SERPL CALC-MCNC: 55.8 MG/DL (ref 63–159)
NONHDLC SERPL-MCNC: 80 MG/DL
TRIGL SERPL-MCNC: 121 MG/DL (ref 30–150)
TSH SERPL DL<=0.005 MIU/L-ACNC: 1.5 UIU/ML (ref 0.4–4)

## 2025-01-25 PROCEDURE — 84443 ASSAY THYROID STIM HORMONE: CPT | Performed by: INTERNAL MEDICINE

## 2025-01-25 PROCEDURE — 84153 ASSAY OF PSA TOTAL: CPT | Performed by: INTERNAL MEDICINE

## 2025-01-25 PROCEDURE — 83520 IMMUNOASSAY QUANT NOS NONAB: CPT | Performed by: PHYSICIAN ASSISTANT

## 2025-01-25 PROCEDURE — 80061 LIPID PANEL: CPT | Performed by: INTERNAL MEDICINE

## 2025-01-25 PROCEDURE — 83036 HEMOGLOBIN GLYCOSYLATED A1C: CPT | Performed by: INTERNAL MEDICINE

## 2025-01-25 PROCEDURE — 82233 BETA-AMYLOID 1-40 (ABETA 40): CPT | Performed by: PHYSICIAN ASSISTANT

## 2025-01-25 PROCEDURE — 84425 ASSAY OF VITAMIN B-1: CPT | Performed by: PHYSICIAN ASSISTANT

## 2025-01-28 ENCOUNTER — OFFICE VISIT (OUTPATIENT)
Dept: INTERNAL MEDICINE | Facility: CLINIC | Age: 80
End: 2025-01-28
Payer: MEDICARE

## 2025-01-28 VITALS — BODY MASS INDEX: 31.59 KG/M2 | WEIGHT: 220.69 LBS | HEIGHT: 70 IN

## 2025-01-28 DIAGNOSIS — K21.00 GASTROESOPHAGEAL REFLUX DISEASE WITH ESOPHAGITIS, UNSPECIFIED WHETHER HEMORRHAGE: ICD-10-CM

## 2025-01-28 DIAGNOSIS — Z00.00 ENCOUNTER FOR ANNUAL HEALTH EXAMINATION: Primary | ICD-10-CM

## 2025-01-28 DIAGNOSIS — N40.0 BENIGN PROSTATIC HYPERPLASIA, UNSPECIFIED WHETHER LOWER URINARY TRACT SYMPTOMS PRESENT: ICD-10-CM

## 2025-01-28 DIAGNOSIS — I25.10 CORONARY ARTERY DISEASE INVOLVING NATIVE CORONARY ARTERY OF NATIVE HEART WITHOUT ANGINA PECTORIS: ICD-10-CM

## 2025-01-28 DIAGNOSIS — G20.C PARKINSONISM, UNSPECIFIED PARKINSONISM TYPE: ICD-10-CM

## 2025-01-28 DIAGNOSIS — I10 HYPERTENSION, UNSPECIFIED TYPE: ICD-10-CM

## 2025-01-28 DIAGNOSIS — E78.5 DYSLIPIDEMIA: ICD-10-CM

## 2025-01-28 LAB
NEUROFILAMENT LIGHT CHAIN, PLASMA: 18 PG/ML
VIT B12 SERPL-MCNC: 441 NG/L (ref 180–914)

## 2025-01-28 PROCEDURE — 99213 OFFICE O/P EST LOW 20 MIN: CPT | Mod: PBBFAC | Performed by: INTERNAL MEDICINE

## 2025-01-28 PROCEDURE — 99214 OFFICE O/P EST MOD 30 MIN: CPT | Mod: S$PBB,,, | Performed by: INTERNAL MEDICINE

## 2025-01-28 PROCEDURE — 99999 PR PBB SHADOW E&M-EST. PATIENT-LVL III: CPT | Mod: PBBFAC,,, | Performed by: INTERNAL MEDICINE

## 2025-01-29 ENCOUNTER — HOSPITAL ENCOUNTER (OUTPATIENT)
Dept: RADIOLOGY | Facility: HOSPITAL | Age: 80
Discharge: HOME | End: 2025-01-29
Attending: PHYSICIAN ASSISTANT
Payer: MEDICARE

## 2025-01-29 DIAGNOSIS — E85.4 CEREBRAL AMYLOID ANGIOPATHY: ICD-10-CM

## 2025-01-29 DIAGNOSIS — I68.0 CEREBRAL AMYLOID ANGIOPATHY: ICD-10-CM

## 2025-01-29 DIAGNOSIS — R41.3 OTHER AMNESIA: ICD-10-CM

## 2025-01-29 LAB — LC PHOSPHO-TAU (181P): 1.08 PG/ML (ref 0–0.97)

## 2025-01-29 PROCEDURE — 70551 MRI BRAIN STEM W/O DYE: CPT | Mod: TC

## 2025-01-29 PROCEDURE — 70551 MRI BRAIN STEM W/O DYE: CPT | Mod: 26,,, | Performed by: RADIOLOGY

## 2025-01-29 NOTE — PROGRESS NOTES
Subjective:       Patient ID: Patrick Short is a 79 y.o. male.    Chief Complaint: Annual Exam      HPI  Annual health exam. Reviewed medical, surgical, social and family history, medications, appropriate preventive health screenings, as well as vaccination history. Updates as noted below or in assessment and plan    Advised to come in recently for comprehensive exam after a recent syncopal episode while on hunting trip. Went to ED in [place]. Ended up getting LHC in [facility] as he has known CAD hx. Cath was stable without concerning blockage and he does have cardiac monitor which was unremarkable per pt. Following up with Cards soon. Overall the hx given was fairly typical of vasovagal. He was on hunting trip with friends, hydration probably not typical and it was also noted that he was standing in [employer] blind and also yawning multiple times when the episode took place. No issues since.  BPH stable on finasteride.  Notes normal bp at home on lisinopril 10.  GERD controlled on PPI.  Neuro following Parkinsonism. Is on Sinemet 1 1/2 tabs tid now.   CAD and stroke hx. On asa and lipitor 40.    Review of Systems   All other systems reviewed and are negative.      Past Medical History:   Diagnosis Date    CAD (coronary artery disease) 07/26/2016    3 stents    Cerebral amyloid angiopathy     Cerebral ischemic stroke due to global hypoperfusion with watershed infarct 10/10/2022    Heart block     MI (myocardial infarction)     Parkinsonism     Primary hypertension 11/21/2022    Reflux          Current Outpatient Medications:     acyclovir 5% (ZOVIRAX) 5 % ointment, Apply topically to lip sore, 6 times daily, for one week (Patient not taking: Reported on 8/1/2024), Disp: 15 g, Rfl: 0    aspirin 81 MG Chew, Take 1 tablet (81 mg total) by mouth once daily., Disp: , Rfl: 0    atorvastatin (LIPITOR) 40 MG tablet, TAKE 1 TABLET BY MOUTH EVERY DAY, Disp: 90 tablet, Rfl: 3    benzonatate (TESSALON) 100 MG capsule, Take 1  capsule (100 mg total) by mouth every 8 (eight) hours as needed for Cough., Disp: 30 capsule, Rfl: 0    carbidopa-levodopa  mg (SINEMET)  mg per tablet, Take 1 tablet by mouth 3 (three) times daily for 28 days, THEN 1.5 tablets 3 (three) times daily., Disp: 405 tablet, Rfl: 3    ciclopirox (LOPROX) 0.77 % Crea, Apply topically 2 (two) times daily. Apply to affected foot/feet 2x/day, Disp: 30 g, Rfl: 3    finasteride (PROSCAR) 5 mg tablet, Take 1 tablet (5 mg total) by mouth once daily., Disp: 90 tablet, Rfl: 3    lisinopriL 10 MG tablet, Take 1 tablet (10 mg total) by mouth once daily., Disp: 90 tablet, Rfl: 3    mupirocin (BACTROBAN) 2 % ointment, Apply topically to nostril, three times daily for one week (Patient not taking: Reported on 8/1/2024), Disp: 30 g, Rfl: 0    pantoprazole (PROTONIX) 40 MG tablet, Take 1 tablet (40 mg total) by mouth once daily., Disp: 90 tablet, Rfl: 3    urea (CARMOL) 40 % Crea, Apply topically once daily. (Patient not taking: Reported on 8/1/2024), Disp: 28 g, Rfl: 2  No current facility-administered medications for this visit.    Facility-Administered Medications Ordered in Other Visits:     ceFAZolin 2 g in dextrose 5 % in water (D5W) 5 % 50 mL IVPB (MB+), 2 g, Intravenous, On Call Procedure, Rhonda Mark NP    Past Surgical History:   Procedure Laterality Date    ANGIOGRAM, CORONARY, WITH LEFT HEART CATHETERIZATION N/A 12/23/2024    Procedure: Angiogram, Coronary, with Left Heart Cath;  Surgeon: Sung Mendoza MD;  Location: [facility] CATH LAB;  Service: Cardiology;  Laterality: N/A;    CARDIAC CATHETERIZATION      EYE SURGERY Left 02/2017    HIP SURGERY Right 12/2021    INSERTION OF IMPLANTABLE LOOP RECORDER N/A 1/24/2023    Procedure: Insertion, Implantable Loop Recorder;  Surgeon: ALBERTO Roth MD;  Location: [facility] EP LAB;  Service: Cardiology;  Laterality: N/A;  CHERIE LOPEZ, MDT, [facility], , 3 Prep    LEFT HEART CATHETERIZATION Left 12/23/2024    Procedure:  Left heart cath;  Surgeon: Sung Mendoza MD;  Location: [facility] CATH LAB;  Service: Cardiology;  Laterality: Left;       Family History   Problem Relation Name Age of Onset    Hypertension Mother      Emphysema Father      Coronary artery disease Father         Social History     Tobacco Use    Smoking status: Never    Smokeless tobacco: Never   Substance Use Topics    Alcohol use: Yes     Alcohol/week: 1.0 standard drink of alcohol     Types: 1 Shots of liquor per week     Comment: twice a month/beer wine    Drug use: Never       Immunization History   Administered Date(s) Administered    COVID-19, MRNA, LN-S, PF (Pfizer) (Purple Cap) 01/05/2021, 01/26/2021, 11/23/2021    Tdap 06/12/2016         Objective:      There were no vitals filed for this visit.    Physical Exam  Constitutional:       General: He is not in acute distress.     Appearance: Normal appearance. He is well-developed. He is not ill-appearing.   HENT:      Head: Normocephalic and atraumatic.      Right Ear: Hearing and tympanic membrane normal. There is no impacted cerumen.      Left Ear: Hearing and tympanic membrane normal. There is no impacted cerumen.      Nose: Nose normal.      Mouth/Throat:      Mouth: Mucous membranes are moist.      Pharynx: Oropharynx is clear.   Eyes:      Extraocular Movements: Extraocular movements intact.      Conjunctiva/sclera: Conjunctivae normal.      Pupils: Pupils are equal, round, and reactive to light.   Neck:      Vascular: No carotid bruit.   Cardiovascular:      Rate and Rhythm: Normal rate and regular rhythm.      Heart sounds: Normal heart sounds. No murmur heard.  Pulmonary:      Effort: Pulmonary effort is normal. No respiratory distress.      Breath sounds: Normal breath sounds. No wheezing, rhonchi or rales.   Abdominal:      General: Abdomen is flat. There is no distension.      Palpations: Abdomen is soft. There is no mass.      Tenderness: There is no abdominal tenderness.      Hernia: No hernia  is present.   Musculoskeletal:         General: No swelling or deformity. Normal range of motion.      Cervical back: No tenderness.      Right lower leg: No edema.      Left lower leg: No edema.   Lymphadenopathy:      Cervical: No cervical adenopathy.   Skin:     General: Skin is warm and dry.      Findings: No lesion or rash.   Neurological:      General: No focal deficit present.      Mental Status: He is alert and oriented to person, place, and time.      Cranial Nerves: No cranial nerve deficit.      Coordination: Coordination normal.      Gait: Gait normal.      Deep Tendon Reflexes: Reflexes normal.   Psychiatric:         Mood and Affect: Mood normal.         Behavior: Behavior normal.         Thought Content: Thought content normal.         Judgment: Judgment normal.         Recent Results (from the past 12 weeks)   Cardiac device check - Remote    Collection Time: 12/04/24  1:33 AM   Result Value Ref Range    Device Type Loop     AF [place] % < 1    Comprehensive metabolic panel    Collection Time: 12/22/24  9:17 AM   Result Value Ref Range    Sodium 141 136 - 145 mmol/L    Potassium 4.3 3.5 - 5.1 mmol/L    Chloride 107 98 - 107 mmol/L    CO2 24 23 - 31 mmol/L    Glucose 100 82 - 115 mg/dL    Blood Urea Nitrogen 16.8 8.4 - 25.7 mg/dL    Creatinine 0.94 0.72 - 1.25 mg/dL    Calcium 9.8 8.8 - 10.0 mg/dL    Protein Total 7.1 5.8 - 7.6 gm/dL    Albumin 4.0 3.4 - 4.8 g/dL    Globulin 3.1 2.4 - 3.5 gm/dL    Albumin/Globulin Ratio 1.3 1.1 - 2.0 ratio    Bilirubin Total 1.3 <=1.5 mg/dL    ALP 69 40 - 150 unit/L    ALT 8 0 - 55 unit/L    AST 29 5 - 34 unit/L    eGFR >60 mL/min/1.73/m2    Anion Gap 10.0 mEq/L    BUN/Creatinine Ratio 18    Troponin I #1    Collection Time: 12/22/24  9:17 AM   Result Value Ref Range    Troponin-I 0.045 0.000 - 0.045 ng/mL   B-Type natriuretic peptide (BNP)    Collection Time: 12/22/24  9:17 AM   Result Value Ref Range    Natriuretic Peptide 42.7 <=100.0 pg/mL   CBC with Differential     Collection Time: 12/22/24  9:17 AM   Result Value Ref Range    WBC 15.43 (H) 4.50 - 11.50 x10(3)/mcL    RBC 4.72 4.70 - 6.10 x10(6)/mcL    Hgb 14.8 14.0 - 18.0 g/dL    Hct 43.7 42.0 - 52.0 %    MCV 92.6 80.0 - 94.0 fL    MCH 31.4 (H) 27.0 - 31.0 pg    MCHC 33.9 33.0 - 36.0 g/dL    RDW 12.6 11.5 - 17.0 %    Platelet 147 130 - 400 x10(3)/mcL    MPV 11.4 (H) 7.4 - 10.4 fL    Neut % 87.8 %    Lymph % 5.1 %    Mono % 6.2 %    Eos % 0.3 %    Basophil % 0.3 %    Lymph # 0.78 0.6 - 4.6 x10(3)/mcL    Neut # 13.57 (H) 2.1 - 9.2 x10(3)/mcL    Mono # 0.95 0.1 - 1.3 x10(3)/mcL    Eos # 0.05 0 - 0.9 x10(3)/mcL    Baso # 0.04 <=0.2 x10(3)/mcL    Imm Gran # 0.04 0 - 0.04 x10(3)/mcL    Imm Grans % 0.3 %    NRBC% 0.0 %   EKG 12-lead    Collection Time: 12/22/24  9:18 AM   Result Value Ref Range    QRS Duration 94 ms    [facility] QTC Calculation 395 ms   POCT glucose    Collection Time: 12/22/24  9:36 AM   Result Value Ref Range    POCT Glucose 99 70 - 110 mg/dL   COVID/RSV/FLU A&B PCR    Collection Time: 12/22/24  9:39 AM   Result Value Ref Range    Influenza A PCR Not Detected Not Detected    Influenza B PCR Not Detected Not Detected    Respiratory Syncytial Virus PCR Not Detected Not Detected    SARS-CoV-2 PCR Not Detected Not Detected, Negative   Urinalysis, Reflex to Urine Culture    Collection Time: 12/22/24 11:53 AM    Specimen: Urine   Result Value Ref Range    Color, UA Light-Yellow Yellow, Light-Yellow, Colorless, Straw, Dark-Yellow    Appearance, UA Clear Clear    Specific Gravity, UA 1.022 1.005 - 1.030    pH, UA 6.5 5.0 - 8.5    Protein, UA Negative Negative    Glucose, UA Normal Negative, Normal    Ketones, UA Negative Negative    Blood, UA Negative Negative    Bilirubin, UA Negative Negative    Urobilinogen, UA Normal 0.2, 1.0, Normal    Nitrites, UA Negative Negative    Leukocyte Esterase, UA Negative Negative    RBC, UA 0-5 None Seen, 0-2, 3-5, 0-5 /HPF    WBC, UA 0-5 None Seen, 0-2, 3-5, 0-5 /HPF    Bacteria, UA None Seen  None Seen, Trace /HPF    Squamous Epithelial Cells, UA None Seen None Seen, Trace, Rare /HPF    Mucous, UA Trace (A) None Seen /LPF   Troponin I #2    Collection Time: 12/22/24 11:55 AM   Result Value Ref Range    Troponin-I 0.016 0.000 - 0.045 ng/mL   Comprehensive Metabolic Panel    Collection Time: 12/23/24 10:55 AM   Result Value Ref Range    Sodium 140 136 - 145 mmol/L    Potassium 4.0 3.5 - 5.1 mmol/L    Chloride 106 95 - 110 mmol/L    CO2 27 23 - 29 mmol/L    Glucose 89 70 - 110 mg/dL    BUN 16 8 - 23 mg/dL    Creatinine 0.9 0.5 - 1.4 mg/dL    Calcium 9.4 8.7 - 10.5 mg/dL    Total Protein 6.6 6.0 - 8.4 g/dL    Albumin 3.7 3.5 - 5.2 g/dL    Total Bilirubin 1.1 (H) 0.1 - 1.0 mg/dL    Alkaline Phosphatase 64 40 - 150 U/L    AST 22 10 - 40 U/L    ALT <5 (L) 10 - 44 U/L    eGFR >60.0 >60 mL/min/1.73 m^2    Anion Gap 7 (L) 8 - 16 mmol/L   CBC Auto Differential    Collection Time: 12/23/24 10:55 AM   Result Value Ref Range    WBC 7.36 3.90 - 12.70 K/uL    RBC 4.50 (L) 4.60 - 6.20 M/uL    Hemoglobin 14.3 14.0 - 18.0 g/dL    Hematocrit 42.2 40.0 - 54.0 %    MCV 94 82 - 98 fL    MCH 31.8 (H) 27.0 - 31.0 pg    MCHC 33.9 32.0 - 36.0 g/dL    RDW 12.8 11.5 - 14.5 %    Platelets 154 150 - 450 K/uL    MPV 12.0 9.2 - 12.9 fL    Immature Granulocytes 0.3 0.0 - 0.5 %    Gran # (ANC) 4.4 1.8 - 7.7 K/uL    Immature Grans (Abs) 0.02 0.00 - 0.04 K/uL    Lymph # 1.9 1.0 - 4.8 K/uL    Mono # 0.5 0.3 - 1.0 K/uL    Eos # 0.5 0.0 - 0.5 K/uL    Baso # 0.02 0.00 - 0.20 K/uL    nRBC 0 0 /100 WBC    Gran % 60.0 38.0 - 73.0 %    Lymph % 25.4 18.0 - 48.0 %    Mono % 6.8 4.0 - 15.0 %    Eosinophil % 7.2 0.0 - 8.0 %    Basophil % 0.3 0.0 - 1.9 %    Differential Method Automated    Protime-INR    Collection Time: 12/23/24 10:55 AM   Result Value Ref Range    Prothrombin Time 11.0 9.0 - 12.5 sec    INR 1.0 0.8 - 1.2   Type And Screen Preop    Collection Time: 12/23/24 10:55 AM   Result Value Ref Range    Group & Rh O POS     Indirect New NEG     Cardiac catheterization    Collection Time: 12/23/24  1:13 PM   Result Value Ref Range    Cath EF Estimated 60 %   Cardiac device check - Remote    Collection Time: 01/04/25  1:36 AM   Result Value Ref Range    Device Type Loop     AF [place] % < 1    Vitamin B12 Deficiency Panel    Collection Time: 01/25/25  8:05 AM   Result Value Ref Range    Vitamin B-12 441 180 - 914 ng/L   Neurofilament Light Chain    Collection Time: 01/25/25  8:05 AM   Result Value Ref Range    Neurofilament Light Chain, Plasma 18.0 <=42.1 pg/mL   Lipid Panel    Collection Time: 01/25/25  8:05 AM   Result Value Ref Range    Cholesterol 121 120 - 199 mg/dL    Triglycerides 121 30 - 150 mg/dL    HDL 41 40 - 75 mg/dL    LDL Cholesterol 55.8 (L) 63.0 - 159.0 mg/dL    HDL/Cholesterol Ratio 33.9 20.0 - 50.0 %    Total Cholesterol/HDL Ratio 3.0 2.0 - 5.0    Non-HDL Cholesterol 80 mg/dL   Hemoglobin A1C    Collection Time: 01/25/25  8:05 AM   Result Value Ref Range    Hemoglobin A1C 5.0 4.0 - 5.6 %    Estimated Avg Glucose 97 68 - 131 mg/dL   TSH    Collection Time: 01/25/25  8:05 AM   Result Value Ref Range    TSH 1.500 0.400 - 4.000 uIU/mL   PSA, SCREENING    Collection Time: 01/25/25  8:05 AM   Result Value Ref Range    PSA, Screen 0.52 0.00 - 4.00 ng/mL          Assessment/Plan:     1) Annual wellness exam  2) Syncope - Fairly classic vasovagal episode last mth. Had stable LHC afterwards. Discussed hydration and being vigilant during times of potentially increased risk.  3) CAD - Lipids at goal on Lipitor 40. On daily aspirin.  4) BPH - Stable on Finasteride. PSA normal.  5) GERD - Controlled on daily PPI.  6) Parkinsonism - Neurology following. On Sinemet. Discussed importance of gait and leg exercise regularly.  7) HTN - Controlled at home on Lisinopril 10. He will continue to monitor regularly.    - Vaccines reviewed.  - No plans for further colon screening.  - A1c normal.

## 2025-01-30 ENCOUNTER — HOSPITAL ENCOUNTER (OUTPATIENT)
Dept: RADIOLOGY | Facility: HOSPITAL | Age: 80
Discharge: HOME | End: 2025-01-30
Attending: PHYSICIAN ASSISTANT
Payer: MEDICARE

## 2025-01-30 DIAGNOSIS — I68.0 CEREBRAL AMYLOID ANGIOPATHY: ICD-10-CM

## 2025-01-30 DIAGNOSIS — E85.4 CEREBRAL AMYLOID ANGIOPATHY: ICD-10-CM

## 2025-01-30 LAB
ABETA 42/40 RATIO: 0.13
AL BETA40 PLAS-MCNC: 339 PG/ML
AL BETA42 PLAS-MCNC: 45 PG/ML
VIT B1 BLD-MCNC: 105 UG/L (ref 38–122)

## 2025-01-30 PROCEDURE — 78814 PET IMAGE W/CT LMTD: CPT | Mod: TC

## 2025-01-30 PROCEDURE — A9586 FLORBETAPIR F18: HCPCS | Mod: JZ,TB | Performed by: PHYSICIAN ASSISTANT

## 2025-01-30 PROCEDURE — 78814 PET IMAGE W/CT LMTD: CPT | Mod: 26,PI,, | Performed by: STUDENT IN AN ORGANIZED HEALTH CARE EDUCATION/TRAINING PROGRAM

## 2025-01-30 RX ADMIN — FLORBETAPIR F 18 8.22 MILLICURIE: 51 INJECTION, SOLUTION INTRAVENOUS at 01:01

## 2025-01-31 ENCOUNTER — TELEPHONE (OUTPATIENT)
Dept: INTERNAL MEDICINE | Facility: CLINIC | Age: 80
End: 2025-01-31
Payer: MEDICARE

## 2025-01-31 ENCOUNTER — PATIENT MESSAGE (OUTPATIENT)
Facility: CLINIC | Age: 80
End: 2025-01-31
Payer: MEDICARE

## 2025-01-31 NOTE — TELEPHONE ENCOUNTER
Spoke to Mr Short and let him know that Cookie MADRIGAL will reach out to pt to discuss PET scan results. Pt verbalized understanding.

## 2025-02-04 ENCOUNTER — CLINICAL SUPPORT (OUTPATIENT)
Dept: CARDIOLOGY | Facility: HOSPITAL | Age: 80
End: 2025-02-04
Payer: MEDICARE

## 2025-02-04 DIAGNOSIS — I63.9 CEREBRAL INFARCTION, UNSPECIFIED: ICD-10-CM

## 2025-02-06 ENCOUNTER — TELEPHONE (OUTPATIENT)
Dept: NEUROLOGY | Facility: CLINIC | Age: 80
End: 2025-02-06
Payer: MEDICARE

## 2025-02-06 ENCOUNTER — CLINICAL SUPPORT (OUTPATIENT)
Dept: CARDIOLOGY | Facility: HOSPITAL | Age: 80
End: 2025-02-06
Attending: STUDENT IN AN ORGANIZED HEALTH CARE EDUCATION/TRAINING PROGRAM
Payer: MEDICARE

## 2025-02-06 DIAGNOSIS — I63.9 CEREBRAL INFARCTION, UNSPECIFIED: ICD-10-CM

## 2025-02-06 PROCEDURE — 93298 REM INTERROG DEV EVAL SCRMS: CPT | Performed by: STUDENT IN AN ORGANIZED HEALTH CARE EDUCATION/TRAINING PROGRAM

## 2025-02-11 ENCOUNTER — OFFICE VISIT (OUTPATIENT)
Dept: CARDIOLOGY | Facility: CLINIC | Age: 80
End: 2025-02-11
Payer: MEDICARE

## 2025-02-11 VITALS
DIASTOLIC BLOOD PRESSURE: 78 MMHG | WEIGHT: 222 LBS | OXYGEN SATURATION: 98 % | BODY MASS INDEX: 31.78 KG/M2 | SYSTOLIC BLOOD PRESSURE: 136 MMHG | HEIGHT: 70 IN | HEART RATE: 60 BPM

## 2025-02-11 DIAGNOSIS — E78.2 MIXED HYPERLIPIDEMIA: ICD-10-CM

## 2025-02-11 DIAGNOSIS — R55 VASOVAGAL SYNCOPE: ICD-10-CM

## 2025-02-11 DIAGNOSIS — I10 PRIMARY HYPERTENSION: ICD-10-CM

## 2025-02-11 DIAGNOSIS — I25.10 CORONARY ARTERY DISEASE INVOLVING NATIVE CORONARY ARTERY OF NATIVE HEART WITHOUT ANGINA PECTORIS: Primary | ICD-10-CM

## 2025-02-11 DIAGNOSIS — Z95.818 STATUS POST PLACEMENT OF IMPLANTABLE LOOP RECORDER: ICD-10-CM

## 2025-02-11 PROBLEM — Z01.810 PRE-OPERATIVE CARDIOVASCULAR EXAMINATION: Status: RESOLVED | Noted: 2022-07-26 | Resolved: 2025-02-11

## 2025-02-11 PROCEDURE — 99499 UNLISTED E&M SERVICE: CPT | Mod: S$PBB,,, | Performed by: INTERNAL MEDICINE

## 2025-02-11 PROCEDURE — 99214 OFFICE O/P EST MOD 30 MIN: CPT | Mod: PBBFAC | Performed by: INTERNAL MEDICINE

## 2025-02-11 PROCEDURE — 99999 PR PBB SHADOW E&M-EST. PATIENT-LVL IV: CPT | Mod: PBBFAC,,, | Performed by: INTERNAL MEDICINE

## 2025-02-11 NOTE — PROGRESS NOTES
PCP - Misbah Cordero MD    Subjective:   Patrick Short is a 79 y.o.  male with PMH significant for Vasovagal syncope, STEMI in 2016 s/p RCA PCI with 2 stents and staged LAD PCI, TIA, Parkinson's disease who presents for f/up visit. He had a syncopal event in 12/2024 in [facility] after which LHC was done which showed non obstructive CAD. Last TTE in 1/2024 was normal EF. ILR was implanted to look for arhythmia and nothing has been picked up there yet per patient. He has strong family history of vasovagal syncope and all his 4 kids get it too. No cardiac symptoms at this time. Here with his wife.     Physically active overall. 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     C 12/2024    The ejection fraction was greater than 55% by visual estimate.    The pre-procedure left ventricular end diastolic pressure was 25.    The estimated blood loss was none.    There was non-obstructive coronary artery disease.. Patent RCA stents    There was diastolic dysfunction.    Social History     Tobacco Use    Smoking status: Never    Smokeless tobacco: Never   Substance Use Topics    Alcohol use: Yes     Alcohol/week: 1.0 standard drink of alcohol     Types: 1 Shots of liquor per week     Comment: twice a month/beer wine     Family History   Problem Relation Name Age of Onset    Hypertension Mother      Emphysema Father      Coronary artery disease Father         Meds:   Review of patient's allergies indicates:  No Known Allergies    Current Outpatient Medications:     acyclovir 5% (ZOVIRAX) 5 % ointment, Apply topically to lip sore, 6 times daily, for one week (Patient not taking: Reported on 2/11/2025), Disp: 15 g, Rfl: 0    aspirin 81 MG Chew, Take 1 tablet (81 mg total) by mouth once daily., Disp: , Rfl: 0    atorvastatin (LIPITOR) 40 MG tablet, TAKE 1 TABLET BY MOUTH EVERY DAY, Disp: 90 tablet, Rfl: 3    benzonatate (TESSALON) 100 MG capsule, Take 1  "capsule (100 mg total) by mouth every 8 (eight) hours as needed for Cough., Disp: 30 capsule, Rfl: 0    carbidopa-levodopa  mg (SINEMET)  mg per tablet, Take 1 tablet by mouth 3 (three) times daily for 28 days, THEN 1.5 tablets 3 (three) times daily., Disp: 405 tablet, Rfl: 3    ciclopirox (LOPROX) 0.77 % Crea, Apply topically 2 (two) times daily. Apply to affected foot/feet 2x/day, Disp: 30 g, Rfl: 3    finasteride (PROSCAR) 5 mg tablet, Take 1 tablet (5 mg total) by mouth once daily., Disp: 90 tablet, Rfl: 3    lisinopriL 10 MG tablet, Take 1 tablet (10 mg total) by mouth once daily., Disp: 90 tablet, Rfl: 3    mupirocin (BACTROBAN) 2 % ointment, Apply topically to nostril, three times daily for one week (Patient not taking: Reported on 2/11/2025), Disp: 30 g, Rfl: 0    pantoprazole (PROTONIX) 40 MG tablet, Take 1 tablet (40 mg total) by mouth once daily., Disp: 90 tablet, Rfl: 3    urea (CARMOL) 40 % Crea, Apply topically once daily. (Patient not taking: Reported on 2/11/2025), Disp: 28 g, Rfl: 2  No current facility-administered medications for this visit.    Facility-Administered Medications Ordered in Other Visits:     ceFAZolin 2 g in dextrose 5 % in water (D5W) 5 % 50 mL IVPB (MB+), 2 g, Intravenous, On Call Procedure, Rhonda Mark NP    Review of Systems   Respiratory:  Negative for shortness of breath.    Cardiovascular:  Negative for chest pain, palpitations, orthopnea, claudication, leg swelling and PND.   Musculoskeletal:  Positive for falls (related to Parkinsonism).   Neurological:  Negative for dizziness.       Objective:   /78 (BP Location: Right arm, Patient Position: Sitting)   Pulse 60   Ht 5' 10" (1.778 m)   Wt 100.7 kg (222 lb)   SpO2 98%   BMI 31.85 kg/m²   Physical Exam  Constitutional:       Appearance: Normal appearance.   HENT:      Mouth/Throat:      Mouth: Mucous membranes are moist.   Eyes:      Pupils: Pupils are equal, round, and reactive to light. "   Cardiovascular:      Rate and Rhythm: Normal rate and regular rhythm.      Pulses: Normal pulses.   Pulmonary:      Effort: Pulmonary effort is normal.      Breath sounds: Normal breath sounds.   Abdominal:      General: There is no distension.      Palpations: Abdomen is soft.      Tenderness: There is no abdominal tenderness.   Musculoskeletal:         General: Normal range of motion.   Skin:     General: Skin is warm and dry.   Neurological:      General: No focal deficit present.      Mental Status: He is alert and oriented to person, place, and time.   Psychiatric:         Mood and Affect: Mood normal.         Behavior: Behavior normal.         Labs:     Lab Results   Component Value Date     12/23/2024    K 4.0 12/23/2024     12/23/2024    CO2 27 12/23/2024    BUN 16 12/23/2024    CREATININE 0.9 12/23/2024    GLUCOSE 100 12/22/2024    ANIONGAP 7 (L) 12/23/2024     Lab Results   Component Value Date    HGBA1C 5.0 01/25/2025     Lab Results   Component Value Date    BNP 42.7 12/22/2024    BNP 83 10/10/2022    BNP 61 07/27/2022     (H) 07/24/2016       Lab Results   Component Value Date    WBC 7.36 12/23/2024    HGB 14.3 12/23/2024    HCT 42.2 12/23/2024    HCT 41 07/28/2022     12/23/2024    GRAN 4.4 12/23/2024    GRAN 60.0 12/23/2024     Lab Results   Component Value Date    CHOL 121 01/25/2025    HDL 41 01/25/2025    LDLCALC 55.8 (L) 01/25/2025    TRIG 121 01/25/2025       Lab Results   Component Value Date     12/23/2024    K 4.0 12/23/2024     12/23/2024    CO2 27 12/23/2024    BUN 16 12/23/2024    CREATININE 0.9 12/23/2024    GLUCOSE 100 12/22/2024    ANIONGAP 7 (L) 12/23/2024     Lab Results   Component Value Date    HGBA1C 5.0 01/25/2025     Lab Results   Component Value Date    BNP 42.7 12/22/2024    BNP 83 10/10/2022    BNP 61 07/27/2022     (H) 07/24/2016       Vital Signs:   /78 (BP Location: Right arm, Patient Position: Sitting)   Pulse 60   Ht  "5' 10" (1.778 m)   Wt 100.7 kg (222 lb)   SpO2 98%   BMI 31.85 kg/m²   Lab Results   Component Value Date    WBC 7.36 12/23/2024    HGB 14.3 12/23/2024    HCT 42.2 12/23/2024    HCT 41 07/28/2022     12/23/2024    GRAN 4.4 12/23/2024    GRAN 60.0 12/23/2024     Lab Results   Component Value Date    CHOL 121 01/25/2025    HDL 41 01/25/2025    LDLCALC 55.8 (L) 01/25/2025    TRIG 121 01/25/2025            Assessment & Plan:     1. Coronary artery disease involving native coronary artery of native heart without angina pectoris    2. Primary hypertension    3. Vasovagal syncope    4. Status post placement of implantable loop recorder    5. Mixed hyperlipidemia      #Vasovagal syncope  -after CAD and arhythmias are ruled out and in view of strong family history of vasovagal syncope in 4 kids, appears vasovagal  -has history of passing out as long ago as 40 years ago when witnessed by wife soon after his marriage  -continue supportive measures    #CAD and h/o STEMI  -PCI to RCA followed by a staged PCI to LAD in 2016  -no cardiac symptoms  -Physically active overall. 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  Cardiovascular Fellow PGY-6  [facility] [address] [place], [facility] STAFF  I have seen the patient, reviewed the Fellow's history and physical, assessment and plan. I have personally interviewed and examined the patient and agree with the findings.     LUARA Mendoza MD    "

## 2025-02-13 LAB
OHS CV AF BURDEN PERCENT: < 1
OHS CV DC REMOTE DEVICE TYPE: NORMAL

## 2025-03-07 ENCOUNTER — CLINICAL SUPPORT (OUTPATIENT)
Dept: CARDIOLOGY | Facility: HOSPITAL | Age: 80
End: 2025-03-07
Payer: MEDICARE

## 2025-03-07 DIAGNOSIS — I63.9 CEREBRAL INFARCTION, UNSPECIFIED: ICD-10-CM

## 2025-03-09 ENCOUNTER — CLINICAL SUPPORT (OUTPATIENT)
Dept: CARDIOLOGY | Facility: HOSPITAL | Age: 80
End: 2025-03-09
Attending: STUDENT IN AN ORGANIZED HEALTH CARE EDUCATION/TRAINING PROGRAM
Payer: MEDICARE

## 2025-03-09 DIAGNOSIS — I63.9 CEREBRAL INFARCTION, UNSPECIFIED: ICD-10-CM

## 2025-03-11 NOTE — PROGRESS NOTES
__________________________  ASSESSMENT & PLAN  79 y.o. male with cognitive impairment (f/w Mizuki, 6/24, executive dysfunction predating ICH/AIS) CAD (STEMI/cardiac arrest, s/p PCI 7/2016, RCA(2); s/p stagedPCI to LAD 8/2016), Parkinsonism (Shiela),, s/p ILR 1/2023, vasovagal syncope 12/2024, acute infarct on MRI (L parietal AIS, 10/10/22 - ESUS after TIA), chronic L occipital pole infarct (silent, noted 11/2022 scan),  R parietal lobar ICH (11/2022), follows w/ Dr. Bee (last 4/2024) presenting to CAA clinic with a history of CAA related symptomatic ICH and cognitive impairment.    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-T-N Support for Alzheimer's Disease   Will follow up in [facility] clinic; also interested in cAPPrircorn trial  MOCA 23  Symptomatic ICH History   Symptomatic Lobar Intracerebral Hemorrhage (lobar sICH) #1: Date: 11/2022 (R) Parietal  Prognosis: Severely elevated risk of symptomatic hemorrhage (~ 10-15% / year)  CAA Diagnostic Orders & Management:   APOE Genetic Profile    Vascular Neurology MDM  Anti-thrombotic Regimen  Acute Ischemic Stroke #1: Date: 10/2022 Etiology: Undetermined - Undetermined - ESUS (embolic stroke of undetermined source)/cryptogenic embolism      Acute Coronary Syndrome: STEMI 7/2016  Coronary Revascularization: PCI Date:7/2016 (RCA x 2); 8/2016 LAD  No indications for anticoagulation  Current Antiplatelet: Aspirin 81mg QD  Current Anticoagulant: None  Plan: Continue current management/No Changes  Anti-Hyperlipidemic  Lab Results   Component Value Date    LDLCALC 55.8 (L) 01/25/2025     PREVENT ASCVD 10-year %: N/A  Indications: ASCVD History  Current Anti-Hyperlipidemic: Statin Atorvastatin 40mg daily  Plan: Continue current treatment  Diabetes   A1c   Lab Results   Component Value Date    HGBA1C 5.0 01/25/2025      Current Regimen:   This patient does not have an active medication from one of the medication groupers.  Plan: N/A/No changes  Hypertension  Status:  "Not at goal < 130/80 mmHg despite current medical therapy  Monitoring: Home blood pressure cuff  Offending Agents: None  Failed or Previously Trialed Hypertensive Medications:   None  Current Medical Regimen:   ACEi: Lisinopril (Prinivil, Zestril) 10  Plan/Recommendations: Continue current regimen / No change and need to obtain 2 weeks worth of better blood pressure data from home to further assess need for BP regimen changes; will likely convert to ARB once dose can be better estimated    Research Status:   Candidate for cAPPricorn-1 / ALN-CAROLYN (mivelsiran) Trial based on inclusion criteria - will need to determine ILR compatibility with 3T    Follow-Up: 1 month: Multidisciplinary Clinic    Visit Diagnoses:  1. Cerebral amyloid angiopathy    2. Superficial siderosis present on magnetic resonance imaging    3. History of cerebral hemorrhage    4. Neurodegenerative cognitive impairment    5. History of stroke    6. Essential hypertension       Orders Placed This Encounter    [facility] ApoE Genotype Analysis and Interp        I have spent a total of 86 minutes in chart review, face-to-face encounter, laboratory and/or imaging review and interpretation and documentation for this patient,. A substantial portion of this face-to-face visit was spent on counseling and education the patient on conditions discussed. This also includes interdisciplinary discussion of diagnostic and management planning.  I have independently reviewed and interpreted all available imaging and reviewed the reports.    ____________________________  HPI:  10/6/2022 - Saw Rohit in clinic, diagnosed with "Cortical TIA" (episode of aphasia x 1-2 min, a/w fall w/ head trauma w/o LOC) occurring on 10/5/22  10/10/2022 - Seen in ED for syncope while having IV placed as he was getting his outpatient workup (CTA, MRI); at that time MRI showed infarct; discharged from ED, maintained on aspirin monotherapy  11/15/2022 - c/o headache persistent, CT scan " ordered by Rohit revealing R parietal ICH  2024 e/o LOC - ILR placed, s/p angiogram wnl  Losing words daily and feels like he is progressing.   Forgets names and simple things, how to use the phone, typing at a computer (can no longer cut and paste)  Loses items like keys and wallet regularly.  Head trauma in shower in [facility] 5 years ago.    MRI History     1/29/2025     SWI/SWAN  1.5T   CMB  IPH  cSS  cSAH  EPVS  WMH  Vaso Edema Sulcal Effusion Lepto T1 CE    R L  R L  R L  R L      R L R L R L   Lobar Frontal >5 3      D  D                  Parietal  2    1                      Temporal   3           Yes             Occipital  1  1                        Insular                         Infratentorial Cerebellum                                         Brainstem                    Deep Basal Ganglia                     Thalamus                     Int. Capsule               F1        Ext. Capsule                     Navid Callos                     DVPWM                            History Relevant to CAA Imaging Biomarkers:   History of Catheter based structural heart interventions (i.e. LAAO, Mitral Valve Repair)  History of severe head trauma  Extensive fall history with possible repeat head trauma    [place] CRITERIA:    Pathological Tissue    Age Age >= 50.   Clinically Presenting With Cognitive impairment or dementia.     Lobar Hemorrhagic Lesion Intracerebral hemorrhage  Cerebral Microbleeds  Foci of cortical superficial siderosis     White Matter Features Severe perivascular spaces in the centrum semiovale     CAA Definition Probable CAA           CAA-ri    MICON    Cskqsxr-Beq-Uebkxusafcyptbwlf Profile     Amyloid Tau Neurodegeneration   Serum Lab Results   Component Value Date    ZZNMT4803MHI 0.133 (L) 01/25/2025    WMPDYEFRW61 45 01/25/2025    WDHZFTYDN74 339 01/25/2025      Lab Results   Component Value Date    PTAU 1.08 (H) 01/25/2025       Lab Results   Component Value Date    NEUROLGTCHN 18.0  "01/25/2025        CSF Lab Results   Component Value Date    ZILERKHUK66 45 01/25/2025      No results found for: "ADEVLRATIO", "ADEVLINTERP", "ADEVLTOTAL", "ADEVLPHOSTAU"          AmyloidPET Radiology Impression: Results for orders placed during the hospital encounter of 01/30/25    NM PET CT Florbetapir F18(Brain Beta-amyloid Plaque)    Impression  Positive scan indicating moderate to frequent amyloid neuritic plaque deposition.      Electronically signed by: Jeff Mccray  Date:    01/30/2025  Time:    15:09          APOE Allele Status  No results found for: "APOE"      CSF Testing (inflammatory CAA-ri, etc)  No results found for: "CSFWBC", "LYMPHS", "MONOMACCSF", "CSFRBC", "PROTEINCSF", "GLUCCSF", "B2MCSF", "GADCSF", "SQY5LRJFDRH", "ONW1FPUICUJL", "DPPXIFACSF", "EHD6VYDZZJR", "UOM3UYIEAPW", "SIT7QCF8", "SXC7ARLMNA", "ZCC4PXWTDXC", "YDI3WWJ", "KOR8UGOIQSY", "NEUROCHIFA", "CSFSERUMALBU"        A1c & Lipid Profile:   Recent Labs   Lab 01/25/25  0805   Hemoglobin A1C 5.0   LDL Cholesterol 55.8 L   HDL 41   Triglycerides 121   Cholesterol 121     Coagulopathy:   None      Brain Imaging:  MRI brain: Results for orders placed during the hospital encounter of 10/10/22    MRI Brain Without Contrast    Impression  Subcentimeter acute left parietal subcortical infarct.    Mild chronic small vessel ischemic change.    Multiple parenchymal microhemorrhages with multifocal areas of superficial siderosis affecting both cerebral hemispheres.  The appearance is not entirely specific but suggestive of cerebral amyloid angiopathy in a patient of this age.  For clinical correlation.    This report was flagged in Epic as abnormal.      Electronically signed by: Jas Sigala MD  Date:    10/10/2022  Time:    12:33     Vessel Imaging:  CTA head and neck: Results for orders placed during the hospital encounter of 10/10/22    CTA Head and Neck (xpd)    Impression  No evidence of acute hemorrhage or major vascular distribution " "infarct.    Mild chronic small-vessel ischemic change.    Modest atherosclerosis without evidence of high-grade stenosis or large vessel occlusion.      Electronically signed by: Jas Sigala MD  Date:    10/10/2022  Time:    09:22      Cardiac Evaluation:  TTE: No echocardiogram results found for the past 12 months        BP Readings from Last 10 Encounters:   03/12/25 (!) 175/92   02/11/25 136/78   01/10/25 (!) 160/84   12/23/24 127/74   12/22/24 136/84   10/03/24 137/82   08/01/24 133/83   04/24/24 119/82   04/04/24 (!) 157/88   02/10/24 (!) 140/88      Vital Signs:      10/3/2024     8:55 AM 12/22/2024     9:06 AM 12/23/2024    12:15 PM 1/10/2025    10:07 AM 1/28/2025    10:00 AM 2/11/2025    11:00 AM 3/12/2025     8:39 AM   Vitals - 1 value per visit   SYSTOLIC 137 164 151 160  138 175   DIASTOLIC 82 94 86 84  79 92   Pulse 64 80 61 55  62 59   Temp  97.9 °F (36.6 °C) 98.1 °F (36.7 °C)       Resp  18 18       SPO2  99 % 98 %   98 %    Weight (lb) 209 200 208 210 220.68 222 222   Weight (kg) 94.802 90.719 94.348 95.255 100.1 100.7 100.7   Height 5' 10" (1.778 m) 5' 10" (1.778 m) 5' 10" (1.778 m) 5' 10" (1.778 m) 5' 10" (1.778 m) 5' 10" (1.778 m) 5' 10" (1.778 m)   BMI (Calculated) 30 28.7 29.8 30.1 31.7 31.9 31.9   Pain Score Zero   Zero Zero Zero Zero     Past Medical History  Past Medical History:   Diagnosis Date    CAD (coronary artery disease) 07/26/2016    3 stents    Cerebral amyloid angiopathy     Cerebral ischemic stroke due to global hypoperfusion with watershed infarct 10/10/2022    Heart block     MI (myocardial infarction)     Parkinsonism     Primary hypertension 11/21/2022    Reflux     Vasovagal syncope 2/11/2025     Current Outpatient Medications   Medication Instructions    aspirin 81 mg, Oral, Daily    atorvastatin (LIPITOR) 40 mg, Oral    carbidopa-levodopa  mg (SINEMET)  mg per tablet Take 1 tablet by mouth 3 (three) times daily for 28 days, THEN 1.5 tablets 3 (three) times " daily.    finasteride (PROSCAR) 5 mg, Oral, Daily    lisinopriL 10 mg, Oral, Daily    pantoprazole (PROTONIX) 40 mg, Oral, Daily      Past Surgical History:   Procedure Laterality Date    ANGIOGRAM, CORONARY, WITH LEFT HEART CATHETERIZATION N/A 12/23/2024    Procedure: Angiogram, Coronary, with Left Heart Cath;  Surgeon: Sung Mendoza MD;  Location: [facility] CATH LAB;  Service: Cardiology;  Laterality: N/A;    CARDIAC CATHETERIZATION      EYE SURGERY Left 02/2017    HIP SURGERY Right 12/2021    INSERTION OF IMPLANTABLE LOOP RECORDER N/A 1/24/2023    Procedure: Insertion, Implantable Loop Recorder;  Surgeon: ALBERTO Roth MD;  Location: [facility] EP LAB;  Service: Cardiology;  Laterality: N/A;  CVA, CHERIE, MDT, [facility], , 3 Prep    LEFT HEART CATHETERIZATION Left 12/23/2024    Procedure: Left heart cath;  Surgeon: Sung Mendoza MD;  Location: [facility] CATH LAB;  Service: Cardiology;  Laterality: Left;     Social History  Social History     Socioeconomic History    Marital status:    Tobacco Use    Smoking status: Never    Smokeless tobacco: Never   Substance and Sexual Activity    Alcohol use: Yes     Alcohol/week: 1.0 standard drink of alcohol     Types: 1 Shots of liquor per week     Comment: twice a month/beer wine    Drug use: Never    Sexual activity: Yes     Partners: Female     Comment: Wife     Social Drivers of Health     Financial Resource Strain: Low Risk  (1/24/2024)    Overall Financial Resource Strain (CARDIA)     Difficulty of Paying Living Expenses: Not hard at all   Food Insecurity: No Food Insecurity (1/24/2024)    Hunger Vital Sign     Worried About Running Out of Food in the Last Year: Never true     Ran Out of Food in the Last Year: Never true   Transportation Needs: No Transportation Needs (1/24/2024)    PRAPARE - Transportation     Lack of Transportation (Medical): No     Lack of Transportation (Non-Medical): No   Physical Activity: Insufficiently Active (1/24/2024)    Exercise Vital  Sign     Days of Exercise per Week: 3 days     Minutes of Exercise per Session: 30 min   Stress: No Stress Concern Present (1/24/2024)    [employer] - Occupational Stress Questionnaire     Feeling of Stress : Not at all   Housing Stability: Low Risk  (1/24/2024)    Housing Stability Vital Sign     Unable to Pay for Housing in the Last Year: No     Number of Places Lived in the Last Year: 1     Unstable Housing in the Last Year: No          Nicko Ellis MD

## 2025-03-12 ENCOUNTER — LAB VISIT (OUTPATIENT)
Dept: LAB | Facility: HOSPITAL | Age: 80
End: 2025-03-12
Attending: PSYCHIATRY & NEUROLOGY
Payer: MEDICARE

## 2025-03-12 ENCOUNTER — OFFICE VISIT (OUTPATIENT)
Dept: NEUROLOGY | Facility: CLINIC | Age: 80
End: 2025-03-12
Payer: MEDICARE

## 2025-03-12 VITALS
HEIGHT: 70 IN | DIASTOLIC BLOOD PRESSURE: 92 MMHG | SYSTOLIC BLOOD PRESSURE: 175 MMHG | BODY MASS INDEX: 31.78 KG/M2 | WEIGHT: 222 LBS | HEART RATE: 59 BPM

## 2025-03-12 DIAGNOSIS — I10 ESSENTIAL HYPERTENSION: ICD-10-CM

## 2025-03-12 DIAGNOSIS — Z86.79 HISTORY OF CEREBRAL HEMORRHAGE: ICD-10-CM

## 2025-03-12 DIAGNOSIS — E85.4 CEREBRAL AMYLOID ANGIOPATHY: Primary | ICD-10-CM

## 2025-03-12 DIAGNOSIS — G31.9 NEURODEGENERATIVE COGNITIVE IMPAIRMENT: ICD-10-CM

## 2025-03-12 DIAGNOSIS — Z86.73 HISTORY OF STROKE: ICD-10-CM

## 2025-03-12 DIAGNOSIS — J63.4 SUPERFICIAL SIDEROSIS PRESENT ON MAGNETIC RESONANCE IMAGING: ICD-10-CM

## 2025-03-12 DIAGNOSIS — I68.0 CEREBRAL AMYLOID ANGIOPATHY: Primary | ICD-10-CM

## 2025-03-12 DIAGNOSIS — I68.0 CEREBRAL AMYLOID ANGIOPATHY: ICD-10-CM

## 2025-03-12 DIAGNOSIS — E85.4 CEREBRAL AMYLOID ANGIOPATHY: ICD-10-CM

## 2025-03-12 PROCEDURE — 36415 COLL VENOUS BLD VENIPUNCTURE: CPT | Performed by: PSYCHIATRY & NEUROLOGY

## 2025-03-12 PROCEDURE — 99213 OFFICE O/P EST LOW 20 MIN: CPT | Mod: PBBFAC | Performed by: PSYCHIATRY & NEUROLOGY

## 2025-03-12 PROCEDURE — 99999 PR PBB SHADOW E&M-EST. PATIENT-LVL III: CPT | Mod: PBBFAC,,, | Performed by: PSYCHIATRY & NEUROLOGY

## 2025-03-15 ENCOUNTER — TELEPHONE (OUTPATIENT)
Dept: NEUROLOGY | Facility: CLINIC | Age: 80
End: 2025-03-15
Payer: MEDICARE

## 2025-03-17 LAB
OHS CV AF BURDEN PERCENT: < 1
OHS CV DC REMOTE DEVICE TYPE: NORMAL

## 2025-03-18 LAB
APOLIPOPROTEIN E REASON FOR REFERRAL: NORMAL
APOLIPOPROTEIN E RELEASED BY: NORMAL
APOLIPOPROTEIN E RESULT SUMMARY: NORMAL
APOLIPOPROTEIN E RESULT: NORMAL
APOLIPOPROTEIN E SPECIMEN: NORMAL
GENETICIST REVIEW: NORMAL
SPECIMEN SOURCE: NORMAL

## 2025-03-25 ENCOUNTER — TELEPHONE (OUTPATIENT)
Dept: NEUROLOGY | Facility: CLINIC | Age: 80
End: 2025-03-25
Payer: MEDICARE

## 2025-03-31 ENCOUNTER — OUTPATIENT CASE MANAGEMENT (OUTPATIENT)
Dept: NEUROLOGY | Facility: CLINIC | Age: 80
End: 2025-03-31
Payer: MEDICARE

## 2025-03-31 DIAGNOSIS — R46.89 COGNITIVE AND BEHAVIORAL CHANGES: Primary | ICD-10-CM

## 2025-03-31 DIAGNOSIS — R41.89 COGNITIVE AND BEHAVIORAL CHANGES: Primary | ICD-10-CM

## 2025-04-01 DIAGNOSIS — I68.0 CEREBRAL AMYLOID ANGIOPATHY (CODE): Primary | ICD-10-CM

## 2025-04-01 NOTE — PROGRESS NOTES
Diagnoses           Cerebral Amyloid Angiopathy Probable (BC 2.0)      Disease Status   Clinically Stable     Assessment   Imaging Summary      Historical Confounders L parietal infarct, R parietal lobar IC, L occipital infarct     ATN Status A+T+N-  Amyloid + tau pathology without neurodegeneration (early AD)     APOE Status Lab Results   Component Value Date    APOE Genotype: e2/e3 03/12/2025        Prognosis/Risk Assessment Severely elevated risk of symptomatic hemorrhage (~ 10-15% / year)     Plan   Orders & Mgmt Continue daily BP monitoring with medication change     Vascular MDM Summary   Antithrombotic Continue Aspirin 81mg QD     Lipid Lowering Therapy Continue statin     Blood Pressure Mgmt Initiate ARB: Telmisartan (Micardis) 40 mg and Discontinue ACEi: Lisinopril (Prinivil, Zestril) 10 mg        Research Status Candidate for cAPPricorn-1 / ALN-CAROLYN (mivelsiran) Trial based on inclusion criteria     Follow-Up: Set up for cAPPricorn screening visit              Visit Diagnoses:  1. Cerebral amyloid angiopathy    2. MCI (mild cognitive impairment)    3. Protein-calorie malnutrition, unspecified severity    4. Primary hypertension    5. Primary parkinsonism    6. Mixed hyperlipidemia    7. SHALINI (obstructive sleep apnea)    8. 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 History of ischemic left MCA stroke    10. Coronary artery disease involving native coronary artery of native heart without angina pectoris    11. ST elevation myocardial infarction involving right coronary artery       Orders Placed This Encounter    CBC Auto Differential    Comprehensive Metabolic Panel    Folate    Vitamin B12 Deficiency Panel    TSH    [facility] ORDERABLE    pTau-181, Plasma    GFAP, Plasma    Homocysteine, Serum    IgG    Magnesium    Methylmalonic Acid, Serum    Neurofilament Light Chain    AD Detect, Beta-Amyloid 42/40 Ratio    [facility] GENERIC ORDERABLE [place] TEST ID:     Mitochondrial  Metabolites, Plasma    Dementia, Autoimmune/Paraneoplastic Eval, S    Pharmacogenomics Panel    Ambulatory referral/consult to Sleep Disorders    OT driving evaluation    telmisartan (MICARDIS) 40 MG Tab         I have spent a total of 107 minutes in chart review, face-to-face encounter, laboratory and/or imaging review and interpretation and documentation for this patient. A substantial portion of this face-to-face visit was spent on counseling and education the patient on conditions discussed. This also includes interdisciplinary discussion of diagnostic and management planning.  I have independently reviewed and interpreted all available imaging and reviewed the reports.    Visit today included increased complexity associated with the care of the episodic problem CAA addressed and managing the longitudinal care of the patient due to the serious and/or complex managed problem(s) CAA.       ____________________________  MRI History     1/29/2025     SWI/SWAN  1.5T   CMB  IPH  cSS  cSAH  EPVS  WMH  Vaso Edema Sulcal Effusion Lepto T1 CE    R L  R L  R L  R L      R L R L R L   Lobar Frontal >5 3      D  D                  Parietal  2    1                      Temporal   3           Yes             Occipital  1  1                        Insular                         Infratentorial Cerebellum                                         Brainstem                    Deep Basal Ganglia                     Thalamus                     Int. Capsule               F1        Ext. Capsule                     Navid Callos                     DVPWM                            [place] CRITERIA:    Pathological Tissue    Age Age >= 50.   Clinically Presenting With Cognitive impairment or dementia.     Lobar Hemorrhagic Lesion Intracerebral hemorrhage  Cerebral Microbleeds  Foci of cortical superficial siderosis     White Matter Features Severe perivascular spaces in the centrum semiovale     CAA Definition Probable CAA           "  CAA-ri     MICON-ICH Score = 16    Total Score: 0-4 5-7 8+   Five Year ICH Risk: 1.0% 2.3% 6.7%          MICON-IS = 19    Total Score: 0-9 10-13 14+   Five Year IS Risk: 7% 12% 23%                Interval history 4/2/25:  79 y.o. male with PMHx of with cognitive impairment (f/w Mizuki, 6/24, executive dysfunction predating ICH/AIS) CAD (STEMI/cardiac arrest, s/p PCI 7/2016, RCA(2); s/p stagedPCI to LAD 8/2016), Parkinsonism (Shiela),, s/p ILR 1/2023, vasovagal syncope 12/2024, acute infarct on MRI (L parietal AIS, 10/10/22 - ESUS after TIA), chronic L occipital pole infarct (silent, noted 11/2022 scan),  R parietal lobar ICH (11/2022), follows w/ Dr. Bee (last 4/2024) , following up in CAA clinic with a history of CAA related symptomatic ICH and cognitive impairment. Patient and wife report they are checking his BP at home. Readings have been slightly above 130/80, they do not have a BP log with them for today's visit.      HPI:  10/6/2022 - Saw Rohit in clinic, diagnosed with "Cortical TIA" (episode of aphasia x 1-2 min, a/w fall w/ head trauma w/o LOC) occurring on 10/5/22  10/10/2022 - Seen in ED for syncope while having IV placed as he was getting his outpatient workup (CTA, MRI); at that time MRI showed infarct; discharged from ED, maintained on aspirin monotherapy  11/15/2022 - c/o headache persistent, CT scan ordered by Rohit revealing R parietal ICH  2024 e/o LOC - ILR placed, s/p angiogram wnl  Losing words daily and feels like he is progressing.   Forgets names and simple things, how to use the phone, typing at a computer (can no longer cut and paste)  Loses items like keys and wallet regularly.  Head trauma in shower in [facility] [address] 5 years ago.   Hemorrhagic History & Risk Factors   Symptomatic Lobar Intracerebral Hemorrhage (lobar sICH) #1: Date: 11/2022 (R) Parietal  No coagulopathy identified      Ischemic/Thrombotic History and Risk Factors   Acute Ischemic Stroke: Date: 10/2022 Etiology: " Undetermined - Undetermined - ESUS (embolic stroke of undetermined source)/cryptogenic embolism      Acute Coronary Syndrome: STEMI 7/2016  Coronary Revascularization: PCI Date:7/206 (RCAx2); 8/2016 LAD  No indications for anticoagulation     Current Anti-Thrombotic Agents   Aspirin 81mg QD  No current anticoagulant agent                    Hypertension      Status Not at goal < 130/80 mmHg despite current medical therapy     Monitoring Home blood pressure cuff      Offending Agents None     Current   Anti-Hypertensive Medications ACEi: Lisinopril (Prinivil, Zestril) 10 mg          BP Readings from Last 10 Encounters:   03/12/25 (!) 175/92   02/11/25 136/78   01/10/25 (!) 160/84   12/23/24 127/74   12/22/24 136/84   10/03/24 137/82   08/01/24 133/83   04/24/24 119/82   04/04/24 (!) 157/88   02/10/24 (!) 140/88      No care team member to display   Last 5 Patient Entered Readings                Current 30 Day Average:   Recent Readings        SBP (mmHg)        DBP (mmHg)        Pulse                         Lipid Management  Circulation. 2019;139(25):w0529-n697 ; PMID: 07744728  Circulation. 2019;140(11):q244-i051 ;  PMID: 84322340   Indications for Lipid Lowering Therapy ASCVD History     Current Therapy Statin Atorvastatin 40mg daily        A1c & Lipid Profile:   Recent Labs   Lab 01/25/25  0805   Hemoglobin A1C 5.0   LDL Cholesterol 55.8 L   HDL 41   Triglycerides 121   Cholesterol 121        Diabetes Management     Current Therapy This patient does not have an active medication from one of the medication groupers.     Plan N/A/No changes                Fkgkqlb-Lfr-Zayobkqnhtrkkccbc Profile       Amyloid Tau Neurodegeneration   Serum Lab Results   Component Value Date    SGKWC2876SBD 0.133 (L) 01/25/2025    [id number] 45 01/25/2025    VJKAOCWXB91 339 01/25/2025      Lab Results   Component Value Date    PTAU 1.08 (H) 01/25/2025       Lab Results   Component Value Date    NEUROLGTCHN 18.0 01/25/2025          CSF  "Lab Results   Component Value Date    RBCGUQBUF68 45 01/25/2025      No results found for: "ADEVLINTERP", "ADEVLTOTAL", "ADEVLPHOSTAU"             AmyloidPET Radiology Impression: Results for orders placed during the hospital encounter of 01/30/25    NM PET CT Florbetapir F18(Brain Beta-amyloid Plaque)    Impression  Positive scan indicating moderate to frequent amyloid neuritic plaque deposition.      Electronically signed by: Jeff Mccray  Date:    01/30/2025  Time:    15:09           APOE [place] Status  Lab Results   Component Value Date    APOE Genotype: e2/e3 03/12/2025         No results found for: "GFAP"     CSF Testing   No results found for: "CSFWBC", "LYMPHS", "MONOMACCSF", "CSFRBC", "PROTEINCSF", "GLUCCSF", "B2MCSF", "GADCSF", "NCF3FTFVWCQ", "YIX4WPOFVQXZ", "DPPXIFACSF", "SCA7AKNUACC", "HAN0VFOIEOD", "EYC3QWK3", "DLM0WKWHBA", "ZQL5KQGZGSC", "PBD8PEZ", "FEF3GPRUGFH", "NEUROCHIFA", "CSFSERUMALBU"         Brain Imaging:  MRI brain:   Results for orders placed during the hospital encounter of 10/10/22    MRI Brain Without Contrast    Impression  Subcentimeter acute left parietal subcortical infarct.    Mild chronic small vessel ischemic change.    Multiple parenchymal microhemorrhages with multifocal areas of superficial siderosis affecting both cerebral hemispheres.  The appearance is not entirely specific but suggestive of cerebral amyloid angiopathy in a patient of this age.  For clinical correlation.    This report was flagged in Epic as abnormal.      Electronically signed by: Jas Sigala MD  Date:    10/10/2022  Time:    12:33     Vessel Imaging:  CTA head and neck:   Results for orders placed during the hospital encounter of 10/10/22    CTA Head and Neck (xpd)    Impression  No evidence of acute hemorrhage or major vascular distribution infarct.    Mild chronic small-vessel ischemic change.    Modest atherosclerosis without evidence of high-grade stenosis or large vessel " "occlusion.      Electronically signed by: Jas Sigala MD  Date:    10/10/2022  Time:    09:22    Cardiac Evaluation:  TTE: No echocardiogram results found for the past 12 months     ACC/AHA Heart Failure Staging (Class, EF%): Stage B (pre-heart failure): no Si/Sx of HF, + structural disease or increased filling pressures or RF + biomarkers)     Vital Signs:      10/3/2024     8:55 AM 12/22/2024     9:06 AM 12/23/2024    12:15 PM 1/10/2025    10:07 AM 1/28/2025    10:00 AM 2/11/2025    11:00 AM 3/12/2025     8:39 AM   Vitals - 1 value per visit   SYSTOLIC 137 164 151 160  138 175   DIASTOLIC 82 94 86 84  79 92   Pulse 64 80 61 55  62 59   Temp  97.9 °F (36.6 °C) 98.1 °F (36.7 °C)       Resp  18 18       SPO2  99 % 98 %   98 %    Weight (lb) 209 200 208 210 220.68 222 222   Weight (kg) 94.802 90.719 94.348 95.255 100.1 100.7 100.7   Height 5' 10" (1.778 m) 5' 10" (1.778 m) 5' 10" (1.778 m) 5' 10" (1.778 m) 5' 10" (1.778 m) 5' 10" (1.778 m) 5' 10" (1.778 m)   BMI (Calculated) 30 28.7 29.8 30.1 31.7 31.9 31.9   Pain Score Zero   Zero Zero Zero Zero     Past Medical History  Past Medical History:   Diagnosis Date    CAD (coronary artery disease) 07/26/2016    3 stents    Cerebral amyloid angiopathy     Cerebral ischemic stroke due to global hypoperfusion with watershed infarct 10/10/2022    Heart block     MI (myocardial infarction)     Parkinsonism     Primary hypertension 11/21/2022    Reflux     Vasovagal syncope 2/11/2025     Current Outpatient Medications   Medication Instructions    aspirin 81 mg, Oral, Daily    atorvastatin (LIPITOR) 40 mg, Oral    carbidopa-levodopa  mg (SINEMET)  mg per tablet Take 1 tablet by mouth 3 (three) times daily for 28 days, THEN 1.5 tablets 3 (three) times daily.    coQ10, ubiquinol, 100 mg Cap Take by mouth.    finasteride (PROSCAR) 5 mg, Oral, Daily    pantoprazole (PROTONIX) 40 mg, Oral, Daily    telmisartan (MICARDIS) 40 mg, Oral, Daily      Past Surgical History: "   Procedure Laterality Date    ANGIOGRAM, CORONARY, WITH LEFT HEART CATHETERIZATION N/A 12/23/2024    Procedure: Angiogram, Coronary, with Left Heart Cath;  Surgeon: Sung Mendoza MD;  Location: [facility] CATH LAB;  Service: Cardiology;  Laterality: N/A;    CARDIAC CATHETERIZATION      EYE SURGERY Left 02/2017    HIP SURGERY Right 12/2021    INSERTION OF IMPLANTABLE LOOP RECORDER N/A 1/24/2023    Procedure: Insertion, Implantable Loop Recorder;  Surgeon: ALBERTO Roth MD;  Location: [facility] EP LAB;  Service: Cardiology;  Laterality: N/A;  CVA, ILBEBA, MDT, Local, , 3 Prep    LEFT HEART CATHETERIZATION Left 12/23/2024    Procedure: Left heart cath;  Surgeon: Sung Mendoza MD;  Location: [facility] CATH LAB;  Service: Cardiology;  Laterality: Left;     Social History  Social History     Socioeconomic History    Marital status:    Tobacco Use    Smoking status: Never    Smokeless tobacco: Never   Substance and Sexual Activity    Alcohol use: Yes     Alcohol/week: 1.0 standard drink of alcohol     Types: 1 Shots of liquor per week     Comment: twice a month/beer wine    Drug use: Never    Sexual activity: Yes     Partners: Female     Comment: Wife     Social Drivers of Health     Financial Resource Strain: Low Risk  (1/24/2024)    Overall Financial Resource Strain (CARDIA)     Difficulty of Paying Living Expenses: Not hard at all   Food Insecurity: No Food Insecurity (1/24/2024)    Hunger Vital Sign     Worried About Running Out of Food in the Last Year: Never true     Ran Out of Food in the Last Year: Never true   Transportation Needs: No Transportation Needs (1/24/2024)    PRAPARE - Transportation     Lack of Transportation (Medical): No     Lack of Transportation (Non-Medical): No   Physical Activity: Insufficiently Active (1/24/2024)    Exercise Vital Sign     Days of Exercise per Week: 3 days     Minutes of Exercise per Session: 30 min   Stress: No Stress Concern Present (1/24/2024)    [employer] [place] [employer]  Occupational Health - Occupational Stress Questionnaire     Feeling of Stress : Not at all   Housing Stability: Low Risk  (1/24/2024)    Housing Stability Vital Sign     Unable to Pay for Housing in the Last Year: No     Number of Places Lived in the Last Year: 1     Unstable Housing in the Last Year: No         ADAMA DasC

## 2025-04-02 ENCOUNTER — RESEARCH ENCOUNTER (OUTPATIENT)
Dept: RESEARCH | Facility: HOSPITAL | Age: 80
End: 2025-04-02
Payer: MEDICARE

## 2025-04-02 ENCOUNTER — OFFICE VISIT (OUTPATIENT)
Dept: NEUROLOGY | Facility: CLINIC | Age: 80
End: 2025-04-02
Payer: MEDICARE

## 2025-04-02 DIAGNOSIS — I61.1 NONTRAUMATIC CORTICAL HEMORRHAGE OF RIGHT CEREBRAL HEMISPHERE: ICD-10-CM

## 2025-04-02 DIAGNOSIS — E46 PROTEIN-CALORIE MALNUTRITION, UNSPECIFIED SEVERITY: ICD-10-CM

## 2025-04-02 DIAGNOSIS — G20.C PRIMARY PARKINSONISM: ICD-10-CM

## 2025-04-02 DIAGNOSIS — I68.0 CEREBRAL AMYLOID ANGIOPATHY: ICD-10-CM

## 2025-04-02 DIAGNOSIS — G31.84 MCI (MILD COGNITIVE IMPAIRMENT): Primary | ICD-10-CM

## 2025-04-02 DIAGNOSIS — E85.4 CEREBRAL AMYLOID ANGIOPATHY: ICD-10-CM

## 2025-04-02 DIAGNOSIS — I25.10 CORONARY ARTERY DISEASE INVOLVING NATIVE CORONARY ARTERY OF NATIVE HEART WITHOUT ANGINA PECTORIS: ICD-10-CM

## 2025-04-02 DIAGNOSIS — I10 PRIMARY HYPERTENSION: ICD-10-CM

## 2025-04-02 DIAGNOSIS — E78.2 MIXED HYPERLIPIDEMIA: ICD-10-CM

## 2025-04-02 DIAGNOSIS — G47.33 OSA (OBSTRUCTIVE SLEEP APNEA): ICD-10-CM

## 2025-04-02 DIAGNOSIS — Z86.73 HISTORY OF ISCHEMIC LEFT MCA STROKE: ICD-10-CM

## 2025-04-02 DIAGNOSIS — I21.11 ST ELEVATION MYOCARDIAL INFARCTION INVOLVING RIGHT CORONARY ARTERY: ICD-10-CM

## 2025-04-02 PROCEDURE — 96116 NUBHVL XM PHYS/QHP 1ST HR: CPT | Mod: PBBFAC | Performed by: PSYCHIATRY & NEUROLOGY

## 2025-04-02 PROCEDURE — 99212 OFFICE O/P EST SF 10 MIN: CPT | Mod: PBBFAC

## 2025-04-02 RX ORDER — UBIQUINOL 100 MG
CAPSULE ORAL
COMMUNITY

## 2025-04-02 RX ORDER — TELMISARTAN 40 MG/1
40 TABLET ORAL DAILY
Qty: 90 TABLET | Refills: 3 | Status: SHIPPED | OUTPATIENT
Start: 2025-04-02 | End: 2026-04-02

## 2025-04-02 NOTE — PROGRESS NOTES
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 work in a limited capacity. 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    Medication Evaluation: A comprehensive review and reconciliation of the patient's current medications were performed. Medications were assessed for potential cognitive effects, interactions, and appropriateness. No medication concerns were identified at this time, and no adjustments were deemed necessary.  Safety Evaluation: The patient's safety was evaluated, including an assessment of the home environment, risk of falls, and other potential hazards. Motor vehicle operation was also assessed, and no safety concerns were identified at this time. No immediate recommendations or modifications were deemed necessary.  Caregiver Evaluation: The patient's primary caregiver was identified, and their knowledge, needs, and ability to provide care were evaluated. The caregiver denies needing assistance at this time but was provided with education and support resources for future reference, including requisite referrals as needed.  Advanced Care: The patient is currently documented as Full Code, meaning they have expressed a preference for all  possible life-sustaining treatments to be administered in the event of a medical emergency, such as cardiopulmonary resuscitation (CPR), intubation, and mechanical ventilation. Further discussion regarding advance care planning was not conducted at this time.      Clinical Concerns:   Diagnostics: None/Resolved  Education: None/Resolved  Cognitive Management: Optimized  Sleep/Insomnia: None/Resolved  Weight/Nutritional Concerns: None/Resolved  Behavioral Concerns: None/Resolved  Safety Concerns: None/Resolved          Relevant History of Baseline Neurocognitive Phenotype:    Developmental Milestones: The patient/family report no known birth complications or early life problems. The patient met all developmental milestones.  Learning Neurodivergence: The patient/family report signs or symptoms suggestive of both developmental dyslexia and ADD/ADHD. Also Tourette's syndrome with motor head tics since age 18  Educational History: HS. BS. 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 He is now the retired  of [employer], which specialized in commercial and industrial projects and ceased operations in 2018 or 2019. Recently, he established a new, smaller company called [employer], which consists of himself and three employees. 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  Retired: 2021      Relevant Neurotrauma History:    History of Traumatic Brain Injury: 2015 - Par[place]liding accident - spinal injury  History of Brain/Spine Surgery: No History of Iatrogenic Brain Injury or CNS surgery  History of Toxin  "Exposure/Substance Abuse: No History of Toxin Exposure or Clinically Relevant Substance Abuse  History of Malnutrition/Vitamin Deficiency: No History of Malnutrition or Clinically Relevant Vitamin Deficiency  History of Chronic Mood Disorder/Stress: No History of Clnically Relevant Chronic Mood Disorder or Stress  History of Chronic Inflammatory State: No History of CNS inflammation or Clinically Relevant Chronic Inflammatory State      Relevant Family History:    Relevant General History:  Mother -  at age 98  Father -  at age 77 CHF/TOB  Twin Grandson - "eye problem" nystagmus "born with hole in hear" VSD  Relevant Neurocognitive Disorder History:  The patient/family denies a history of early/late onset cognitive impairment.  Relevant Movement Disorder History:  Son - Tourette  Relevant Motor Neuron Disease History:  The patient/family denies a history of ALS, MND, PLS.  Relevant Developmental/Neurodivergent History:  Son - ADHD. Tourette  Relevant Psychiatric History:  Daughter - bipolar  Known Genetic Profile: There is no relevant genetic testing available on record.            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  Status Post Placement of Implantable Loop Recorder - 2023  Monocular Diplopia of Both Eyes - 2023  Dry Eye Syndrome - 2023  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  Weakness of Left Upper Extremity - 12/07/2022  Impaired Gait and Mobility - 12/02/2022  Intracranial Hemorrhage - 11/22/2022  History of Ischemic Left MCA Stroke - 11/21/2022  Nontraumatic Cortical Hemorrhage of Right Cerebral Hemisphere - 11/21/2022  Mild Neurocognitive Disorder due to Multiple Etiologies - 08/11/2022  Tourette's Syndrome - 08/11/2022  Pleuritic Chest Pain - 07/29/2022  Lumbar Radiculopathy - 04/24/2019  Daytime Sleepiness - 05/15/2018  Past Surgical History  Angiogram, Coronary, with Left Heart Catheterization: Scheduled for 12/23/2024  Cardiac Catheterization: No additional details provided  Eye Surgery: Completed on the left eye in February 2017  Hip Surgery: Completed on the right hip in December 2021  Insertion of Implantable Loop Recorder: Completed on 1/24/2023  Left Heart Catheterization: Scheduled for 12/23/2024  Current Medication(s) Prior to Appointment  Aspirin 81 mg Chew: Take one tablet (81 mg total) by mouth once daily.  Atorvastatin (Lipitor) 40 mg tablet: Take one tablet by mouth every day.  Carbidopa-Levodopa  mg (Sinemet): Take one tablet by mouth three times daily.  Finasteride (Proscar) 5 mg tablet: Take one tablet (5 mg total) by mouth once daily.  Lisinopril 10 mg tablet: Take one tablet (10 mg total) by mouth once daily.  Pantoprazole (Protonix) 40 mg tablet: Take one tablet (40 mg total) by mouth once daily.            Review of cognitive, visuospatial, motor, sensory, and behavioral systems:     Memory  The patient's memory has worsened relative to their baseline.  The patient does not repeat statements or asks the same question repeatedly.  The patient does have difficulty remembering recent important conversations.  The patient does not forget information within minutes.  the patient reports new difficulty with recall events with high fidelity/accuracy.  The patient does not have difficulty remembering recent events.  The patient's recent retrograde  memory is intact.  The patient's remote memory is intact.  Attention  The patient's attention and concentration are impaired.  The patient does have attentional fluctuations.  The patient does have difficulty with selective attention.  The patient does become easily distracted.  The patient does have difficulty with divided attention.  Executive  The patient's cognitive ability to process and respond to information has slowed.  The patient's cognitive ability to process and respond to information has not slowed significantly .  The patient's cognitive ability to manage time is not effected.  The patient's cognitive ability to manage time is not severely effected.  The patient does have difficulty with working memory such as occasional reliance on external aids like notes.  The patient does misplace personal items (e.g., keys, cell phone, wallet) more frequently.  The patient does not have a significant degree of working memory impairment.  The patient does have difficulty with Goal-Directed Behavior.  The patient does have difficulty keeping track of their medications.  The patient is not able to initiate the process of taking medications, requiring external prompts or requires caregiver intervention.  The patient does have difficulty with planning/organizing/completing multistep tasks requires assistance from others.  The patient does have not difficulty with multitasking/multistep tasks.  The patient does have difficulty with recognizing and adjusting for mistakes showing reoccurring inability to recognize or adjust for errors.  The patient's judgment is impaired, with observed difficulty evaluating risks, outcomes, and the appropriateness of actions.  The patient does have difficulty understanding abstract concepts or relationship.  The patient's insight into their health and situation is intact.  Language  The patient's speech has been not affected.  The patient's speech is fluent and non-effortful.  The patient  does forget places/people's names more frequently.  The patient does have word-finding difficulties.  The patient does make inaccurate word substitutions.  The patient's speech is grammatically intact.  The patient does appear to have impaired sentence fluency, repetition,and phonological processing.  The patient does not appear to have impaired comprehension.  The patient does not appear to have difficulty comprehending and understanding written text.  Visuospatial  The patient has become confused or disoriented in new, unfamiliar places.  The patient does not have trouble with navigation.  The patient does not get lost in familiar places.  The patient does not have visuospatial disorientation.  The patient has had problems with driving and/or parking.  The patient does not have difficulty recognizing objects or faces.  Motor/Coordination  The patient does have difficulty with walking.  The patient does feel imbalanced.  The patient has fallen.  The patient does not appear to have new motor deficits.  The patient does have difficulty buttoning shirts, operating zippers, or manipulating tools/utensils.  The patient reports new slow, small dysrhythmic movement.  The patient does have a resting tremor.  The patient's handwriting has become micrographic.  The patient denies restlessness.  The patient denies new and/or worsening simple repetitive behaviors.  The patient denies a change of self-stimulating behavior.  The patient's speech has not become simplified or become repetitive/stereotyped.  The patient denies having any new involuntary movements and/or muscle jerking.  The patient denies new muscle cramps and twitching.  The patient does not have swallowing difficulty.  Sensory  The patient denies new numbness, tingling, paresthesias, or pain.  The patient reports new loss of vision, blurry vision, and/or double vision.  The patient denies new loss of hearing or worsening tinnitus.  The patient denies  anosmia.  The patient does not have hypersensitivity to environmental stimuli.  The patient does not have severe hypersensitivity to environmental stimuli.  Sleep  The patient denies difficulty sleeping. Comment: goes to bed at 8 pm and wakes up at 2 am  The patient does not have difficulty going to sleep.  The patient denies difficulty staying asleep or frequently awakening at night.  The patient denies snoring.  The patient does snore and/or have witnessed apneas while sleeping. Comment: uvular surgery  When the patient wakes up in the morning, the patient does feel well-rested.  The patient denies dream-enactment behavior.  The patient denies per-nocturnal jerking.  The patient denies symptoms suggestive of restless leg syndrome.  Neurobehavioral  The patient's personality has changed. Comment: more critical and judgmental  The patient does difficulty with self-control.  The patient denies new impulsivity or rash/careless actions.  The patient does have difficulty with Decision-Making resulting in occasional indecision or poor choices.  The patient does not appear to have a change in inertia.  The patient does not appear apathetic or has decreased motivation.  The patient does not have difficulty with understanding and responding to social cues.  The patient is not exhibiting a diminished response to other people's needs and feelings.  The patient is not exhibiting symptoms of social withdrawal/indifference.  The patient is not exhibiting a diminished social interest, interrelatedness, or personal warmth.  The patient does not have symptoms to suggest a loss of manners or decorum.  The patient does not have symptoms of disinhibition and social inappropriateness.  The patient's personal hygiene is intact.  The patient does not have difficulty with flexible thinking.  The patient denies any change in rigid behavior.  The patient does not have symptoms of hyper-religiosity or dogmatism.  The patient's  interests/pleasures have not become restrictive, simplified, interrupting, or repetitive.  The patient has not shown new perseverative behavior.  The patient denies new/worsening complex repetitive/ritualistic compulsions and behaviors.  The patient denies any changes in eating behavior.  The patient denies increased consumption of food or substances.  The patient denies oral exploration or consumption of inedible objects.  Psychiatric  The patient does have difficulty with managing emotional responses effectively.  The patient does have symptoms of irritability and mood lability.  The patient does not exhibit hyperactive behavior.  The patient does not have symptoms of agitation, aggression, or violent outbursts.  The patient's emotional expression has changed.  The patient does not have emotional blunting.  The patient does feel depressed.  The patient denies anxiety.  The patient does not exhibit cycling behavior.  The patient is not exhibited symptoms of paranoia.  The patient does not have delusions.  The patient does not have hallucinations.  Medical Review of Systems  The patient does not have constipation.  The patient does not have diarrhea.  The patient does not have urinary incontinence.  The patient denies orthostatic lightheadedness.  The patient's weight is stable.  The patient does not have a history of sensitivity to neuroleptic/psychotropic medications.            Functional Capacity Assessment: Neurological Wellbeing, Intelligence, and Social Evaluation:     Instrumental Activities of Daily Living:   Communal Operations: Moderate level of inability to perform independantly.  Finances: Moderate level of inability to perform independantly.  Housework: level of inability to perform independantly.  Personal Care: Borderline level of inability to perform independantly.  Personal Health: Mild level of inability to perform independantly.  Recreation: Mild level of inability to perform  independantly.  Transportation: Mild level of inability to perform independantly.  Basic Activities of Daily Living:   Axial Motor: Normal level of functional independance.  Grooming: Normal level of functional independance.  Hygeine: Normal level of functional independance.  Oropharngeal Motor: level of inability to perform independantly.  Personal Health: Normal level of functional independance.  Toiletry: Normal level of functional independance.  Functional Capacity Scales:   IADL: Score 3/8 suggestive of Mild dependence.  FAST: Score 3/16 suggestive of Mild Cognitive Impairment (Stage 3).  FAS: Score 9/30 suggestive of Definite Functional Impairment.  CDR-SOB: Score 3.5/18 suggestive of Mild cognitive impairment.  Global CDR: Score 0.5/3 suggestive of Questionable cognitive impairment.  Memory: 1  Orientation: 0.5  Judgment & Problem Solvin  Community Affairs: 0.5  Home and Hobbies: 0.5  Personal Care: 0            Neurological Examination:     Mental Status  The patient's appearance is normal (hygiene is appropriate; attire is proper and clean).  Throughout the interview, the patient is cooperative, the patient's eye contact is appropriate.  The patient behavior is appropriate to the clinical context without impropriety or improper language/conduct.  The patient's energy level is abnormal.  The patient's attention/concentration is impaired.  The patient can complete three-step commands.  The patient's thought process is not logical or goal-oriented.  The patient demonstrated impaired insight based on actions, awareness of the patient's illness, plans for the future.  The patient demonstrated good judgment based on actions and plans for the future.  Cranial Nerves  The patient showed no evidence of anosmia 3/3 (coffee/vanilla/cinnamon).  The patient's pupils were normal.  The patient's visual fields were full to confrontation in all quadrants.  The patient's ocular pursuit in the horizontal and vertical  "plane was complete.  The patient's saccadic initiation, velocity, and amplitude are normal.  The patient demonstrated no square-wave jerks.  The patient's eyelid assessment showed no apraxia. There was no eyelid dysfunction, retraction, or martinez sign.  The patient blink rate was abnormal.  The patient's facial strength was normal.  The patient's facial expression was symmetric and appropriate to the context.  The patient's facial sensation was intact to light touch bilaterally.  The patient's hearing was normal bilaterally.  The patient's oropharynx and soft palate appeared abnormal.  The patient's uvula is mid-line.  The patient's tongue showed no evidence of scalloping.  The patient can protrude their tongue beyond The patient's lips for >10 sec.  The patient can move their extended tongue back and forth rapidly.  The patient's tongue showed no evidence of fasciculation or scalloping.  The patient's sternocleidomastoid and trapezius muscle strength was full bilaterally.  The patient had no significant evidence of anterocollis or retrocollis.  Speech/Language  The patient's speech was not completely fluent and non-effortful.  The patient's speech volume is within normal range and appropriate to the context.  The patient's speech rate is normal.  The patient's respirations are within normal range and appropriate to context.  The patient's speech timbre is normal.  The patient made articulation (segmental features) errors.  The patient has no speech dysdiadochokinesia with repetition of syllables such as "/PA/, /TA/, /KA/, /OM/".  The patient's pitch assessment showed normal range, intonation (Pitch Pattern), and variability.  The patient's tone was not emotionally limited, and tempo was rhythmic (e.g., "Once upon a midnight dreary, while I pondered, weak and weary, Over many a quaint and curious volume of forgotten ayden" and "That government of the people, by the people, for the people, shall not perish from the " "earth").  The patient's speech is not dysarthric.  The patient showed evidence of anomia.  The patient showed no evidence of anomia during spontaneous speech.  The patient showed evidence of anomia during confrontational naming.  The patient makes no phonological loop errors.  The patient makes no errors during the repetition of gibberish words (e.g., "Supercalifragilisticexpialidocious," "Pigglywiggly," "Woospiedoo," "Zowzy," "Bazinga").  The patient makes no errors during the repetition of complex meaningless phrases (e.g., "The horse raced past the barn fell.", "The complex houses  and single soldiers and their families," "Wishes are hopping, and trees are west," and "Brushing liked to daniel aguirre's direction").  The patient can comprehend commands that cross the midline (e.g., with your left thumb, touch your right ear).  The patient can comprehend commands that depend on syntax (e.g., point to the ceiling after you point to the floor).  The patient has difficulty comprehending syntactically complex sentences.  The patient's speech is grammatically intact; (no function/semantic word substitutions, phonemic/semantic paraphasias, or binary confusion).  Motor  The patient's bilateral upper extremity muscle bulk is appropriate.  The patient's upper extremity muscle tone is increased.  The patient's bilateral upper extremity muscle tone does not suggest spasticity.  There is evidence of rigidity/cogwheeling.  The patient's bilateral upper extremity muscle tone has no evidence of paratonia.  There was no dystonia observed on examination.  Assessment of motor strength was symmetric and at minimal anti-gravity.  Deltoid L +5/5 R +5/5 Biceps L +5/5 R +5/5 Triceps L +5/5 R +5/5 Wrist extension L +5/5 R +5/5 Finger abduction L +5/5 R +5/5 Hip flexion L +5/5 R +5/5 Hip extension L +5/5 R +5/5 Knee flexion L +5/5 R +5/5 Knee extension L +5/5 R +5/5 Ankle flexion L +5/5 R +5/5 Ankle extension L +5/5 R +5/5  There is no " pronator or downward drift.  There is no myoclonus observed in the patient bilateral upper and lower extremities.  There are no fasciculations observed in the patient bilateral upper and lower extremities.  Coordination  The patient has no bilateral upper extremity limb dysmetria or past pointing on finger-nose-finger bilaterally.  The patient has no bilateral lower extremity limb dysmetria during shin rub.  The patient has no limb dysdiadochokinesia of the upper extremity on the pronation/supination test and screwing in a light bulb or lower extremity during tapping ball of each foot bilaterally.  The patient has no cerebellar rebound bilaterally.  The patient has a visible tremor.  The patient has no kinetic tremor bilaterally.  The patient has a postural tremor.  The patient has a resting tremor.  The patient has no evidence of interhemispheric motor control deficits.  The patient demonstrates evidence of motor overflow.  The patient demonstrates no alien limb phenomena.  The patient has no dyskinetic movements.  The patient has no akathisia.  The patient's upper extremity fine motor coordination was abnormal.  Higher Cortical Function  The patient had no hemineglect (e.g., line bisection/Braden's test).  The patient showed no evidence of simultanagnosia (Navon hierarchical letters).  The patient showed no evidence of visuospatial constructional dysfunction.  The patient showed no evidence of angular gyrus disconnection (insular-operculum).  The patient has no evidence of dysgraphia.  The patient showed no evidence of apraxia.  The patient showed no dysexecutive behavior.  Sensory  The patient's cortical sensory assessment demonstrated no neglect bilaterally.  The patient's sensation was diminished to light touch, and vibratory sense.  Reflexes  Reflexes were symmetric and 2+ at biceps, 2+ triceps, and 2+ brachioradialis, 2+ at the knees bilaterally, there was no cross-abductor sign, 2+ in the bilateral  ankles.  There were no pathological reflexes; No Babinski, bilateral plantar toes go down, no Jaw Jerk Reflex, No De La Torre, No Palmomental reflex, and No Palmar Grasp reflex.  There was no spreading/clonus.  Gait  The patient has normal posture sitting unaided.  The patient is unable to rise from a chair and sit back down without using their arms.  The patient's gait was normal.  The patient's stance while walking is normal.  The patient's gait speed (6 meters) was normal (70-80 F 1.13 m/s M 1.26 m/s, >80 F 0.94 m/sec, M 0.97 m/sec).  The patient's stride (gait cycle) was abnormal.  The patient's arms swing is abnormal.  The patient takes turns in >4 steps.  When attempting to walk abnormally (heels, tiptoes, tandem), the patient makes errors.  While walking on the patient's tiptoes for ten steps, the patient makes no deviations.  While walking on the patient's heels for ten steps, the patient makes no deviations.  While walking tandem for ten steps, the patient makes deviations.  While walking with eyes closed for ten steps, the patient makes deviations.  The patient has evidence of posture/balance impairment.            Neurocognitive Assessment:     Question Score Interpretation   Memory   Registration T1 (3) 2/3 Borderline Impairment based on age and education.   Registration T1 (5) 2 Within Normal Limits.   Registration T1 (9) 2/9 Moderate Impairment: -2.5 STDs below the average score based on age and education.   Registration T2 (9) 4/9 Mild Impairment: -1.8 STDs below the average score based on age and education.   Registration T3 (9) 4/9 Moderate Impairment: -2.8 STDs below the average score based on age and education.   Registration T4 (9) 5/9 Moderate Impairment: -2.5 STDs below the average score based on age and education.   Registration T5 (9) 5/9 Moderate Impairment: -2.5 STDs below the average score based on age and education.   Delayed Recall 30 sec (9) 4/9 Moderate Impairment: -2.1 STDs below the  average score based on age and education.   Delayed Recall 10 min (3) 3/3 Within Normal Limits.   Delayed Recall 10 min (5) 5/5 Within Normal Limits.   Delayed Recall 10 min (9) 5/9 Borderline Impairment: -0.7 STDs below the average score based on age and education.   Delayed Recall Cued (9) 8/9 Within Normal Limits.   Delayed Recognition (9) 9/9 Within Normal Limits.   Modified Marco A Recall 12/17 Borderline Impairment based on age and education.   Executive   Three-step command 3/3 Within Normal Limits.   Serial Sevens 3/3 Within Normal Limits.   WORLD Backward 5/5 Within Normal Limits.   Digit Span Backwards 3 Mild Impairment: -1.6 STDs below the average score based on age and education.   Digit Span - 2 2/2 Within Normal Limits.   Lexical Fluency - F 10 Mild Impairment: -1.2 STDs below the average score based on age and education.   Lexical Fluency - A 4 Moderate Impairment: -2.4 STDs below the average score based on age and education.   Lexical Fluency - S 11 Borderline Impairment: -1 STDs below the average score based on age and education.   Semantic Fluency - Animals 11 Mild Impairment: -1.8 STDs below the average score based on age and education.   Semantic Fluency - Vegetables 9 Moderate Impairment: -2.3 STDs below the average score based on age and education.   Trials-1 1/1 Within Normal Limits.   Visuospatial   3D Cube Copy 1/1 Within Normal Limits.   Clock Draw 3/3 Within Normal Limits.   Horta Figure Copy 15/17 Borderline Impairment based on age and education.   Overlap Images Total 10/10 Within Normal Limits.   Overlap Images (Illusion) 10 Within Normal Limits.   Picture Synthesis 3/3 Within Normal Limits.   Pareidolia Total 20/20 Within Normal Limits.   Pareidolia (+Face) 7/10 Borderline Impairment based on age and education.   Pareidolia (+No Face) 10/10 Within Normal Limits.   Language   Naming-2 2/2 Within Normal Limits.   Naming-3 3/3 Within Normal Limits.   15-Item BNT 14/15 Within Normal Limits.    Repetition-1 1/1 Within Normal Limits.   Repetition-2 2/2 Within Normal Limits.   Repetition of Phrases 4/5 Borderline Impairment based on age and education.   Verbal Agility 5/6 Borderline Impairment based on age and education.   Following written command 1/1 Within Normal Limits.   Writing a complete sentence 1/1 Within Normal Limits.   Repeat & Point - Nonfluent  10/10 Within Normal Limits.   Repeat & Point - Semantics 9/10 Borderline Impairment based on age and education.   Abstraction 2/2 Within Normal Limits.   Attention   Orientation-10 10/10 Within Normal Limits.   Orientation-6 6/6 Within Normal Limits.   Digit Span Forwards 8 Within Normal Limits.   Alternating Sequence 1/1 Within Normal Limits.   Social   FTLD Questionnaire  20/20 Within Normal Limits.   Theory of Mind Cartoon 1/1 Within Normal Limits.   Motor/Sensory   Cortical Sensory Neglect 18/20 Borderline Impairment based on age and education.   Limb-Kinetic Apraxia 10/10 Within Normal Limits.     Clinical Impression of Neurocognitive Assessment: Executive predominant multidomain major cognitive impairment.          Laboratories:     Metabolic Screening  Name Reference Previous Values   Cholesterol Total 120 - 199 mg/dL 117 (L)   2023-03-23   102 (L)   2024-01-26   121   2025-01-25      Triglycerides 30 - 150 mg/dL 81   2023-03-23   70   2024-01-26   121   2025-01-25      HDL 40 - 75 mg/dL 39 (L)   2023-03-23   35 (L)   2024-01-26   41   2025-01-25      LDL Cholesterol 63.0 - 159.0 mg/dL 61.8 (L)   2023-03-23   53.0 (L)   2024-01-26   55.8 (L)   2025-01-25      HDL/Cholesterol Ratio 20.0 - 50.0 % 33.3   2023-03-23   34.3   2024-01-26   33.9   2025-01-25      Total Cholesterol/HDL Ratio 2.0 - 5.0 3.0   2023-03-23   2.9   2024-01-26   3.0   2025-01-25      Non-HDL Cholesterol mg/dL 78   2023-03-23   67   2024-01-26   80   2025-01-25      Hemoglobin A1C External 4.0 - 5.6 % 5.2   2023-03-23   5.0   2025-01-25      Estimated Avg  Glucose 68 - 131 mg/dL 103   2023-03-23   97   2025-01-25      BNP 0 - 99 pg/mL 42.7   2024-12-22      Neurodegenerative Biomarkers - Serum  Name Reference Previous Values   Abeta42  45   2025-01-25      Neurofilament Light Chain, Plasma <=37.9 pg/mL 18.0   2025-01-25      Phospho-Tau (181P) <0.97 pg/mL 1.08 (H)   2025-01-25      Neurodegenerative Biomarkers - Serum   Name Reference Previous Values   Abeta40 <1000 339   2025-01-25      Neuroendocrine/Electrolyte Screening  Name Reference Previous Values   TSH 0.400 - 4.000 uIU/mL 1.932   2023-03-23   1.500   2025-01-25      Neuro-Nutritional Screening  Name Reference Previous Values   Vitamin B12 180 - 914 ng/L 441   2025-01-25      Thiamine 38 - 122 ug/L 105   2025-01-25      Neuro-Infectious Screening  Name Reference Previous Values   SARS Coronavirus 2 Antigen Negative Negative   2024-08-01                Neurological Relevant Procedures:    Electrocardiogram performed on 12/22/2024  Formal Interpretation: Vent. Rate : 71 BPM Atrial Rate : 71 BPM P-R Int : 144 ms QRS Dur : 94 ms QT Int : 364 ms P-R-T Axes : 49 -11 degrees QTcB Int : 395 ms  Provider Interpretation: The electrocardiogram (ECG) shows a normal sinus rhythm. There is evidence of an inferior infarct, but the age of the infarct cannot be determined. Overall, the ECG is abnormal.            Neurologically Relevant Imaging:    NM PET+CT Scan Ltd, Brain, F18 Flutemetamol (Amyloid-Plaque) performed on 01/30/2025  Radiologist Interpretation: Positive scan indicating moderate to frequent amyloid neuritic plaque deposition.  Provider Interpretation: The radiographic interpretation indicates multifocal radiotracer uptake throughout the cerebral cortical gray matter. There is also reduced gray-white matter contrast involving the bilateral frontal, parietal, occipital, and temporal lobes.  MRI brain/head without contrast performed on 01/29/2025  Radiologist Interpretation: Interval development of a  at least for new foci of susceptibility left parasagittal frontal lobe, left anterior temporal lobe, right occipital lobe and right anterior body corpus callosum as detailed above no significant edema signal associated with these foci. This is superimposed on scattered superficial siderosis and numerous punctate foci of susceptibility throughout the cerebral hemispheres concerning for amyloid angiopathy. No restricted diffusion to suggest acute infarction. No new extra-axial collection Scattered T2 FLAIR signal hyperintensity supratentorial white matter which may be sequela of chronic ischemic change with remote left occipital infarction.  Provider Interpretation: The radiographic interpretation reveals mild generalized cortical atrophy with slight regional atrophy in the posterior parietal region. There is encephalomalacia of the left occipital pole, accompanied by scattered subcortical white matter changes in the external watershed territories. Additionally, there are scattered subcortical microbleeds and superficial siderosis, findings that are consistent with cerebral amyloid angiopathy.  T1-weighted (T1W) Image Summary: The radiographic interpretation reveals mild generalized cortical atrophy without evidence of clear focal volume loss. There is bilateral hippocampal volume loss noted. The corpus callosum, midbrain, and brainstem appear to have an intact volume ratio.  FLAIR/T2-weighted (T2W) Image Summary: The radiographic interpretation indicates subcortical white matter changes with midline periventricular capping in the posterior frontal region. There are white matter hyperintensities that extend into the cortex, accompanied by encephalomalacia of the left occipital pole. Additionally, there are scattered secondary white matter hyperintensities extending to the centrum semiovale.  Diffusion Weighted Imaging with ADC Mapping Summary: There are no significant hyperintensities or hypointensities observed on  Diffusion-Weighted Imaging (DWI). Additionally, there is no apparent correlation with the Apparent Diffusion Coefficient (ADC).  Susceptibility-weighted imaging (SWI) and/or GRE Summary: The radiographic interpretation indicates the presence of scattered punctate foci of susceptibility within the cerebral subcortical white matter. There has been an interval development of a new punctate focus within the left temporal lobe, the right anterior body of the corpus callosum, the right occipital lobe, and the left parasagittal frontal lobe.               Billing Statement:       I spent a total of 90 minutes (from 01:00 PM to 02:30 PM) on 03/31/2025, engaging in person in a face-to-face consultation with the patient. More than 50% of this time was devoted to counseling on symptoms, treatment plans, risks, therapeutic options, lifestyle modifications, and safety concerns related to the above diagnoses.     A Review of Systems was completed, encompassing 10 of the 14 systems. All findings were negative, except those noted in the History of Present Illness (HPI) and Review of Systems (ROS) Sections. The systems reviewed were Constitutional (Const), Eyes, Ear/Nose/Throat (ENT), Respiratory (Resp), Cardiovascular (CV), Gastrointestinal (GI), Genitourinary (), Musculoskeletal (MSK), Skin, and Neurological (Neuro).     I spent a total of 5 minutes to reviewing previous laboratory results on the day of the clinical evaluation. This review was an integral part of the face-to-face encounter. The laboratory findings were predominantly negative, with exceptions as noted in the History of Present Illness (HPI) and Assessment.     I spent a total of 5 minutes to reviewing previous diagnostic tests on the day of the clinical evaluation. This activity was directly associated with the face-to-face encounter. The findings from these diagnostic tests were generally within normal limits, with any exceptions as noted in the History of  Present Illness (HPI) and Assessment.     I performed a neurobehavioral status examination on the day of clinical evaluation that included a clinical assessment of thinking, reasoning, and judgment to ensure a comprehensive approach in managing the complex and evolving needs of the patient's neurocognitive condition. Please see above HPI and ROS for full details. This exam was performed on the day of clinical evaluation and included 15 minutes spent on direct face-to-face clinical observation and interview with the patient and 16 minutes spent interpreting test results and preparing the report. The total time of 31 minutes spent on the neurobehavioral status examination is not included in the time spent on evaluation and management coding.     In response to concerns about changes in the patient's cognitive functioning, I conducted a comprehensive neuropsychological evaluation to assess these changes and their impact on daily life. This evaluation involved both face-to-face and non-face-to-face services. During the face-to-face sessions, I engaged directly with the patient to gather a comprehensive history and understand current concerns. I assessed the patients behavior, mood, and affect during the evaluation. Informed consent was obtained prior to testing. The administration and scoring of standardized neurocognitive tests, which objectively measure various cognitive functions - including memory, attention, executive functions, visuospatial skills, and language - were conducted during this time. These tests were selected based on the patients symptoms and medical history to ensure an accurate assessment. The face-to-face time spent on clinical history-taking, behavioral assessment, and feedback to the patient totaled 15 minutes, billed under CPT code 69055. In the non-face-to-face component, I reviewed and synthesized patient records, prior test results, and relevant medical history. I analyzed results from the  administered neuropsychological tests to derive clinical insights. I formulated diagnostic impressions and developed treatment or intervention plans based on the evaluation. I documented findings, interpretations, and recommendations in a comprehensive report. I provided feedback to the patient and their caregiver, discussing the test findings and outlining recommended next steps. These non-face-to-face activities totaled 18 minutes, billed under CPT code 21571. The total time spent on evaluation services was 33 minutes, strictly excluding any time associated with test administration or scoring. It is important to note that the time spent on this evaluation is separate from any evaluation and management services provided on the same day. Detailed findings, interpretations, and recommendations are documented in the neuropsychological assessment report. This comprehensive cognitive assessment is essential for establishing a clear cognitive baseline, accurately diagnosing the patients condition, facilitating targeted interventions and personalized care plans, and providing a basis for ongoing monitoring and adjustment of the care plan.     In response to concerns about changes in the patient's cognitive functioning, a comprehensive neuropsychological evaluation was conducted to assess these changes and their impact on daily life. A series of standardized neurocognitive tests were administered by a technician to objectively measure various cognitive functions, including memory, attention, executive functions, visuospatial skills, and language. These tests were selected based on the patient's symptoms and medical history to ensure an accurate assessment. Informed consent was obtained prior to testing. The face-to-face administration and scoring of these tests, performed by a qualified technician, took 35 minutes, billed under CPT code 32799.     Total Billing time spent on encounter/documentation for this patient's  evaluation and management, not including the Neurobehavioral Status Examination and Neuropsychological Evaluation - Interpretation and Neuropsychological Evaluation - Administration by Technician: 70 minutes.           The care plan was developed collaboratively with the patient and caregiver, addressing their goals of maintaining functional independence while planning for potential disease progression.     Thank you for entrusting us with the care of your patient. Should you have any questions or concerns, please feel free to contact us at your convenience.     This note was dictated using the M*Modal Fluency Direct voice recognition program. Please be aware that word recognition errors may occasionally occur and might be overlooked during review.            Signing Physician:  Sung Parisi MD

## 2025-04-02 NOTE — PROGRESS NOTES
INFORMED CONSENT     Study title: A Phase 2, Randomized, Double-blind, Placebo-controlled Study to Evaluate the Efficacy, Safety, Tolerability, and Pharmacodynamics of Intrathecally Administered ALN-CAROLYN in Patients with Cerebral Amyloid Angiopathy (CAA) (cAPPricorn-1)  Sponsor: [employer]  NCT# [id number]     : Nicko Ellis MD    IRB #: 2024.129      Date consent obtained: 04/02/2025       Present for discussion:  Patient, Patient's spouse , , Sub-investigator, and CRC       Study Personnel Obtaining Consent Mariana Foster       Prior to the Informed Consent (IC) being signed, or any study protocol required data collection, testing, procedure, or intervention being performed, the following was done and/or discussed:  Patient was given a copy of the IC for review   Purpose of the study and qualifications to participate   Study design, Follow up schedule, and tests or procedures done at each visit  Confidentiality and HIPAA Authorization for Release of Medical Records for the research trial/ subject's rights/research related injury  Risk, Benefits, Alternative Treatments, Compensation and Costs  Participation in the research trial is voluntary and patient may withdraw at anytime  Contact information for study related questions    Patient verbalizes understanding of the above: Yes  Contact information for CRC and PI given to patient: Yes   Patient able to adequately summarize: the purpose of the study, the risks associated with the study, and all procedures, testing, and follow-ups associated with the study: Yes    The subject was provided with ample time to review the consent for consideration and ask questions related to this study. All questions were answered appropriately and to their satisfaction. The subject was able to verbalize understanding of the study and agreed to participate. The patient signed the IRB-approved version of the consent form and was  provided with a copy of the signed consent form, which includes the PI's contact information. Subject was also provided with the CRC's contact information. The original consent was filed into the subject's research study binder and a copy was scanned into electronic medical records (Epic).    Additional Informed Consent  Study Partner Consent Form Yes  Study Partner Name: Valeria Short  Relationship to Patient: Wife      Patient was advised to contact CRC or PI with any study-related questions or concerns.   Patient expressed understanding of information discussed, and he agreed to continue with the trial.    Mariana Foster Sr. Clinical Research Coordinator   [facility]   Phone: [phone]

## 2025-04-02 NOTE — PROGRESS NOTES
Screening Visit (Day -60 - Day - 1)    Study title: A Phase 2, Randomized, Double-blind, Placebo-controlled Study to Evaluate the Efficacy, Safety, Tolerability, and Pharmacodynamics of Intrathecally Administered ALN-CAROLYN in Patients with Cerebral Amyloid Angiopathy (CAA) (cAPPricorn-1)  Sponsor: [employer]  NCT# [id number]     : Nicko Ellis MD    IRB #: [id number]    Date of Visit: 04/02/2025   Time of Exams: 11:09am    Physical Exam    If Abnormal, document findings:   General Appearance Normal    Skin Normal    HEENT Normal      Respiratory Normal    Cardiovascular Normal    Gastrointestinal Normal    Musculoskeletal Normal    Dermatological Normal    Thyroid Normal    Lymph nodes Normal      Neurological Exam    Category: Mental Status   If Abnormal, document findings:   Level of consciousness (alertness) Normal    Orientation Normal    Attention and concentration Normal      Language Normal    Memory Normal      Category: Meningeal signs   If Abnormal, document findings:   Nuchal rigidity, Kernigs sign, Brudzinskis sign, meningismus Normal      Category: Cranial nerves*  *2 optic, 3 oculomotor, 4 trochlear, 5 trigeminal, 6 abducens, 7 facial, 8 vestibulocochlear, 9 glossopharyngeal, 10 vagus, 11 spinal accessory, 12 hypoglossal.   If Abnormal, document findings:   Fundoscopic optic nerve examination Normal    Visual acuity Abnormal - Not Clinically Significant  20/100   Visual fields Normal      Pupillary light reflex Normal    Extraocular eye movements, including observation for nystagmus Normal    Facial sensation Normal    Facial muscle strength/symmetry Normal    Hearing Abnormal - Not Clinically Significant  Uses hearing aids   Palatal movement/ uvula position Normal    Dysarthria Normal    Head rotation/shoulder elevation (sternocleinomastoid and trapezius) Normal    Tongue movement Normal      Category: Motor examination   If Abnormal, document findings:   Muscle  bulk Normal    Tone, including examination for rigidity, spasticity, paratonia, with characterization Normal    Abnormal/involuntary movements, including tremor, myoclonus, dystonia, and other abnormal/involuntary motor activity, with characterization Normal    Strength testing Normal      Category: Reflex examination  If Abnormal, document findings:   Deep tendon reflexes Abnormal - Not Clinically Significant  +1 BL upper extremities  Normal patellar  +1 achilles   Plantar (Babinski) reflex Normal R equivocal  L normal     Primitive reflexes (frontal release signs) when appropriate Normal      Category: Sensory examination  If Abnormal, document findings:   Multimodality sensory examination Abnormal - Not Clinically Significant  Diminished sharp sensation and temperature sensation in stocking glove pattern    Mild decreased vibratory sensation in feet       Category: Coordination examination  If Abnormal, document findings:   Dexterity (e.g., finger tapping) Normal    Rapid alternating movements Normal    Finger-to-nose and heel-to-shin testing or equivalent Normal        Category: Gait and balance examination   If Abnormal, document findings:   Stance Normal    Romberg test Normal    Casual gait evaluation Normal      Heel, toe, and tandem gait evaluation Abnormal - Not Clinically Significant  Mildly impaired heel to toe gait

## 2025-04-02 NOTE — PATIENT INSTRUCTIONS
Today, you were seen for cerebral amyloid angiopathy (CAA). This is a condition that affects the blood vessels in your brain and increases your risk of spontaneous bleeding in your brain. Your risk for a symptomatic hemorrhage is 10-15% per year. This percentage is determined by your imaging findings. In order to maximally reduce your risk of a symptomatic hemorrhage, we recommend close home blood pressure monitoring. Your goal blood pressure is less than 130/80. Please check your blood pressure daily and contact our clinic if you are having multiple readings above your blood pressure goal.    You received a folder today with a patient education packet on CAA for your reference.      Please go to the laboratory today to complete your blood work. We will schedule your next follow up appointment.    Discontinue lisinopril and start telmisartan     Other recommendations:  Aeorbic Physical Activity ( min/wk @ 65-75% Max HR)  Resistance Exercise ( min/wk @ 50-80% 1RM)  Isometric Resistance Exercise  Moderation of EtOH Consumption (Men <=2/d; Women <=1/d)  Weight Loss (1 mmHg / 2 lbs reduction)  Diet (DASH or MIND; fruits/vegetables/whole grains/low fat dairy)  Reduced Sodium (optimal goal < 1500 mg/d)  Enhanced Potassium intake (goal 8287-8572/d)

## 2025-04-03 ENCOUNTER — LAB VISIT (OUTPATIENT)
Dept: LAB | Facility: HOSPITAL | Age: 80
End: 2025-04-03
Payer: MEDICARE

## 2025-04-03 DIAGNOSIS — E46 PROTEIN-CALORIE MALNUTRITION, UNSPECIFIED SEVERITY: ICD-10-CM

## 2025-04-03 DIAGNOSIS — E85.4 CEREBRAL AMYLOID ANGIOPATHY: ICD-10-CM

## 2025-04-03 DIAGNOSIS — I68.0 CEREBRAL AMYLOID ANGIOPATHY: ICD-10-CM

## 2025-04-03 DIAGNOSIS — G31.84 MCI (MILD COGNITIVE IMPAIRMENT): ICD-10-CM

## 2025-04-03 DIAGNOSIS — G20.C PRIMARY PARKINSONISM: ICD-10-CM

## 2025-04-03 LAB
ABSOLUTE EOSINOPHIL (OHS): 0.09 K/UL
ABSOLUTE MONOCYTE (OHS): 0.36 K/UL (ref 0.3–1)
ABSOLUTE NEUTROPHIL COUNT (OHS): 3.16 K/UL (ref 1.8–7.7)
ALBUMIN SERPL BCP-MCNC: 4.1 G/DL (ref 3.5–5.2)
ALP SERPL-CCNC: 62 UNIT/L (ref 40–150)
ALT SERPL W/O P-5'-P-CCNC: 16 UNIT/L (ref 10–44)
ANION GAP (OHS): 8 MMOL/L (ref 8–16)
AST SERPL-CCNC: 22 UNIT/L (ref 11–45)
BASOPHILS # BLD AUTO: 0.03 K/UL
BASOPHILS NFR BLD AUTO: 0.5 %
BILIRUB SERPL-MCNC: 1 MG/DL (ref 0.1–1)
BUN SERPL-MCNC: 16 MG/DL (ref 8–23)
CALCIUM SERPL-MCNC: 9.5 MG/DL (ref 8.7–10.5)
CHLORIDE SERPL-SCNC: 104 MMOL/L (ref 95–110)
CO2 SERPL-SCNC: 28 MMOL/L (ref 23–29)
CREAT SERPL-MCNC: 0.8 MG/DL (ref 0.5–1.4)
ERYTHROCYTE [DISTWIDTH] IN BLOOD BY AUTOMATED COUNT: 12.5 % (ref 11.5–14.5)
FOLATE SERPL-MCNC: 11.8 NG/ML (ref 4–24)
GFR SERPLBLD CREATININE-BSD FMLA CKD-EPI: >60 ML/MIN/1.73/M2
GLUCOSE SERPL-MCNC: 76 MG/DL (ref 70–110)
HCT VFR BLD AUTO: 44.7 % (ref 40–54)
HGB BLD-MCNC: 14.8 GM/DL (ref 14–18)
IGG SERPL-MCNC: 1083 MG/DL (ref 650–1600)
IMM GRANULOCYTES # BLD AUTO: 0.01 K/UL (ref 0–0.04)
IMM GRANULOCYTES NFR BLD AUTO: 0.2 % (ref 0–0.5)
LYMPHOCYTES # BLD AUTO: 1.96 K/UL (ref 1–4.8)
MAGNESIUM SERPL-MCNC: 1.6 MG/DL (ref 1.6–2.6)
MCH RBC QN AUTO: 30.3 PG (ref 27–31)
MCHC RBC AUTO-ENTMCNC: 33.1 G/DL (ref 32–36)
MCV RBC AUTO: 91 FL (ref 82–98)
NUCLEATED RBC (/100WBC) (OHS): 0 /100 WBC
PLATELET # BLD AUTO: 145 K/UL (ref 150–450)
PMV BLD AUTO: 11.8 FL (ref 9.2–12.9)
POTASSIUM SERPL-SCNC: 4 MMOL/L (ref 3.5–5.1)
PROT SERPL-MCNC: 7.1 GM/DL (ref 6–8.4)
RBC # BLD AUTO: 4.89 M/UL (ref 4.6–6.2)
RELATIVE EOSINOPHIL (OHS): 1.6 %
RELATIVE LYMPHOCYTE (OHS): 34.9 % (ref 18–48)
RELATIVE MONOCYTE (OHS): 6.4 % (ref 4–15)
RELATIVE NEUTROPHIL (OHS): 56.4 % (ref 38–73)
SODIUM SERPL-SCNC: 140 MMOL/L (ref 136–145)
TSH SERPL-ACNC: 2.12 UIU/ML (ref 0.4–4)
WBC # BLD AUTO: 5.61 K/UL (ref 3.9–12.7)

## 2025-04-03 PROCEDURE — 83921 ORGANIC ACID SINGLE QUANT: CPT | Mod: 59

## 2025-04-03 PROCEDURE — 80053 COMPREHEN METABOLIC PANEL: CPT

## 2025-04-03 PROCEDURE — 82233 BETA-AMYLOID 1-40 (ABETA 40): CPT

## 2025-04-03 PROCEDURE — 82746 ASSAY OF FOLIC ACID SERUM: CPT

## 2025-04-03 PROCEDURE — 83520 IMMUNOASSAY QUANT NOS NONAB: CPT

## 2025-04-03 PROCEDURE — 82784 ASSAY IGA/IGD/IGG/IGM EACH: CPT

## 2025-04-03 PROCEDURE — 83735 ASSAY OF MAGNESIUM: CPT

## 2025-04-03 PROCEDURE — 83921 ORGANIC ACID SINGLE QUANT: CPT

## 2025-04-03 PROCEDURE — 83090 ASSAY OF HOMOCYSTEINE: CPT

## 2025-04-03 PROCEDURE — 36415 COLL VENOUS BLD VENIPUNCTURE: CPT

## 2025-04-03 PROCEDURE — 85025 COMPLETE CBC W/AUTO DIFF WBC: CPT

## 2025-04-03 PROCEDURE — 84443 ASSAY THYROID STIM HORMONE: CPT

## 2025-04-04 LAB — (HCYS)2 SERPL-MCNC: 11.6 UMOL/L (ref 4–16.5)

## 2025-04-04 PROCEDURE — 82542 COL CHROMOTOGRAPHY QUAL/QUAN: CPT

## 2025-04-05 LAB — VIT B12 SERPL-MCNC: 359 NG/L (ref 180–914)

## 2025-04-07 LAB — NEUROFILAMENT LIGHT CHAIN, PLASMA: 35.2 PG/ML

## 2025-04-08 LAB
METHYLMALONATE SERPL-SCNC: 0.12 NMOL/ML
W METHYLMALONIC ACID: 0.22 UMOL/L

## 2025-04-09 ENCOUNTER — CLINICAL SUPPORT (OUTPATIENT)
Dept: CARDIOLOGY | Facility: HOSPITAL | Age: 80
End: 2025-04-09
Attending: STUDENT IN AN ORGANIZED HEALTH CARE EDUCATION/TRAINING PROGRAM
Payer: MEDICARE

## 2025-04-09 ENCOUNTER — CLINICAL SUPPORT (OUTPATIENT)
Dept: CARDIOLOGY | Facility: HOSPITAL | Age: 80
End: 2025-04-09
Payer: MEDICARE

## 2025-04-09 DIAGNOSIS — I63.9 CEREBRAL INFARCTION, UNSPECIFIED: ICD-10-CM

## 2025-04-09 PROCEDURE — 93298 REM INTERROG DEV EVAL SCRMS: CPT | Performed by: STUDENT IN AN ORGANIZED HEALTH CARE EDUCATION/TRAINING PROGRAM

## 2025-04-11 LAB
OHS CV AF BURDEN PERCENT: < 1
OHS CV DC REMOTE DEVICE TYPE: NORMAL
ONEOME COMMENT: NORMAL
ONEOME METHOD: NORMAL

## 2025-04-15 ENCOUNTER — TELEPHONE (OUTPATIENT)
Facility: CLINIC | Age: 80
End: 2025-04-15
Payer: MEDICARE

## 2025-04-15 NOTE — TELEPHONE ENCOUNTER
Called and spoke with Pt in regards to scheduling his skin biopsy with Joanne BLAKELY, Pt is scheduled 5/22.

## 2025-04-16 ENCOUNTER — CLINICAL SUPPORT (OUTPATIENT)
Dept: REHABILITATION | Facility: HOSPITAL | Age: 80
End: 2025-04-16

## 2025-04-16 DIAGNOSIS — G31.84 MCI (MILD COGNITIVE IMPAIRMENT): ICD-10-CM

## 2025-04-16 DIAGNOSIS — G31.84 MILD NEUROCOGNITIVE DISORDER: Primary | ICD-10-CM

## 2025-04-16 DIAGNOSIS — G20.C PRIMARY PARKINSONISM: ICD-10-CM

## 2025-04-16 NOTE — PROGRESS NOTES
"Occupational Therapy   Driving Evaluation    Patrick Short   MRN: [id number]      Referring Physician: Sung Parisi MD  Diagnosis: Mild neurocognitive disorder, Parkinsonism   History: Mr. Short is currently a licensed  but has been referred to Occupational Therapy by his MD for clinical and on-road testing to be used for this driving evaluation.     LADL [id number]   Exp 7/2/2029  Restrictions: 01 corrective lens     His wife Valeria was present for testing and for a conference afterwards to discuss results and recommendations.     SUBJECTIVE: " Some of my adult children want me to do this."     OBJECTIVE:   Physical Function Testing:    ROM:  WFLs B UE's    Head / Neck ROM: WFL's   Pt is right hand dominant   Strength: WFL's B UEs and LE's  Balance:    Static and Dynamic sitting- Normal   Static Standing - Normal    Dynamic standing - Normal   Transfers and Mobility: Independent as he ambulates at a normal pace and without a device   Rapid Pace Walk: 6.6 seconds which is faster and better than the average standard of 8 seconds    Perceptual testing:   Letter cancellation:  34/34 passing score where testing was completed in 1 minute 4 seconds, average time for completion is 1 minute 15 seconds   Line bisection:  WNL's  Maze Test - 39 seconds, no error (Cut point score is 60 seconds or less with 1 error or less)   Trail Making Test A - 33 seconds, no error(s) (Average 29 seconds, Deficient > 78 seconds, rule of thumb: most in 90 seconds)  Trail Making Test B - 255 seconds, 3 error(s) (Average 75 seconds, Deficient > 273 seconds, rule of thumb: most in 3 minutes)  Motor Free Visual Perception Test (MVPT) which assesses figure ground, visual discrimination, spatial relationships, visual closure and visual memory,  34/36 , a passing score, where testing was competed in 10 minutes 58 seconds, average time for completion is 7 minutes   Right/Left discrimination: 4/4     Auditory discrimination: WFL's as he wears " bilateral hearing aids    Vision testing: no glasses were worn during testing (Mr. Short reported that he had cataract surgery a few years ago and no longer needs glasses.)   Peripheral vision: bilateral nasal vision is intact. Left peripheral vision is intact at 85°, Right is limited to 55°.  Spatial frequency: contrast sensitivity is abnormal; it is not within the normal curve, and the curve of his eyes differ more than 5 contrast values at multiple frequency. This is a concern for driving at night.  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  Correctly identified 11/12 road signs and was only 1/3 for depth perception.  Comment: Visual acuity minimum standards for the [facility] is 20/40 in one eye.    Cognitive testing:   Short term memory:  ,    Immediate # recall (Digit span) : 6,  WFL's score    Cognitive sequencing of months of year in reverse:  no errors, mild delay    [facility] Mental Status Exam (UMS) which is used to  detect mild neurocognitive disorder and dementia: Pt scored 25/30 which  scores in the range indicating mild neurocognitive disorder.     Long term memory:  WNL's     Judgment questions regarding rules of the road: 7/8 correct    Insight:  Good as he was very receptive to all recommendations.    Communication:   Expressive aphasia:  Not found  Receptive aphasia:  Not found    Reaction time testin/100s or a second (avg of 3 trials) which is slightly slower than the average standard of 50/100s of a second.    In-car / on-road assessment:  Mr. Short transferred to car  Independently needing mildly increased time and after orienting to the test vehicle, he was able to adjust mirrors and seat, daylin seat belt and start vehicle. he safely pulled the car out of parking space and then successfully drove on side streets per instruction stopping  correctly at stop signs, checked traffic in all directions before negotiating turns correctly and did well encountering light neighborhood traffic. Mr. Short was then directed to a divided highway where he correctly obeyed traffic signs and signals, speed limits, followed at appropriate distances, changed lanes appropriately when asked and had no difficulty stopping vehicle at appropriate distances. he drove at normal speeds keeping up with traffic flow and was comfortable in these traffic conditions. He understood to drive in the right della to let faster traffic pass. Mr. Short was then directed [place] and after correctly merging into higher speed traffic, he was able to drive at higher speeds, was given instructions and was able to change lanes safely and correctly using turn signals when appropriate. He again stayed in the right slower della to let faster traffic pass.  He was then given instructions to get off at a specific exit and was able to exit correctly. Once off of the [place], he was asked to find his way back to hospital grounds without given cues for direction. He used his own judgement to find a route through traffic situations to return to hospital grounds. Mr. Short returned the vehicle the designated parking spot in the garage and was able to parallel park with no difficulty.    The lower scores in reaction time, slower processing and depth perception noted during the clinical assessment did not limit his ability to operate a vehicle this day.     After all testing a conference was held with Mr. Short and his wife to discuss recommendations and limitations. All expressed understanding and agreement.     ASSESSMENT:   Mr. Short demonstrated the ability to operate an automobile this day and demonstrated good insight as he realized that a driving assessment is necessary to show that he is capable of driving. It is recommended that he have a follow up vision test since  although his vision does meet State minimums, there is room for improvement. He has successfully passed this driving evaluation to drive his automobile with the conditions listed below. Findings were notified to the office of Sung Pariis MD. Changes in patient's medical status may warrant retesting in the future as the conclusion reached and the recommendations made reflect findings at the time of this evaluation.    Recommended conditions  Driving within familiar areas near the home  Ophthalmology review  Limit freeway/Interstate driving  No rush-hour driving  Driving during daylight hours only    PLAN:   Discharge patient from outpatient Occupational Therapy services as Pt and MD needs being met with completion and reporting of this evaluation.      ISAAK Doyle, S  Occupational Therapist, Certified  Rehabilitation Specialist  4/16/2025

## 2025-04-17 ENCOUNTER — HOSPITAL ENCOUNTER (OUTPATIENT)
Dept: RADIOLOGY | Facility: HOSPITAL | Age: 80
Discharge: HOME | End: 2025-04-17
Attending: PSYCHIATRY & NEUROLOGY
Payer: MEDICARE

## 2025-04-17 ENCOUNTER — RESEARCH ENCOUNTER (OUTPATIENT)
Dept: RESEARCH | Facility: HOSPITAL | Age: 80
End: 2025-04-17
Payer: MEDICARE

## 2025-04-17 VITALS
HEART RATE: 54 BPM | RESPIRATION RATE: 17 BRPM | DIASTOLIC BLOOD PRESSURE: 88 MMHG | WEIGHT: 208.75 LBS | BODY MASS INDEX: 29.89 KG/M2 | SYSTOLIC BLOOD PRESSURE: 146 MMHG | TEMPERATURE: 99 F | HEIGHT: 70 IN

## 2025-04-17 DIAGNOSIS — I68.0 CEREBRAL AMYLOID ANGIOPATHY (CODE): ICD-10-CM

## 2025-04-17 PROCEDURE — 70551 MRI BRAIN STEM W/O DYE: CPT | Mod: TC

## 2025-04-17 PROCEDURE — 72146 MRI CHEST SPINE W/O DYE: CPT | Mod: TC

## 2025-04-17 NOTE — RESEARCH
Screening Visit (Day -60 - Day - 1)    Study title: A Phase 2, Randomized, Double-blind, Placebo-controlled Study to Evaluate the Efficacy, Safety, Tolerability, and Pharmacodynamics of Intrathecally Administered ALN-CAROLYN in Patients with Cerebral Amyloid Angiopathy (CAA) (cAPPricorn-1)  Sponsor: [employer]  NCT# [id number]     : Nicko Ellis MD    IRB #: 2024.129    Subject ID: [id number]  Date of visit: 04/17/2025     Study Timeline  Double-Blind Treatment Period  04/02/2025 - ICF  04/17/2025 - Screening      Screening Period Procedures     Informed consent - Patient   Informed consent - Partner   Demographics and medical history   Vital Signs (Weight, Height, BP, Heart Rate, Respiratory Rate, Temperature)   Mini Mental State Examination (MMSE)    Clinical Dementia Rating: Global Score (CDR)    Full Physical Examination - (Must be completed by Investigator)   Neurological assessment (including nondilated fundi) - (Must be completed by Investigator)   Single 12-lead ECG - Patients should be supine for at least 10 minutes before each ECG is obtained; ECGs should be performed before blood samples are drawn, when possible   Cental Lab - CBC with differential, CMP, LFTs, Urinalysis, Coagulation       Demographics:    Year of Birth 1945   What is the subject's age? (Years) 79   Sex  [] Female  [x] Male     What is the subject's childbearing potential? [] Of Childbearing Potential  [] Permanently Sterilized  [] Postmenopausal  [] Premenarchal  [] Sterilized  [x] Not Applicable   What is the ethnicity of the subject? []   or            [] Emirati, Emirati American, Chicano/a           [] Bermudian            [] Edwin             [] Another , /a or Ecuadorean origin  [x]  Not  or   []  Not Reported  []  Unknown   What is the race of the subject?(Check all that apply) [x]  White   []  Black or    []   or [place]  "Native   []      []  Chinese   []  Barbadian   []  Polish          [] 1st generation         [] 2nd generation   [] 3rd generation  [] Romanian   [] Irish   [] Other   []    [] Namibian or Shubham   [] Colombian   [] Other    [] Other, specify -        CAA Medical History:    Cerebral Amyloid Angiopathy - Date of Diagnosis 10/10/2022   Age (years) at symptom onset 77     Has the participant experienced a spontaneous,  primary, non-traumatic intracerebral  hemorrhage? Yes   Date of most recent ICH diagnosis 10/10/2022   Was the participant on an antiplatelet  medication at the time of ICH? Yes    If Yes, please specify: Aspirin   Was the participant on an anticoagulant  medication at the time of ICH? No   Did the participant have a temporary  extraventricular drain or other temporary CSF  diversion for any prior ICH? No   Did the participant undergo a neurosurgical  procedure for any prior ICH lobar hematoma?  If yes, please record on Medical History log. No   Has the participant experienced multiple prior  lobar ICH? No   Has the participant experienced transient  focal neurological episodes (TFNE/"amyloid  spell(s)")? No   Has the participant experienced cognitive  impairment or dementia? Yes    Date of symptom onset 12/28/2018   Nature of the symptoms Amnestic   Current clinical staging diagnosis Mild cognitive impairment (MCI)   Has the participant been diagnosed with  cerebral amyloid angiopathy with related  Inflammation (CAA-ri)? No   Has the participant undergone genetic testing  for the [id number] mutation on the CAROLYN gene, which is associated with Pakistani-type CAA? 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   If Yes, please record " procedure  on Medical History log. No       Vital Signs:    Time collected 09:21 (24-hour clock)       Weight (kg) 94.7   Height (cm) 177.8   BMI (kg/m2) 29.96       Blood pressure should be taken using the same arm at each visit.   Blood pressure measuring position    [x]Seated  []Supine   Blood pressure measuring arm [x]Right  []Left   Systolic blood pressure 146 mmHg   Diastolic blood pressure 88 mmHg   Heart rate  54 beats/min   Respiratory Rate  17 breaths/min   Temperature location [x] Oral   [] Temporal  [] Tympanic  [] Axillary   Temperature (C) 37      Central Labs:  Screening Lab Collection (Blood and Urine)  Were all protocol required labs obtained? Yes  Time collected: 10:02 (24-hour clock)  Performed by: Hospital lab    Single 12-Lead ECG:    Time ECG performed  09:39 (24-hour clock)   Overall interpretation of ECG  If clinically significant, document as an adverse event Abnormal, not clinically significant     Neuroimaging:  MRI Brain and MRI Whole Spine scheduled for 04/17/2025      Clinical Dementia Rating: Global Score (CDR)     Was the CDR performed? Yes   Date of assessment: 04/17/2025       Who performed the CDR? () []PI/Sub-I  [x][facility]   Method of Completion [x]Tablet   []Paper     First and Last Name Mariana Cristian     Mini Mental State Examination (MMSE)     Was the MMSE performed? Yes   Date of assessment: 04/17/2025       Who performed the MMSE? () []PI/Sub-I  [x][facility]   Method of Completion [x]Tablet   []Paper     First and Last Name Mariana Cristian       See Awa Zuniga PA-C Note from 04/02/2025 for Investigator-specific tasks        The patient was notified that they would be contacted once all screening procedures have been completed to determine if they are eligible to enroll in the study.   The patient was also advised to contact CRC or PI with any study-related questions or concerns and he expressed understanding of information discussed.

## 2025-05-01 ENCOUNTER — PATIENT MESSAGE (OUTPATIENT)
Dept: RESEARCH | Facility: HOSPITAL | Age: 80
End: 2025-05-01
Payer: MEDICARE

## 2025-05-01 DIAGNOSIS — E85.4 CEREBRAL AMYLOID ANGIOPATHY: Primary | ICD-10-CM

## 2025-05-01 DIAGNOSIS — Z00.6 RESEARCH STUDY PATIENT: ICD-10-CM

## 2025-05-01 DIAGNOSIS — I68.0 CEREBRAL AMYLOID ANGIOPATHY: Primary | ICD-10-CM

## 2025-05-05 ENCOUNTER — RESEARCH ENCOUNTER (OUTPATIENT)
Dept: RESEARCH | Facility: HOSPITAL | Age: 80
End: 2025-05-05
Payer: MEDICARE

## 2025-05-05 ENCOUNTER — LAB VISIT (OUTPATIENT)
Dept: LAB | Facility: HOSPITAL | Age: 80
End: 2025-05-05
Attending: PSYCHIATRY & NEUROLOGY
Payer: MEDICARE

## 2025-05-05 ENCOUNTER — OFFICE VISIT (OUTPATIENT)
Dept: NEUROLOGY | Facility: CLINIC | Age: 80
End: 2025-05-05
Payer: MEDICARE

## 2025-05-05 VITALS
SYSTOLIC BLOOD PRESSURE: 156 MMHG | BODY MASS INDEX: 31.26 KG/M2 | HEART RATE: 50 BPM | HEIGHT: 70 IN | WEIGHT: 218.38 LBS | DIASTOLIC BLOOD PRESSURE: 71 MMHG | RESPIRATION RATE: 18 BRPM | TEMPERATURE: 98 F

## 2025-05-05 DIAGNOSIS — E85.4 CEREBRAL AMYLOID ANGIOPATHY: Primary | ICD-10-CM

## 2025-05-05 DIAGNOSIS — E85.4 CEREBRAL AMYLOID ANGIOPATHY: ICD-10-CM

## 2025-05-05 DIAGNOSIS — I68.0 CEREBRAL AMYLOID ANGIOPATHY: Primary | ICD-10-CM

## 2025-05-05 DIAGNOSIS — Z00.6 RESEARCH STUDY PATIENT: ICD-10-CM

## 2025-05-05 DIAGNOSIS — Z00.6 RESEARCH EXAM: ICD-10-CM

## 2025-05-05 DIAGNOSIS — I68.0 CEREBRAL AMYLOID ANGIOPATHY: ICD-10-CM

## 2025-05-05 LAB
ABSOLUTE EOSINOPHIL (OHS): 0.22 K/UL
ABSOLUTE MONOCYTE (OHS): 0.31 K/UL (ref 0.3–1)
ABSOLUTE NEUTROPHIL COUNT (OHS): 2.44 K/UL (ref 1.8–7.7)
APTT PPP: 26.5 SECONDS (ref 21–32)
BASOPHILS # BLD AUTO: 0.05 K/UL
BASOPHILS NFR BLD AUTO: 1.1 %
ERYTHROCYTE [DISTWIDTH] IN BLOOD BY AUTOMATED COUNT: 13.3 % (ref 11.5–14.5)
HCT VFR BLD AUTO: 43.1 % (ref 40–54)
HGB BLD-MCNC: 14.5 GM/DL (ref 14–18)
IMM GRANULOCYTES # BLD AUTO: 0.01 K/UL (ref 0–0.04)
IMM GRANULOCYTES NFR BLD AUTO: 0.2 % (ref 0–0.5)
INR PPP: 1 (ref 0.8–1.2)
LYMPHOCYTES # BLD AUTO: 1.63 K/UL (ref 1–4.8)
MCH RBC QN AUTO: 31 PG (ref 27–31)
MCHC RBC AUTO-ENTMCNC: 33.6 G/DL (ref 32–36)
MCV RBC AUTO: 92 FL (ref 82–98)
NUCLEATED RBC (/100WBC) (OHS): 0 /100 WBC
PLATELET # BLD AUTO: 130 K/UL (ref 150–450)
PMV BLD AUTO: 11.6 FL (ref 9.2–12.9)
PROTHROMBIN TIME: 11.2 SECONDS (ref 9–12.5)
RBC # BLD AUTO: 4.67 M/UL (ref 4.6–6.2)
RELATIVE EOSINOPHIL (OHS): 4.7 %
RELATIVE LYMPHOCYTE (OHS): 35 % (ref 18–48)
RELATIVE MONOCYTE (OHS): 6.7 % (ref 4–15)
RELATIVE NEUTROPHIL (OHS): 52.3 % (ref 38–73)
WBC # BLD AUTO: 4.66 K/UL (ref 3.9–12.7)

## 2025-05-05 PROCEDURE — 85025 COMPLETE CBC W/AUTO DIFF WBC: CPT

## 2025-05-05 PROCEDURE — 85610 PROTHROMBIN TIME: CPT

## 2025-05-05 PROCEDURE — 85730 THROMBOPLASTIN TIME PARTIAL: CPT

## 2025-05-05 PROCEDURE — 36415 COLL VENOUS BLD VENIPUNCTURE: CPT

## 2025-05-05 PROCEDURE — 99213 OFFICE O/P EST LOW 20 MIN: CPT | Mod: PBBFAC | Performed by: PSYCHIATRY & NEUROLOGY

## 2025-05-05 NOTE — RESEARCH
Pre-Dose/Day -1 Visit    Study title: A Phase 2, Randomized, Double-blind, Placebo-controlled Study to Evaluate the Efficacy, Safety, Tolerability, and Pharmacodynamics of Intrathecally Administered ALN-CAROLYN in Patients with Cerebral Amyloid Angiopathy (CAA) (cAPPricorn-1)  Sponsor: [employer]  NCT# [id number]     : Nicko Ellis MD    IRB #: 2024.129    Subject ID: [id number]  Date of visit: 05/05/2025     Study Timeline  Double-Blind Treatment Period  04/02/2025 - ICF  04/17/2025 - Screening  05/05/2025 - Day -1 Visit       Pre-Dose/Day -1 Procedures    BEFORE STUDY DRUG ADMINISTRATION      Vital Signs (Weight, Height, BP, Heart Rate, Respiratory Rate, Temperature)   Clinical Dementia Rating: Global Score (CDR)    Modified [place] Scale (mRS) - (Must be completed by Investigator)   Full Physical Examination - (Must be completed by Investigator)   Neurological assessment (including nondilated fundi) - (Must be completed by Investigator)   Stroke-Specific Quality of Life (SS-QOL) - COMPLETED ON TABLET (Back-up paper form)   EuroQol-5 Domains-5L (EQ-5D-5L) - COMPLETED ON TABLET (Back-up paper form)   Repeatable Battery for the Assessment of Neuropsychological Status (RBANS) -   COMPLETED ON TABLET (Back-up paper form)   Trail Making Test - COMPLETED ON TABLET (Back-up paper form)   Neuropsychiatric Inventory Questionnaire (NPI-Q) - COMPLETED ON TABLET (Back-up paper form)   [place] - Suicide Severity Rating Scale (C-SSRS) - COMPLETED ON TABLET (Back-up paper form)   Local Lab - CBC with differential, Coagulation   Pre-dose ECG, Plasma PK  Central Labs - Immunogenicity (ADA), Blood plasma PD and exploratory biomarkers ,LFTs, Urinalysis,       VITAL SIGNS     Time collected 10:21       Weight (kg) 99   Height (cm) 177.8   BMI (kg/m2) 31.33       Blood pressure should be taken using the same arm at each visit.   Blood pressure measuring position    [x]Seated  []Supine   Blood  pressure measuring arm [x]Right  []Left   Systolic blood pressure 156 mmHg   Diastolic blood pressure 71 mmHg   Heart rate  50 beats/min   Respiratory Rate  18 breaths/min   Temperature location [x] Oral   [] Temporal  [] Tympanic  [] Axillary   Temperature (C) 36.7      Pre-Dose Central Labs:  Lab Collection (Blood and Urine)  Were all protocol required labs obtained? Yes  Time collected: 10:59 (24-hour clock)  Performed by: Hospital Lab      Pre-dose ECGs and Plasma PK Actual Times:  Patients should be supine for at least 10 minutes before each ECG is obtained; ECGs should be performed before blood samples are drawn, when possible  Sampling Time 12-lead ECG Blood PK Samples   Pre-dose 10:43  Abnormal - Not Clinically Significant     10:44  Abnormal - Not Clinically Significant     10:45  Abnormal - Not Clinically Significant  10:59         Assessments:    Modified [place] Scale (mRS) (Clinician-reported)    Was the Modified [place] Scale performed? Yes   Date of assessment: 05/05/2025       Who performed the Modified [place] Scale? () [x]PI/Sub-I   Method of Completion [x]Tablet (Preferred)  []Paper (Back-up)    First and Last Name Nicko Ellis MD     RBANS    Was the RBANS performed? Yes   Date of assessment: 05/05/2025       Who performed the RBANS? () []PI/Sub-I  [x][facility]   Method of Completion [x]Tablet (Preferred)  []Paper (Back-up)    First and Last Name Mariana Foster     [place] Making Test     Was the Trail Making Test performed? Yes   Date of assessment: 05/05/2025       Who performed the Trail Making Test ? () []PI/Sub-I  [x][facility]   Method of Completion [x]Tablet (Preferred)  []Paper (Back-up)    First and Last Name Mariana Foster      SS-QOL (Patient-reported)    Was the SS-QOL performed? Yes   Date of assessment: 05/05/2025       Responses provided by [x]Patient   []Caregiver/Study Partner    Method of Completion [x]Tablet (Preferred)  []Paper (Back-up)      EQ-5D-5L (Patient-reported)    Was the [id number]? Yes   Date of assessment: 05/05/2025       Responses provided by [x]Patient   []Caregiver/Study Partner    Method of Completion [x]Tablet (Preferred)  []Paper (Back-up)     NPI-Q (Patient-reported)    Was the NPI-Q performed? Yes   Date of assessment: 05/05/2025       Responses provided by []Patient   [x]Caregiver/Study Partner    Method of Completion [x]Tablet (Preferred)  []Paper (Back-up)     [place] - Suicide Severity Rating Scale (C-SSRS)     Was the C-SSRS performed? Yes   Date of assessment: 05/05/2025       Who performed the Trail Making Test ? () []PI/Sub-I  [x][facility]   Method of Completion [x]Tablet (Preferred)  []Paper (Back-up)    First and Last Name Mariana Foster       ADVERSE EVENTS  EMR and patient were surveyed for recent admissions and other adverse events. There are no new adverse events to report at this time.    PRIOR/CONCOMITANT MEDICATIONS  NOTE: all prior/concomitant medication will be documented and maintained in patient-specific Concomitant Log in subject files  No changes to medications since last visit.      Visit Summary/Follow-up Items:  Day 1/Randomization Visit is scheduled fot 05/06/25   Patient was advised to contact CRC or PI with any study-related questions or concerns and he expressed understanding of information discussed.

## 2025-05-05 NOTE — PROGRESS NOTES
Study Eligibility Checklist     Study title: A Phase 2, Randomized, Double-blind, Placebo-controlled Study to Evaluate the Efficacy, Safety, Tolerability, and Pharmacodynamics of Intrathecally Administered ALN-CAROLYN in Patients with Cerebral Amyloid Angiopathy (CAA) (cAPPricorn-1)  Sponsor: [employer]  NCT# [id number]     :   IRB #: 2024.129     Subject ID: [id number]    INCLUSION CRITERIA    Inclusion Criteria for sCAA Patients   Age and Sex   1 Yes Male or female aged >=50 years at the time of informed consent   Patient and Disease Characteristics   2 Yes Individuals with probable CAA per the [place] Criteria Version 2.0, characterized by the  following history of clinical presentation and MRI findings at screening:  a. Presentation with spontaneous intracerebral hemorrhage, transient focal neurological  episodes or cognitive impairment or dementia  b. At least 2 strictly lobar hemorrhagic lesions on MRI, in any combination (ICH, CMB,  or foci of cSS or cSAH), or  c. At least 1 lobar hemorrhagic lesion plus 1 white matter feature (severe PVS in the  -PVS or WMH in a multispot pattern), and  d. Absence of any deep hemorrhagic lesions (ie, intracerebral hemorrhage or  microbleeds in basal ganglia, thalamus, or brainstem) on MRI, and  e. Absence of other cause of the hemorrhagic lesions. Other causes of hemorrhagic  lesion: antecedent head trauma, hemorrhagic transformation of an ischaemic stroke,  arteriovenous malformation, haemorrhagic tumour, CNS vasculitis. In addition, other  causes of cSS and acute cSAH should also be excluded.   3 Yes A minimum of 4 lobar CMBs confirmed by MRI; or multifocal cSS (as defined in  [Joemou 2017] with 2 or more lobar CMBs confirmed by MRI; or multiple lobar ICH  with 2 or more lobar CMBs confirmed by MRI.   Inclusion Criteria for D-CAA Patients   Age and Sex   4 N/A Male or female aged >=30 years at the time of informed consent   Patient  and Disease Characteristics   5 N/A Individuals with a known [id number] CAROLYN gene mutation for [place]-type CAA   Inclusion Criteria for Both sCAA and D-CAA Patients   Patient and Disease Characteristics   6 Yes Corrected vision at 20/50 or better as measured per local procedures or sourced from  documented medical history, for the subset of patients who will have BOLD-fMRI  assessments.   7 Yes Able and willing to meet all study requirements in the opinion of the Investigator,  including travel to study center, procedures, measurements, and visits, including:  a. Adequately supportive psychosocial circumstances. Must have a study partner who  in the Investigators judgement is able to provide accurate information regarding the  patients cognitive and functional abilities, who agrees to provide information at  applicable study visits which require informant input for scale completion  b. Able to undergo MRI scans and able to tolerate them (eg, no metal implants including  MRI incompatible intrauterine devices, myoclonus of a severity that precludes MRI  scans or any condition that renders testing intolerable for the patient)  c. Body Mass Index (BMI) >=18 and <=34 kg/m2 at Screening visit  d. Able to tolerate LP   8 Yes Evaluable brain MRI imaging at screening   Informed Consent   9 Yes Patient is able to understand, is willing and able to comply with the study requirements  and to provide written informed consent.     EXCLUSION CRITERIA    Exclusion Criteria for Both sCAA and D-CAA Patients   Disease-specific Conditions   1 No Moderate or Severe Stage AD (defined as global clinical dementia rating [CDR] 2.0 or  3.0, respectively) or significant CI (Mini Mental State Examination [MMSE] <22) at  screening   2 No History of previous clinical ICH with onset less than 90 days prior to anticipated  randomization in the study   Laboratory Assessments   3 No Has any of the following laboratory parameter assessments at  screening:  a. Alanine aminotransferase or aspartate aminotransferase >= 3.0 x upper limit of normal  (ULN)  b. Total bilirubin >= 2.0 x ULN.  c. International normalized ratio >1.4  d. Platelet count <100,000/microliter (?L)  e. Estimated glomerular filtration (eGFR) of <30mL/min/1.73m2 (calculation will be  based on the Chronic Kidney Disease Epidemiology Collaboration [CKD-EPI]  creatinine formula (2021) (refer to Section 10.1).   Prior/Concomitant Therapy   4 No Treatment with another investigational drug, biological agent, or device within 6 months  of Screening, or 5 half-lives of investigational agent, whichever is longer. Any agent that  has received health agency authorization (including for emergency use) by local or  regional regulatory authorities is not considered investigational.   5 No Use of the following medications is prohibited unless the dose has been stable (prescribed  dosing regimen is unchanged) for at least 12 weeks prior to Screening and the dosing  regimen is not anticipated to change during the study: antidepressants, antipsychotics,  anxiolytics, benzodiazepines, acetylcholinesterase inhibitors, anticonvulsants, mood  stabilizers, and memantine.   6 No Antiplatelet or anticoagulant therapy within the 10 days prior to Screening or anticipated  use during the study. This includes but is not limited to: clopidogrel, dipyridamole,  warfarin, dabigatran, rivaroxaban and apixaban. Aspirin is allowed.   7 No Treatment with amyloid-targeting antibody prior to Screening   8 No Treatment with another IT administered medication within the last 1 year prior to  Screening   9 No Prior treatment with a CNS-targeted siRNA or antisense oligonucleotide (ASO)   10 No Any history of gene therapy or cell transplantation or experimental brain surgery   11 No Presence of an implanted shunt for the drainage of CSF or an implanted CNS catheter   Medical Conditions   12 No Clinically significant  electrocardiogram (ECG) abnormalities at Screening, in the opinion  of the Investigator, or a Fridericia-corrected QT interval (QTcF) >450 msec for males or  >470 msec for females at Screening.   13 No Has systolic blood pressure >150 mmHg and/or a diastolic blood pressure >90 mmHg  after 10 minutes of rest at screening.   14 No Active fungal or bacterial systemic infection that will not be completely treated at least 7  days prior to the study drug dosing on Day 1   15 No Attempted suicide, suicidal ideation with a plan that required hospital admission and/or  change in level of care within 12 months prior to Screening. For patients with suicidal  behaviors within the last 12 months, a risk assessment should be done by an  appropriately-qualified health professional to assess whether it is safe for the patient to  participate in the study. In addition, patients deemed by the Investigator to be at  significant risk of suicide, major depressive episode, psychosis, confusional state, or  violent behavior should be excluded.   16 No History of bleeding diathesis.   17 No A medical history of brain or spinal disease that would interfere with the LP process, CSF  circulation or safety assessment, including but not limited to tumors or abnormalities  visualized by MRI or computed tomography, suggestion of raised intracranial pressure on  MRI or ophthalmic examination, spinal stenosis, or curvature, Chiari malformation,  hydrocephalus, syringomyelia, tethered spinal cord syndrome and connective tissue  disorders such as Nuno-Danlos syndrome and Marfan syndrome.   18 No History of uncontrolled seizures within the last 6 months prior to screening.   19 No Hospitalization for any major medical or surgical procedure involving general anesthesia  within 12 weeks of Screening.   20 No Has other medical conditions or comorbidities which, in the opinion of the Investigator,  would interfere with study compliance or data  interpretation; or, in the opinion of the  Investigator, taking part in the study would jeopardize the safety of the patient.   Contraception, Pregnancy, and Breastfeeding   21 No Is not willing to comply with the contraceptive requirements during the study period, as  described in Section 5.9.1.   22 No Female patient is pregnant, planning a pregnancy, or breast-feeding.   Alcohol Use   23 No History of alcohol abuse, within the last 12 months before Screening, in the opinion of  the Investigator.       Confirmation of Eligibility  YES NO   This subject meets all of the inclusion and none of the exclusion criteria for the cAPPricorn-1 study per study protocol version Amendment 1 - verified by CRC, Mariana Foster , and confirmed by PI, Nicko Ellis MD, per co-signature of this note. [x] []

## 2025-05-06 ENCOUNTER — RESEARCH ENCOUNTER (OUTPATIENT)
Dept: RESEARCH | Facility: HOSPITAL | Age: 80
End: 2025-05-06
Payer: MEDICARE

## 2025-05-06 ENCOUNTER — HOSPITAL ENCOUNTER (OUTPATIENT)
Dept: RADIOLOGY | Facility: HOSPITAL | Age: 80
Discharge: HOME | End: 2025-05-06
Attending: PSYCHIATRY & NEUROLOGY
Payer: MEDICARE

## 2025-05-06 ENCOUNTER — OFFICE VISIT (OUTPATIENT)
Dept: NEUROLOGY | Facility: CLINIC | Age: 80
End: 2025-05-06
Payer: MEDICARE

## 2025-05-06 DIAGNOSIS — I68.0 CEREBRAL AMYLOID ANGIOPATHY: Primary | ICD-10-CM

## 2025-05-06 DIAGNOSIS — Z00.6 RESEARCH STUDY PATIENT: ICD-10-CM

## 2025-05-06 DIAGNOSIS — I68.0 CEREBRAL AMYLOID ANGIOPATHY: ICD-10-CM

## 2025-05-06 DIAGNOSIS — Z00.6 RESEARCH EXAM: ICD-10-CM

## 2025-05-06 DIAGNOSIS — E85.4 CEREBRAL AMYLOID ANGIOPATHY: ICD-10-CM

## 2025-05-06 DIAGNOSIS — E85.4 CEREBRAL AMYLOID ANGIOPATHY: Primary | ICD-10-CM

## 2025-05-06 LAB
CLARITY CSF: CLEAR
COLOR CSF: COLORLESS
CSF TUBE NUMBER (OHS): 1
CSF TUBE NUMBER (OHS): 1
GLUCOSE CSF-MCNC: 52 MG/DL (ref 40–70)
LYMPHOCYTES NFR CSF MANUAL: 93 % (ref 40–80)
MONOS+MACROS NFR CSF MANUAL: 5 % (ref 15–45)
NEUTROPHILS NFR CSF MANUAL: 3 %
PROT CSF-MCNC: 62 MG/DL (ref 15–40)
RBC # CSF: 1 /CU MM
SPECIMEN VOL CSF: 2 ML
WBC # CSF: 1 /CU MM

## 2025-05-06 PROCEDURE — 89051 BODY FLUID CELL COUNT: CPT | Performed by: PSYCHIATRY & NEUROLOGY

## 2025-05-06 PROCEDURE — 82945 GLUCOSE OTHER FLUID: CPT | Performed by: PSYCHIATRY & NEUROLOGY

## 2025-05-06 PROCEDURE — 62328 DX LMBR SPI PNXR W/FLUOR/CT: CPT | Performed by: RADIOLOGY

## 2025-05-06 PROCEDURE — 84157 ASSAY OF PROTEIN OTHER: CPT | Performed by: PSYCHIATRY & NEUROLOGY

## 2025-05-06 NOTE — H&P
Radiology History & Physical      SUBJECTIVE:     Chief Complaint: CAA    History of Present Illness:  Patrick Short is a 79 y.o. male who presents for lumbar puncture with intrathecal administration of medication/placebo for cAPPricorn-1 clinical trial.  Past Medical History:   Diagnosis Date    CAD (coronary artery disease) 07/26/2016    3 stents    Cerebral amyloid angiopathy     Cerebral ischemic stroke due to global hypoperfusion with watershed infarct 10/10/2022    Heart block     MI (myocardial infarction)     Parkinsonism     Primary hypertension 11/21/2022    Reflux     Vasovagal syncope 2/11/2025     Past Surgical History:   Procedure Laterality Date    ANGIOGRAM, CORONARY, WITH LEFT HEART CATHETERIZATION N/A 12/23/2024    Procedure: Angiogram, Coronary, with Left Heart Cath;  Surgeon: Sung Mendoza MD;  Location: [facility] CATH LAB;  Service: Cardiology;  Laterality: N/A;    CARDIAC CATHETERIZATION      EYE SURGERY Left 02/2017    HIP SURGERY Right 12/2021    INSERTION OF IMPLANTABLE LOOP RECORDER N/A 1/24/2023    Procedure: Insertion, Implantable Loop Recorder;  Surgeon: ALBERTO Roth MD;  Location: [facility] EP LAB;  Service: Cardiology;  Laterality: N/A;  CHERIE LOPEZ MDT, [facility], , 3 Prep    LEFT HEART CATHETERIZATION Left 12/23/2024    Procedure: Left heart cath;  Surgeon: Sung Mendoza MD;  Location: [facility] CATH LAB;  Service: Cardiology;  Laterality: Left;       Home Meds:   Prior to Admission medications    Medication Sig Start Date End Date Taking? Authorizing Provider   aspirin 81 MG Chew Take 1 tablet (81 mg total) by mouth once daily. 11/10/20 5/5/25  Anant Lynch MD   atorvastatin (LIPITOR) 40 MG tablet TAKE 1 TABLET BY MOUTH EVERY DAY 5/1/24   Misbah Cordero MD   carbidopa-levodopa  mg (SINEMET)  mg per tablet Take 1 tablet by mouth 3 (three) times daily for 28 days, THEN 1.5 tablets 3 (three) times daily. 10/3/24 10/31/25  Cookie Kuo, GATO   coQ10,  ubiquinol, 100 mg Cap Take by mouth.    Provider, Historical   finasteride (PROSCAR) 5 mg tablet Take 1 tablet (5 mg total) by mouth once daily. 5/1/24   Misbah Cordero MD   pantoprazole (PROTONIX) 40 MG tablet Take 1 tablet (40 mg total) by mouth once daily. 5/1/24   Misbah Cordero MD   telmisartan (MICARDIS) 40 MG Tab Take 1 tablet (40 mg total) by mouth once daily. 4/2/25 4/2/26  Awa Zuniga PA-C     Anticoagulants/Antiplatelets: no anticoagulation    Allergies: Review of patient's allergies indicates:  No Known Allergies  Sedation History:  no adverse reactions    Review of Systems:   Hematological: no known coagulopathies  Respiratory: no shortness of breath  Cardiovascular: no chest pain  Gastrointestinal: no abdominal pain  Genito-Urinary: no dysuria  Musculoskeletal: negative  Neurological: no TIA or stroke symptoms         OBJECTIVE:     Vital Signs (Most Recent)       Physical Exam:    General: no acute distress  Mental Status: alert and oriented to person, place and time  HEENT: normocephalic, atraumatic  Chest: unlabored breathing  Heart: regular heart rate  Abdomen: distended  Extremity: moves all extremities    ASSESSMENT/PLAN:     Sedation Plan: local only  Patient will undergo:  lumbar puncture with intrathecal administration of medication/placebo for cAPPricorn-1 clinical trial.    Hilton Rodriguez PA-C  Interventional Radiology

## 2025-05-06 NOTE — RESEARCH
Day 1/Randomization Visit     Study title: A Phase 2, Randomized, Double-blind, Placebo-controlled Study to Evaluate the Efficacy, Safety, Tolerability, and Pharmacodynamics of Intrathecally Administered ALN-CAROLYN in Patients with Cerebral Amyloid Angiopathy (CAA) (cAPPricorn-1)  Sponsor: [employer]  NCT# [id number]     : Nicko Ellis MD    IRB #: 2024.129    Subject ID: [id number]  Date of visit: 05/06/2025     Study Timeline  Double-Blind Treatment Period  04/02/2025 - ICF  04/17/2025 - Screening  05/05/2025 - Day -1 Visit   5/6/2025 - Day 1 Visit      Day 1/Randomization Procedures    ON DAY OF STUDY DRUG ADMINISTRATION    Randomization - Randomization can be performed up to 5 business days before study drug dosing      Lumbar Puncture and CSF Collection -   Local Lab - CSF for number of RBC and WBC (and differential), protein and glucose  Levels  Central Lab- Pre-dose CSF PK, CSF PD and exploratory biomarkers   Study Drug Administration  - ALN-CAROLYN or placebo will be administered as a single IT bolus injection pushed over 2 to 3 minutes. Administration will be via LP using an atraumatic spinal needle with the needle opening oriented rostrally (toward the patients head). The needle should be inserted into the L3/L4 space, although placement at a different level is allowed if patients anatomy or clinical judgment dictates. If institutional guidelines allow, local anesthesia may be used for the  procedure; the use of local anesthetic should be recorded as concomitant medication. Spinal ultrasound may also be used but is not required. Fluoroscopy guidance should be used as needed if LP without imaging are unsuccessful or as per local site guidances. Detailed instructions for study drug administration are found in the Pharmacy Manual.      AFTER STUDY DRUG ADMINISTRATION - Post dose observations for at least 3 hours     Post-dose ECGs, Plasma PK - see collection schedule below     3-hour post dose Neurological assessment (including nondilated fundi) - (Must be completed by Investigator)   Discharge Neurological assessment (including nondilated fundi) - (Must be completed by Investigator)         CSF PK Sample   10:20 - 10:32 Time Collected    ONLY Pre-dose CSF PK       Post-dose ECGs and Plasma PK Actual Times:  Patients should be supine for at least 10 minutes before each ECG is obtained; ECGs should be performed before blood samples are drawn, when possible  Sampling Time 12-lead ECG Blood PK Samples   00:15 (±5 min) 10:55  Abnormal - Not Clinically Significant     10:56  Abnormal - Not Clinically Significant     10:56  Abnormal - Not Clinically Significant  10:57   00:30 (±5 min) 11:10  Abnormal - Not Clinically Significant     11:10  Abnormal - Not Clinically Significant     11:11  Abnormal - Not Clinically Significant  11:12   01:00 (±5 min) 11:33  Abnormal - Not Clinically Significant     11:34  Abnormal - Not Clinically Significant     11:35  Abnormal - Not Clinically Significant  11:37   02:00 (±5 min) 12:36  Abnormal - Not Clinically Significant     12:37  Abnormal - Not Clinically Significant     12:37  Abnormal - Not Clinically Significant  12:40   04:00 (±10 min) 14:32  Abnormal - Not Clinically Significant     14:33  Abnormal - Not Clinically Significant     14:33  Abnormal - Not Clinically Significant  14:39   06:00 (±15 min) 16:30  Abnormal - Not Clinically Significant     16:31  Abnormal - Not Clinically Significant     16:32  Abnormal - Not Clinically Significant  16:36         TIME OF STUDY DRUG ADMINISTRATION - 10:32 - 10:35    ADVERSE EVENTS  EMR and patient were surveyed for recent admissions and other adverse events. There are no new adverse events to report at this time.    PRIOR/CONCOMITANT MEDICATIONS  NOTE: all prior/concomitant medication will be documented and maintained in patient-specific Concomitant Log in subject files  No changes to medications since last  visit     POST-DOSE OBSERVATION (Post dose observations for at least 3 hours)  TIME SUBJECT DISCHARGED TO HOME: 17:20      Visit Summary/Follow-up Items:  Day 2 scheduled for 05/07/2025  Patient was advised to contact CRC or PI with any study-related questions or concerns and he expressed understanding of information discussed.

## 2025-05-06 NOTE — PROCEDURES
Radiology Post-Procedure Note    Pre Op Diagnosis: CAA    Post Op Diagnosis: Same    Procedure: lumbar puncture    Procedure performed by: Hilton Rodriguez PA-C    Written Informed Consent Obtained: Yes    Specimen Removed: 20 mL CSF    Estimated Blood Loss: Minimal    Findings: Following written informed consent and sterile prep and drape, a 22 gauge spinal needle was inserted at L4 - L5 intralaminar space under fluoroscopic surveillance..  20 mL clear CSF removed and sent to the lab for further analysis. 20 mL of medication/placebo injected intrathecally per cAPPricorn-1 trial protocol. There were no complications.    Patient tolerated procedure well.  See report for further details.    Hilton Rodriguez PA-C  Interventional Radiology

## 2025-05-06 NOTE — PROGRESS NOTES
Day 1 (Randomization/Dosing Visit) - Prior to Discharge    Study title: A Phase 2, Randomized, Double-blind, Placebo-controlled Study to Evaluate the Efficacy, Safety, Tolerability, and Pharmacodynamics of Intrathecally Administered ALN-CAROLYN in Patients with Cerebral Amyloid Angiopathy (CAA) (cAPPricorn-1)  Sponsor: [employer]  NCT# [id number]     : Nicko Ellis MD    IRB #: 2024.129    Time of Exam: 16:30    Patrick Short is stable for discharge to home and will return to clinic tomorrow for 24 hour post-dose PK blood draw and EKG.  Physical Exam    If Abnormal, document findings:   General Appearance Normal    Skin Normal      HEENT Normal      Respiratory Normal    Cardiovascular Normal    Gastrointestinal Normal    Musculoskeletal Normal    Dermatological Normal    Thyroid Normal    Lymph nodes Normal      Neurological Exam    Category: Mental Status   If Abnormal, document findings:   Level of consciousness (alertness) Normal    Orientation Normal    Attention and concentration Normal      Language Normal    Memory Abnormal - Not Clinically Significant  2/3 @ 5 min     Category: Meningeal signs   If Abnormal, document findings:   Nuchal rigidity, Kernigs sign, Brudzinskis sign, meningismus Normal      Category: Cranial nerves*  *2 optic, 3 oculomotor, 4 trochlear, 5 trigeminal, 6 abducens, 7 facial, 8 vestibulocochlear, 9 glossopharyngeal, 10 vagus, 11 spinal accessory, 12 hypoglossal.   If Abnormal, document findings:   Fundoscopic optic nerve examination Normal    Visual acuity Abnormal - Not Clinically Significant  20/50   Visual fields Normal      Pupillary light reflex Normal    Extraocular eye movements, including observation for nystagmus Normal    Facial sensation Normal    Facial muscle strength/symmetry Normal    Hearing Normal    Palatal movement/ uvula position Normal    Dysarthria Normal    Head rotation/shoulder elevation (sternocleinomastoid and  trapezius) Normal    Tongue movement Normal      Category: Motor examination   If Abnormal, document findings:   Muscle bulk Normal    Tone, including examination for rigidity, spasticity, paratonia, with characterization Normal    Abnormal/involuntary movements, including tremor, myoclonus, dystonia, and other abnormal/involuntary motor activity, with characterization Normal    Strength testing Normal      Category: Reflex examination  If Abnormal, document findings:   Deep tendon reflexes Normal    Plantar (Babinski) reflex Normal    Primitive reflexes (frontal release signs) when appropriate Normal      Category: Sensory examination  If Abnormal, document findings:   Multimodality sensory examination Abnormal - Not Clinically Significant  Mild reduced vibration b/l LE     Category: Coordination examination  If Abnormal, document findings:   Dexterity (e.g., finger tapping) Abnormal - Not Clinically Significant     Rapid alternating movements Abnormal - Not Clinically Significant     Finger-to-nose and heel-to-shin testing or equivalent Normal        Category: Gait and balance examination   If Abnormal, document findings:   Stance Normal    Romberg test Normal    Casual gait evaluation Normal      Heel, toe, and tandem gait evaluation Normal      I have spent a total of 62 minutes in face-to-face encounter, which includes performance of the neurological and general examination(s) and post LP monitoring and check-in

## 2025-05-06 NOTE — PROGRESS NOTES
10:45 Received patient from interventional radiology. Respirations even and unlabored, AAO X 4. Pt ambulating with even and steady gait. Pt denies pain or headache at this time.   10:57 00:15 PK: PIV noted to left FA c/d/I/. PIV port cleansed with ETOH and flush with 10 ml of NS; 10 ml of blood aspirated for waste; 5 ml of blood aspirated for specimen collection; 10 ml NS flush completed; PIV locked and green mary beth-cap applied. Pt tolerated well without any distress or c/o voiced.  11:12 00:30 PK: PIV noted to left FA c/d/I/. PIV port cleansed with ETOH and flush with 10 ml of NS; 10 ml of blood aspirated for waste; 5 ml of blood aspirated for specimen collection; 10 ml NS flush completed; PIV locked and green mary beth-cap applied. Pt tolerated well without any distress or c/o voiced.  10:55 00:15 ECG: ECG obtained by Joann Craven LPN  11:10 00:30 ECG: ECG obtained by Joann Craven LPN  11:18 Pt ambulating to 8th floor with all personal belongings; accompanied by Joann Craven LPN  11:34 01:00 ECG: ECG obtained by Joann Craven LPN  11:37 01:00 PK: PIV noted to left FA c/d/I/. PIV port cleansed with ETOH and flush with 10 ml of NS; 10 ml of blood aspirated for waste; 5 ml of blood aspirated for specimen collection; 10 ml NS flush completed; PIV locked and green mary beth-cap applied. Pt tolerated well without any distress or c/o voiced.  12:36 02:00 ECG: ECG obtained by Joann Craven LPN  12:40 02:00 PK: PIV noted to left FA c/d/I/. PIV port cleansed with ETOH and flush with 10 ml of NS; 10 ml of blood aspirated for waste; 5 ml of blood aspirated for specimen collection; 10 ml NS flush completed; PIV locked and green mary beth-cap applied by Joann Craven LPN. Pt tolerated well without any distress or c/o voiced.  1:35 03:00 Neurocognitive assessment performed by Dr. Ellis.  2:32 04:00 ECG: ECG obtained by Joann Craven LPN  2:39 04:00 PK: Collected by Joann Craven LPN  4:30 06:00 ECG: ECG obtained by Joann Craven  LPN  4:36 06:00 PK: PIV noted to left FA c/d/I/. PIV port cleansed with ETOH and flush with 10 ml of NS; 10 ml of blood aspirated for waste; 5 ml of blood aspirated for specimen collection; 10 ml NS flush completed by Joann Craven LPN. Pt tolerated well without any distress or c/o voiced.  5:10 PIV removed per MD orders by Kori Matt RN with tip intact. Pressure applied to post-PIV site for 45 seconds with hemostasis achieved. 2x2 guaze and coban applied. Pt tolerated well with no c/o pain or distress noted.  5:20 Pt ambulating out of unit with all personal affects and educated on return appointment in the AM; voiced verbal understanding and agrees with instructions.

## 2025-05-06 NOTE — PROGRESS NOTES
Day 1 (Randomization/Dosing Visit) - 3 Hours Post-Dose    Study title: A Phase 2, Randomized, Double-blind, Placebo-controlled Study to Evaluate the Efficacy, Safety, Tolerability, and Pharmacodynamics of Intrathecally Administered ALN-CAROLYN in Patients with Cerebral Amyloid Angiopathy (CAA) (cAPPricorn-1)  Sponsor: [employer]  NCT# [id number]     : Nicko Ellis MD    IRB #: 2024.129    Time of Exam: 13:32    Physical Exam    If Abnormal, document findings:   General Appearance Normal    Skin Normal      HEENT Normal      Respiratory Normal    Cardiovascular Normal    Gastrointestinal Normal    Musculoskeletal Normal    Dermatological Normal    Thyroid Normal    Lymph nodes Normal      Neurological Exam    Category: Mental Status   If Abnormal, document findings:   Level of consciousness (alertness) Normal    Orientation Normal    Attention and concentration Normal      Language Normal    Memory Abnormal - Not Clinically Significant  2/3 @ 5 min     Category: Meningeal signs   If Abnormal, document findings:   Nuchal rigidity, Kernigs sign, Brudzinskis sign, meningismus Normal      Category: Cranial nerves*  *2 optic, 3 oculomotor, 4 trochlear, 5 trigeminal, 6 abducens, 7 facial, 8 vestibulocochlear, 9 glossopharyngeal, 10 vagus, 11 spinal accessory, 12 hypoglossal.   If Abnormal, document findings:   Fundoscopic optic nerve examination Normal    Visual acuity Abnormal - Not Clinically Significant  20/50   Visual fields Normal      Pupillary light reflex Normal    Extraocular eye movements, including observation for nystagmus Normal    Facial sensation Normal    Facial muscle strength/symmetry Normal    Hearing Normal    Palatal movement/ uvula position Normal    Dysarthria Normal    Head rotation/shoulder elevation (sternocleinomastoid and trapezius) Normal    Tongue movement Normal      Category: Motor examination   If Abnormal, document findings:   Muscle bulk Normal     Tone, including examination for rigidity, spasticity, paratonia, with characterization Normal    Abnormal/involuntary movements, including tremor, myoclonus, dystonia, and other abnormal/involuntary motor activity, with characterization Normal    Strength testing Normal      Category: Reflex examination  If Abnormal, document findings:   Deep tendon reflexes Normal    Plantar (Babinski) reflex Normal    Primitive reflexes (frontal release signs) when appropriate Normal      Category: Sensory examination  If Abnormal, document findings:   Multimodality sensory examination Abnormal - Not Clinically Significant  Mild reduced vibration b/l LE     Category: Coordination examination  If Abnormal, document findings:   Dexterity (e.g., finger tapping) Abnormal - Not Clinically Significant     Rapid alternating movements Abnormal - Not Clinically Significant     Finger-to-nose and heel-to-shin testing or equivalent Normal        Category: Gait and balance examination   If Abnormal, document findings:   Stance Normal    Romberg test Normal    Casual gait evaluation Normal      Heel, toe, and tandem gait evaluation Normal

## 2025-05-06 NOTE — PROGRESS NOTES
Day -1 Pre-Dose Visit     Study title: A Phase 2, Randomized, Double-blind, Placebo-controlled Study to Evaluate the Efficacy, Safety, Tolerability, and Pharmacodynamics of Intrathecally Administered ALN-CAROLYN in Patients with Cerebral Amyloid Angiopathy (CAA) (cAPPricorn-1)  Sponsor: [employer]  NCT# [id number]     : Nicko Ellis MD    IRB #: 2024.129    Time of Exams: 0900    Patrick Short is eligible for randomization and can proceed with study drug administration for the cAPPricorn-1 study.     Physical Exam    If Abnormal, document findings:   General Appearance Normal    Skin Normal      HEENT Normal      Respiratory Normal    Cardiovascular Normal    Gastrointestinal Normal    Musculoskeletal Normal    Dermatological Normal    Thyroid Normal    Lymph nodes Normal      Neurological Exam    Category: Mental Status   If Abnormal, document findings:   Level of consciousness (alertness) Normal    Orientation Normal    Attention and concentration Normal      Language Normal    Memory Abnormal - Not Clinically Significant  2/3 @ 5 min     Category: Meningeal signs   If Abnormal, document findings:   Nuchal rigidity, Kernigs sign, Brudzinskis sign, meningismus Normal      Category: Cranial nerves*  *2 optic, 3 oculomotor, 4 trochlear, 5 trigeminal, 6 abducens, 7 facial, 8 vestibulocochlear, 9 glossopharyngeal, 10 vagus, 11 spinal accessory, 12 hypoglossal.   If Abnormal, document findings:   Fundoscopic optic nerve examination Normal    Visual acuity Abnormal - Not Clinically Significant  20/50   Visual fields Normal      Pupillary light reflex Normal    Extraocular eye movements, including observation for nystagmus Normal    Facial sensation Normal    Facial muscle strength/symmetry Normal    Hearing Normal    Palatal movement/ uvula position Normal    Dysarthria Normal    Head rotation/shoulder elevation (sternocleinomastoid and trapezius) Normal    Tongue movement Normal       Category: Motor examination   If Abnormal, document findings:   Muscle bulk Normal    Tone, including examination for rigidity, spasticity, paratonia, with characterization Normal    Abnormal/involuntary movements, including tremor, myoclonus, dystonia, and other abnormal/involuntary motor activity, with characterization Normal    Strength testing Normal      Category: Reflex examination  If Abnormal, document findings:   Deep tendon reflexes Normal    Plantar (Babinski) reflex Normal    Primitive reflexes (frontal release signs) when appropriate Normal      Category: Sensory examination  If Abnormal, document findings:   Multimodality sensory examination Abnormal - Not Clinically Significant  Mild reduced vibration b/l LE     Category: Coordination examination  If Abnormal, document findings:   Dexterity (e.g., finger tapping) Abnormal - Not Clinically Significant     Rapid alternating movements Abnormal - Not Clinically Significant     Finger-to-nose and heel-to-shin testing or equivalent Normal        Category: Gait and balance examination   If Abnormal, document findings:   Stance Normal    Romberg test Normal    Casual gait evaluation Normal      Heel, toe, and tandem gait evaluation Normal      Medication List with Changes/Refills   Current Medications    ASPIRIN 81 MG CHEW    Take 1 tablet (81 mg total) by mouth once daily.    ATORVASTATIN (LIPITOR) 40 MG TABLET    TAKE 1 TABLET BY MOUTH EVERY DAY    CARBIDOPA-LEVODOPA  MG (SINEMET)  MG PER TABLET    Take 1 tablet by mouth 3 (three) times daily for 28 days, THEN 1.5 tablets 3 (three) times daily.    COQ10, UBIQUINOL, 100 MG CAP    Take by mouth.    FINASTERIDE (PROSCAR) 5 MG TABLET    Take 1 tablet (5 mg total) by mouth once daily.    PANTOPRAZOLE (PROTONIX) 40 MG TABLET    Take 1 tablet (40 mg total) by mouth once daily.    TELMISARTAN (MICARDIS) 40 MG TAB    Take 1 tablet (40 mg total) by mouth once daily.       I have spent a total of 26  minutes in face-to-face encounter, which includes in depth discussion and educational review of Cerebral Amyloid Angiopathy and specifics of the case and performance of the neurological and general examination(s)

## 2025-05-07 ENCOUNTER — RESEARCH ENCOUNTER (OUTPATIENT)
Dept: RESEARCH | Facility: HOSPITAL | Age: 80
End: 2025-05-07
Payer: MEDICARE

## 2025-05-07 DIAGNOSIS — E85.4 CEREBRAL AMYLOID ANGIOPATHY: Primary | ICD-10-CM

## 2025-05-07 DIAGNOSIS — Z00.6 RESEARCH EXAM: ICD-10-CM

## 2025-05-07 DIAGNOSIS — I68.0 CEREBRAL AMYLOID ANGIOPATHY: Primary | ICD-10-CM

## 2025-05-07 NOTE — PROGRESS NOTES
Day 2 Visit     Study title: A Phase 2, Randomized, Double-blind, Placebo-controlled Study to Evaluate the Efficacy, Safety, Tolerability, and Pharmacodynamics of Intrathecally Administered ALN-CAROLYN in Patients with Cerebral Amyloid Angiopathy (CAA) (cAPPricorn-1)  Sponsor: [employer]  NCT# [id number]     : Nicko Ellis MD    IRB #: 2024.129    Subject ID: [id number]  Date of visit: 05/07/2025     Study Timeline  Double-Blind Treatment Period  04/02/2025 - ICF  04/17/2025 - Screening  05/05/2025 - Day -1 Visit   5/6/2025 - Day 1 Visit  05/07/2025 - Day 2 Visit      Central Labs:  24-hour (+/-12 hours) PK Sample   Time collected: 09:15 (24-hour clock)  Performed by: Hospital lab    Triplicate 12-Lead ECG:    Time ECG performed  08:54 (24-hour clock)   Overall interpretation of ECG  If clinically significant, document as an adverse event Abnormal, not clinically significant     Time ECG performed  08:55 (24-hour clock)   Overall interpretation of ECG  If clinically significant, document as an adverse event Abnormal, not clinically significant     Time ECG performed  08:56 (24-hour clock)   Overall interpretation of ECG  If clinically significant, document as an adverse event Abnormal, not clinically significant     ADVERSE EVENTS  There are no new adverse events to report at this time.    PRIOR/CONCOMITANT MEDICATIONS  NOTE: all prior/concomitant medication will be documented and maintained in patient-specific Concomitant Log in subject files  There are no changes to concomitant medications at this time.       The patient was advised to contact [facility] or PI with any study-related questions or concerns and he expressed understanding of information discussed.

## 2025-05-09 ENCOUNTER — TELEPHONE (OUTPATIENT)
Facility: CLINIC | Age: 80
End: 2025-05-09
Payer: MEDICARE

## 2025-05-09 NOTE — TELEPHONE ENCOUNTER
Called and spoke with Pt in regards to rescheduling his skin biopsy from 5/22-5/29. At this time all is well.

## 2025-05-10 ENCOUNTER — CLINICAL SUPPORT (OUTPATIENT)
Dept: CARDIOLOGY | Facility: HOSPITAL | Age: 80
End: 2025-05-10
Attending: STUDENT IN AN ORGANIZED HEALTH CARE EDUCATION/TRAINING PROGRAM
Payer: MEDICARE

## 2025-05-10 ENCOUNTER — CLINICAL SUPPORT (OUTPATIENT)
Dept: CARDIOLOGY | Facility: HOSPITAL | Age: 80
End: 2025-05-10
Payer: MEDICARE

## 2025-05-10 DIAGNOSIS — I63.9 CEREBRAL INFARCTION, UNSPECIFIED: ICD-10-CM

## 2025-05-10 PROCEDURE — 93298 REM INTERROG DEV EVAL SCRMS: CPT | Performed by: STUDENT IN AN ORGANIZED HEALTH CARE EDUCATION/TRAINING PROGRAM

## 2025-05-13 LAB
OHS CV AF BURDEN PERCENT: < 1
OHS CV DC REMOTE DEVICE TYPE: NORMAL

## 2025-05-14 DIAGNOSIS — N40.0 BENIGN PROSTATIC HYPERPLASIA, UNSPECIFIED WHETHER LOWER URINARY TRACT SYMPTOMS PRESENT: ICD-10-CM

## 2025-05-14 DIAGNOSIS — K21.00 GASTROESOPHAGEAL REFLUX DISEASE WITH ESOPHAGITIS, UNSPECIFIED WHETHER HEMORRHAGE: ICD-10-CM

## 2025-05-14 DIAGNOSIS — E78.5 DYSLIPIDEMIA: ICD-10-CM

## 2025-05-14 RX ORDER — PANTOPRAZOLE SODIUM 40 MG/1
40 TABLET, DELAYED RELEASE ORAL DAILY
Qty: 90 TABLET | Refills: 0 | Status: SHIPPED | OUTPATIENT
Start: 2025-05-14

## 2025-05-14 RX ORDER — ATORVASTATIN CALCIUM 40 MG/1
40 TABLET, FILM COATED ORAL DAILY
Qty: 90 TABLET | Refills: 0 | Status: SHIPPED | OUTPATIENT
Start: 2025-05-14

## 2025-05-14 RX ORDER — FINASTERIDE 5 MG/1
5 TABLET, FILM COATED ORAL DAILY
Qty: 90 TABLET | Refills: 0 | Status: SHIPPED | OUTPATIENT
Start: 2025-05-14

## 2025-05-14 NOTE — TELEPHONE ENCOUNTER
No care due was identified.  [facility] Embedded Care Due Messages. Reference number: [id number].   5/14/2025 9:02:00 AM CDT

## 2025-05-16 ENCOUNTER — TELEPHONE (OUTPATIENT)
Facility: CLINIC | Age: 80
End: 2025-05-16
Payer: MEDICARE

## 2025-05-16 ENCOUNTER — PATIENT MESSAGE (OUTPATIENT)
Dept: NEUROLOGY | Facility: CLINIC | Age: 80
End: 2025-05-16
Payer: MEDICARE

## 2025-05-16 NOTE — TELEPHONE ENCOUNTER
Attempted to speak with Pt in regards to answering his questions about upcoming skin biopsy with Joanne BLAKELY on 5/29, left a detailed message asking for a call back.

## 2025-05-23 ENCOUNTER — TELEPHONE (OUTPATIENT)
Facility: CLINIC | Age: 80
End: 2025-05-23
Payer: MEDICARE

## 2025-05-23 NOTE — TELEPHONE ENCOUNTER
Called and spoke with Pt in regards to confirming upcoming biopsy with kris BLAKELY on 5/29. Also confirmed Pt has stopped taking blood thinners in time for the procedure.

## 2025-05-26 RX ORDER — CARBIDOPA AND LEVODOPA 25; 100 MG/1; MG/1
1 TABLET ORAL 3 TIMES DAILY
Qty: 270 TABLET | Refills: 1 | Status: SHIPPED | OUTPATIENT
Start: 2025-05-26

## 2025-05-28 ENCOUNTER — TELEPHONE (OUTPATIENT)
Facility: CLINIC | Age: 80
End: 2025-05-28
Payer: MEDICARE

## 2025-05-28 NOTE — TELEPHONE ENCOUNTER
Called and spoke with Pt in regards to confirming tomorrows 6/29 biopsy with Joanne BLAKELY, also confirmed Pt will bring all current medications for discussion. At this time all is well for the procedure.

## 2025-05-29 ENCOUNTER — PROCEDURE VISIT (OUTPATIENT)
Facility: CLINIC | Age: 80
End: 2025-05-29
Payer: MEDICARE

## 2025-05-29 VITALS
WEIGHT: 218.25 LBS | HEART RATE: 54 BPM | DIASTOLIC BLOOD PRESSURE: 93 MMHG | SYSTOLIC BLOOD PRESSURE: 171 MMHG | BODY MASS INDEX: 31.25 KG/M2 | HEIGHT: 70 IN

## 2025-05-29 DIAGNOSIS — G20.C PRIMARY PARKINSONISM: Primary | ICD-10-CM

## 2025-05-29 DIAGNOSIS — G20.C PARKINSONISM, UNSPECIFIED PARKINSONISM TYPE: ICD-10-CM

## 2025-05-29 PROCEDURE — 11104 PUNCH BX SKIN SINGLE LESION: CPT | Mod: S$PBB,,, | Performed by: NURSE PRACTITIONER

## 2025-05-29 PROCEDURE — 11105 PUNCH BX SKIN EA SEP/ADDL: CPT | Mod: PBBFAC | Performed by: NURSE PRACTITIONER

## 2025-05-29 PROCEDURE — 11105 PUNCH BX SKIN EA SEP/ADDL: CPT | Mod: S$PBB,,, | Performed by: NURSE PRACTITIONER

## 2025-05-29 PROCEDURE — 11104 PUNCH BX SKIN SINGLE LESION: CPT | Mod: PBBFAC | Performed by: NURSE PRACTITIONER

## 2025-05-29 NOTE — PROCEDURES
PRE-OP DIAGNOSIS:  POST-OP DIAGNOSIS: Same   PROCEDURE: skin punch biopsy     Performing Physician: Joanne Ashton NP  Supervising Physician (if applicable): Sung Parisi MD.     PROCEDURE:   _  Shave Biopsy  X  Punch Biopsy  _  Incisional Biopsy     DESCRIPTION:  - Punch Size: 0.5 cm  - Number of Punch Biopsies: 3 right side - thinks his wife stopped it one week ago.   + CPT 87736 (punch biopsy, neck) 1st procedure  + CPT 58180 (punch biopsy, each additional lesion, thigh) 2nd procedure  + CPT 60460 (punch biopsy, each additional lesion, ankle) 3rd procedure     The area surrounding the skin lesion was prepared and draped in the  usual sterile manner. The lesion was removed in the usual manner by punch biopsy method noted above. Three full thickness cylindrical samples of the skin were removed from the upper back, lower thigh, and lower leg. The intent of the biopsy is to remove a sample of a cutaneous lesion for [facility] diagnostic pathologic examination. Hemostasis was assured. The patient tolerated  the procedure well.     Closure:       _  Monsels for hemostasis                _  Suture   X Simple closure  _ None     Followup: The patient tolerated the procedure well without  complications.  Standard post-procedure care is explained and return  precautions are given.     Pathology/biopsy specimens were sent directly to [facility]IA-Cert[facility] for processing. Pathology was not sent via in-house [facility] laboratory. Pathology results will be returned within 4-6 weeks and scanned into [facility] Electronic Medical Record.

## 2025-06-03 DIAGNOSIS — Z00.6 RESEARCH STUDY PATIENT: ICD-10-CM

## 2025-06-03 DIAGNOSIS — I68.0 CEREBRAL AMYLOID ANGIOPATHY (CODE): Primary | ICD-10-CM

## 2025-06-04 ENCOUNTER — RESEARCH ENCOUNTER (OUTPATIENT)
Dept: RESEARCH | Facility: HOSPITAL | Age: 80
End: 2025-06-04
Payer: MEDICARE

## 2025-06-04 ENCOUNTER — OFFICE VISIT (OUTPATIENT)
Dept: NEUROLOGY | Facility: CLINIC | Age: 80
End: 2025-06-04
Payer: MEDICARE

## 2025-06-04 VITALS
TEMPERATURE: 98 F | RESPIRATION RATE: 16 BRPM | BODY MASS INDEX: 31.59 KG/M2 | HEART RATE: 58 BPM | DIASTOLIC BLOOD PRESSURE: 82 MMHG | WEIGHT: 220.13 LBS | SYSTOLIC BLOOD PRESSURE: 145 MMHG

## 2025-06-04 DIAGNOSIS — E85.4 CEREBRAL AMYLOID ANGIOPATHY: ICD-10-CM

## 2025-06-04 DIAGNOSIS — I68.0 CEREBRAL AMYLOID ANGIOPATHY: ICD-10-CM

## 2025-06-04 DIAGNOSIS — Z00.6 RESEARCH EXAM: Primary | ICD-10-CM

## 2025-06-04 DIAGNOSIS — I10 PRIMARY HYPERTENSION: ICD-10-CM

## 2025-06-04 PROCEDURE — 99213 OFFICE O/P EST LOW 20 MIN: CPT | Mod: PBBFAC | Performed by: PSYCHIATRY & NEUROLOGY

## 2025-06-04 RX ORDER — TELMISARTAN 40 MG/1
80 TABLET ORAL DAILY
Start: 2025-06-04 | End: 2025-06-12

## 2025-06-04 NOTE — PROGRESS NOTES
Month 1    Study title: A Phase 2, Randomized, Double-blind, Placebo-controlled Study to Evaluate the Efficacy, Safety, Tolerability, and Pharmacodynamics of Intrathecally Administered ALN-CAROLYN in Patients with Cerebral Amyloid Angiopathy (CAA) (cAPPricorn-1)  Sponsor: [employer]  NCT# [id number]     : Nicko Ellis MD    IRB #: [id number]    Date of Visit: 06/04/2025   Time of Exams: 10:15  Physical Exam      If Abnormal, document findings:   General Appearance Normal     Skin Normal        HEENT Normal        Respiratory Normal     Cardiovascular Normal     Gastrointestinal Normal     Musculoskeletal Normal     Dermatological Normal     Thyroid Normal     Lymph nodes Normal        Neurological Exam     Category: Mental Status    If Abnormal, document findings:   Level of consciousness (alertness) Normal     Orientation Normal     Attention and concentration Normal        Language Normal     Memory Normal       Category: Meningeal signs    If Abnormal, document findings:   Nuchal rigidity, Kernigs sign, Brudzinskis sign, meningismus Normal        Category: Cranial nerves*  *2 optic, 3 oculomotor, 4 trochlear, 5 trigeminal, 6 abducens, 7 facial, 8 vestibulocochlear, 9 glossopharyngeal, 10 vagus, 11 spinal accessory, 12 hypoglossal.    If Abnormal, document findings:   Fundoscopic optic nerve examination Normal     Visual acuity Abnormal - Not Clinically Significant  20/40   Visual fields Normal        Pupillary light reflex Normal     Extraocular eye movements, including observation for nystagmus Normal     Facial sensation Normal     Facial muscle strength/symmetry Normal     Hearing Normal     Palatal movement/ uvula position Normal     Dysarthria Normal     Head rotation/shoulder elevation (sternocleinomastoid and trapezius) Normal     Tongue movement Normal        Category: Motor examination    If Abnormal, document findings:   Muscle bulk Normal     Tone, including  examination for rigidity, spasticity, paratonia, with characterization Normal     Abnormal/involuntary movements, including tremor, myoclonus, dystonia, and other abnormal/involuntary motor activity, with characterization Normal     Strength testing Normal        Category: Reflex examination   If Abnormal, document findings:   Deep tendon reflexes Normal     Plantar (Babinski) reflex Normal     Primitive reflexes (frontal release signs) when appropriate Normal        Category: Sensory examination   If Abnormal, document findings:   Multimodality sensory examination Abnormal - Not Clinically Significant  Mild reduced vibration b/l LE      Category: Coordination examination   If Abnormal, document findings:   Dexterity (e.g., finger tapping) Abnormal - Not Clinically Significant      Rapid alternating movements Abnormal - Not Clinically Significant      Finger-to-nose and heel-to-shin testing or equivalent Normal           Category: Gait and balance examination    If Abnormal, document findings:   Stance Normal     Romberg test Normal     Casual gait evaluation Normal        Heel, toe, and tandem gait evaluation Normal       .  No outpatient medications have been marked as taking for the 6/4/25 encounter (Office Visit) with Nicko Ellis MD.     I have spent a total of 30 minutes in face-to-face encounter, which includes performance of the neurological and general examination(s)

## 2025-06-09 ENCOUNTER — PATIENT MESSAGE (OUTPATIENT)
Dept: NEUROLOGY | Facility: CLINIC | Age: 80
End: 2025-06-09
Payer: MEDICARE

## 2025-06-10 ENCOUNTER — CLINICAL SUPPORT (OUTPATIENT)
Dept: CARDIOLOGY | Facility: HOSPITAL | Age: 80
End: 2025-06-10
Payer: MEDICARE

## 2025-06-10 ENCOUNTER — CLINICAL SUPPORT (OUTPATIENT)
Dept: CARDIOLOGY | Facility: HOSPITAL | Age: 80
End: 2025-06-10
Attending: STUDENT IN AN ORGANIZED HEALTH CARE EDUCATION/TRAINING PROGRAM
Payer: MEDICARE

## 2025-06-10 DIAGNOSIS — I63.9 CEREBRAL INFARCTION, UNSPECIFIED: ICD-10-CM

## 2025-06-10 PROCEDURE — 93298 REM INTERROG DEV EVAL SCRMS: CPT | Performed by: STUDENT IN AN ORGANIZED HEALTH CARE EDUCATION/TRAINING PROGRAM

## 2025-06-12 ENCOUNTER — OFFICE VISIT (OUTPATIENT)
Dept: NEUROLOGY | Facility: CLINIC | Age: 80
End: 2025-06-12
Payer: MEDICARE

## 2025-06-12 DIAGNOSIS — I68.0 CEREBRAL AMYLOID ANGIOPATHY: ICD-10-CM

## 2025-06-12 DIAGNOSIS — E85.4 CEREBRAL AMYLOID ANGIOPATHY: ICD-10-CM

## 2025-06-12 DIAGNOSIS — I10 PRIMARY HYPERTENSION: ICD-10-CM

## 2025-06-12 LAB
OHS CV AF BURDEN PERCENT: < 1
OHS CV DC REMOTE DEVICE TYPE: NORMAL

## 2025-06-12 PROCEDURE — 98005 SYNCH AUDIO-VIDEO EST LOW 20: CPT | Mod: 95,,, | Performed by: PSYCHIATRY & NEUROLOGY

## 2025-06-12 RX ORDER — AMLODIPINE BESYLATE 5 MG/1
5 TABLET ORAL DAILY
Qty: 90 TABLET | Refills: 3 | Status: SHIPPED | OUTPATIENT
Start: 2025-06-12 | End: 2026-06-12

## 2025-06-12 RX ORDER — TELMISARTAN 80 MG/1
80 TABLET ORAL DAILY
Qty: 90 TABLET | Refills: 3 | Status: SHIPPED | OUTPATIENT
Start: 2025-06-12 | End: 2026-06-12

## 2025-06-12 NOTE — PROGRESS NOTES
IMPRESSION:   BP remains elevated despite current Telmisartan 80 mg, with readings consistently in 140s-150s/90s-100s range.   Considered history of Parkinsonism, aiming to control BP without causing symptomatic hypotension, however no signs of dysautonomia or fluctuations.   Monitoring for any potential side effects of investigational drug, though elevated BP not currently observed as a side effect in trial participants.    PLAN SUMMARY:   Started Amlodipine 5 mg daily   Continued Telmisartan to 80 mg daily   Encouraged lifestyle modifications for BP management and weight loss   Recommend increasing daily step count to 10,000-15,000 steps   Follow up in 1 week to reassess after medication changes   Send BP log via patient portal    PRIMARY HYPERTENSION:   Explained normal BP range (100-120/60-80 mmHg) and target for patient (<130/80 mmHg).   Discussed importance of consistent BP monitoring and proper technique (sitting, arm at heart level, silent for 5 minutes, legs uncrossed).   Patient to monitor and record BP twice daily, noting time of measurement.   Educated on the impact of physical activity, including non-exercise activity, on weight loss and BP control.   Encouraged lifestyle modifications including increased physical activity and weight loss to aid in BP management.   Patient to increase daily step count to 10,000-15,000 steps and maintain current walking routine of 1 hour per day.   Patient to continue efforts to lose weight through increased activity and dietary modifications.   Follow up in 1 week to reassess after implementing medication changes.   Send BP log via patient portal.         History of Present Illness    CHIEF COMPLAINT:  - Patient presents for follow-up of uncontrolled hypertension despite current medication regimen.    HPI:  - Patient has been taking Telmisartan 80 mg for slightly over 1 week. His blood pressure is measured in the morning before medication administration and  approximately 1.5 to 2 hours after, with readings remaining elevated. He reports no symptoms associated with his hypertension, including no headaches, dizziness, or noticeable changes even with increased medication dosage. Recent blood pressure readings have been in the range of 140s-150s/90s-100s, with one reading as high as 197 systolic. His wife notes that his mother had a history of extremely high blood pressure that was resistant to various medications. He walks for an hour daily at a brisk pace, sometimes accompanied by his daughter.    FAMILY HISTORY:  - Mother: Extremely high blood pressure, tried various medications, taken off blood pressure medication towards the end of her life, lived 3 years with high blood pressure,  at 98 years old    TEST RESULTS:  - Resting heart rate: Multiple times, typically in 50s bpm, with occasional higher readings (86, 88 bpm noted as uncharacteristic)    ROS:  Neurological: -headache, -dizziness         The patient location is: [place]  The chief complaint leading to consultation is: elevated blood pressure    Visit type: audiovisual    Face to Face time with patient: 20 min  22 minutes of total time spent on the encounter, which includes face to face time and non-face to face time preparing to see the patient (eg, review of tests), Obtaining and/or reviewing separately obtained history, Documenting clinical information in the electronic or other health record, Independently interpreting results (not separately reported) and communicating results to the patient/family/caregiver, or Care coordination (not separately reported).         Each patient to whom he or she provides medical services by telemedicine is:  (1) informed of the relationship between the physician and patient and the respective role of any other health care provider with respect to management of the patient; and (2) notified that he or she may decline to receive medical services by telemedicine and may  withdraw from such care at any time.    Notes:                            Juan C hector

## 2025-06-17 ENCOUNTER — RESEARCH ENCOUNTER (OUTPATIENT)
Dept: RESEARCH | Facility: HOSPITAL | Age: 80
End: 2025-06-17
Payer: MEDICARE

## 2025-06-17 ENCOUNTER — PATIENT MESSAGE (OUTPATIENT)
Facility: CLINIC | Age: 80
End: 2025-06-17
Payer: MEDICARE

## 2025-06-17 NOTE — PROGRESS NOTES
Phone Call (Month 2)    Study title: A Phase 2, Randomized, Double-blind, Placebo-controlled Study to Evaluate the Efficacy, Safety, Tolerability, and Pharmacodynamics of Intrathecally Administered ALN-CAROLYN in Patients with Cerebral Amyloid Angiopathy (CAA) (cAPPricorn-1)  Sponsor: [employer]  NCT# [id number]     : Nicko Ellis MD    IRB #: [id number]    Subject ID: [id number]     Date of Phone Call: 06/17/2025    Person contacted: Subject      Has the patient experienced any new adverse events since the last visit?  No   Has the patient had any changes to their concomitant medications since last visit?  Yes - Started Amlodipine 5 mg on 6/12 for Hypertension           Mr. Short was notified that the next visit would be in August 2025 for Month 3 Visit. Subject was advised to contact CRC or PI with any study-related questions or concerns. Subject expressed understanding of information discussed.

## 2025-06-17 NOTE — PROGRESS NOTES
Anesthesia Evaluation     NPO Solid Status: > 8 hours  NPO Liquid Status: > 8 hours           Airway   Mallampati: II  TM distance: >3 FB  Neck ROM: full  No difficulty expected  Dental - normal exam     Pulmonary - normal exam   Cardiovascular - normal exam    (+) hypertension      Neuro/Psych  GI/Hepatic/Renal/Endo    (+) obesity, GERD    Musculoskeletal     Abdominal  - normal exam    Bowel sounds: normal.   Substance History      OB/GYN          Other                    Anesthesia Plan    ASA 2     MAC   total IV anesthesia  intravenous induction     Anesthetic plan, risks, benefits, and alternatives have been provided, discussed and informed consent has been obtained with: patient.  Pre-procedure education provided  Plan discussed with CRNA.    CODE STATUS:    Code Status (Patient has no pulse and is not breathing): CPR (Attempt to Resuscitate)  Medical Interventions (Patient has pulse or is breathing): Full Support       Met with Patrick Short for exit to Phase II cardiac rehab.      Weight: 210 lbs  BMI:  29.98  Abdominal girth: 42 inches  Body fat: 21.9%    Pt did not meet rehab weight goal due to increased sugar intake and traveling. Pt is compliant with Low Na diet. He did have some weight loss (2.3%) with a decrease in abdominal girth by 1 inch.     Labs reviewed with patient. Patient confirms he is taking atorvastatin for cholesterol control.    Lab Results   Component Value Date    CHOL 94 (L) 03/15/2017     Lab Results   Component Value Date    HDL 34 (L) 03/15/2017     Lab Results   Component Value Date    LDLCALC 41.0 (L) 03/15/2017     Lab Results   Component Value Date    TRIG 95 03/15/2017     Lab Results   Component Value Date    CHOLHDL 36.2 03/15/2017         Lab Results   Component Value Date    GLUF 88 03/15/2017     Lab Results   Component Value Date    HGBA1C 5.2 01/08/2015       Vitamins/supplements: none    Patient eats 3 meals daily.  Pt's wife prepares meals at home. Pt reports that he is trying to follow a plant based diet- limiting chicken/meat/fish. Pt reports increased intake of sugar d/t vegetarian diet. Discussed balanced vegetarian diet. Denies use of a salt shaker at the table on prepared foods. Dines out 2 meals per week. No fried foods/fast foods.  Chooses fish 2x/month. Alcohol: occasionally (wine).    Patrick Short encouraged to contact cardiac rehab staff with any additional questions or concerns regarding diet.      Dunia Cardozo MS, RD, LDN

## 2025-07-02 ENCOUNTER — TELEPHONE (OUTPATIENT)
Dept: NEUROLOGY | Facility: CLINIC | Age: 80
End: 2025-07-02
Payer: MEDICARE

## 2025-07-02 NOTE — TELEPHONE ENCOUNTER
Left  for call back in order to notify that Dr. Ellis increased Amlodipine dose to 7.5 mg after reviewing patient's BP log.

## 2025-07-03 NOTE — TELEPHONE ENCOUNTER
Talked to Mr. Short and informed him of Amlodipine dose change by Dr. Ellis. Patient expressed understanding of information discussed.

## 2025-07-11 ENCOUNTER — CLINICAL SUPPORT (OUTPATIENT)
Dept: CARDIOLOGY | Facility: HOSPITAL | Age: 80
End: 2025-07-11
Payer: MEDICARE

## 2025-07-11 ENCOUNTER — CLINICAL SUPPORT (OUTPATIENT)
Dept: CARDIOLOGY | Facility: HOSPITAL | Age: 80
End: 2025-07-11
Attending: STUDENT IN AN ORGANIZED HEALTH CARE EDUCATION/TRAINING PROGRAM
Payer: MEDICARE

## 2025-07-11 DIAGNOSIS — I63.9 CEREBRAL INFARCTION, UNSPECIFIED: ICD-10-CM

## 2025-07-11 PROCEDURE — 93298 REM INTERROG DEV EVAL SCRMS: CPT | Performed by: STUDENT IN AN ORGANIZED HEALTH CARE EDUCATION/TRAINING PROGRAM

## 2025-07-16 LAB
OHS CV AF BURDEN PERCENT: < 1
OHS CV DC REMOTE DEVICE TYPE: NORMAL

## 2025-07-18 ENCOUNTER — HOSPITAL ENCOUNTER (OUTPATIENT)
Facility: HOSPITAL | Age: 80
Discharge: HOME | End: 2025-07-19
Attending: STUDENT IN AN ORGANIZED HEALTH CARE EDUCATION/TRAINING PROGRAM | Admitting: FAMILY MEDICINE
Payer: MEDICARE

## 2025-07-18 DIAGNOSIS — R10.84 ABDOMINAL PAIN, DIFFUSE: ICD-10-CM

## 2025-07-18 DIAGNOSIS — R07.9 CHEST PAIN: ICD-10-CM

## 2025-07-18 DIAGNOSIS — R55 SYNCOPE: Primary | ICD-10-CM

## 2025-07-18 LAB
ABSOLUTE EOSINOPHIL (OHS): 0.01 K/UL
ABSOLUTE MONOCYTE (OHS): 0.34 K/UL (ref 0.3–1)
ABSOLUTE NEUTROPHIL COUNT (OHS): 4.45 K/UL (ref 1.8–7.7)
ALBUMIN SERPL BCP-MCNC: 3.9 G/DL (ref 3.5–5.2)
ALLENS TEST: ABNORMAL
ALP SERPL-CCNC: 66 UNIT/L (ref 40–150)
ALT SERPL W/O P-5'-P-CCNC: 22 UNIT/L (ref 10–44)
ANION GAP (OHS): 9 MMOL/L (ref 8–16)
AST SERPL-CCNC: 22 UNIT/L (ref 11–45)
BASOPHILS # BLD AUTO: 0.01 K/UL
BASOPHILS NFR BLD AUTO: 0.2 %
BILIRUB SERPL-MCNC: 1.2 MG/DL (ref 0.1–1)
BUN SERPL-MCNC: 19 MG/DL (ref 8–23)
CALCIUM SERPL-MCNC: 8.3 MG/DL (ref 8.7–10.5)
CHLORIDE SERPL-SCNC: 104 MMOL/L (ref 95–110)
CO2 SERPL-SCNC: 24 MMOL/L (ref 23–29)
CREAT SERPL-MCNC: 0.9 MG/DL (ref 0.5–1.4)
ERYTHROCYTE [DISTWIDTH] IN BLOOD BY AUTOMATED COUNT: 12.7 % (ref 11.5–14.5)
GFR SERPLBLD CREATININE-BSD FMLA CKD-EPI: >60 ML/MIN/1.73/M2
GLUCOSE SERPL-MCNC: 146 MG/DL (ref 70–110)
HCO3 UR-SCNC: 26.2 MMOL/L (ref 24–28)
HCT VFR BLD AUTO: 43.2 % (ref 40–54)
HCV AB SERPL QL IA: NORMAL
HGB BLD-MCNC: 14.5 GM/DL (ref 14–18)
HIV 1+2 AB+HIV1 P24 AG SERPL QL IA: NORMAL
IMM GRANULOCYTES # BLD AUTO: 0.01 K/UL (ref 0–0.04)
IMM GRANULOCYTES NFR BLD AUTO: 0.2 % (ref 0–0.5)
LIPASE SERPL-CCNC: 58 U/L (ref 4–60)
LYMPHOCYTES # BLD AUTO: 0.22 K/UL (ref 1–4.8)
MCH RBC QN AUTO: 30.7 PG (ref 27–31)
MCHC RBC AUTO-ENTMCNC: 33.6 G/DL (ref 32–36)
MCV RBC AUTO: 91 FL (ref 82–98)
NUCLEATED RBC (/100WBC) (OHS): 0 /100 WBC
PCO2 BLDA: 42.6 MMHG (ref 35–45)
PH SMN: 7.4 [PH] (ref 7.35–7.45)
PLATELET # BLD AUTO: 142 K/UL (ref 150–450)
PMV BLD AUTO: 11.8 FL (ref 9.2–12.9)
PO2 BLDA: 23 MMHG (ref 40–60)
POC BE: 1 MMOL/L (ref -2–2)
POC SATURATED O2: 40 % (ref 95–100)
POC TCO2: 27 MMOL/L (ref 24–29)
POTASSIUM SERPL-SCNC: 3.7 MMOL/L (ref 3.5–5.1)
PROT SERPL-MCNC: 7 GM/DL (ref 6–8.4)
RBC # BLD AUTO: 4.73 M/UL (ref 4.6–6.2)
RELATIVE EOSINOPHIL (OHS): 0.2 %
RELATIVE LYMPHOCYTE (OHS): 4.4 % (ref 18–48)
RELATIVE MONOCYTE (OHS): 6.7 % (ref 4–15)
RELATIVE NEUTROPHIL (OHS): 88.3 % (ref 38–73)
SAMPLE: ABNORMAL
SITE: ABNORMAL
SODIUM SERPL-SCNC: 137 MMOL/L (ref 136–145)
TROPONIN I SERPL HS-MCNC: 174 NG/L
TROPONIN I SERPL HS-MCNC: 184 NG/L
WBC # BLD AUTO: 5.04 K/UL (ref 3.9–12.7)

## 2025-07-18 PROCEDURE — 87389 HIV-1 AG W/HIV-1&-2 AB AG IA: CPT | Performed by: PHYSICIAN ASSISTANT

## 2025-07-18 PROCEDURE — 84155 ASSAY OF PROTEIN SERUM: CPT

## 2025-07-18 PROCEDURE — 85025 COMPLETE CBC W/AUTO DIFF WBC: CPT

## 2025-07-18 PROCEDURE — 96361 HYDRATE IV INFUSION ADD-ON: CPT

## 2025-07-18 PROCEDURE — 99900035 HC TECH TIME PER 15 MIN (STAT)

## 2025-07-18 PROCEDURE — 84484 ASSAY OF TROPONIN QUANT: CPT | Performed by: STUDENT IN AN ORGANIZED HEALTH CARE EDUCATION/TRAINING PROGRAM

## 2025-07-18 PROCEDURE — 99285 EMERGENCY DEPT VISIT HI MDM: CPT | Mod: 25

## 2025-07-18 PROCEDURE — 82803 BLOOD GASES ANY COMBINATION: CPT

## 2025-07-18 PROCEDURE — 86803 HEPATITIS C AB TEST: CPT | Performed by: PHYSICIAN ASSISTANT

## 2025-07-18 PROCEDURE — 96366 THER/PROPH/DIAG IV INF ADDON: CPT

## 2025-07-18 PROCEDURE — 93005 ELECTROCARDIOGRAM TRACING: CPT

## 2025-07-18 PROCEDURE — 84484 ASSAY OF TROPONIN QUANT: CPT

## 2025-07-18 PROCEDURE — 96365 THER/PROPH/DIAG IV INF INIT: CPT

## 2025-07-18 PROCEDURE — 93010 ELECTROCARDIOGRAM REPORT: CPT | Mod: ,,, | Performed by: INTERNAL MEDICINE

## 2025-07-18 PROCEDURE — 83690 ASSAY OF LIPASE: CPT

## 2025-07-19 VITALS
BODY MASS INDEX: 33.27 KG/M2 | HEART RATE: 79 BPM | TEMPERATURE: 99 F | DIASTOLIC BLOOD PRESSURE: 63 MMHG | SYSTOLIC BLOOD PRESSURE: 120 MMHG | RESPIRATION RATE: 18 BRPM | WEIGHT: 212 LBS | HEIGHT: 67 IN | OXYGEN SATURATION: 91 %

## 2025-07-19 PROBLEM — R11.2 N&V (NAUSEA AND VOMITING): Status: ACTIVE | Noted: 2025-07-19

## 2025-07-19 LAB
ABSOLUTE EOSINOPHIL (OHS): 0.03 K/UL
ABSOLUTE MONOCYTE (OHS): 0.54 K/UL (ref 0.3–1)
ABSOLUTE NEUTROPHIL COUNT (OHS): 3.49 K/UL (ref 1.8–7.7)
ALBUMIN SERPL BCP-MCNC: 3.7 G/DL (ref 3.5–5.2)
ALP SERPL-CCNC: 62 UNIT/L (ref 40–150)
ALT SERPL W/O P-5'-P-CCNC: 21 UNIT/L (ref 10–44)
ANION GAP (OHS): 10 MMOL/L (ref 8–16)
AST SERPL-CCNC: 20 UNIT/L (ref 11–45)
BASOPHILS # BLD AUTO: 0.01 K/UL
BASOPHILS NFR BLD AUTO: 0.2 %
BILIRUB SERPL-MCNC: 1.2 MG/DL (ref 0.1–1)
BILIRUB UR QL STRIP.AUTO: NEGATIVE
BUN SERPL-MCNC: 21 MG/DL (ref 8–23)
CALCIUM SERPL-MCNC: 8.2 MG/DL (ref 8.7–10.5)
CHLORIDE SERPL-SCNC: 107 MMOL/L (ref 95–110)
CLARITY UR: CLEAR
CO2 SERPL-SCNC: 21 MMOL/L (ref 23–29)
COLOR UR AUTO: YELLOW
CREAT SERPL-MCNC: 0.8 MG/DL (ref 0.5–1.4)
ERYTHROCYTE [DISTWIDTH] IN BLOOD BY AUTOMATED COUNT: 12.9 % (ref 11.5–14.5)
GFR SERPLBLD CREATININE-BSD FMLA CKD-EPI: >60 ML/MIN/1.73/M2
GLUCOSE SERPL-MCNC: 140 MG/DL (ref 70–110)
GLUCOSE UR QL STRIP: NEGATIVE
HCT VFR BLD AUTO: 40.8 % (ref 40–54)
HGB BLD-MCNC: 14 GM/DL (ref 14–18)
HGB UR QL STRIP: NEGATIVE
IMM GRANULOCYTES # BLD AUTO: 0.01 K/UL (ref 0–0.04)
IMM GRANULOCYTES NFR BLD AUTO: 0.2 % (ref 0–0.5)
KETONES UR QL STRIP: ABNORMAL
LEUKOCYTE ESTERASE UR QL STRIP: NEGATIVE
LYMPHOCYTES # BLD AUTO: 0.28 K/UL (ref 1–4.8)
MAGNESIUM SERPL-MCNC: 1.2 MG/DL (ref 1.6–2.6)
MCH RBC QN AUTO: 31 PG (ref 27–31)
MCHC RBC AUTO-ENTMCNC: 34.3 G/DL (ref 32–36)
MCV RBC AUTO: 90 FL (ref 82–98)
NITRITE UR QL STRIP: NEGATIVE
NUCLEATED RBC (/100WBC) (OHS): 0 /100 WBC
OHS QRS DURATION: 84 MS
OHS QRS DURATION: 92 MS
OHS QTC CALCULATION: 428 MS
OHS QTC CALCULATION: 440 MS
PH UR STRIP: 6 [PH]
PHOSPHATE SERPL-MCNC: 2.5 MG/DL (ref 2.7–4.5)
PLATELET # BLD AUTO: 135 K/UL (ref 150–450)
PMV BLD AUTO: 12 FL (ref 9.2–12.9)
POTASSIUM SERPL-SCNC: 3.7 MMOL/L (ref 3.5–5.1)
PROT SERPL-MCNC: 6.8 GM/DL (ref 6–8.4)
PROT UR QL STRIP: ABNORMAL
RBC # BLD AUTO: 4.52 M/UL (ref 4.6–6.2)
RELATIVE EOSINOPHIL (OHS): 0.7 %
RELATIVE LYMPHOCYTE (OHS): 6.4 % (ref 18–48)
RELATIVE MONOCYTE (OHS): 12.4 % (ref 4–15)
RELATIVE NEUTROPHIL (OHS): 80.1 % (ref 38–73)
SODIUM SERPL-SCNC: 138 MMOL/L (ref 136–145)
SP GR UR STRIP: >=1.03
TROPONIN I SERPL HS-MCNC: 150 NG/L
UROBILINOGEN UR STRIP-ACNC: NEGATIVE EU/DL
WBC # BLD AUTO: 4.36 K/UL (ref 3.9–12.7)

## 2025-07-19 PROCEDURE — G0378 HOSPITAL OBSERVATION PER HR: HCPCS

## 2025-07-19 PROCEDURE — 25500020 PHARM REV CODE 255

## 2025-07-19 PROCEDURE — 85025 COMPLETE CBC W/AUTO DIFF WBC: CPT

## 2025-07-19 PROCEDURE — 84100 ASSAY OF PHOSPHORUS: CPT

## 2025-07-19 PROCEDURE — 63600175 PHARM REV CODE 636 W HCPCS

## 2025-07-19 PROCEDURE — 25000003 PHARM REV CODE 250

## 2025-07-19 PROCEDURE — 82040 ASSAY OF SERUM ALBUMIN: CPT

## 2025-07-19 PROCEDURE — 93010 ELECTROCARDIOGRAM REPORT: CPT | Mod: ,,, | Performed by: INTERNAL MEDICINE

## 2025-07-19 PROCEDURE — 93005 ELECTROCARDIOGRAM TRACING: CPT

## 2025-07-19 PROCEDURE — 81003 URINALYSIS AUTO W/O SCOPE: CPT

## 2025-07-19 PROCEDURE — 84484 ASSAY OF TROPONIN QUANT: CPT

## 2025-07-19 PROCEDURE — 83735 ASSAY OF MAGNESIUM: CPT

## 2025-07-19 RX ORDER — ATORVASTATIN CALCIUM 40 MG/1
40 TABLET, FILM COATED ORAL DAILY
Status: DISCONTINUED | OUTPATIENT
Start: 2025-07-19 | End: 2025-07-19 | Stop reason: HOSPADM

## 2025-07-19 RX ORDER — SODIUM CHLORIDE 0.9 % (FLUSH) 0.9 %
10 SYRINGE (ML) INJECTION EVERY 12 HOURS PRN
Status: DISCONTINUED | OUTPATIENT
Start: 2025-07-19 | End: 2025-07-19 | Stop reason: HOSPADM

## 2025-07-19 RX ORDER — SIMETHICONE 80 MG
1 TABLET,CHEWABLE ORAL 4 TIMES DAILY PRN
Status: DISCONTINUED | OUTPATIENT
Start: 2025-07-19 | End: 2025-07-19 | Stop reason: HOSPADM

## 2025-07-19 RX ORDER — MAGNESIUM SULFATE HEPTAHYDRATE 40 MG/ML
2 INJECTION, SOLUTION INTRAVENOUS ONCE
Status: COMPLETED | OUTPATIENT
Start: 2025-07-19 | End: 2025-07-19

## 2025-07-19 RX ORDER — SODIUM,POTASSIUM PHOSPHATES 280-250MG
1 POWDER IN PACKET (EA) ORAL ONCE
Status: COMPLETED | OUTPATIENT
Start: 2025-07-19 | End: 2025-07-19

## 2025-07-19 RX ORDER — PANTOPRAZOLE SODIUM 40 MG/1
40 TABLET, DELAYED RELEASE ORAL DAILY
Status: DISCONTINUED | OUTPATIENT
Start: 2025-07-19 | End: 2025-07-19 | Stop reason: HOSPADM

## 2025-07-19 RX ORDER — IBUPROFEN 200 MG
16 TABLET ORAL
Status: DISCONTINUED | OUTPATIENT
Start: 2025-07-19 | End: 2025-07-19 | Stop reason: HOSPADM

## 2025-07-19 RX ORDER — NALOXONE HCL 0.4 MG/ML
0.02 VIAL (ML) INJECTION
Status: DISCONTINUED | OUTPATIENT
Start: 2025-07-19 | End: 2025-07-19 | Stop reason: HOSPADM

## 2025-07-19 RX ORDER — GLUCAGON 1 MG
1 KIT INJECTION
Status: DISCONTINUED | OUTPATIENT
Start: 2025-07-19 | End: 2025-07-19 | Stop reason: HOSPADM

## 2025-07-19 RX ORDER — IBUPROFEN 200 MG
24 TABLET ORAL
Status: DISCONTINUED | OUTPATIENT
Start: 2025-07-19 | End: 2025-07-19 | Stop reason: HOSPADM

## 2025-07-19 RX ORDER — FINASTERIDE 5 MG/1
5 TABLET, FILM COATED ORAL DAILY
Status: DISCONTINUED | OUTPATIENT
Start: 2025-07-19 | End: 2025-07-19 | Stop reason: HOSPADM

## 2025-07-19 RX ORDER — ONDANSETRON HYDROCHLORIDE 2 MG/ML
4 INJECTION, SOLUTION INTRAVENOUS EVERY 8 HOURS PRN
Status: DISCONTINUED | OUTPATIENT
Start: 2025-07-19 | End: 2025-07-19 | Stop reason: HOSPADM

## 2025-07-19 RX ORDER — ALUMINUM HYDROXIDE, MAGNESIUM HYDROXIDE, AND SIMETHICONE 1200; 120; 1200 MG/30ML; MG/30ML; MG/30ML
30 SUSPENSION ORAL 4 TIMES DAILY PRN
Status: DISCONTINUED | OUTPATIENT
Start: 2025-07-19 | End: 2025-07-19 | Stop reason: HOSPADM

## 2025-07-19 RX ORDER — CARBIDOPA AND LEVODOPA 25; 100 MG/1; MG/1
1 TABLET ORAL 3 TIMES DAILY
Status: DISCONTINUED | OUTPATIENT
Start: 2025-07-19 | End: 2025-07-19 | Stop reason: HOSPADM

## 2025-07-19 RX ADMIN — MAGNESIUM SULFATE HEPTAHYDRATE 2 G: 40 INJECTION, SOLUTION INTRAVENOUS at 08:07

## 2025-07-19 RX ADMIN — ATORVASTATIN CALCIUM 40 MG: 40 TABLET, FILM COATED ORAL at 08:07

## 2025-07-19 RX ADMIN — SODIUM CHLORIDE, POTASSIUM CHLORIDE, SODIUM LACTATE AND CALCIUM CHLORIDE 1000 ML: 600; 310; 30; 20 INJECTION, SOLUTION INTRAVENOUS at 06:07

## 2025-07-19 RX ADMIN — POTASSIUM & SODIUM PHOSPHATES POWDER PACK 280-160-250 MG 1 PACKET: 280-160-250 PACK at 08:07

## 2025-07-19 RX ADMIN — MAGNESIUM SULFATE HEPTAHYDRATE 2 G: 40 INJECTION, SOLUTION INTRAVENOUS at 10:07

## 2025-07-19 RX ADMIN — PANTOPRAZOLE SODIUM 40 MG: 40 TABLET, DELAYED RELEASE ORAL at 08:07

## 2025-07-19 RX ADMIN — IOHEXOL 100 ML: 350 INJECTION, SOLUTION INTRAVENOUS at 12:07

## 2025-07-19 RX ADMIN — FINASTERIDE 5 MG: 5 TABLET, FILM COATED ORAL at 08:07

## 2025-07-19 RX ADMIN — CARBIDOPA AND LEVODOPA 1 TABLET: 25; 100 TABLET ORAL at 08:07

## 2025-07-19 NOTE — PROVIDER PROGRESS NOTES - EMERGENCY DEPT.
Signout Note    I received signout from the previous provider.     Chief complaint:  Abdominal Pain (Pt states abdominal pain, nausea, vomiting all day. Denies diarrhea. Pt has history of MI and angiogram done in Dec 2024)      Pertinent history &exam:  Patrick Short is a 80 y.o. male with pertinent PMH of cerebral angio void angiopathy, Alzheimer's dementia, coronary artery disease status post PCI, Parkinson's disease who presented to emergency department with complaint of abdominal pain and syncope.  He also vomited.  Did not hit his head.  He is afebrile and hemodynamically stable.  EKG without evidence of ischemia or arrhythmia.  Initial troponin is elevated, no chest pain, or shortness of breath.      He was signed out to me pending CT abdomen pelvis for observation stay    Vitals:    07/19/25 0330   BP: (!) 125/58   Pulse: 93   Resp: 18   Temp:        Imaging Studies:    CT Abdomen Pelvis With IV Contrast NO Oral Contrast   Final Result      1. Findings suggestive of a nonspecific enteritis and/or diarrheal illness.  Significant fluid distension of the stomach and small volume fluid in the lower esophagus.   2. Similar appearance of infrarenal aorta ectasia and left common iliac artery aneurysm.   3. Incidental findings discussed in the body of the report.      Electronically signed by resident: Markel Brunson   Date:    07/19/2025   Time:    01:47      Electronically signed by: Norman Rueda MD   Date:    07/19/2025   Time:    02:50          Medications Given:  Medications   iohexoL (OMNIPAQUE 350) injection 100 mL (100 mLs Intravenous Given 7/19/25 0017)       Pending Items/ MDM:  80 y.o. male with above complaints.  CT consistent with enteritis.  Patient discussed with hospital medicine, place in observation for high-risk syncope    Diagnostic Impression:    1. Syncope    2. Abdominal pain, diffuse         ED Disposition Condition    Observation                  Sara Samson MD  Emergency  Medicine

## 2025-07-19 NOTE — PLAN OF CARE
[facility] - Emergency Dept  Initial Discharge Assessment       Primary Care Provider: Misbah Cordero MD    Admission Diagnosis: Syncope [R55]    Admission Date: 7/18/2025  Expected Discharge Date:     Pt stated he is independent with his ambulation and ADL's and does not require assistance or equipment.     Pt to d/c home with no needs when ready     Transition of Care Barriers: (P) None    Payor: MEDICARE / Plan: MEDICARE PART A & B / Product Type: Government /     Extended Emergency Contact Information  Primary Emergency Contact: Valeria Short   [facility]  Home Phone: [phone]  Mobile Phone: [phone]  Relation: Spouse    Discharge Plan A: (P) Home  Discharge Plan B: (P) Home      [id number] - [place], [place] - [address] [facility]  [address]  [facility] [address]  Phone: [phone] Fax: [phone]    [facility] - [place], [place] - [address]  [address]  [facility] [address]  Phone: [phone] Fax: [phone]      Initial Assessment (most recent)       Adult Discharge Assessment - 07/19/25 0840          Discharge Assessment    Assessment Type Discharge Planning Assessment     Confirmed/corrected address, phone number and insurance Yes     Confirmed Demographics Correct on Facesheet     Source of Information patient     Communicated ROSS with patient/caregiver Yes (P)      People in Home spouse (P)      Name(s) of People in Home dana Shaw (P)      Facility Arrived From: home (P)      Do you expect to return to your current living situation? Yes (P)      Do you have help at home or someone to help you manage your care at home? No (P)      Prior to hospitilization cognitive status: Alert/Oriented;No Deficits (P)      Current cognitive status: No Deficits;Alert/Oriented (P)      Walking or Climbing Stairs Difficulty no (P)      Dressing/Bathing Difficulty no (P)      Home  Accessibility stairs within home (P)      Number of Stairs, Within Home, Primary ten (P)      Home Layout Able to live on 1st floor;Bathroom on 2nd floor;Bedroom on 2nd floor (P)      Equipment Currently Used at Home none (P)      Patient currently being followed by outpatient case management? No (P)      Do you currently have service(s) that help you manage your care at home? No (P)      Do you take prescription medications? Yes (P)      Do you have prescription coverage? Yes (P)      Do you have any problems affording any of your prescribed medications? No (P)      Is the patient taking medications as prescribed? yes (P)      Who is going to help you get home at discharge? family/friends (P)      How do you get to doctors appointments? car, drives self (P)      Are you on dialysis? No (P)      Do you take coumadin? No (P)      Discharge Plan A Home (P)      Discharge Plan B Home (P)      DME Needed Upon Discharge  none (P)      Discharge Plan discussed with: Patient (P)      Transition of Care Barriers None (P)         Physical Activity    On average, how many days per week do you engage in moderate to strenuous exercise (like a brisk walk)? 4 days (P)      On average, how many minutes do you engage in exercise at this level? 60 min (P)         Financial Resource Strain    How hard is it for you to pay for the very basics like food, housing, medical care, and heating? Not hard at all (P)         Housing Stability    In the last 12 months, was there a time when you were not able to pay the mortgage or rent on time? No (P)      At any time in the past 12 months, were you homeless or living in a shelter (including now)? No (P)         Transportation Needs    In the past 12 months, has lack of transportation kept you from medical appointments or from getting medications? No (P)      In the past 12 months, has lack of transportation kept you from meetings, work, or from getting things needed for daily living? No (P)          Food Insecurity    Within the past 12 months, you worried that your food would run out before you got the money to buy more. Never true (P)      Within the past 12 months, the food you bought just didn't last and you didn't have money to get more. Never true (P)         Stress    Do you feel stress - tense, restless, nervous, or anxious, or unable to sleep at night because your mind is troubled all the time - these days? Not at all (P)         Social Isolation    How often do you feel lonely or isolated from those around you?  Never (P)         Alcohol Use    Q1: How often do you have a drink containing alcohol? Monthly or less (P)      Q2: How many drinks containing alcohol do you have on a typical day when you are drinking? 1 or 2 (P)      Q3: How often do you have six or more drinks on one occasion? Never (P)         Utilities    In the past 12 months has the electric, gas, oil, or water company threatened to shut off services in your home? No (P)         Health Literacy    How often do you need to have someone help you when you read instructions, pamphlets, or other written material from your doctor or pharmacy? Never (P)                    Discharge Plan A and Plan B have been determined by review of patient's clinical status, future medical and therapeutic needs, and coverage/benefits for post-acute care in coordination with multidisciplinary team members.    Ayesha Rucker CD, MSW, LMSW, RSW   Case Management  [facility]  Email: [email or web address]

## 2025-07-19 NOTE — ASSESSMENT & PLAN NOTE
Long history of vasovagol syncope. Suspect ED syncope vasovagal. Do no suspect hypovolemia as patient BP wnl and had only 1 episode of emesis. Had a syncopal event christmas 2024. had an angiogram and loop recorder w/ no arrhythmias     - consulted cardiology for interrogation with STEMI history and tropinemia (downtrending).   - tele

## 2025-07-19 NOTE — HOSPITAL COURSE
79 y/o male with past medical history of STEMI, CAD w/ LAD stent with implanted loop recorder, HTN,  Parkinsons, SVA, and cerebral amyloid angiopathy presented for evaluation of general malaise, abdominal pain, diarrhea, and one episode of emesis with witnessed syncopal episode with no evidence of head injury. In the ED patient normotensive, afebrile, with labs notable for mildly elevated troponin levels (184), WBC within normal limits, with mild electrolyte imbalances. EKG in NSR, no new ST changes, CT abdomen significant for nonspecific enteritis. Patient admitted for observation. Patient remained hemodynamically stable throughout stay, denies chest pain and shortness of breath throughout admission. Able to tolerate PO intake. Electrolytes repleted. Serial troponin down trending. Viral illness suspected as the cause of his symptoms. Patient felt resolution of symptoms, and deemed medically stable for discharge. Cardiology consulted for interrogation of patient loop recorder to rule out arrhythmia as possible cause of syncope, however patient preference was for outpatient follow up during weekly interrogation. Patient educated on strict return precautions.

## 2025-07-19 NOTE — ED NOTES
Telemetry Verification   Patient placed on Telemetry Box  Verified with War Room  Box # [id number]   Monitor Tech    Rate 74   Rhythm SR

## 2025-07-19 NOTE — ASSESSMENT & PLAN NOTE
Continue home levodopa, do not suspect parkinson related gastroparesis as he has not had this problem before.

## 2025-07-19 NOTE — H&P
[facility] - Emergency Dept  [facility] Medicine  History & Physical    Patient Name: Patrick Short  MRN: [id number]  Patient Class: OP- Observation  Admission Date: 7/18/2025  Attending Physician: Patrick Hardy III*   Primary Care Provider: Misbah Cordero MD         Patient information was obtained from patient and ER records.     Subjective:     Principal Problem:<principal problem not specified>    Chief Complaint:   Chief Complaint   Patient presents with    Abdominal Pain     Pt states abdominal pain, nausea, vomiting all day. Denies diarrhea. Pt has history of MI and angiogram done in Dec 2024        HPI: 79 yo M pmh of GERD, HTN, STEMI, CAD w/ LAD stent , Parkinsonism, CVA, cerebral amyloid angiopathy presented for evaluation of abdominal pain/emesis.     In the ED normotensive, afebrile, tachypnic, remained on RA. Wbcs wnl, electrolytes wnl, cr 0.9. Bili 1.2 with normal liver enzymes. Trop 184 >174 >150. EKG with NSR. CT A/P with nonspecific enteritis and/or diarrheal illness.  Significant fluid distension of the stomach - noted to be physiological.     One episode of emesis with severe nausea today with no inciting events. Patient was feeling unwell and went to the ED. There he has a syncopal event. Wife notes that pt has long history of vasovagal syncope with triggers of emotional distress. No SOB, chest pain, diarrhea. He note worsening belching. No recent medication changes. Last received LP for  ALN-CAROLYN trial 3 months ago.     Past Medical History:   Diagnosis Date    CAD (coronary artery disease) 07/26/2016    3 stents    Cerebral amyloid angiopathy     Cerebral ischemic stroke due to global hypoperfusion with watershed infarct 10/10/2022    Heart block     MI (myocardial infarction)     Parkinsonism     Primary hypertension 11/21/2022    Reflux     Vasovagal syncope 2/11/2025       Past Surgical History:   Procedure Laterality Date    ANGIOGRAM, CORONARY, WITH LEFT HEART CATHETERIZATION N/A  12/23/2024    Procedure: Angiogram, Coronary, with Left Heart Cath;  Surgeon: Sung Mendoza MD;  Location: [facility] CATH LAB;  Service: Cardiology;  Laterality: N/A;    CARDIAC CATHETERIZATION      EYE SURGERY Left 02/2017    HIP SURGERY Right 12/2021    INSERTION OF IMPLANTABLE LOOP RECORDER N/A 1/24/2023    Procedure: Insertion, Implantable Loop Recorder;  Surgeon: ALBERTO Roth MD;  Location: [facility] EP LAB;  Service: Cardiology;  Laterality: N/A;  CVA, ILR, MDT, Local, , 3 Prep    LEFT HEART CATHETERIZATION Left 12/23/2024    Procedure: Left heart cath;  Surgeon: Sung Mendoza MD;  Location: [facility] CATH LAB;  Service: Cardiology;  Laterality: Left;       Review of patient's allergies indicates:  No Known Allergies    No current facility-administered medications on file prior to encounter.     Current Outpatient Medications on File Prior to Encounter   Medication Sig    amLODIPine (NORVASC) 2.5 MG Tab Take 3 tablets (7.5 mg total) by mouth once daily.    aspirin 81 MG Chew Take 1 tablet (81 mg total) by mouth once daily.    atorvastatin (LIPITOR) 40 MG tablet Take 1 tablet (40 mg total) by mouth once daily.    carbidopa-levodopa  mg (SINEMET)  mg per tablet TAKE 1 TABLET BY MOUTH THREE TIMES A DAY    coQ10, ubiquinol, 100 mg Cap Take by mouth.    finasteride (PROSCAR) 5 mg tablet Take 1 tablet (5 mg total) by mouth once daily.    pantoprazole (PROTONIX) 40 MG tablet Take 1 tablet (40 mg total) by mouth once daily.    telmisartan (MICARDIS) 80 MG Tab Take 1 tablet (80 mg total) by mouth once daily.     Family History       Problem Relation (Age of Onset)    Coronary artery disease Father    Emphysema Father    Hypertension Mother          Tobacco Use    Smoking status: Never    Smokeless tobacco: Never   Substance and Sexual Activity    Alcohol use: Yes     Alcohol/week: 1.0 standard drink of alcohol     Types: 1 Shots of liquor per week     Comment: twice a month/beer wine    Drug use: Never     Sexual activity: Yes     Partners: Female     Comment: Wife     Review of Systems  Objective:     Vital Signs (Most Recent):  Temp: 97.6 °F (36.4 °C) (07/18/25 2132)  Pulse: 93 (07/19/25 0330)  Resp: 18 (07/19/25 0330)  BP: (!) 125/58 (07/19/25 0330)  SpO2: 97 % (07/19/25 0330) Vital Signs (24h Range):  Temp:  [97.6 °F (36.4 °C)-97.9 °F (36.6 °C)] 97.6 °F (36.4 °C)  Pulse:  [68-93] 93  Resp:  [16-21] 18  SpO2:  [91 %-97 %] 97 %  BP: (117-134)/(58-72) 125/58     Weight: 96.2 kg (212 lb)  Body mass index is 33.2 kg/m².     Physical Exam  Constitutional:       Appearance: Normal appearance. He is not ill-appearing.   Cardiovascular:      Rate and Rhythm: Normal rate and regular rhythm.      Heart sounds: Normal heart sounds.   Pulmonary:      Effort: Pulmonary effort is normal.      Breath sounds: Normal breath sounds.   Abdominal:      General: Bowel sounds are normal. There is distension (upper abdomen).      Tenderness: There is no abdominal tenderness.   Musculoskeletal:      Right lower leg: No edema.      Left lower leg: No edema.   Skin:     General: Skin is warm and dry.   Neurological:      Mental Status: He is alert. Mental status is at baseline.                Significant Labs: All pertinent labs within the past 24 hours have been reviewed.  Recent Lab Results         07/19/25  0029   07/18/25  2246   07/18/25  2211   07/18/25  2146        Albumin       3.9       ALP       66       Allens Test     N/A         ALT       22       Anion Gap       9       AST       22       Baso #       0.01       Basophil %       0.2       BILIRUBIN TOTAL       1.2  Comment: For infants and newborns, interpretation of results should be based   on gestational age, weight and in agreement with clinical   observations.    Premature Infant recommended reference ranges:   0-24 hours:  <8.0 mg/dL   24-48 hours: <12.0 mg/dL   3-5 days:    <15.0 mg/dL   6-29 days:   <15.0 mg/dL       Site     Other         BUN       19       Calcium        8.3       Chloride       104       CO2       24       Creatinine       0.9       eGFR       >60  Comment: Estimated GFR calculated using the CKD-EPI creatinine (2021) equation.       Eos #       0.01       Eos %       0.2       Glucose       146       Gran # (ANC)       4.45       Hematocrit       43.2       Hemoglobin       14.5       Hepatitis C Ab       Non-Reactive       HIV 1/2 Ag/Ab       Non-Reactive       Immature Grans (Abs)       0.01  Comment: Mild elevation in immature granulocytes is non specific and can be seen in a variety of conditions including stress response, acute inflammation, trauma and pregnancy. Correlation with other laboratory and clinical findings is essential.       Immature Granulocytes       0.2       Lipase       58       Lymph #       0.22       Lymph %       4.4       MCH       30.7       MCHC       33.6       MCV       91       Mono #       0.34       Mono %       6.7       MPV       11.8       Neut %       88.3       nRBC       0       Platelet Count       142       POC BE     1         POC HCO3     26.2         POC PCO2     42.6         POC PH     7.397         POC PO2     23         POC SATURATED O2     40         POC TCO2     27         Potassium       3.7       PROTEIN TOTAL       7.0       RBC       4.73       RDW       12.7       Sample     VENOUS         Sodium       137       Troponin I High Sensitivity 150   174     184       WBC       5.04               Significant Imaging: I have reviewed all pertinent imaging results/findings within the past 24 hours.        Assessment/Plan:     Assessment & Plan  Vasovagal syncope  Long history of vasovagol syncope. Suspect ED syncope vasovagal. Do no suspect hypovolemia as patient BP wnl and had only 1 episode of emesis. Had a syncopal event christmas 2024. had an angiogram and loop recorder w/ no arrhythmias     - consulted cardiology for interrogation with STEMI history and tropinemia (downtrending).   - tele   N&V (nausea and  vomiting)  1 Day of nausea and 1 episode of emesis. In the ED normotensive, afebrile, tachypnic, remained on RA. Wbcs wnl, electrolytes wnl, cr 0.9. Bili 1.2 with normal liver enzymes. Trop 184 >174 >150. EKG with NSR.     CT A/P with the stomach is significantly distended with ingested contents, with narrowing at the pylorus, which is likely physiologic noting ingested contents are seen in the small intestine. Mild diffuse wall enhancement and mild fluid distension of several loops of small bowel. Small bowel wall thickening in the left hemiabdomen. Liquid stool in the colon     - Zofran prn   - Likely viral   - reviewed home medications , less likely medication induced  CAD (coronary artery disease)  Patient with known CAD s/p stent placement, which is controlled Will continue ASA and Statin and monitor for S/Sx of angina/ACS. Continue to monitor on telemetry.       - Zofran prn   - Likely viral   - reviewed home medications , less likely medication induced  Parkinsonism  Continue home levodopa, do not suspect parkinson related gastroparesis as he has not had this problem before.      Primary hypertension  Patient's blood pressure range in the last 24 hours was: BP  Min: 117/66  Max: 134/72.The patient's inpatient anti-hypertensive regimen is listed below:  Current Antihypertensives       Plan  - BP is controlled, no changes needed to their regimen  - hold home Amlodipine, telmisartan  Intracranial hemorrhage  Previously with 1.2 cm intraparenchymal hemorrhage centered in the subcortical white matter of the right postcentral gyrus  VTE Risk Mitigation (From admission, onward)           Ordered     Reason for No Pharmacological VTE Prophylaxis  Once        Question:  Reasons:  Answer:  Patient is Ambulatory    07/19/25 0350     IP VTE HIGH RISK PATIENT  Once         07/19/25 0350     Place sequential compression device  Until discontinued         07/19/25 0350                  On 07/19/2025, patient should be  placed in hospital observation services under my care in collaboration with Dr. Hardy.         Jason Ang MD  Department of Hospital Medicine  [facility] - Emergency Dept

## 2025-07-19 NOTE — ASSESSMENT & PLAN NOTE
Patient with known CAD s/p stent placement, which is controlled Will continue ASA and Statin and monitor for S/Sx of angina/ACS. Continue to monitor on telemetry.       - Zofran prn   - Likely viral   - reviewed home medications , less likely medication induced

## 2025-07-19 NOTE — ASSESSMENT & PLAN NOTE
Patient's blood pressure range in the last 24 hours was: BP  Min: 117/66  Max: 134/72.The patient's inpatient anti-hypertensive regimen is listed below:  Current Antihypertensives       Plan  - BP is controlled, no changes needed to their regimen  - hold home Amlodipine, telmisartan

## 2025-07-19 NOTE — ED TRIAGE NOTES
Pt comes in via POV with complaints of 4/10 abdominal pain and N/V. Pt has a hx of MI and an angiogram done in December 2024.

## 2025-07-19 NOTE — ASSESSMENT & PLAN NOTE
Previously with 1.2 cm intraparenchymal hemorrhage centered in the subcortical white matter of the right postcentral gyrus

## 2025-07-19 NOTE — HPI
81 yo M pmh of GERD, HTN, STEMI, CAD w/ LAD stent , Parkinsonism, CVA, cerebral amyloid angiopathy presented for evaluation of abdominal pain/emesis.     In the ED normotensive, afebrile, tachypnic, remained on RA. Wbcs wnl, electrolytes wnl, cr 0.9. Bili 1.2 with normal liver enzymes. Trop 184 >174 >150. EKG with NSR. CT A/P with nonspecific enteritis and/or diarrheal illness.  Significant fluid distension of the stomach - noted to be physiological.     One episode of emesis with severe nausea today with no inciting events. Patient was feeling unwell and went to the ED. There he has a syncopal event. Wife notes that pt has long history of vasovagal syncope with triggers of emotional distress. No SOB, chest pain, diarrhea. He note worsening belching. No recent medication changes. Last received LP for  ALN-CAROLYN trial 3 months ago.

## 2025-07-19 NOTE — ED NOTES
I assumed care of this patient at this time. Report received from CURLY Yepez. Pt is resting comfortably in ED stretcher + no acute distress observed. Pt is AAOx4. RR is even, unlabored, and spontaneous + pt's oxygen saturation is 95% on room air at this time. Skin is warm, dry and intact. Pt denies pain or needs at this time. Pt remains on continuous cardiac monitor, pulse ox, and BP cuff to cycle. Bed low and locked; side rails up x2; call light within reach. Family member at bedside.

## 2025-07-19 NOTE — DISCHARGE SUMMARY
[facility] - Emergency Dept  Hospital Medicine  Discharge Summary      Patient Name: Patrick Short  MRN: [id number]  VINH: [id number]  Patient Class: OP- Observation  Admission Date: 7/18/2025  Hospital Length of Stay: 0 days  Discharge Date and Time: 07/19/2025 4:02 PM  Attending Physician: No att. providers found   Discharging Provider: Kristie Coe MD  Primary Care Provider: Misbah Cordero MD  [facility] Medicine Team: [place] [id number] Kristie Coe MD  Primary Care Team: [facility] 4    HPI:   81 yo M pmh of GERD, HTN, STEMI, CAD w/ LAD stent , Parkinsonism, CVA, cerebral amyloid angiopathy presented for evaluation of abdominal pain/emesis.     In the ED normotensive, afebrile, tachypnic, remained on RA. Wbcs wnl, electrolytes wnl, cr 0.9. Bili 1.2 with normal liver enzymes. Trop 184 >174 >150. EKG with NSR. CT A/P with nonspecific enteritis and/or diarrheal illness.  Significant fluid distension of the stomach - noted to be physiological.     One episode of emesis with severe nausea today with no inciting events. Patient was feeling unwell and went to the ED. There he has a syncopal event. Wife notes that pt has long history of vasovagal syncope with triggers of emotional distress. No SOB, chest pain, diarrhea. He note worsening belching. No recent medication changes. Last received LP for  ALN-CAROLYN trial 3 months ago.     * No surgery found *      Hospital Course:   79 y/o male with past medical history of STEMI, CAD w/ LAD stent with implanted loop recorder, HTN,  Parkinsons, SVA, and cerebral amyloid angiopathy presented for evaluation of general malaise, abdominal pain, diarrhea, and one episode of emesis with witnessed syncopal episode with no evidence of head injury. In the ED patient normotensive, afebrile, with labs notable for mildly elevated troponin levels (184), WBC within normal limits, with mild electrolyte imbalances. EKG in NSR, no new ST changes, CT abdomen significant for nonspecific  enteritis. Patient admitted for observation. Patient remained hemodynamically stable throughout stay, denies chest pain and shortness of breath throughout admission. Able to tolerate PO intake. Electrolytes repleted. Serial troponin down trending. Viral illness suspected as the cause of his symptoms. Patient felt resolution of symptoms, and deemed medically stable for discharge. Cardiology consulted for interrogation of patient loop recorder to rule out arrhythmia as possible cause of syncope, however patient preference was for outpatient follow up during weekly interrogation. Patient educated on strict return precautions.      Goals of Care Treatment Preferences:  Code Status: Full Code      Final Active Diagnoses:    Diagnosis Date Noted POA    PRINCIPAL PROBLEM:  N&V (nausea and vomiting) [R11.2] 07/19/2025 Yes    Vasovagal syncope [R55] 02/11/2025 Yes    Intracranial hemorrhage [I62.9] 11/22/2022 Yes    Primary hypertension [I10] 11/21/2022 Yes    Parkinsonism [G20.C] 12/15/2021 Yes    CAD (coronary artery disease) [I25.10] 07/26/2016 Yes      Problems Resolved During this Admission:       Discharged Condition: stable    Disposition: Home or Self Care      Patient Instructions:   No discharge procedures on file.    Significant Diagnostic Studies: Labs: CMP   Recent Labs   Lab 07/18/25 2146 07/19/25  0405    138   K 3.7 3.7    107   CO2 24 21*   * 140*   BUN 19 21   CREATININE 0.9 0.8   CALCIUM 8.3* 8.2*   PROT 7.0 6.8   ALBUMIN 3.9 3.7   BILITOT 1.2* 1.2*   ALKPHOS 66 62   AST 22 20   ALT 22 21   ANIONGAP 9 10   , CBC   Recent Labs   Lab 07/18/25 2146 07/19/25  0405   WBC 5.04 4.36   HGB 14.5 14.0   HCT 43.2 40.8   * 135*   , and Troponin.  Radiology: All imaging reviewed.    Pending Diagnostic Studies:       Procedure Component Value Units Date/Time    GREY TOP URINE HOLD [[id number]] Collected: 07/19/25 0355    Order Status: Sent Lab Status: In process Updated: 07/19/25 0459     Specimen: Urine     HCV Virus Hold Specimen [[id number]] Collected: 07/18/25 2146    Order Status: Sent Lab Status: In process Updated: 07/18/25 2151    Specimen: Blood            Medications:  Reconciled Home Medications:      Medication List        CONTINUE taking these medications      aspirin 81 MG Chew  Take 1 tablet (81 mg total) by mouth once daily.     atorvastatin 40 MG tablet  Commonly known as: LIPITOR  Take 1 tablet (40 mg total) by mouth once daily.     carbidopa-levodopa  mg  mg per tablet  Commonly known as: SINEMET  TAKE 1 TABLET BY MOUTH THREE TIMES A DAY     coQ10 (ubiquinol) 100 mg Cap  Take by mouth.     finasteride 5 mg tablet  Commonly known as: PROSCAR  Take 1 tablet (5 mg total) by mouth once daily.     INV amlodipine 2.5 MG Tab  Commonly known as: NORVASC  Take 3 tablets (7.5 mg total) by mouth once daily.     pantoprazole 40 MG tablet  Commonly known as: PROTONIX  Take 1 tablet (40 mg total) by mouth once daily.     telmisartan 80 MG Tab  Commonly known as: MICARDIS  Take 1 tablet (80 mg total) by mouth once daily.                Time spent on the discharge of patient: 45 minutes         Kristie Coe MD PGY1  Department of Hospital Medicine  [facility] - Emergency Dept

## 2025-07-19 NOTE — ED NOTES
Received report from  Stuart RAWLS.  The patient is awake, alert and cooperative with a calm affect, patient is aware of environment, airway is open and patent, respirations are spontaneous, normal effort and rate noted, skin warm and dry, moves all extremities well, appearance: no apparent distress noted, bed placed in low position, side rails up x 2, call light is within reach of patient, explanation of care provided to patient, alarms set and turned on for monitor, pulse ox, plan of care: observe and reassure, position of comfort, patient offers no complaints at this time, awaiting further MD orders and bed assignment, will continue to monitor.

## 2025-07-19 NOTE — ED NOTES
Transport cancelled -- per social work, pt does not want to be admitted + wants to speak w/ the doctor and be discharged ASAP.

## 2025-07-19 NOTE — SUBJECTIVE & OBJECTIVE
Past Medical History:   Diagnosis Date    CAD (coronary artery disease) 07/26/2016    3 stents    Cerebral amyloid angiopathy     Cerebral ischemic stroke due to global hypoperfusion with watershed infarct 10/10/2022    Heart block     MI (myocardial infarction)     Parkinsonism     Primary hypertension 11/21/2022    Reflux     Vasovagal syncope 2/11/2025       Past Surgical History:   Procedure Laterality Date    ANGIOGRAM, CORONARY, WITH LEFT HEART CATHETERIZATION N/A 12/23/2024    Procedure: Angiogram, Coronary, with Left Heart Cath;  Surgeon: Sung Mendoza MD;  Location: [facility] CATH LAB;  Service: Cardiology;  Laterality: N/A;    CARDIAC CATHETERIZATION      EYE SURGERY Left 02/2017    HIP SURGERY Right 12/2021    INSERTION OF IMPLANTABLE LOOP RECORDER N/A 1/24/2023    Procedure: Insertion, Implantable Loop Recorder;  Surgeon: ALBERTO Roth MD;  Location: [facility] EP LAB;  Service: Cardiology;  Laterality: N/A;  CVA, ILR, MDT, Local, , 3 Prep    LEFT HEART CATHETERIZATION Left 12/23/2024    Procedure: Left heart cath;  Surgeon: Sung Mendoza MD;  Location: [facility] CATH LAB;  Service: Cardiology;  Laterality: Left;       Review of patient's allergies indicates:  No Known Allergies    No current facility-administered medications on file prior to encounter.     Current Outpatient Medications on File Prior to Encounter   Medication Sig    amLODIPine (NORVASC) 2.5 MG Tab Take 3 tablets (7.5 mg total) by mouth once daily.    aspirin 81 MG Chew Take 1 tablet (81 mg total) by mouth once daily.    atorvastatin (LIPITOR) 40 MG tablet Take 1 tablet (40 mg total) by mouth once daily.    carbidopa-levodopa  mg (SINEMET)  mg per tablet TAKE 1 TABLET BY MOUTH THREE TIMES A DAY    coQ10, ubiquinol, 100 mg Cap Take by mouth.    finasteride (PROSCAR) 5 mg tablet Take 1 tablet (5 mg total) by mouth once daily.    pantoprazole (PROTONIX) 40 MG tablet Take 1 tablet (40 mg total) by mouth once daily.     telmisartan (MICARDIS) 80 MG Tab Take 1 tablet (80 mg total) by mouth once daily.     Family History       Problem Relation (Age of Onset)    Coronary artery disease Father    Emphysema Father    Hypertension Mother          Tobacco Use    Smoking status: Never    Smokeless tobacco: Never   Substance and Sexual Activity    Alcohol use: Yes     Alcohol/week: 1.0 standard drink of alcohol     Types: 1 Shots of liquor per week     Comment: twice a month/beer wine    Drug use: Never    Sexual activity: Yes     Partners: Female     Comment: Wife     Review of Systems  Objective:     Vital Signs (Most Recent):  Temp: 97.6 °F (36.4 °C) (07/18/25 2132)  Pulse: 93 (07/19/25 0330)  Resp: 18 (07/19/25 0330)  BP: (!) 125/58 (07/19/25 0330)  SpO2: 97 % (07/19/25 0330) Vital Signs (24h Range):  Temp:  [97.6 °F (36.4 °C)-97.9 °F (36.6 °C)] 97.6 °F (36.4 °C)  Pulse:  [68-93] 93  Resp:  [16-21] 18  SpO2:  [91 %-97 %] 97 %  BP: (117-134)/(58-72) 125/58     Weight: 96.2 kg (212 lb)  Body mass index is 33.2 kg/m².     Physical Exam  Constitutional:       Appearance: Normal appearance. He is not ill-appearing.   Cardiovascular:      Rate and Rhythm: Normal rate and regular rhythm.      Heart sounds: Normal heart sounds.   Pulmonary:      Effort: Pulmonary effort is normal.      Breath sounds: Normal breath sounds.   Abdominal:      General: Bowel sounds are normal. There is distension (upper abdomen).      Tenderness: There is no abdominal tenderness.   Musculoskeletal:      Right lower leg: No edema.      Left lower leg: No edema.   Skin:     General: Skin is warm and dry.   Neurological:      Mental Status: He is alert. Mental status is at baseline.                Significant Labs: All pertinent labs within the past 24 hours have been reviewed.  Recent Lab Results         07/19/25  0029   07/18/25  2246   07/18/25  2211   07/18/25  2146        Albumin       3.9       ALP       66       Allens Test     N/A         ALT       22        Anion Gap       9       AST       22       Baso #       0.01       Basophil %       0.2       BILIRUBIN TOTAL       1.2  Comment: For infants and newborns, interpretation of results should be based   on gestational age, weight and in agreement with clinical   observations.    Premature Infant recommended reference ranges:   0-24 hours:  <8.0 mg/dL   24-48 hours: <12.0 mg/dL   3-5 days:    <15.0 mg/dL   6-29 days:   <15.0 mg/dL       Site     Other         BUN       19       Calcium       8.3       Chloride       104       CO2       24       Creatinine       0.9       eGFR       >60  Comment: Estimated GFR calculated using the CKD-EPI creatinine (2021) equation.       Eos #       0.01       Eos %       0.2       Glucose       146       Gran # (ANC)       4.45       Hematocrit       43.2       Hemoglobin       14.5       Hepatitis C Ab       Non-Reactive       HIV 1/2 Ag/Ab       Non-Reactive       Immature Grans (Abs)       0.01  Comment: Mild elevation in immature granulocytes is non specific and can be seen in a variety of conditions including stress response, acute inflammation, trauma and pregnancy. Correlation with other laboratory and clinical findings is essential.       Immature Granulocytes       0.2       Lipase       58       Lymph #       0.22       Lymph %       4.4       MCH       30.7       MCHC       33.6       MCV       91       Mono #       0.34       Mono %       6.7       MPV       11.8       Neut %       88.3       nRBC       0       Platelet Count       142       POC BE     1         POC HCO3     26.2         POC PCO2     42.6         POC PH     7.397         POC PO2     23         POC SATURATED O2     40         POC TCO2     27         Potassium       3.7       PROTEIN TOTAL       7.0       RBC       4.73       RDW       12.7       Sample     VENOUS         Sodium       137       Troponin I High Sensitivity 150   174     184       WBC       5.04               Significant Imaging: I have  reviewed all pertinent imaging results/findings within the past 24 hours.

## 2025-07-19 NOTE — ASSESSMENT & PLAN NOTE
1 Day of nausea and 1 episode of emesis. In the ED normotensive, afebrile, tachypnic, remained on RA. Wbcs wnl, electrolytes wnl, cr 0.9. Bili 1.2 with normal liver enzymes. Trop 184 >174 >150. EKG with NSR.     CT A/P with the stomach is significantly distended with ingested contents, with narrowing at the pylorus, which is likely physiologic noting ingested contents are seen in the small intestine. Mild diffuse wall enhancement and mild fluid distension of several loops of small bowel. Small bowel wall thickening in the left hemiabdomen. Liquid stool in the colon     - Zofran prn   - Likely viral   - reviewed home medications , less likely medication induced

## 2025-07-19 NOTE — ED PROVIDER NOTES
Encounter Date: 7/18/2025       History     Chief Complaint   Patient presents with    Abdominal Pain     Pt states abdominal pain, nausea, vomiting all day. Denies diarrhea. Pt has history of MI and angiogram done in Dec 2024     80 year old male PMHx cerebral amyloid angiopathy, Alzheimer's dementia, hx of vasovagal syncope, CAD s/p PCI, HTN, Parkinson's presenting to the ED from home with abdominal pain and vomiting. History obtained from patient and his wife. Patient states that he began having epigastric abdominal pain this morning, followed by several episodes of non-bloody, non-bilious vomiting. He has been able to continue PO fluid intake today, but has been unable to keep down solids. His wife also states that he had a syncopal episode in the waiting room. He does not remember the event, and is unable to give information on any weakness prior to the event. He did not fall and remained seated. Episode lasted approx 5 seconds. He denies any fevers, chills, chest pain, SOB. He reports having one non-bloody bowel movement this morning, and denies diarrhea or constipation.       Review of patient's allergies indicates:  No Known Allergies  Past Medical History:   Diagnosis Date    CAD (coronary artery disease) 07/26/2016    3 stents    Cerebral amyloid angiopathy     Cerebral ischemic stroke due to global hypoperfusion with watershed infarct 10/10/2022    Heart block     MI (myocardial infarction)     Parkinsonism     Primary hypertension 11/21/2022    Reflux     Vasovagal syncope 2/11/2025     Past Surgical History:   Procedure Laterality Date    ANGIOGRAM, CORONARY, WITH LEFT HEART CATHETERIZATION N/A 12/23/2024    Procedure: Angiogram, Coronary, with Left Heart Cath;  Surgeon: Sung Mendoza MD;  Location: [facility] CATH LAB;  Service: Cardiology;  Laterality: N/A;    CARDIAC CATHETERIZATION      EYE SURGERY Left 02/2017    HIP SURGERY Right 12/2021    INSERTION OF IMPLANTABLE LOOP RECORDER N/A 1/24/2023     Procedure: Insertion, Implantable Loop Recorder;  Surgeon: ALBERTO Roth MD;  Location: [facility] EP LAB;  Service: Cardiology;  Laterality: N/A;  CVA, CHERIE, MDT, [facility], , 3 Prep    LEFT HEART CATHETERIZATION Left 12/23/2024    Procedure: Left heart cath;  Surgeon: Sung Mendoza MD;  Location: [facility] CATH LAB;  Service: Cardiology;  Laterality: Left;     Family History   Problem Relation Name Age of Onset    Hypertension Mother      Emphysema Father      Coronary artery disease Father       Social History[1]  Review of Systems   Constitutional:  Negative for activity change, appetite change, chills, fatigue and fever.   HENT:  Negative for congestion and sore throat.    Eyes:  Negative for visual disturbance.   Respiratory:  Negative for cough, chest tightness, shortness of breath and wheezing.    Cardiovascular:  Negative for chest pain, palpitations and leg swelling.   Gastrointestinal:  Positive for abdominal pain, nausea and vomiting. Negative for abdominal distention, constipation and diarrhea.   Genitourinary:  Negative for decreased urine volume, difficulty urinating, dysuria, flank pain and urgency.   Musculoskeletal:  Negative for arthralgias and back pain.   Neurological:  Positive for syncope. Negative for dizziness, weakness, numbness and headaches.   Psychiatric/Behavioral:  Negative for confusion.        Physical Exam     Initial Vitals [07/18/25 2035]   BP Pulse Resp Temp SpO2   124/68 93 18 97.9 °F (36.6 °C) (!) 94 %      MAP       --         Physical Exam    Constitutional: He appears well-developed and well-nourished. No distress.   HENT:   Head: Normocephalic and atraumatic.   Right Ear: External ear normal.   Left Ear: External ear normal.   Nose: Nose normal. Mouth/Throat: Oropharynx is clear and moist.   Eyes: Conjunctivae and EOM are normal.   Neck:   Normal range of motion.  Cardiovascular:  Normal rate, regular rhythm and normal heart sounds.           No murmur heard.  Pulmonary/Chest:  Breath sounds normal. No respiratory distress. He has no wheezes.   Abdominal: Abdomen is soft. Bowel sounds are normal. He exhibits no distension and no mass. There is abdominal tenderness. There is no rebound and no guarding.   Musculoskeletal:         General: No tenderness. Normal range of motion.      Cervical back: Normal range of motion.     Neurological: He is alert and oriented to person, place, and time.   Skin: Skin is warm.   Psychiatric: He has a normal mood and affect. Thought content normal.         ED Course   Procedures  Labs Reviewed   COMPREHENSIVE METABOLIC PANEL - Abnormal       Result Value    Sodium 137      Potassium 3.7      Chloride 104      CO2 24      Glucose 146 (*)     BUN 19      Creatinine 0.9      Calcium 8.3 (*)     Protein Total 7.0      Albumin 3.9      Bilirubin Total 1.2 (*)     ALP 66      AST 22      ALT 22      Anion Gap 9      eGFR >60     CBC WITH DIFFERENTIAL - Abnormal    WBC 5.04      RBC 4.73      HGB 14.5      HCT 43.2      MCV 91      MCH 30.7      MCHC 33.6      RDW 12.7      Platelet Count 142 (*)     MPV 11.8      Nucleated RBC 0      Neut % 88.3 (*)     Lymph % 4.4 (*)     Mono % 6.7      Eos % 0.2      Basophil % 0.2      Imm Grans % 0.2      Neut # 4.45      Lymph # 0.22 (*)     Mono # 0.34      Eos # 0.01      Baso # 0.01      Imm Grans # 0.01      Narrative:     This is an appended report.  These results have been appended to a previously verified report.   TROPONIN I HIGH SENSITIVITY - Abnormal    Troponin High Sensitive 184 (*)    TROPONIN I HIGH SENSITIVITY - Abnormal    Troponin High Sensitive 174 (*)    TROPONIN I HIGH SENSITIVITY - Abnormal    Troponin High Sensitive 150 (*)    ISTAT PROCEDURE - Abnormal    POC PH 7.397      POC PCO2 42.6      POC PO2 23 (*)     POC HCO3 26.2      POC BE 1      POC SATURATED O2 40      POC TCO2 27      Sample VENOUS      Site Other      Allens Test N/A     HEPATITIS C ANTIBODY - Normal    Hep C Ab Interp Non-Reactive      HIV 1 / 2 ANTIBODY - Normal    HIV 1/2 Ag/Ab Non-Reactive     LIPASE - Normal    Lipase Level 58     CBC W/ AUTO DIFFERENTIAL    Narrative:     The following orders were created for panel order CBC W/ AUTO DIFFERENTIAL.  Procedure                               Abnormality         Status                     ---------                               -----------         ------                     CBC with Differential[[id number]]       Abnormal            Final result                 Please view results for these tests on the individual orders.   HEP C VIRUS HOLD SPECIMEN   URINALYSIS, REFLEX TO URINE CULTURE     EKG Readings: (Independently Interpreted)   NSR, without acute ischemic changes       Imaging Results              CT Abdomen Pelvis With IV Contrast NO Oral Contrast (Final result)  Result time 07/19/25 02:50:12      Final result by Norman Rueda MD (07/19/25 02:50:12)                   Impression:      1. Findings suggestive of a nonspecific enteritis and/or diarrheal illness.  Significant fluid distension of the stomach and small volume fluid in the lower esophagus.  2. Similar appearance of infrarenal aorta ectasia and left common iliac artery aneurysm.  3. Incidental findings discussed in the body of the report.    Electronically signed by resident: Markel Brunson  Date:    07/19/2025  Time:    01:47    Electronically signed by: Norman Rueda MD  Date:    07/19/2025  Time:    02:50               Narrative:    EXAMINATION:  CT ABDOMEN PELVIS WITH IV CONTRAST    CLINICAL HISTORY:  Abdominal pain, acute, nonlocalized;    TECHNIQUE:  Low dose axial images were obtained from the lung bases to the pubic symphysis following the intravenous administration of 100 cc of Omnipaque 350.  Sagittal and coronal reformats were provided.  Oral contrast not administered.    COMPARISON:  CTA 07/28/2022.    FINDINGS:  Thoracic soft tissues: No significant abnormality.    Heart: No cardiomegaly or pericardial effusion.   Patchy calcific atherosclerosis throughout the coronary arteries.  Aortic valve annular calcification.    Lower mediastinum: No significant lymphadenopathy    Lungs: No consolidation, pneumothorax, or pleural effusion.    Liver: Normal in size without focal hepatic abnormality.    Gallbladder: No calcified gallstones.  No gallbladder wall thickening.  No pericholecystic fluid.    Bile Ducts: No intra or extrahepatic biliary ductal dilation.    Pancreas: No pancreatic mass lesion or duct dilatation.    Spleen: Unremarkable.    Adrenals: Unremarkable.    Kidneys/ Ureters: Normal in size and location.  Kidneys enhance symmetrically.  No nephrolithiasis.  No hydroureteronephrosis.  Right-sided renal cyst.    Bladder: Smooth contours without bladder wall thickening.    Reproductive organs: Prostatomegaly.  Prostatic calcifications present.    GI Tract/Mesentery: Small hiatal hernia.  Small volume fluid in the distal esophagus, which may reflect gastroesophageal reflux.  The stomach is significantly distended with ingested contents, with narrowing at the pylorus, which is likely physiologic noting ingested contents are seen in the small intestine.  Mild diffuse wall enhancement and mild fluid distension of several loops of small bowel.  Small bowel wall thickening in the left hemiabdomen.  Liquid stool in the colon.  Solid stool in the rectum.    Peritoneal Space: No intraperitoneal free air or free fluid.    Lymph nodes: No significant adenopathy.    Abdominal wall/extraperitoneal soft tissues: Unremarkable.    Vasculature: Left-sided aortic arch.  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  Similar noncalcified plaque or mural thrombus in the right internal iliac artery.    Bones: Right hip arthroplasty.  Degenerative change without acute displaced fracture or bone destructive process.                                       Medications   iohexoL (OMNIPAQUE 350) injection 100 mL (100 mLs Intravenous Given 7/19/25 0017)     Medical Decision Making  80M PMHx cerebral amyloid angiopathy, Alzheimer's dementia, hx of vasovagal syncope, CAD s/p PCI, HTN, Parkinson's presenting to the ED from home with abdominal pain and vomiting, syncope in waiting room. Ddx for abdominal pain includes viral gastroenteritis, bowel ischemia, AAA, hernia. CBC, CMP ordered. Pertinent positive includes elevated Tbili. Lipase wnl. Patient denies any recent illness or eating any foods out of the ordinary for him. CT did show signs of diarrheal illness. His abdominal pain began today, and has improved since then. CT A/P w Contrast ordered to rule out acute intra-abdominal processes like ischemia, results without signs of acute ischemia or acute intraabdominal process. VBG ordered to assess lactate, results wnl. Patient without pain radiation, less likely AAA but will be visualized on CT. AAA 3.4 cm, slight increase from 2022 but not emergent. Hernia possible, patient denies history and sudden onset of pain seems less likely. Likely gastroenteritis given improvement of symptoms and lack of evidence on CT. Ddx for syncope includes vasovagal syncope, ACS. Patient with a hx vasovagal syncope, does not remember LOC or syncopal episode. Wife states that it lasted a few seconds and happens often when stressed. EKG, Trop ordered. EKG without signs of ischemia but initial Trop 184, repeat 172, 150. EKG shows NSR without acute ischemia. Plan to admit patient for observation.    Amount and/or Complexity of Data Reviewed  Labs:  ordered.  Radiology: ordered.    Risk  Prescription drug management.                                          Clinical Impression:  Final diagnoses:  [R10.84] Abdominal pain, diffuse  [R55] Syncope (Primary)          ED Disposition Condition    Observation                       [1]   Social History  Tobacco Use    Smoking status: Never    Smokeless tobacco: Never   Substance Use Topics    Alcohol use: Yes     Alcohol/week: 1.0 standard drink of alcohol     Types: 1 Shots of liquor per week     Comment: twice a month/beer wine    Drug use: Never        Nicko Rojas MD  Resident  07/19/25 [id number]

## 2025-07-19 NOTE — DISCHARGE INSTRUCTIONS
At Home Care:  - Drink plenty of clear fluids (water, electrolyte drinks like Pedialyte or Gatorade, or broth).   - Avoid caffeine and alcohol while recovering from illness  - Start with bland foods and advance diet as tolerated to a heart healthy diet   - BRAT diet: (bananas, rice, applesauce, toast)  - Eat small, frequent meals    Symptom Management:  - Rest as needed. Avoid strenuous activity until you're fully recovered.  - You can take over the counter medications for nausea or fever as needed per instructions on bottle.    Follow-Up Care  - You currently have a loop monitor, which is checked weekly. This device will continue to help monitor your heart rhythm. Please follow up with your cardiologist to further investigate fainting episode. Outpatient evaluation may include reviewing your monitor readings and considering additional testing if needed.    Please return to the emergency department or contact your doctor if you experience:  - Inability to keep fluids down or worsening vomiting/diarrhea  - Signs of dehydration (dry mouth, dizziness, low urine output)  - New or worsening fever (>100.4)  - Ongoing or worsening dizziness of fainting episodes  - New chest pain, shortness of breath, or palpitations

## 2025-07-20 LAB — HOLD SPECIMEN: NORMAL

## 2025-07-21 ENCOUNTER — PATIENT OUTREACH (OUTPATIENT)
Dept: ADMINISTRATIVE | Facility: CLINIC | Age: 80
End: 2025-07-21
Payer: MEDICARE

## 2025-07-21 LAB — HOLD SPECIMEN: NORMAL

## 2025-07-27 NOTE — PROGRESS NOTES
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 patient does have difficulty keeping track of their medications.  The patient is not able to initiate the process of taking medications, requiring external prompts or requires caregiver intervention.  The patient does have difficulty with planning/organizing/completing multistep tasks requires assistance from others.  The patient does have not difficulty with multitasking/multistep tasks.  The patient does have difficulty with recognizing and adjusting for mistakes showing reoccurring inability to recognize or adjust for errors.  The patient's judgment is impaired, with observed difficulty evaluating risks, outcomes, and the appropriateness of actions.  The patient does have difficulty understanding abstract concepts or relationship.  The patient's insight into their health and situation is intact.  Language  The patient's speech has been not affected.  The patient's speech is fluent and non-effortful.  The patient does forget places/people's names more frequently.  The patient does have word-finding difficulties.  The patient does make inaccurate word substitutions.  The patient's speech is grammatically intact.  The patient does  appear to have impaired sentence fluency, repetition,and phonological processing.  The patient does not appear to have impaired comprehension.  The patient does not appear to have difficulty comprehending and understanding written text.  Visuospatial  The patient has become confused or disoriented in new, unfamiliar places.  The patient does not have trouble with navigation.  The patient does not get lost in familiar places.  The patient does not have visuospatial disorientation.  The patient has had problems with driving and/or parking.  The patient does not have difficulty recognizing objects or faces.  Motor/Coordination  The patient does have difficulty with walking.  The patient does feel imbalanced.  The patient has fallen.  The patient does not appear to have new motor deficits.  The patient does have difficulty buttoning shirts, operating zippers, or manipulating tools/utensils.  The patient reports new slow, small dysrhythmic movement.  The patient does have a resting tremor.  The patient's handwriting has become micrographic.  The patient denies restlessness.  The patient denies new and/or worsening simple repetitive behaviors.  The patient denies a change of self-stimulating behavior.  The patient's speech has not become simplified or become repetitive/stereotyped.  The patient denies having any new involuntary movements and/or muscle jerking.  The patient denies new muscle cramps and twitching.  The patient does not have swallowing difficulty.  Sensory  The patient denies new numbness, tingling, paresthesias, or pain.  The patient reports new loss of vision, blurry vision, and/or double vision.  The patient denies new loss of hearing or worsening tinnitus.  The patient denies anosmia.  The patient does not have hypersensitivity to environmental stimuli.  The patient does not have severe hypersensitivity to environmental stimuli.  Sleep  The patient denies difficulty sleeping. Comment: goes to bed at 8 pm  and wakes up at 2 am  The patient does not have difficulty going to sleep.  The patient denies difficulty staying asleep or frequently awakening at night.  The patient denies snoring.  The patient does snore and/or have witnessed apneas while sleeping. Comment: uvular surgery  When the patient wakes up in the morning, the patient does feel well-rested.  The patient denies dream-enactment behavior.  The patient denies per-nocturnal jerking.  The patient denies symptoms suggestive of restless leg syndrome.  Neurobehavioral  The patient's personality has changed. Comment: more critical and judgmental  The patient does difficulty with self-control.  The patient denies new impulsivity or rash/careless actions.  The patient does have difficulty with Decision-Making resulting in occasional indecision or poor choices.  The patient does not appear to have a change in inertia.  The patient does not appear apathetic or has decreased motivation.  The patient does not have difficulty with understanding and responding to social cues.  The patient is not exhibiting a diminished response to other people's needs and feelings.  The patient is not exhibiting symptoms of social withdrawal/indifference.  The patient is not exhibiting a diminished social interest, interrelatedness, or personal warmth.  The patient does not have symptoms to suggest a loss of manners or decorum.  The patient does not have symptoms of disinhibition and social inappropriateness.  The patient's personal hygiene is intact.  The patient does not have difficulty with flexible thinking.  The patient denies any change in rigid behavior.  The patient does not have symptoms of hyper-religiosity or dogmatism.  The patient's interests/pleasures have not become restrictive, simplified, interrupting, or repetitive.  The patient has not shown new perseverative behavior.  The patient denies new/worsening complex repetitive/ritualistic compulsions and behaviors.  The  patient denies any changes in eating behavior.  The patient denies increased consumption of food or substances.  The patient denies oral exploration or consumption of inedible objects.  Psychiatric  The patient does have difficulty with managing emotional responses effectively.  The patient does have symptoms of irritability and mood lability.  The patient does not exhibit hyperactive behavior.  The patient does not have symptoms of agitation, aggression, or violent outbursts.  The patient's emotional expression has changed.  The patient does not have emotional blunting.  The patient does feel depressed.  The patient denies anxiety.  The patient does not exhibit cycling behavior.  The patient is not exhibited symptoms of paranoia.  The patient does not have delusions.  The patient does not have hallucinations.  Medical Review of Systems  The patient does not have constipation.  The patient does not have diarrhea.  The patient does not have urinary incontinence.  The patient denies orthostatic lightheadedness.  The patient's weight is stable.  The patient does not have a history of sensitivity to neuroleptic/psychotropic medications.            Neurological Examination:     Mental Status  The patient's appearance is normal (hygiene is appropriate; attire is proper and clean).  Throughout the interview, the patient is cooperative, the patient's eye contact is appropriate.  The patient behavior is appropriate to the clinical context without impropriety or improper language/conduct.  The patient's energy level is abnormal.  The patient's attention/concentration is impaired.  The patient can complete three-step commands.  The patient's thought process is not logical or goal-oriented.  The patient demonstrated impaired insight based on actions, awareness of the patient's illness, plans for the future.  The patient demonstrated good judgment based on actions and plans for the future.  Cranial Nerves  The patient showed no  "evidence of anosmia 3/3 (coffee/vanilla/cinnamon).  The patient's pupils were normal.  The patient's visual fields were full to confrontation in all quadrants.  The patient's ocular pursuit in the horizontal and vertical plane was complete.  The patient's saccadic initiation, velocity, and amplitude are normal.  The patient demonstrated no square-wave jerks.  The patient's eyelid assessment showed no apraxia. There was no eyelid dysfunction, retraction, or martinez sign.  The patient blink rate was abnormal.  The patient's facial strength was normal.  The patient's facial expression was symmetric and appropriate to the context.  The patient's facial sensation was intact to light touch bilaterally.  The patient's hearing was normal bilaterally.  The patient's oropharynx and soft palate appeared abnormal.  The patient's uvula is mid-line.  The patient's tongue showed no evidence of scalloping.  The patient can protrude their tongue beyond The patient's lips for >10 sec.  The patient can move their extended tongue back and forth rapidly.  The patient's tongue showed no evidence of fasciculation or scalloping.  The patient's sternocleidomastoid and trapezius muscle strength was full bilaterally.  The patient had no significant evidence of anterocollis or retrocollis.  Speech/Language  The patient's speech was not completely fluent and non-effortful.  The patient's speech volume is within normal range and appropriate to the context.  The patient's speech rate is normal.  The patient's respirations are within normal range and appropriate to context.  The patient's speech timbre is normal.  The patient made articulation (segmental features) errors.  The patient has no speech dysdiadochokinesia with repetition of syllables such as "/PA/, /TA/, /KA/, /OM/".  The patient's pitch assessment showed normal range, intonation (Pitch Pattern), and variability.  The patient's tone was not emotionally limited, and tempo was rhythmic " "(e.g., "Once upon a midnight dreary, while I pondered, weak and weary, Over many a quaint and curious volume of forgotten ayden" and "That government of the people, by the people, for the people, shall not perish from the earth").  The patient's speech is not dysarthric.  The patient showed evidence of anomia.  The patient showed no evidence of anomia during spontaneous speech.  The patient showed evidence of anomia during confrontational naming.  The patient makes no phonological loop errors.  The patient makes no errors during the repetition of gibberish words (e.g., "Supercalifragilisticexpialidocious," "Pigglywiggly," "Woospiedoo," "Zowzy," "Bazinga").  The patient makes no errors during the repetition of complex meaningless phrases (e.g., "The horse raced past the barn fell.", "The complex houses  and single soldiers and their families," "Wishes are hopping, and trees are west," and "Brushing liked to negrita timothy's direction").  The patient can comprehend commands that cross the midline (e.g., with your left thumb, touch your right ear).  The patient can comprehend commands that depend on syntax (e.g., point to the ceiling after you point to the floor).  The patient has difficulty comprehending syntactically complex sentences.  The patient's speech is grammatically intact; (no function/semantic word substitutions, phonemic/semantic paraphasias, or binary confusion).  Motor  The patient's bilateral upper extremity muscle bulk is appropriate.  The patient's upper extremity muscle tone is increased.  The patient's bilateral upper extremity muscle tone does not suggest spasticity.  There is evidence of rigidity/cogwheeling.  The patient's bilateral upper extremity muscle tone has no evidence of paratonia.  There was no dystonia observed on examination.  Assessment of motor strength was symmetric and at minimal anti-gravity.  Deltoid L +5/5 R +5/5 Biceps L +5/5 R +5/5 Triceps L +5/5 R +5/5 Wrist extension L +5/5 " R +5/5 Finger abduction L +5/5 R +5/5 Hip flexion L +5/5 R +5/5 Hip extension L +5/5 R +5/5 Knee flexion L +5/5 R +5/5 Knee extension L +5/5 R +5/5 Ankle flexion L +5/5 R +5/5 Ankle extension L +5/5 R +5/5  There is no pronator or downward drift.  There is no myoclonus observed in the patient bilateral upper and lower extremities.  There are no fasciculations observed in the patient bilateral upper and lower extremities.  Coordination  The patient has no bilateral upper extremity limb dysmetria or past pointing on finger-nose-finger bilaterally.  The patient has no bilateral lower extremity limb dysmetria during shin rub.  The patient has no limb dysdiadochokinesia of the upper extremity on the pronation/supination test and screwing in a light bulb or lower extremity during tapping ball of each foot bilaterally.  The patient has no cerebellar rebound bilaterally.  The patient has a visible tremor.  The patient has no kinetic tremor bilaterally.  The patient has a postural tremor.  The patient has a resting tremor.  The patient has no evidence of interhemispheric motor control deficits.  The patient demonstrates evidence of motor overflow.  The patient demonstrates no alien limb phenomena.  The patient has no dyskinetic movements.  The patient has no akathisia.  The patient's upper extremity fine motor coordination was abnormal.  Higher Cortical Function  The patient had no hemineglect (e.g., line bisection/Braden's test).  The patient showed no evidence of simultanagnosia (Navon hierarchical letters).  The patient showed no evidence of visuospatial constructional dysfunction.  The patient showed no evidence of angular gyrus disconnection (insular-operculum).  The patient has no evidence of dysgraphia.  The patient showed no evidence of apraxia.  The patient showed no dysexecutive behavior.  Sensory  The patient's cortical sensory assessment demonstrated no neglect bilaterally.  The patient's sensation was diminished  to light touch, and vibratory sense.  Reflexes  Reflexes were symmetric and 2+ at biceps, 2+ triceps, and 2+ brachioradialis, 2+ at the knees bilaterally, there was no cross-abductor sign, 2+ in the bilateral ankles.  There were no pathological reflexes; No Babinski, bilateral plantar toes go down, no Jaw Jerk Reflex, No De La Torre, No Palmomental reflex, and No Palmar Grasp reflex.  There was no spreading/clonus.  Gait  The patient has normal posture sitting unaided.  The patient is unable to rise from a chair and sit back down without using their arms.  The patient's gait was normal.  The patient's stance while walking is normal.  The patient's gait speed (6 meters) was normal (70-80 F 1.13 m/s M 1.26 m/s, >80 F 0.94 m/sec, M 0.97 m/sec).  The patient's stride (gait cycle) was abnormal.  The patient's arms swing is abnormal.  The patient takes turns in >4 steps.  When attempting to walk abnormally (heels, tiptoes, tandem), the patient makes errors.  While walking on the patient's tiptoes for ten steps, the patient makes no deviations.  While walking on the patient's heels for ten steps, the patient makes no deviations.  While walking tandem for ten steps, the patient makes deviations.  While walking with eyes closed for ten steps, the patient makes deviations.  The patient has evidence of posture/balance impairment.            Functional Capacity Assessment: Neurological Wellbeing, Intelligence, and Social Evaluation:     Instrumental Activities of Daily Living:   Communal Operations: Moderate level of inability to perform independantly.  Finances: Moderate level of inability to perform independantly.  Housework: level of inability to perform independantly.  Personal Care: Borderline level of inability to perform independantly.  Personal Health: Mild level of inability to perform independantly.  Recreation: Mild level of inability to perform independantly.  Transportation: Mild level of inability to perform  independantly.  Basic Activities of Daily Living:   Axial Motor: Normal level of functional independance.  Grooming: Normal level of functional independance.  Hygeine: Normal level of functional independance.  Oropharngeal Motor: level of inability to perform independantly.  Personal Health: Normal level of functional independance.  Toiletry: Normal level of functional independance.  Functional Capacity Scales:   IADL: Score 3/8 suggestive of Mild dependence.  FAST: Score 3/16 suggestive of Mild Cognitive Impairment (Stage 3).  FAS: Score 9/30 suggestive of Definite Functional Impairment.  CDR-SOB: Score 3.5/18 suggestive of Mild cognitive impairment.  Global CDR: Score 0.5/3 suggestive of Questionable cognitive impairment.  Memory: 1  Orientation: 0.5  Judgment & Problem Solvin  Community Affairs: 0.5  Home and Hobbies: 0.5  Personal Care: 0            Neurocognitive Assessment:     Question Score Interpretation   Memory   Registration T1 (3) 2/3 Borderline Impairment based on age and education.   Registration T1 (5) 2 Within Normal Limits.   Registration T1 (9) 2/9 Moderate Impairment: -2.5 STDs below the average score based on age and education.   Registration T2 (9) 4/9 Mild Impairment: -1.8 STDs below the average score based on age and education.   Registration T3 (9) 4/9 Moderate Impairment: -2.8 STDs below the average score based on age and education.   Registration T4 (9) 5/9 Moderate Impairment: -2.5 STDs below the average score based on age and education.   Registration T5 (9) 5/9 Moderate Impairment: -2.5 STDs below the average score based on age and education.   Delayed Recall 30 sec (9) 4/9 Moderate Impairment: -2.1 STDs below the average score based on age and education.   Delayed Recall 10 min (3) 3/3 Within Normal Limits.   Delayed Recall 10 min (5) 5/5 Within Normal Limits.   Delayed Recall 10 min (9) 5/9 Borderline Impairment: -0.7 STDs below the average score based on age and education.    Delayed Recall Cued (9) 8/9 Within Normal Limits.   Delayed Recognition (9) 9/9 Within Normal Limits.   Complex Figure Recall 12/17 Within Normal Limits.   Executive   Three-step command 3/3 Within Normal Limits.   Serial Sevens 3/3 Within Normal Limits.   WORLD Backward 5/5 Within Normal Limits.   Digit Span Backwards 3 Mild Impairment: -1.6 STDs below the average score based on age and education.   Digit Span - 2 2/2 Within Normal Limits.   Lexical Fluency - F 10 Mild Impairment: -1.2 STDs below the average score based on age and education.   Lexical Fluency - A 4 Moderate Impairment: -2.4 STDs below the average score based on age and education.   Lexical Fluency - S 11 Borderline Impairment: -1 STDs below the average score based on age and education.   Semantic Fluency - Animals 11 Mild Impairment: -1.8 STDs below the average score based on age and education.   Semantic Fluency - Vegetables 9 Moderate Impairment: -2.3 STDs below the average score based on age and education.   Trials-1 1/1 Within Normal Limits.   Visuospatial   3D Cube Copy 1/1 Within Normal Limits.   Clock Draw 3/3 Within Normal Limits.   Complex Figure Copy 15/17 Within Normal Limits.   Overlap Images Total 10/10 Within Normal Limits.   Overlap Images (Illusion) 10 Within Normal Limits.   Picture Synthesis 3/3 Within Normal Limits.   Pareidolia Total 20/20 Within Normal Limits.   Pareidolia (+Face) 7/10 Borderline Impairment based on age and education.   Pareidolia (+No Face) 10/10 Within Normal Limits.   Language   Naming-2 2/2 Within Normal Limits.   Naming-3 3/3 Within Normal Limits.   15-Item BNT 14/15 Within Normal Limits.   Repetition-1 1/1 Within Normal Limits.   Repetition-2 2/2 Within Normal Limits.   Repetition of Phrases 4/5 Borderline Impairment based on age and education.   Verbal Agility 5/6 Borderline Impairment based on age and education.   Following written command 1/1 Within Normal Limits.   Writing a complete sentence 1/1  Within Normal Limits.   Repeat & Point - Nonfluent  10/10 Within Normal Limits.   Repeat & Point - Semantics 9/10 Borderline Impairment based on age and education.   Abstraction 2/2 Within Normal Limits.   Attention   Orientation-10 10/10 Within Normal Limits.   Orientation-6 6/6 Within Normal Limits.   Digit Span Forwards 8 Within Normal Limits.   Alternating Sequence 1/1 Within Normal Limits.   Social   FTLD Questionnaire  20/20 Within Normal Limits.   Theory of Mind Cartoon 1/1 Within Normal Limits.   Motor/Sensory   Cortical Sensory Neglect 18/20 Borderline Impairment based on age and education.   Limb-Kinetic Apraxia 10/10 Within Normal Limits.     Clinical Impression of Neurocognitive Assessment: Executive predominant multidomain major cognitive impairment.          Laboratories:     Metabolic Screening  Name Reference Previous Values   Cholesterol Total 120 - 199 mg/dL 117 (L)   2023-03-23   102 (L)   2024-01-26   121   2025-01-25      Triglycerides 30 - 150 mg/dL 81   2023-03-23   70   2024-01-26   121   2025-01-25      HDL 40 - 75 mg/dL 39 (L)   2023-03-23   35 (L)   2024-01-26   41   2025-01-25      LDL Cholesterol 63.0 - 159.0 mg/dL 61.8 (L)   2023-03-23   53.0 (L)   2024-01-26   55.8 (L)   2025-01-25      HDL/Cholesterol Ratio 20.0 - 50.0 % 33.3   2023-03-23   34.3   2024-01-26   33.9   2025-01-25      Total Cholesterol/HDL Ratio 2.0 - 5.0 3.0   2023-03-23   2.9   2024-01-26   3.0   2025-01-25      Non-HDL Cholesterol mg/dL 78   2023-03-23   67   2024-01-26   80   2025-01-25      Hemoglobin A1C External 4.0 - 5.6 % 5.2   2023-03-23   5.0   2025-01-25      Estimated Avg Glucose 68 - 131 mg/dL 103   2023-03-23   97   2025-01-25      BNP 0 - 99 pg/mL 42.7   2024-12-22      Glucose 70 - 110 mg/dL 76   2025-04-03   146 (H)   2025-07-18   140 (H)   2025-07-19      BUN 8 - 23 mg/dL 16   2025-04-03   19   2025-07-18   21   2025-07-19      Albumin 3.5 - 5.2 g/dL 4.1   2025-04-03    3.9   2025-07-18   3.7   2025-07-19      Lipase 4 - 60 U/L 58   2025-07-18      Neurodegenerative Biomarkers - Serum  Name Reference Previous Values   Abeta42  45   2025-01-25      Neurofilament Light Chain, Plasma <=37.9 pg/mL 18.0   2025-01-25   35.2   2025-04-03      Phospho-Tau (181P) <0.97 pg/mL 1.08 (H)   2025-01-25      Neurodegenerative Biomarkers - Serum   Name Reference Previous Values   Abeta40 <1000 339   2025-01-25      Neuroendocrine/Electrolyte Screening  Name Reference Previous Values   TSH 0.400 - 4.000 uIU/mL 1.932   2023-03-23   1.500   2025-01-25   2.116   2025-04-03      Sodium 136 - 145 mmol/L 140   2025-04-03   137   2025-07-18   138   2025-07-19      Potassium 3.5 - 5.1 mmol/L 4.0   2025-04-03   3.7   2025-07-18   3.7   2025-07-19      Chloride 95 - 110 mmol/L 104   2025-04-03   104   2025-07-18   107   2025-07-19      CO2 23 - 29 mmol/L 28   2025-04-03   24   2025-07-18   21 (L)   2025-07-19      Creatinine 0.5 - 1.4 mg/dL 0.8   2025-04-03   0.9   2025-07-18   0.8   2025-07-19      Calcium 8.7 - 10.5 mg/dL 9.5   2025-04-03   8.3 (L)   2025-07-18   8.2 (L)   2025-07-19      Anion Gap 8 - 16 mmol/L 8   2025-04-03   9   2025-07-18   10   2025-07-19      Magnesium 1.6 - 2.6 mg/dL 1.6   2025-04-03   1.2 (L)   2025-07-19      Neuro-Nutritional Screening  Name Reference Previous Values   Vitamin B12 180 - 914 ng/L 441   2025-01-25      Thiamine 38 - 122 ug/L 105   2025-01-25      PROTEIN TOTAL 6.0 - 8.4 g/dL 7.1   2025-04-03   7.0   2025-07-18   6.8   2025-07-19      Folate 4.0 - 24.0 ng/mL 11.8   2025-04-03      Vitamin B12 Assay, S 180 - 914 ng/L 359   2025-04-03      Homocysteine 4.0 - 15.5 umol/L 11.6   2025-04-03      Methylmalonic Acid 69 - 390 nmol/L 0.22   2025-04-03      Neuro-Infectious Screening  Name Reference Previous Values   SARS Coronavirus 2 Antigen Negative Negative   2024-08-01      Neurodegenerative Biomarkers - Skin  Name Reference  Previous Values   Phosphorylation Alpha-Synuclein - Posterior Cervical (Right) normal Normal   2025-05-29      Phosphorylation Alpha-Synuclein - Distal Thigh (Right) normal Normal   2025-05-29      Phosphorylation Alpha-Synuclein - Distal Leg (Right) normal Normal   2025-05-29      Hematology Screening - Serum  Name Reference Previous Values   WBC 3.90 - 12.70 K/uL 5.61   2025-04-03   4.66   2025-05-05   5.04   2025-07-18   4.36   2025-07-19      RBC 4.00 - 5.40 M/uL 4.89   2025-04-03   4.67   2025-05-05   4.73   2025-07-18   4.52 (L)   2025-07-19      Hemoglobin 12.0 - 16.0 g/dL 14.8   2025-04-03   14.5   2025-05-05   14.5   2025-07-18   14.0   2025-07-19      Hematocrit 37.0 - 48.5 % 44.7   2025-04-03   43.1   2025-05-05   43.2   2025-07-18   40.8   2025-07-19      MCV 82 - 98 fL 91   2025-04-03   92   2025-05-05   91   2025-07-18   90   2025-07-19      MCH 27.0 - 31.0 pg 30.3   2025-04-03   31.0   2025-05-05   30.7   2025-07-18   31.0   2025-07-19      MCHC 32.0 - 36.0 g/dL 33.1   2025-04-03   33.6   2025-05-05   33.6   2025-07-18   34.3   2025-07-19      RDW 11.5 - 14.5 % 12.5   2025-04-03   13.3   2025-05-05   12.7   2025-07-18   12.9   2025-07-19      Platelet Count 150 - 450 K/uL 145 (L)   2025-04-03   130 (L)   2025-05-05   142 (L)   2025-07-18   135 (L)   2025-07-19      MPV 9.2 - 12.9 fL 11.8   2025-04-03   11.6   2025-05-05   11.8   2025-07-18   12.0   2025-07-19      Liver Function Screening  Name Reference Previous Values   BILIRUBIN TOTAL 0.1 - 1.0 mg/dL 1.0   2025-04-03   1.2 (H)   2025-07-18   1.2 (H)   2025-07-19      ALP 55 - 135 U/L 62   2025-04-03   66   2025-07-18   62   2025-07-19      AST 10 - 40 U/L 22   2025-04-03   22   2025-07-18   20   2025-07-19      ALT 10 - 44 U/L 16   2025-04-03   22   2025-07-18   21   2025-07-19      Neuroinfectious Screening  Name Reference Previous Values   Hepatitis C Ab Negative    2025-07-18      HIV 1/2  Ag/Ab Non-reactive    2025-07-18      Standard Screening - CSF  Name Reference Previous Values   Wbc, Cell Count CSF 0 - 5 /cu mm 1   2025-05-06      RBC, Cell Count CSF 0 /cu mm 1 (H)   2025-05-06      Protein, CSF 15 - 40 mg/dL 62 (H)   2025-05-06      Glucose CSF 40 - 70 mg/dL 52   2025-05-06      Autoimmune Screening - Serum  Name Reference Previous Values   AGNA-1, S Negative Negative   2025-04-03      Amphiphysin Ab, S Negative Negative   2025-04-03      SOILA-1, S Negative Negative   2025-04-03      SOILA-2, S Negative Negative   2025-04-03      SOILA-3, S Negative Negative   2025-04-03      CRMP-5-IgG, S Negative Negative   2025-04-03      GAD65 Ab Assay, S <=0.02 nmol/L 0.04 (H)   2025-04-03      PCA-2, S Negative Negative   2025-04-03      PCA-Tr, S Negative Negative   2025-04-03      NMDA-R Ab CBA, S Negative Negative   2025-04-03      AMPA-R Ab CBA, S Negative Negative   2025-04-03      ANNA MARIE-B-R Ab CBA, S Negative Negative   2025-04-03      LGI1-IgG CBA, S Negative Negative   2025-04-03      CASPR2-IgG CBA, S Negative Negative   2025-04-03      mGluR1 Ab IFA, S Negative Negative   2025-04-03      GFAP IFA, S Negative Negative   2025-04-03      DPPX Ab CBA, S Negative Negative   2025-04-03      IgLON5 CBA, S Negative Negative   2025-04-03      NEUROCHONDRIN, IFA, S Negative Negative   2025-04-03      Phosphodesterase 10A (PDE10A) IgG, Serum Negative Negative   2025-04-03      TRIM46 Ab, IFA, Serum Negative Negative   2025-04-03      IgG 650 - 1600 mg/dL 1 (L)   2025-04-03      Neuro-Inflammatory Screening - Serum  Name Reference Previous Values   NIF IFA, S Negative Negative   2025-04-03      Neurodegenerative Biomarkers - CSF  Name Reference Previous Values   Abeta 42/40 Ratio >=0.073 0.159   2025-04-03                Neurological Relevant Procedures:    Electrocardiogram performed on 12/22/2024  Formal Interpretation: Vent. Rate : 71 BPM Atrial Rate : 71 BPM P-R Int : 144 ms  QRS Dur : 94 ms QT Int : 364 ms P-R-T Axes : 49 -11 degrees QTcB Int : 395 ms  Provider Interpretation: The electrocardiogram (ECG) shows a normal sinus rhythm. There is evidence of an inferior infarct, but the age of the infarct cannot be determined. Overall, the ECG is abnormal.            Neurologically Relevant Imaging:    NM PET+CT Scan Ltd, Brain, F18 Flutemetamol (Amyloid-Plaque) performed on 01/30/2025  Radiologist Interpretation: Positive scan indicating moderate to frequent amyloid neuritic plaque deposition.  Provider Interpretation: The radiographic interpretation indicates multifocal radiotracer uptake throughout the cerebral cortical gray matter. There is also reduced gray-white matter contrast involving the bilateral frontal, parietal, occipital, and temporal lobes.  MRI brain/head without contrast performed on 01/29/2025  Radiologist Interpretation: Interval development of a at least for new foci of susceptibility left parasagittal frontal lobe, left anterior temporal lobe, right occipital lobe and right anterior body corpus callosum as detailed above no significant edema signal associated with these foci. This is superimposed on scattered superficial siderosis and numerous punctate foci of susceptibility throughout the cerebral hemispheres concerning for amyloid angiopathy. No restricted diffusion to suggest acute infarction. No new extra-axial collection Scattered T2 FLAIR signal hyperintensity supratentorial white matter which may be sequela of chronic ischemic change with remote left occipital infarction.  Provider Interpretation: The radiographic interpretation reveals mild generalized cortical atrophy with slight regional atrophy in the posterior parietal region. There is encephalomalacia of the left occipital pole, accompanied by scattered subcortical white matter changes in the external watershed territories. Additionally, there are scattered subcortical microbleeds and superficial siderosis,  findings that are consistent with cerebral amyloid angiopathy.  T1-weighted (T1W) Image Summary: The radiographic interpretation reveals mild generalized cortical atrophy without evidence of clear focal volume loss. There is bilateral hippocampal volume loss noted. The corpus callosum, midbrain, and brainstem appear to have an intact volume ratio.  FLAIR/T2-weighted (T2W) Image Summary: The radiographic interpretation indicates subcortical white matter changes with midline periventricular capping in the posterior frontal region. There are white matter hyperintensities that extend into the cortex, accompanied by encephalomalacia of the left occipital pole. Additionally, there are scattered secondary white matter hyperintensities extending to the centrum semiovale.  Diffusion Weighted Imaging with ADC Mapping Summary: There are no significant hyperintensities or hypointensities observed on Diffusion-Weighted Imaging (DWI). Additionally, there is no apparent correlation with the Apparent Diffusion Coefficient (ADC).  Susceptibility-weighted imaging (SWI) and/or GRE Summary: The radiographic interpretation indicates the presence of scattered punctate foci of susceptibility within the cerebral subcortical white matter. There has been an interval development of a new punctate focus within the left temporal lobe, the right anterior body of the corpus callosum, the right occipital lobe, and the left parasagittal frontal lobe.            Clinical Summary/Interpretation:    Neurobehavioral/Neuropsychiatric Evaluation Interpretation: The observed abnormalities in the review of systems suggest neurocognitive deficits primarily associated with dysfunctions in cortical and subcortical regions. Memory issues, both general and short-term, as well as working memory difficulties, indicate possible involvement of the hippocampus and prefrontal cortex. Anomia and word-finding difficulties point to language network pathologies,  potentially in the left temporal lobe. Deficits in attention, concentration, and alertness, alongside mood instability and emotional lability, suggest disruptions in the frontal-subcortical circuits. Gait disturbances, bradykinesia, and fine motor skill impairments might be linked to basal ganglia dysfunction, while issues such as organization, self-control, and error detection may reflect broader prefrontal cortex and executive network challenges.  BEHAV5+: Score 1/5 indicates minor behavioral symptoms which align with observed neuropsychiatric findings.  Neurocognitive/Cognition-Focused Evaluation Interpretation: Executive predominant multidomain major cognitive impairment. Assessment completed in April 2025  MMSE 28/30: Score suggestive of normal cognitive function to borderline cognitive impairment.  MOCA 29/30: Score suggestive of normal cognitive function to borderline cognitive impairment.  Neurological Examination Interpretation: The neurological examination revealed deficits in arousal and attention, indicating potential dysfunction in the frontal and parietal cortical regions, which are crucial for maintaining alertness and focus. Impaired fluency and articulation suggest involvement of the left hemisphere's language networks, possibly affecting Broca's area, while thought disorder and impaired insight may be linked to frontal lobe dysfunction. Bradykinesia, resting tremor, motor overflow, and difficulty with gait and posture are characteristic of basal ganglia and cerebellar network pathologies, reflecting subcortical impairments. Sensory ataxia and eyelid apraxia point towards disruptions in sensory pathways and motor planning regions, with syntactically complex comprehension difficulties further indicating parietal-temporal language network involvement.  Neurological Imaging Summary:  NM PET+CT Scan Ltd, Brain, F18 Flutemetamol (Amyloid-Plaque) performed on 01/30/2025  Provider Interpretation: The  radiographic interpretation indicates multifocal radiotracer uptake throughout the cerebral cortical gray matter. There is also reduced gray-white matter contrast involving the bilateral frontal, parietal, occipital, and temporal lobes.  Radiologist Interpretation: Positive scan indicating moderate to frequent amyloid neuritic plaque deposition.  MRI brain/head without contrast performed on 01/29/2025  Provider Interpretation: The radiographic interpretation reveals mild generalized cortical atrophy with slight regional atrophy in the posterior parietal region. There is encephalomalacia of the left occipital pole, accompanied by scattered subcortical white matter changes in the external watershed territories. Additionally, there are scattered subcortical microbleeds and superficial siderosis, findings that are consistent with cerebral amyloid angiopathy.  Radiologist Interpretation: Interval development of a at least for new foci of susceptibility left parasagittal frontal lobe, left anterior temporal lobe, right occipital lobe and right anterior body corpus callosum as detailed above no significant edema signal associated with these foci. This is superimposed on scattered superficial siderosis and numerous punctate foci of susceptibility throughout the cerebral hemispheres concerning for amyloid angiopathy. No restricted diffusion to suggest acute infarction. No new extra-axial collection Scattered T2 FLAIR signal hyperintensity supratentorial white matter which may be sequela of chronic ischemic change with remote left occipital infarction.    Billing Statement:       I performed this consultation using real-time Telehealth tools, including a live video connection between my location and the patient's location (their home within the [facility]). Prior to initiating the consultation, I obtained informed verbal consent to perform this consultation using Telehealth tools and answered all the questions about  the Telehealth interaction. The participants understood that only a limited neurological exam and limited neuropsychological testing could be performed using Telehealth tools.     I spent a total of 45 minutes (from 11:00 AM to 11:45 AM) on 08/04/2025, engaging in person in a face-to-face consultation with the patient. More than 50% of this time was devoted to counseling on symptoms, treatment plans, risks, therapeutic options, lifestyle modifications, and safety concerns related to the above diagnoses.     A Review of Systems was completed, encompassing 10 of the 14 systems. All findings were negative, except those noted in the History of Present Illness (HPI) and Review of Systems (ROS) Sections. The systems reviewed were Constitutional (Const), Eyes, Ear/Nose/Throat (ENT), Respiratory (Resp), Cardiovascular (CV), Gastrointestinal (GI), Genitourinary (), Musculoskeletal (MSK), Skin, and Neurological (Neuro).     I spent a total of 10 minutes to reviewing previous laboratory results on the day of the clinical evaluation. This review was an integral part of the face-to-face encounter. The laboratory findings were predominantly negative, with exceptions as noted in the History of Present Illness (HPI) and Assessment.     I spent a total of 5 minutes reviewing and summarizing records from outside physicians on the day of the clinical evaluation. This review and summary were conducted as described in the History of Present Illness (HPI) and Assessment.     I performed a neurobehavioral status examination on the day of clinical evaluation that included a clinical assessment of thinking, reasoning, and judgment to ensure a comprehensive approach in managing the complex and evolving needs of the patient's neurocognitive condition. Please see above HPI and ROS for full details. This exam was performed on the day of clinical evaluation and included 16 minutes spent on direct face-to-face clinical observation and interview  with the patient and 18 minutes spent interpreting test results and preparing the report. The total time of 34 minutes spent on the neurobehavioral status examination is not included in the time spent on evaluation and management coding.     Total Billing time spent on encounter/documentation for this patient's evaluation and management, not including the Neurobehavioral Status Examination: 44 minutes.           This note was dictated using the M*Modal Fluency Direct voice recognition program. Please be aware that word recognition errors may occasionally occur and might be overlooked during review. [id number]        Signing Physician:  Sung Parisi MD

## 2025-08-05 ENCOUNTER — OFFICE VISIT (OUTPATIENT)
Facility: CLINIC | Age: 80
End: 2025-08-05
Payer: MEDICARE

## 2025-08-05 ENCOUNTER — PATIENT MESSAGE (OUTPATIENT)
Dept: NEUROLOGY | Facility: CLINIC | Age: 80
End: 2025-08-05
Payer: MEDICARE

## 2025-08-05 DIAGNOSIS — K29.70 GASTRITIS, PRESENCE OF BLEEDING UNSPECIFIED, UNSPECIFIED CHRONICITY, UNSPECIFIED GASTRITIS TYPE: ICD-10-CM

## 2025-08-05 DIAGNOSIS — G21.4 VASCULAR PARKINSONISM: ICD-10-CM

## 2025-08-05 DIAGNOSIS — E85.4 CEREBRAL AMYLOID ANGIOPATHY: ICD-10-CM

## 2025-08-05 DIAGNOSIS — I68.0 CEREBRAL AMYLOID ANGIOPATHY: ICD-10-CM

## 2025-08-05 DIAGNOSIS — F02.80 ALZHEIMER DISEASE: ICD-10-CM

## 2025-08-05 DIAGNOSIS — M54.2 CERVICALGIA: ICD-10-CM

## 2025-08-05 DIAGNOSIS — I61.1 NONTRAUMATIC CORTICAL HEMORRHAGE OF RIGHT CEREBRAL HEMISPHERE: ICD-10-CM

## 2025-08-05 DIAGNOSIS — J63.4 SUPERFICIAL SIDEROSIS PRESENT ON MAGNETIC RESONANCE IMAGING: ICD-10-CM

## 2025-08-05 DIAGNOSIS — G30.9 ALZHEIMER DISEASE: ICD-10-CM

## 2025-08-05 DIAGNOSIS — G31.84 MCI (MILD COGNITIVE IMPAIRMENT): Primary | ICD-10-CM

## 2025-08-07 ENCOUNTER — RESEARCH ENCOUNTER (OUTPATIENT)
Dept: RESEARCH | Facility: HOSPITAL | Age: 80
End: 2025-08-07
Payer: MEDICARE

## 2025-08-07 ENCOUNTER — OFFICE VISIT (OUTPATIENT)
Dept: NEUROLOGY | Facility: CLINIC | Age: 80
End: 2025-08-07
Payer: MEDICARE

## 2025-08-07 ENCOUNTER — HOSPITAL ENCOUNTER (OUTPATIENT)
Dept: RADIOLOGY | Facility: HOSPITAL | Age: 80
Discharge: HOME | End: 2025-08-07
Attending: PSYCHIATRY & NEUROLOGY
Payer: MEDICARE

## 2025-08-07 VITALS
DIASTOLIC BLOOD PRESSURE: 68 MMHG | HEIGHT: 67 IN | RESPIRATION RATE: 17 BRPM | WEIGHT: 219.38 LBS | BODY MASS INDEX: 34.43 KG/M2 | HEART RATE: 61 BPM | TEMPERATURE: 98 F | SYSTOLIC BLOOD PRESSURE: 129 MMHG

## 2025-08-07 DIAGNOSIS — Z00.6 RESEARCH STUDY PATIENT: ICD-10-CM

## 2025-08-07 DIAGNOSIS — E85.4 CEREBRAL AMYLOID ANGIOPATHY: ICD-10-CM

## 2025-08-07 DIAGNOSIS — I68.0 CEREBRAL AMYLOID ANGIOPATHY (CODE): ICD-10-CM

## 2025-08-07 DIAGNOSIS — Z00.6 RESEARCH EXAM: Primary | ICD-10-CM

## 2025-08-07 DIAGNOSIS — I68.0 CEREBRAL AMYLOID ANGIOPATHY: ICD-10-CM

## 2025-08-07 LAB
CLARITY CSF: ABNORMAL
COLOR CSF: ABNORMAL
CSF TUBE NUMBER (OHS): 3
CSF TUBE NUMBER (OHS): 3
EOSINOPHIL NFR CSF MANUAL: 3 %
GLUCOSE CSF-MCNC: 54 MG/DL (ref 40–70)
LYMPHOCYTES NFR CSF MANUAL: 41 % (ref 40–80)
MONOS+MACROS NFR CSF MANUAL: 16 % (ref 15–45)
NEUTROPHILS NFR CSF MANUAL: 39 %
PROT CSF-MCNC: 87 MG/DL (ref 15–40)
RBC # CSF: ABNORMAL /CU MM
SPECIMEN VOL CSF: 2 ML
WBC # CSF: 9 /CU MM

## 2025-08-07 PROCEDURE — 89051 BODY FLUID CELL COUNT: CPT | Performed by: PSYCHIATRY & NEUROLOGY

## 2025-08-07 PROCEDURE — 84157 ASSAY OF PROTEIN OTHER: CPT | Performed by: PSYCHIATRY & NEUROLOGY

## 2025-08-07 PROCEDURE — 82945 GLUCOSE OTHER FLUID: CPT | Performed by: PSYCHIATRY & NEUROLOGY

## 2025-08-07 PROCEDURE — 63600175 PHARM REV CODE 636 W HCPCS: Performed by: PSYCHIATRY & NEUROLOGY

## 2025-08-07 PROCEDURE — 99213 OFFICE O/P EST LOW 20 MIN: CPT | Mod: PBBFAC | Performed by: PSYCHIATRY & NEUROLOGY

## 2025-08-07 RX ORDER — LIDOCAINE HYDROCHLORIDE 10 MG/ML
5 INJECTION, SOLUTION INFILTRATION; PERINEURAL ONCE
Status: COMPLETED | OUTPATIENT
Start: 2025-08-07 | End: 2025-08-07

## 2025-08-07 RX ADMIN — LIDOCAINE HYDROCHLORIDE 5 ML: 10 INJECTION, SOLUTION INFILTRATION; PERINEURAL at 01:08

## 2025-08-07 NOTE — H&P
Radiology History & Physical      SUBJECTIVE:     Chief Complaint: CAA    History of Present Illness:  Patrick Short is a 80 y.o. male presents for lumbar puncture per cAPPricorn-1 trial   Past Medical History:   Diagnosis Date    CAD (coronary artery disease) 07/26/2016    3 stents    Cerebral amyloid angiopathy     Cerebral ischemic stroke due to global hypoperfusion with watershed infarct 10/10/2022    Heart block     MI (myocardial infarction)     Parkinsonism     Primary hypertension 11/21/2022    Reflux     Vasovagal syncope 2/11/2025     Past Surgical History:   Procedure Laterality Date    ANGIOGRAM, CORONARY, WITH LEFT HEART CATHETERIZATION N/A 12/23/2024    Procedure: Angiogram, Coronary, with Left Heart Cath;  Surgeon: Sung Mendoza MD;  Location: [facility] CATH LAB;  Service: Cardiology;  Laterality: N/A;    CARDIAC CATHETERIZATION      EYE SURGERY Left 02/2017    HIP SURGERY Right 12/2021    INSERTION OF IMPLANTABLE LOOP RECORDER N/A 1/24/2023    Procedure: Insertion, Implantable Loop Recorder;  Surgeon: ALBERTO Roth MD;  Location: [facility] EP LAB;  Service: Cardiology;  Laterality: N/A;  CHERIE LOPEZ MDT, [facility], , 3 Prep    LEFT HEART CATHETERIZATION Left 12/23/2024    Procedure: Left heart cath;  Surgeon: Sung Mendoza MD;  Location: [facility] CATH LAB;  Service: Cardiology;  Laterality: Left;       Home Meds:   Prior to Admission medications    Medication Sig Start Date End Date Taking? Authorizing Provider   amLODIPine (NORVASC) 2.5 MG Tab Take 3 tablets (7.5 mg total) by mouth once daily. 7/2/25 7/2/26  Nicko Ellis MD   aspirin 81 MG Chew Take 1 tablet (81 mg total) by mouth once daily. 11/10/20 8/7/25  Anant Lynch MD   atorvastatin (LIPITOR) 40 MG tablet Take 1 tablet (40 mg total) by mouth once daily. 5/14/25   Fany Harding MD   carbidopa-levodopa  mg (SINEMET)  mg per tablet TAKE 1 TABLET BY MOUTH THREE TIMES A DAY  Patient not taking: Reported on 8/7/2025  5/26/25   Frantz Bee MD   coQ10, ubiquinol, 100 mg Cap Take by mouth.    Provider, Historical   finasteride (PROSCAR) 5 mg tablet Take 1 tablet (5 mg total) by mouth once daily. 5/14/25   Fany Harding MD   pantoprazole (PROTONIX) 40 MG tablet Take 1 tablet (40 mg total) by mouth once daily. 5/14/25   Fany Harding MD   telmisartan (MICARDIS) 80 MG Tab Take 1 tablet (80 mg total) by mouth once daily. 6/12/25 6/12/26  Nicko Ellis MD     Anticoagulants/Antiplatelets: no anticoagulation    Allergies: Review of patient's allergies indicates:  No Known Allergies  Sedation History:  no adverse reactions    Review of Systems:   Hematological: no known coagulopathies  Respiratory: no shortness of breath  Cardiovascular: no chest pain  Gastrointestinal: no abdominal pain  Genito-Urinary: no dysuria  Musculoskeletal: negative  Neurological: no TIA or stroke symptoms         OBJECTIVE:     Vital Signs (Most Recent)       Physical Exam:    General: no acute distress  Mental Status: alert and oriented to person, place and time  HEENT: normocephalic, atraumatic  Chest: unlabored breathing  Heart: regular heart rate  Abdomen: nondistended  Extremity: moves all extremities    ASSESSMENT/PLAN:     Sedation Plan: local  Patient will undergo: lumbar puncture per cAPPricorn-1 clinical trial guidelines    Hilton Rodriguez PA-C  Interventional Radiology

## 2025-08-07 NOTE — PROGRESS NOTES
Month 3    Study title: A Phase 2, Randomized, Double-blind, Placebo-controlled Study to Evaluate the Efficacy, Safety, Tolerability, and Pharmacodynamics of Intrathecally Administered ALN-CAROLYN in Patients with Cerebral Amyloid Angiopathy (CAA) (cAPPricorn-1)  Sponsor: [employer]  NCT# [id number]     : Nicko Ellis MD    IRB #: 2024.129    Date of Visit: 08/07/2025   Time of Exams: 11:15      Physical Exam      If Abnormal, document findings:   General Appearance Normal     Skin Normal        HEENT Normal        Respiratory Normal     Cardiovascular Normal     Gastrointestinal Normal     Musculoskeletal Normal     Dermatological Normal     Thyroid Normal     Lymph nodes Normal        Neurological Exam     Category: Mental Status    If Abnormal, document findings:   Level of consciousness (alertness) Normal     Orientation Abnormal, not clinically significant  date off by 1   Attention and concentration Normal        Language Normal     Memory Normal        Category: Meningeal signs    If Abnormal, document findings:   Nuchal rigidity, Kernigs sign, Brudzinskis sign, meningismus Normal        Category: Cranial nerves*  *2 optic, 3 oculomotor, 4 trochlear, 5 trigeminal, 6 abducens, 7 facial, 8 vestibulocochlear, 9 glossopharyngeal, 10 vagus, 11 spinal accessory, 12 hypoglossal.    If Abnormal, document findings:   Fundoscopic optic nerve examination Normal     Visual acuity Abnormal - Not Clinically Significant  20/30   Visual fields Normal        Pupillary light reflex Normal     Extraocular eye movements, including observation for nystagmus Normal     Facial sensation Normal     Facial muscle strength/symmetry Normal     Hearing Normal     Palatal movement/ uvula position Normal     Dysarthria Normal     Head rotation/shoulder elevation (sternocleinomastoid and trapezius) Normal     Tongue movement Normal        Category: Motor examination    If Abnormal, document findings:    Muscle bulk Normal     Tone, including examination for rigidity, spasticity, paratonia, with characterization Normal     Abnormal/involuntary movements, including tremor, myoclonus, dystonia, and other abnormal/involuntary motor activity, with characterization Normal     Strength testing Normal        Category: Reflex examination   If Abnormal, document findings:   Deep tendon reflexes Normal     Plantar (Babinski) reflex Normal     Primitive reflexes (frontal release signs) when appropriate Normal        Category: Sensory examination   If Abnormal, document findings:   Multimodality sensory examination Abnormal - Not Clinically Significant  Mild reduced vibration b/l LE      Category: Coordination examination   If Abnormal, document findings:   Dexterity (e.g., finger tapping) Abnormal - Not Clinically Significant      Rapid alternating movements Abnormal - Not Clinically Significant      Finger-to-nose and heel-to-shin testing or equivalent Normal           Category: Gait and balance examination    If Abnormal, document findings:   Stance Normal     Romberg test Normal     Casual gait evaluation Normal        Heel, toe, and tandem gait evaluation Normal        I have spent a total of 35 minutes in face-to-face encounter, which includes performance of the neurological and general examination(s)

## 2025-08-07 NOTE — PROGRESS NOTES
Month 3 Visit (Non-Dosing Visit)    Study title: A Phase 2, Randomized, Double-blind, Placebo-controlled Study to Evaluate the Efficacy, Safety, Tolerability, and Pharmacodynamics of Intrathecally Administered ALN-CAROLYN in Patients with Cerebral Amyloid Angiopathy (CAA) (cAPPricorn-1)  Sponsor: [employer]  NCT# [id number]     : Nicko Ellis MD    IRB #: [id number]    Subject ID: [id number]  Date of visit: 08/07/2025     Study Timeline  Double-Blind Treatment Period  04/02/2025 - ICF  04/17/2025 - Screening  05/05/2025 - Day -1 Visit   5/6/2025 - Day 1 Visit  05/07/2025 - Day 2 Visit  6/4/2025 - Month 1 Visit  6/17/2025 - Month 2 Phone Call  8/7/2025 - Month 3 Visit      Vital Signs (Weight, Height, BP, Heart Rate, Respiratory Rate, Temperature)   Triplicate ECG   Full Physical Examination - (Must be completed by Investigator)   Neurological assessment (including nondilated fundi) - (Must be completed by Investigator)   [place] - Suicide Severity Rating Scale (C-SSRS)   Repeatable Battery for the Assessment of Neuropsychological Status (RBANS)   Neuropsychiatric Inventory Questionnaire (NPI-Q)   Central Labs (Blood) - Clinical laboratory assessments; Blood plasma PD and exploratory biomarkers;  Plasma PK   Brain MRI    Lumbar Puncture    Central Labs (CSF) - CSF PK, CSF PD and exploratory biomarkers   Local Labs (CSF) - CSF for number of RBC and WBC (and differential), protein and glucose  Levels   Review for Adverse Advents    Review Concomitant Medications        VITAL SIGNS     Time collected 11:05       Weight (kg) 99.5   Height (cm) 170.2   BMI (kg/m2) 34.36       Blood pressure should be taken using the same arm at each visit.   Blood pressure measuring position    [x]Seated  []Supine   Blood pressure measuring arm [x]Right  []Left   Systolic blood pressure 129 mmHg   Diastolic blood pressure 68 mmHg   Heart rate  61 beats/min   Respiratory Rate  17 breaths/min   Temperature  location [] Oral   [x] Temporal  [] Tympanic  [] Axillary   Temperature (C) 36.6          Labs:   Lab Collection (Blood and Urine)  Were all protocol required labs obtained? Yes  Time collected: 12:04 (24-hour clock)  Performed by: [facility] lab   Lab Collection (CSF)  Were all protocol required labs obtained? Yes   Time CSF collection started: 13:11   Time CSF collection ended: 13:24      Lumbar Puncture:  Collection location: L3-L4 Intervertebral Space     Triplicate 12-Lead ECG:    Time ECG performed  12:13   Overall interpretation of ECG  If clinically significant, document as an adverse event Abnormal, not clinically significant     Time ECG performed  12:14   Overall interpretation of ECG  If clinically significant, document as an adverse event Abnormal, not clinically significant     Time ECG performed  12:14   Overall interpretation of ECG  If clinically significant, document as an adverse event Abnormal, not clinically significant     Assessments:    RBANS    Was the RBANS performed? Yes   Date of assessment: 8/7/2025       Who performed the RBANS? () []PI/Sub-I  [x][facility]   Method of Completion [x]Tablet (Preferred)  []Paper (Back-up)    First and Last Name Mariana Foster       NPI-Q (Patient-reported)    Was the NPI-Q performed? Yes   Date of assessment: 8/7/2025        Responses provided by   [x]Caregiver/Study Partner    Method of Completion [x]Tablet (Preferred)  []Paper (Back-up)     [place] - Suicide Severity Rating Scale (C-SSRS)     Was the C-SSRS performed? Yes   Date of assessment: 8/7/2025       Who performed the Trail Making Test ? () []PI/Sub-I  [x][facility]   Method of Completion [x]Tablet (Preferred)  []Paper (Back-up)    First and Last Name Mariana Foster       ADVERSE EVENTS  EMR and patient were surveyed for recent admissions and other adverse events.   ER visit for viral gastroenteritis on 07/18/2025. This has resolved.    PRIOR/CONCOMITANT MEDICATIONS  NOTE: all  prior/concomitant medication will be documented and maintained in patient-specific Concomitant Log in subject files  Patient stopped taking carbidopa-levodopa yesterday.    Visit Summary/Follow-up Items:  Patient was discharged home with his wife after the procedure and did not express any discomfort.  Patient was advised to contact CRC or PI with any study-related questions or concerns. Patient expressed understanding of information discussed.

## 2025-08-07 NOTE — PROCEDURES
Radiology Post-Procedure Note    Pre Op Diagnosis: CAA    Post Op Diagnosis: Same    Procedure: lumbar puncture    Procedure performed by: Hilton Rodriguez PA-C    Written Informed Consent Obtained: Yes    Specimen Removed: 15 mL CSF    Estimated Blood Loss: Minimal    Findings: Following written informed consent and sterile prep and drape, a 22 gauge spinal needle was inserted at L3 - L4 intralaminar space under fluoroscopic surveillance.  15 mL blood-tinged CSF removed and sent to the lab for further analysis.  There were no complications.    Patient tolerated procedure well.  See report for further details.    Hilton Rodriguez PA-C  Interventional Radiology    Statement Selected

## 2025-08-08 ENCOUNTER — HOSPITAL ENCOUNTER (OUTPATIENT)
Dept: RADIOLOGY | Facility: HOSPITAL | Age: 80
Discharge: HOME | End: 2025-08-08
Attending: PSYCHIATRY & NEUROLOGY
Payer: MEDICARE

## 2025-08-08 DIAGNOSIS — I68.0 CEREBRAL AMYLOID ANGIOPATHY (CODE): ICD-10-CM

## 2025-08-08 PROCEDURE — 70551 MRI BRAIN STEM W/O DYE: CPT | Mod: TC

## 2025-08-08 NOTE — PROGRESS NOTES
INFORMED CONSENT - Re-consent for Protocol Amendment 2    Study title: A Phase 2, Randomized, Double-blind, Placebo-controlled Study to Evaluate the Efficacy, Safety, Tolerability, and Pharmacodynamics of Intrathecally Administered ALN-CAROLYN in Patients with Cerebral Amyloid Angiopathy (CAA) (cAPPricorn-1)  Sponsor: [employer]  NCT# [id number]     : Nicko Ellis MD    IRB #: 2024.129    Subject ID: [id number]  Date consent obtained: 08/08/2025       Present for discussion:  Patient, Patient's spouse, and [facility]       Study Personnel Obtaining Consent Mariana Foster       Prior to the Informed Consent (IC) being signed, or any study protocol required data collection, testing, procedure, or intervention being performed, the following was done and/or discussed:  Patient was given a copy of the IC for review   Purpose of the study and qualifications to participate   Study design, Follow up schedule, and tests or procedures done at each visit  Confidentiality and HIPAA Authorization for Release of Medical Records for the research trial/ subject's rights/research related injury  Risk, Benefits, Alternative Treatments, Compensation and Costs  Participation in the research trial is voluntary and patient may withdraw at anytime  Contact information for study related questions    Patient verbalizes understanding of the above: Yes  Contact information for CRC and PI given to patient: Yes  Patient able to adequately summarize: the purpose of the study, the risks associated with the study, and all procedures, testing, and follow-ups associated with the study: Yes    The subject was provided with ample time to review the consent for consideration and ask questions related to this study. All questions were answered appropriately and to their satisfaction. The subject was able to verbalize understanding of the study and agreed to participate. The patient signed the IRB-approved version of the  consent form and was provided with a copy of the signed consent form, which includes the PI's contact information. Subject was also provided with the CRC's contact information. The original consent was filed into the subject's research study binder and a copy was scanned into electronic medical records (Epic).    Additional Informed Consent  Study Partner Consent Form Yes  Study Partner Name: Valeria Short  Relationship to Patient: Spouse      Patient was advised to contact CRC or PI with any study-related questions or concerns.   Patient/LAR expressed understanding of information discussed, and agreed to continue with the trial.

## 2025-08-10 DIAGNOSIS — N40.0 BENIGN PROSTATIC HYPERPLASIA, UNSPECIFIED WHETHER LOWER URINARY TRACT SYMPTOMS PRESENT: ICD-10-CM

## 2025-08-10 DIAGNOSIS — E78.5 DYSLIPIDEMIA: ICD-10-CM

## 2025-08-10 DIAGNOSIS — K21.00 GASTROESOPHAGEAL REFLUX DISEASE WITH ESOPHAGITIS, UNSPECIFIED WHETHER HEMORRHAGE: ICD-10-CM

## 2025-08-11 ENCOUNTER — CLINICAL SUPPORT (OUTPATIENT)
Dept: CARDIOLOGY | Facility: HOSPITAL | Age: 80
End: 2025-08-11
Attending: STUDENT IN AN ORGANIZED HEALTH CARE EDUCATION/TRAINING PROGRAM
Payer: MEDICARE

## 2025-08-11 ENCOUNTER — CLINICAL SUPPORT (OUTPATIENT)
Dept: CARDIOLOGY | Facility: HOSPITAL | Age: 80
End: 2025-08-11
Payer: MEDICARE

## 2025-08-11 DIAGNOSIS — I63.9 CEREBRAL INFARCTION, UNSPECIFIED: ICD-10-CM

## 2025-08-11 PROCEDURE — 93298 REM INTERROG DEV EVAL SCRMS: CPT | Mod: 26,GZ,, | Performed by: STUDENT IN AN ORGANIZED HEALTH CARE EDUCATION/TRAINING PROGRAM

## 2025-08-11 RX ORDER — PANTOPRAZOLE SODIUM 40 MG/1
40 TABLET, DELAYED RELEASE ORAL DAILY
Qty: 90 TABLET | Refills: 0 | Status: SHIPPED | OUTPATIENT
Start: 2025-08-11

## 2025-08-11 RX ORDER — ATORVASTATIN CALCIUM 40 MG/1
40 TABLET, FILM COATED ORAL DAILY
Qty: 90 TABLET | Refills: 0 | Status: SHIPPED | OUTPATIENT
Start: 2025-08-11

## 2025-08-11 RX ORDER — FINASTERIDE 5 MG/1
5 TABLET, FILM COATED ORAL DAILY
Qty: 90 TABLET | Refills: 0 | Status: SHIPPED | OUTPATIENT
Start: 2025-08-11

## 2025-08-15 ENCOUNTER — CLINICAL SUPPORT (OUTPATIENT)
Dept: REHABILITATION | Facility: HOSPITAL | Age: 80
End: 2025-08-15
Attending: PSYCHIATRY & NEUROLOGY
Payer: MEDICARE

## 2025-08-15 VITALS — HEART RATE: 73 BPM | DIASTOLIC BLOOD PRESSURE: 84 MMHG | SYSTOLIC BLOOD PRESSURE: 148 MMHG

## 2025-08-15 DIAGNOSIS — M54.2 NECK PAIN: Primary | ICD-10-CM

## 2025-08-15 PROCEDURE — 97161 PT EVAL LOW COMPLEX 20 MIN: CPT | Mod: PO

## 2025-08-15 PROCEDURE — 20561 NDL INSJ W/O NJX 3+ MUSC: CPT | Mod: PO

## 2025-08-17 LAB
OHS CV AF BURDEN PERCENT: < 1
OHS CV DC REMOTE DEVICE TYPE: NORMAL
OHS CV ICD SHOCK: NO

## 2025-08-18 PROBLEM — M54.2 NECK PAIN: Status: ACTIVE | Noted: 2025-08-18

## 2025-08-22 DIAGNOSIS — I68.0 CEREBRAL AMYLOID ANGIOPATHY (CODE): Primary | ICD-10-CM

## 2025-08-22 DIAGNOSIS — Z00.6 RESEARCH STUDY PATIENT: ICD-10-CM

## 2025-08-25 DIAGNOSIS — Z00.00 ENCOUNTER FOR MEDICARE ANNUAL WELLNESS EXAM: ICD-10-CM

## 2025-08-26 ENCOUNTER — RESEARCH ENCOUNTER (OUTPATIENT)
Dept: RESEARCH | Facility: HOSPITAL | Age: 80
End: 2025-08-26
Payer: MEDICARE

## 2025-08-26 DIAGNOSIS — Z00.6 RESEARCH STUDY PATIENT: ICD-10-CM

## 2025-08-26 DIAGNOSIS — I68.0 CEREBRAL AMYLOID ANGIOPATHY (CODE): Primary | ICD-10-CM

## (undated) DEVICE — KIT CUSTOM MANIFOLD

## (undated) DEVICE — COVER PROBE ADHESIVE 8X72IN

## (undated) DEVICE — OMNIPAQUE CONTRAST 350MG/100ML

## (undated) DEVICE — DRESSING AQUACEL FOAM 3 X 3

## (undated) DEVICE — CATH ANG PIGTAIL 4FR INFINITY

## (undated) DEVICE — TRAY CATH LAB OMC

## (undated) DEVICE — DRAPE OPTIMA MAJOR PEDIATRIC

## (undated) DEVICE — KIT GLIDESHEATH SLEND 6FR 10CM

## (undated) DEVICE — SPIKE SHORT LG BORE 1-WAY 2IN

## (undated) DEVICE — GUIDEWIRE EMERALD .035IN 260CM

## (undated) DEVICE — PAD DEFIB CADENCE ADULT R2

## (undated) DEVICE — ADHESIVE DERMABOND ADVANCED

## (undated) DEVICE — HEMOSTAT VASC BAND REG 24CM

## (undated) DEVICE — CATH INFINITI 4F JL4 .042X100

## (undated) DEVICE — DRAPE ANGIO BRACH 38X44IN

## (undated) DEVICE — SYR MARK 7 ARTERION 150ML

## (undated) DEVICE — HOLDER SHARPS SHORTSTOP

## (undated) DEVICE — CATH INFINITI JUDKINS JR4